# Patient Record
Sex: MALE | Race: WHITE | Employment: OTHER | URBAN - METROPOLITAN AREA
[De-identification: names, ages, dates, MRNs, and addresses within clinical notes are randomized per-mention and may not be internally consistent; named-entity substitution may affect disease eponyms.]

---

## 2018-12-12 ENCOUNTER — OFFICE VISIT (OUTPATIENT)
Dept: PRIMARY CARE CLINIC | Age: 68
End: 2018-12-12

## 2018-12-12 VITALS
RESPIRATION RATE: 18 BRPM | HEART RATE: 89 BPM | SYSTOLIC BLOOD PRESSURE: 115 MMHG | HEIGHT: 70 IN | WEIGHT: 190.6 LBS | OXYGEN SATURATION: 98 % | DIASTOLIC BLOOD PRESSURE: 71 MMHG | TEMPERATURE: 97.8 F | BODY MASS INDEX: 27.29 KG/M2

## 2018-12-12 DIAGNOSIS — J02.0 STREP PHARYNGITIS: ICD-10-CM

## 2018-12-12 DIAGNOSIS — R05.9 COUGH: ICD-10-CM

## 2018-12-12 DIAGNOSIS — J02.9 SORE THROAT: Primary | ICD-10-CM

## 2018-12-12 DIAGNOSIS — J00 ACUTE NASOPHARYNGITIS: ICD-10-CM

## 2018-12-12 LAB
S PYO AG THROAT QL: POSITIVE
VALID INTERNAL CONTROL?: YES

## 2018-12-12 RX ORDER — DICLOFENAC SODIUM 75 MG/1
TABLET, DELAYED RELEASE ORAL
COMMUNITY
Start: 2018-05-29 | End: 2019-02-01

## 2018-12-12 RX ORDER — AMLODIPINE BESYLATE 10 MG/1
10 TABLET ORAL DAILY
COMMUNITY

## 2018-12-12 RX ORDER — LISINOPRIL 20 MG/1
20 TABLET ORAL DAILY
COMMUNITY
Start: 2018-09-15 | End: 2020-01-01

## 2018-12-12 RX ORDER — HYDROCHLOROTHIAZIDE 12.5 MG/1
12.5 TABLET ORAL DAILY
COMMUNITY
End: 2019-01-23 | Stop reason: SDUPTHER

## 2018-12-12 RX ORDER — AMOXICILLIN 875 MG/1
875 TABLET, FILM COATED ORAL 2 TIMES DAILY
Qty: 20 TAB | Refills: 0 | Status: SHIPPED | OUTPATIENT
Start: 2018-12-12 | End: 2018-12-22

## 2018-12-12 RX ORDER — BENZONATATE 200 MG/1
200 CAPSULE ORAL
Qty: 21 CAP | Refills: 0 | Status: SHIPPED | OUTPATIENT
Start: 2018-12-12 | End: 2018-12-19

## 2018-12-12 RX ORDER — FINASTERIDE 5 MG/1
5 TABLET, FILM COATED ORAL DAILY
COMMUNITY
Start: 2018-12-11

## 2018-12-12 RX ORDER — TRAMADOL HYDROCHLORIDE 50 MG/1
50 TABLET ORAL
COMMUNITY
Start: 2017-10-09 | End: 2019-02-01

## 2018-12-12 NOTE — PROGRESS NOTES
Subjective:     Chief Complaint   Patient presents with    Cough     productive cough, yellow in color    Nasal Discharge    Sore Throat     started friday        He  is a 77y.o. year old male who presents today as a new patient for acute care. He walked in for acute care. He already has an established PCP. His medical hx is significant for HTN, AF, Hx of melanoma. Patient reports that for the past 5 days he has been having runny nose, sore throat, coughing up yellow colored phlegm . Reports that his wife was having the same symptoms last week and she is feeling little better now. He denies any fever, chill, soa. He reports that he tried Mucinex and zyrtec for few days but did not seem like help. He reports that his throat hurt especially at night . He reports that cough is intermittent, no chest pain,no  wheezing. A comprehensive review of systems was negative except for that written in the HPI. Objective:     Vitals:    12/12/18 1004   BP: 115/71   Pulse: 89   Resp: 18   Temp: 97.8 °F (36.6 °C)   TempSrc: Oral   SpO2: 98%   Weight: 190 lb 9.6 oz (86.5 kg)   Height: 5' 9.5\" (1.765 m)       Physical Examination: General appearance - alert, well appearing, and in no distress, oriented to person, place, and time and overweight  Mental status - alert, oriented to person, place, and time, normal mood, behavior, speech, dress, motor activity, and thought processes  Ears - bilateral TM's and external ear canals normal  Nose - normal and patent, no erythema, discharge or polyps and normal nontender sinuses  Mouth - mucous membranes moist, pharynx  inflamed  with no exudate. Neck - supple, no significant adenopathy  Chest - clear to auscultation, no wheezes, rales or rhonchi, symmetric air entry  Heart - normal rate, regular rhythm, normal S1, S2, no murmurs, rubs, clicks or gallops.     Allergies   Allergen Reactions    Demerol [Meperidine] Other (comments)     Duplicate, delete    Demerol [Meperidine] Other (comments)     \"passed out\"      Social History     Socioeconomic History    Marital status:      Spouse name: Not on file    Number of children: Not on file    Years of education: Not on file    Highest education level: Not on file   Tobacco Use    Smoking status: Former Smoker     Last attempt to quit: 1988     Years since quittin.9    Smokeless tobacco: Never Used   Substance and Sexual Activity    Alcohol use: Yes     Alcohol/week: 2.0 oz     Types: 4 Glasses of wine per week    Drug use: No      Family History   Problem Relation Age of Onset    Hypertension Father     Dementia Father       Past Surgical History:   Procedure Laterality Date    HX ABDOMINAL WALL DEFECT REPAIR      HX APPENDECTOMY      HX ORTHOPAEDIC  1966    CARTILEDGE REMOVED RIGHT KNEE    HX TONSILLECTOMY      HX UROLOGICAL      CYSTOSCOPY      Past Medical History:   Diagnosis Date    Cancer ([facility] [address]     BLADDER    Hypercholesterolemia     Hypertension     Joint replaced     Sun-damaged skin       Current Outpatient Medications   Medication Sig Dispense Refill    amLODIPine (NORVASC) 10 mg tablet Take  by mouth daily.  rivaroxaban (XARELTO) 20 mg tab tablet Take  by mouth daily.  diclofenac EC (VOLTAREN) 75 mg EC tablet TAKE 1 TABLET TWICE A DAY WITH MEALS      hydroCHLOROthiazide (HYDRODIURIL) 12.5 mg tablet Take 12.5 mg by mouth daily.  finasteride (PROSCAR) 5 mg tablet Take 5 mg by mouth daily.  lisinopril (PRINIVIL, ZESTRIL) 20 mg tablet Take 20 mg by mouth daily.  fenofibrate (TRICOR) 160 mg tablet Take 160 mg by mouth daily.  simvastatin (ZOCOR) 40 mg tablet Take  by mouth nightly.  traMADol (ULTRAM) 50 mg tablet Take 50 mg by mouth.  bupivacaine-EPINEPHrine (MARCAINE-EPINEPHRINE) 0.25 %-1:200,000 soln Mohs surgery 1.5 mL 0        Assessment/ Plan:   Diagnoses and all orders for this visit:    1. Sore throat  -     AMB POC RAPID STREP A is positive.      2. Strep pharyngitis  -    Start  amoxicillin (AMOXIL) 875 mg tablet; Take 1 Tab by mouth two (2) times a day for 10 days. 3. Cough  -    Start  benzonatate (TESSALON) 200 mg capsule; Take 1 Cap by mouth three (3) times daily as needed for Cough for up to 7 days. 4. Acute nasopharyngitis      - Humidifier, mist inhalation, Zicam lozenges. - Tylenol/advil , salt water gurgle. Medication risks/benefits/costs/interactions/alternatives discussed with patient. Advised patient to call back or return to office if symptoms worsen/change/persist. If patient cannot reach us or should anything more severe/urgent arise he/she should proceed directly to the nearest emergency department. Discussed expected course/resolution/complications of diagnosis in detail with patient. Patient given a written after visit summary which includes her diagnoses, current medications and vitals. Patient expressed understanding with the diagnosis and plan. Follow-up Disposition:  Return if symptoms worsen or fail to improve.

## 2019-01-01 ENCOUNTER — HOSPITAL ENCOUNTER (OUTPATIENT)
Dept: CT IMAGING | Age: 69
Discharge: HOME | End: 2019-12-16
Attending: NURSE PRACTITIONER
Payer: MEDICARE

## 2019-01-01 ENCOUNTER — ANESTHESIA (OUTPATIENT)
Dept: SURGERY | Age: 69
End: 2019-01-01
Payer: MEDICARE

## 2019-01-01 ENCOUNTER — TELEPHONE (OUTPATIENT)
Dept: PRIMARY CARE CLINIC | Age: 69
End: 2019-01-01

## 2019-01-01 ENCOUNTER — HOSPITAL ENCOUNTER (OUTPATIENT)
Age: 69
Setting detail: OUTPATIENT SURGERY
Discharge: HOME | End: 2019-12-06
Attending: SURGERY | Admitting: SURGERY
Payer: MEDICARE

## 2019-01-01 ENCOUNTER — TELEPHONE (OUTPATIENT)
Dept: SURGERY | Age: 69
End: 2019-01-01

## 2019-01-01 ENCOUNTER — HOSPITAL ENCOUNTER (OUTPATIENT)
Dept: LAB | Age: 69
Discharge: HOME | End: 2019-11-26

## 2019-01-01 ENCOUNTER — TELEPHONE (OUTPATIENT)
Dept: DERMATOLOGY | Facility: AMBULATORY SURGERY CENTER | Age: 69
End: 2019-01-01

## 2019-01-01 ENCOUNTER — DOCUMENTATION ONLY (OUTPATIENT)
Dept: ONCOLOGY | Age: 69
End: 2019-01-01

## 2019-01-01 ENCOUNTER — HOSPITAL ENCOUNTER (OUTPATIENT)
Dept: LAB | Age: 69
Discharge: HOME | End: 2019-12-16
Payer: MEDICARE

## 2019-01-01 ENCOUNTER — OFFICE VISIT (OUTPATIENT)
Dept: PRIMARY CARE CLINIC | Age: 69
End: 2019-01-01

## 2019-01-01 ENCOUNTER — OFFICE VISIT (OUTPATIENT)
Dept: DERMATOLOGY | Facility: AMBULATORY SURGERY CENTER | Age: 69
End: 2019-01-01

## 2019-01-01 ENCOUNTER — ANESTHESIA EVENT (OUTPATIENT)
Dept: SURGERY | Age: 69
End: 2019-01-01
Payer: MEDICARE

## 2019-01-01 ENCOUNTER — HOSPITAL ENCOUNTER (OUTPATIENT)
Dept: ULTRASOUND IMAGING | Age: 69
Discharge: HOME | End: 2019-11-22
Attending: FAMILY MEDICINE
Payer: MEDICARE

## 2019-01-01 ENCOUNTER — OFFICE VISIT (OUTPATIENT)
Dept: SURGERY | Age: 69
End: 2019-01-01

## 2019-01-01 ENCOUNTER — OFFICE VISIT (OUTPATIENT)
Dept: ONCOLOGY | Age: 69
End: 2019-01-01

## 2019-01-01 ENCOUNTER — HOSPITAL ENCOUNTER (OUTPATIENT)
Dept: MRI IMAGING | Age: 69
Discharge: HOME | End: 2019-12-16
Attending: NURSE PRACTITIONER
Payer: MEDICARE

## 2019-01-01 ENCOUNTER — HOSPITAL ENCOUNTER (OUTPATIENT)
Dept: LAB | Age: 69
Discharge: HOME | End: 2019-05-20

## 2019-01-01 VITALS
SYSTOLIC BLOOD PRESSURE: 138 MMHG | WEIGHT: 194.8 LBS | HEIGHT: 70 IN | SYSTOLIC BLOOD PRESSURE: 130 MMHG | BODY MASS INDEX: 27.2 KG/M2 | RESPIRATION RATE: 17 BRPM | HEIGHT: 70 IN | OXYGEN SATURATION: 97 % | TEMPERATURE: 98.2 F | DIASTOLIC BLOOD PRESSURE: 70 MMHG | DIASTOLIC BLOOD PRESSURE: 76 MMHG | RESPIRATION RATE: 16 BRPM | OXYGEN SATURATION: 96 % | TEMPERATURE: 97.8 F | HEART RATE: 82 BPM | BODY MASS INDEX: 27.89 KG/M2 | WEIGHT: 190 LBS | HEART RATE: 83 BPM

## 2019-01-01 VITALS
BODY MASS INDEX: 27.92 KG/M2 | OXYGEN SATURATION: 93 % | HEIGHT: 70 IN | WEIGHT: 195 LBS | RESPIRATION RATE: 16 BRPM | HEART RATE: 73 BPM | SYSTOLIC BLOOD PRESSURE: 169 MMHG | TEMPERATURE: 97.3 F | DIASTOLIC BLOOD PRESSURE: 79 MMHG

## 2019-01-01 VITALS
HEART RATE: 76 BPM | OXYGEN SATURATION: 97 % | DIASTOLIC BLOOD PRESSURE: 68 MMHG | WEIGHT: 186.4 LBS | BODY MASS INDEX: 27.61 KG/M2 | SYSTOLIC BLOOD PRESSURE: 152 MMHG | TEMPERATURE: 98 F | HEIGHT: 69 IN

## 2019-01-01 VITALS
HEIGHT: 70 IN | DIASTOLIC BLOOD PRESSURE: 76 MMHG | HEART RATE: 83 BPM | HEART RATE: 93 BPM | OXYGEN SATURATION: 98 % | BODY MASS INDEX: 27.77 KG/M2 | WEIGHT: 194 LBS | TEMPERATURE: 97.9 F | RESPIRATION RATE: 15 BRPM | OXYGEN SATURATION: 97 % | TEMPERATURE: 98.1 F | HEIGHT: 70 IN | DIASTOLIC BLOOD PRESSURE: 80 MMHG | WEIGHT: 188.6 LBS | RESPIRATION RATE: 16 BRPM | SYSTOLIC BLOOD PRESSURE: 148 MMHG | BODY MASS INDEX: 27 KG/M2 | SYSTOLIC BLOOD PRESSURE: 132 MMHG

## 2019-01-01 VITALS
HEART RATE: 116 BPM | TEMPERATURE: 97.9 F | WEIGHT: 184 LBS | OXYGEN SATURATION: 96 % | SYSTOLIC BLOOD PRESSURE: 94 MMHG | BODY MASS INDEX: 27.25 KG/M2 | DIASTOLIC BLOOD PRESSURE: 59 MMHG | HEIGHT: 69 IN | RESPIRATION RATE: 16 BRPM

## 2019-01-01 VITALS
WEIGHT: 185.7 LBS | OXYGEN SATURATION: 96 % | BODY MASS INDEX: 27.5 KG/M2 | SYSTOLIC BLOOD PRESSURE: 138 MMHG | BODY MASS INDEX: 27.25 KG/M2 | HEART RATE: 100 BPM | TEMPERATURE: 97.6 F | OXYGEN SATURATION: 95 % | WEIGHT: 187.2 LBS | SYSTOLIC BLOOD PRESSURE: 133 MMHG | TEMPERATURE: 97.9 F | DIASTOLIC BLOOD PRESSURE: 74 MMHG | HEART RATE: 77 BPM | RESPIRATION RATE: 16 BRPM | DIASTOLIC BLOOD PRESSURE: 78 MMHG | HEIGHT: 69 IN

## 2019-01-01 VITALS
SYSTOLIC BLOOD PRESSURE: 135 MMHG | TEMPERATURE: 97.7 F | BODY MASS INDEX: 26.48 KG/M2 | OXYGEN SATURATION: 98 % | RESPIRATION RATE: 16 BRPM | DIASTOLIC BLOOD PRESSURE: 79 MMHG | WEIGHT: 185 LBS | HEIGHT: 70 IN | HEART RATE: 76 BPM

## 2019-01-01 VITALS
OXYGEN SATURATION: 96 % | WEIGHT: 185 LBS | BODY MASS INDEX: 26.48 KG/M2 | SYSTOLIC BLOOD PRESSURE: 140 MMHG | TEMPERATURE: 98.1 F | DIASTOLIC BLOOD PRESSURE: 70 MMHG | HEIGHT: 70 IN | HEART RATE: 100 BPM | RESPIRATION RATE: 16 BRPM

## 2019-01-01 VITALS
BODY MASS INDEX: 26.8 KG/M2 | WEIGHT: 187.17 LBS | DIASTOLIC BLOOD PRESSURE: 71 MMHG | OXYGEN SATURATION: 92 % | HEIGHT: 70 IN | TEMPERATURE: 97.5 F | RESPIRATION RATE: 15 BRPM | SYSTOLIC BLOOD PRESSURE: 135 MMHG | HEART RATE: 76 BPM

## 2019-01-01 DIAGNOSIS — R19.01 ABDOMINAL WALL MASS OF RIGHT UPPER QUADRANT: Primary | ICD-10-CM

## 2019-01-01 DIAGNOSIS — C43.9 RECURRENT SKIN MELANOMA (HCC): ICD-10-CM

## 2019-01-01 DIAGNOSIS — D22.9 MULTIPLE BENIGN NEVI: ICD-10-CM

## 2019-01-01 DIAGNOSIS — L57.0 ACTINIC KERATOSES: ICD-10-CM

## 2019-01-01 DIAGNOSIS — I10 ESSENTIAL HYPERTENSION: Primary | ICD-10-CM

## 2019-01-01 DIAGNOSIS — R19.00 MASS OF SOFT TISSUE OF ABDOMEN: ICD-10-CM

## 2019-01-01 DIAGNOSIS — R19.7 NAUSEA VOMITING AND DIARRHEA: ICD-10-CM

## 2019-01-01 DIAGNOSIS — Z85.828 HISTORY OF NONMELANOMA SKIN CANCER: ICD-10-CM

## 2019-01-01 DIAGNOSIS — R22.2 ABDOMINAL WALL LUMP: ICD-10-CM

## 2019-01-01 DIAGNOSIS — D48.5 NEOPLASM OF UNCERTAIN BEHAVIOR OF SKIN OF BACK: Primary | ICD-10-CM

## 2019-01-01 DIAGNOSIS — I10 ESSENTIAL HYPERTENSION: ICD-10-CM

## 2019-01-01 DIAGNOSIS — R11.2 NAUSEA VOMITING AND DIARRHEA: ICD-10-CM

## 2019-01-01 DIAGNOSIS — C43.9 METASTATIC MELANOMA (HCC): ICD-10-CM

## 2019-01-01 DIAGNOSIS — E78.5 HYPERLIPIDEMIA, UNSPECIFIED HYPERLIPIDEMIA TYPE: ICD-10-CM

## 2019-01-01 DIAGNOSIS — C43.9 MALIGNANT MELANOMA, UNSPECIFIED SITE (HCC): ICD-10-CM

## 2019-01-01 DIAGNOSIS — D18.01 CHERRY ANGIOMA: ICD-10-CM

## 2019-01-01 DIAGNOSIS — Z86.79 HISTORY OF ATRIAL FIBRILLATION: ICD-10-CM

## 2019-01-01 DIAGNOSIS — R22.2 ABDOMINAL WALL LUMP: Primary | ICD-10-CM

## 2019-01-01 DIAGNOSIS — R25.1 TREMOR OF LEFT HAND: ICD-10-CM

## 2019-01-01 DIAGNOSIS — Z85.820 PERSONAL HISTORY OF MALIGNANT MELANOMA OF SKIN: ICD-10-CM

## 2019-01-01 DIAGNOSIS — K52.9 GASTROENTERITIS: Primary | ICD-10-CM

## 2019-01-01 DIAGNOSIS — E78.5 HYPERLIPIDEMIA, UNSPECIFIED HYPERLIPIDEMIA TYPE: Primary | ICD-10-CM

## 2019-01-01 DIAGNOSIS — Z85.828 HISTORY OF SKIN CANCER: Primary | ICD-10-CM

## 2019-01-01 DIAGNOSIS — L82.1 SEBORRHEIC KERATOSES: ICD-10-CM

## 2019-01-01 DIAGNOSIS — D48.5 NEOPLASM OF UNCERTAIN BEHAVIOR OF SKIN OF CHEEK: ICD-10-CM

## 2019-01-01 DIAGNOSIS — E78.2 MIXED HYPERLIPIDEMIA: ICD-10-CM

## 2019-01-01 DIAGNOSIS — C43.9 RECURRENT SKIN MELANOMA (HCC): Primary | ICD-10-CM

## 2019-01-01 DIAGNOSIS — C43.59 MALIGNANT MELANOMA OF TORSO EXCLUDING BREAST (HCC): Primary | ICD-10-CM

## 2019-01-01 DIAGNOSIS — I10 HYPERTENSION, UNSPECIFIED TYPE: ICD-10-CM

## 2019-01-01 DIAGNOSIS — L11.1 GROVER'S DISEASE: ICD-10-CM

## 2019-01-01 DIAGNOSIS — L91.0 HYPERTROPHIC SCAR: ICD-10-CM

## 2019-01-01 DIAGNOSIS — Z85.828 HISTORY OF SCC (SQUAMOUS CELL CARCINOMA) OF SKIN: ICD-10-CM

## 2019-01-01 DIAGNOSIS — D48.5 NEOPLASM OF UNCERTAIN BEHAVIOR OF SKIN OF CHEST: Primary | ICD-10-CM

## 2019-01-01 DIAGNOSIS — Z85.828 HISTORY OF BASAL CELL CARCINOMA (BCC): ICD-10-CM

## 2019-01-01 LAB
ALBUMIN SERPL-MCNC: 4.3 G/DL (ref 3.5–4.8)
ALBUMIN SERPL-MCNC: 4.8 G/DL (ref 3.5–4.8)
ALBUMIN SERPL-MCNC: 4.9 G/DL (ref 3.5–4.8)
ALBUMIN/GLOB SERPL: 2 {RATIO} (ref 1.2–2.2)
ALBUMIN/GLOB SERPL: 2.2 {RATIO} (ref 1.2–2.2)
ALBUMIN/GLOB SERPL: 2.3 {RATIO} (ref 1.2–2.2)
ALP SERPL-CCNC: 50 IU/L (ref 39–117)
ALP SERPL-CCNC: 55 IU/L (ref 39–117)
ALP SERPL-CCNC: 73 IU/L (ref 39–117)
ALT SERPL-CCNC: 21 IU/L (ref 0–44)
ALT SERPL-CCNC: 23 IU/L (ref 0–44)
ALT SERPL-CCNC: 33 IU/L (ref 0–44)
AST SERPL-CCNC: 18 IU/L (ref 0–40)
AST SERPL-CCNC: 25 IU/L (ref 0–40)
AST SERPL-CCNC: 35 IU/L (ref 0–40)
BASOPHILS # BLD AUTO: 0.1 X10E3/UL (ref 0–0.2)
BASOPHILS NFR BLD AUTO: 1 %
BILIRUB SERPL-MCNC: 0.3 MG/DL (ref 0–1.2)
BILIRUB SERPL-MCNC: 0.4 MG/DL (ref 0–1.2)
BILIRUB SERPL-MCNC: 0.5 MG/DL (ref 0–1.2)
BUN SERPL-MCNC: 13 MG/DL (ref 8–27)
BUN SERPL-MCNC: 14 MG/DL (ref 8–27)
BUN SERPL-MCNC: 17 MG/DL (ref 8–27)
BUN SERPL-MCNC: 22 MG/DL (ref 8–27)
BUN/CREAT SERPL: 11 (ref 10–24)
BUN/CREAT SERPL: 11 (ref 10–24)
BUN/CREAT SERPL: 13 (ref 10–24)
BUN/CREAT SERPL: 15 (ref 10–24)
CALCIUM SERPL-MCNC: 10.1 MG/DL (ref 8.6–10.2)
CALCIUM SERPL-MCNC: 9.4 MG/DL (ref 8.6–10.2)
CALCIUM SERPL-MCNC: 9.6 MG/DL (ref 8.6–10.2)
CALCIUM SERPL-MCNC: 9.7 MG/DL (ref 8.6–10.2)
CHLORIDE SERPL-SCNC: 104 MMOL/L (ref 96–106)
CHLORIDE SERPL-SCNC: 104 MMOL/L (ref 96–106)
CHLORIDE SERPL-SCNC: 105 MMOL/L (ref 96–106)
CHLORIDE SERPL-SCNC: 106 MMOL/L (ref 96–106)
CHOLEST SERPL-MCNC: 159 MG/DL (ref 100–199)
CHOLEST SERPL-MCNC: 162 MG/DL (ref 100–199)
CO2 SERPL-SCNC: 18 MMOL/L (ref 20–29)
CO2 SERPL-SCNC: 18 MMOL/L (ref 20–29)
CO2 SERPL-SCNC: 21 MMOL/L (ref 20–29)
CO2 SERPL-SCNC: 21 MMOL/L (ref 20–29)
CREAT SERPL-MCNC: 1.22 MG/DL (ref 0.76–1.27)
CREAT SERPL-MCNC: 1.27 MG/DL (ref 0.76–1.27)
CREAT SERPL-MCNC: 1.31 MG/DL (ref 0.76–1.27)
CREAT SERPL-MCNC: 1.5 MG/DL (ref 0.76–1.27)
EOSINOPHIL # BLD AUTO: 0.2 X10E3/UL (ref 0–0.4)
EOSINOPHIL NFR BLD AUTO: 2 %
ERYTHROCYTE [DISTWIDTH] IN BLOOD BY AUTOMATED COUNT: 12 % (ref 12.3–15.4)
GLOBULIN SER CALC-MCNC: 2 G/DL (ref 1.5–4.5)
GLOBULIN SER CALC-MCNC: 2.1 G/DL (ref 1.5–4.5)
GLOBULIN SER CALC-MCNC: 2.4 G/DL (ref 1.5–4.5)
GLUCOSE SERPL-MCNC: 113 MG/DL (ref 65–99)
GLUCOSE SERPL-MCNC: 87 MG/DL (ref 65–99)
GLUCOSE SERPL-MCNC: 88 MG/DL (ref 65–99)
GLUCOSE SERPL-MCNC: 89 MG/DL (ref 65–99)
HCT VFR BLD AUTO: 39.3 % (ref 37.5–51)
HDLC SERPL-MCNC: 46 MG/DL
HDLC SERPL-MCNC: 50 MG/DL
HGB BLD-MCNC: 13.3 G/DL (ref 13–17.7)
IMM GRANULOCYTES # BLD AUTO: 0.1 X10E3/UL (ref 0–0.1)
IMM GRANULOCYTES NFR BLD AUTO: 1 %
LDH SERPL-CCNC: 148 IU/L (ref 121–224)
LDLC SERPL CALC-MCNC: 67 MG/DL (ref 0–99)
LDLC SERPL CALC-MCNC: 74 MG/DL (ref 0–99)
LYMPHOCYTES # BLD AUTO: 1.7 X10E3/UL (ref 0.7–3.1)
LYMPHOCYTES NFR BLD AUTO: 13 %
MCH RBC QN AUTO: 31.7 PG (ref 26.6–33)
MCHC RBC AUTO-ENTMCNC: 33.8 G/DL (ref 31.5–35.7)
MCV RBC AUTO: 94 FL (ref 79–97)
MONOCYTES # BLD AUTO: 0.9 X10E3/UL (ref 0.1–0.9)
MONOCYTES NFR BLD AUTO: 7 %
NEUTROPHILS # BLD AUTO: 9.6 X10E3/UL (ref 1.4–7)
NEUTROPHILS NFR BLD AUTO: 76 %
PLATELET # BLD AUTO: 410 X10E3/UL (ref 150–450)
POTASSIUM SERPL-SCNC: 4.1 MMOL/L (ref 3.5–5.2)
POTASSIUM SERPL-SCNC: 4.2 MMOL/L (ref 3.5–5.2)
POTASSIUM SERPL-SCNC: 4.4 MMOL/L (ref 3.5–5.2)
POTASSIUM SERPL-SCNC: 4.7 MMOL/L (ref 3.5–5.2)
PROT SERPL-MCNC: 6.3 G/DL (ref 6–8.5)
PROT SERPL-MCNC: 6.9 G/DL (ref 6–8.5)
PROT SERPL-MCNC: 7.3 G/DL (ref 6–8.5)
RBC # BLD AUTO: 4.2 X10E6/UL (ref 4.14–5.8)
SODIUM SERPL-SCNC: 140 MMOL/L (ref 134–144)
SODIUM SERPL-SCNC: 141 MMOL/L (ref 134–144)
TRIGL SERPL-MCNC: 196 MG/DL (ref 0–149)
TRIGL SERPL-MCNC: 223 MG/DL (ref 0–149)
VLDLC SERPL CALC-MCNC: 39 MG/DL (ref 5–40)
VLDLC SERPL CALC-MCNC: 45 MG/DL (ref 5–40)
WBC # BLD AUTO: 12.5 X10E3/UL (ref 3.4–10.8)

## 2019-01-01 PROCEDURE — 76010000138 HC OR TIME 0.5 TO 1 HR: Performed by: SURGERY

## 2019-01-01 PROCEDURE — 70553 MRI BRAIN STEM W/O & W/DYE: CPT

## 2019-01-01 PROCEDURE — 76705 ECHO EXAM OF ABDOMEN: CPT

## 2019-01-01 PROCEDURE — 74011250637 HC RX REV CODE- 250/637: Performed by: SURGERY

## 2019-01-01 PROCEDURE — 77030002933 HC SUT MCRYL J&J -A: Performed by: SURGERY

## 2019-01-01 PROCEDURE — 74011250636 HC RX REV CODE- 250/636: Performed by: NURSE PRACTITIONER

## 2019-01-01 PROCEDURE — 83615 LACTATE (LD) (LDH) ENZYME: CPT

## 2019-01-01 PROCEDURE — 88341 IMHCHEM/IMCYTCHM EA ADD ANTB: CPT

## 2019-01-01 PROCEDURE — 36415 COLL VENOUS BLD VENIPUNCTURE: CPT

## 2019-01-01 PROCEDURE — 85025 COMPLETE CBC W/AUTO DIFF WBC: CPT

## 2019-01-01 PROCEDURE — 77030026438 HC STYL ET INTUB CARD -A: Performed by: NURSE ANESTHETIST, CERTIFIED REGISTERED

## 2019-01-01 PROCEDURE — 76210000006 HC OR PH I REC 0.5 TO 1 HR: Performed by: SURGERY

## 2019-01-01 PROCEDURE — 74011000250 HC RX REV CODE- 250: Performed by: NURSE ANESTHETIST, CERTIFIED REGISTERED

## 2019-01-01 PROCEDURE — 77030040922 HC BLNKT HYPOTHRM STRY -A

## 2019-01-01 PROCEDURE — 76060000033 HC ANESTHESIA 1 TO 1.5 HR: Performed by: SURGERY

## 2019-01-01 PROCEDURE — 80053 COMPREHEN METABOLIC PANEL: CPT

## 2019-01-01 PROCEDURE — 77030018836 HC SOL IRR NACL ICUM -A: Performed by: SURGERY

## 2019-01-01 PROCEDURE — 76210000021 HC REC RM PH II 0.5 TO 1 HR: Performed by: SURGERY

## 2019-01-01 PROCEDURE — 74011250636 HC RX REV CODE- 250/636: Performed by: NURSE ANESTHETIST, CERTIFIED REGISTERED

## 2019-01-01 PROCEDURE — A9575 INJ GADOTERATE MEGLUMI 0.1ML: HCPCS | Performed by: NURSE PRACTITIONER

## 2019-01-01 PROCEDURE — 74011636320 HC RX REV CODE- 636/320: Performed by: RADIOLOGY

## 2019-01-01 PROCEDURE — 77030011640 HC PAD GRND REM COVD -A: Performed by: SURGERY

## 2019-01-01 PROCEDURE — 74011000258 HC RX REV CODE- 258: Performed by: RADIOLOGY

## 2019-01-01 PROCEDURE — 77030002916 HC SUT ETHLN J&J -A: Performed by: SURGERY

## 2019-01-01 PROCEDURE — 74011000250 HC RX REV CODE- 250: Performed by: SURGERY

## 2019-01-01 PROCEDURE — 74177 CT ABD & PELVIS W/CONTRAST: CPT

## 2019-01-01 PROCEDURE — 74011250636 HC RX REV CODE- 250/636: Performed by: SURGERY

## 2019-01-01 PROCEDURE — 77030008684 HC TU ET CUF COVD -B: Performed by: NURSE ANESTHETIST, CERTIFIED REGISTERED

## 2019-01-01 PROCEDURE — 74011250636 HC RX REV CODE- 250/636: Performed by: ANESTHESIOLOGY

## 2019-01-01 PROCEDURE — 88342 IMHCHEM/IMCYTCHM 1ST ANTB: CPT

## 2019-01-01 PROCEDURE — 88305 TISSUE EXAM BY PATHOLOGIST: CPT

## 2019-01-01 PROCEDURE — 71260 CT THORAX DX C+: CPT

## 2019-01-01 PROCEDURE — 77030031139 HC SUT VCRL2 J&J -A: Performed by: SURGERY

## 2019-01-01 PROCEDURE — 88331 PATH CONSLTJ SURG 1 BLK 1SPC: CPT

## 2019-01-01 RX ORDER — METOPROLOL SUCCINATE 50 MG/1
50 TABLET, EXTENDED RELEASE ORAL DAILY
Qty: 90 TAB | Refills: 0 | Status: SHIPPED | OUTPATIENT
Start: 2019-01-01 | End: 2019-01-01

## 2019-01-01 RX ORDER — SODIUM CHLORIDE, SODIUM LACTATE, POTASSIUM CHLORIDE, CALCIUM CHLORIDE 600; 310; 30; 20 MG/100ML; MG/100ML; MG/100ML; MG/100ML
1000 INJECTION, SOLUTION INTRAVENOUS CONTINUOUS
Status: DISCONTINUED | OUTPATIENT
Start: 2019-01-01 | End: 2019-01-01 | Stop reason: HOSPADM

## 2019-01-01 RX ORDER — LIDOCAINE HYDROCHLORIDE 20 MG/ML
INJECTION, SOLUTION EPIDURAL; INFILTRATION; INTRACAUDAL; PERINEURAL AS NEEDED
Status: DISCONTINUED | OUTPATIENT
Start: 2019-01-01 | End: 2019-01-01 | Stop reason: HOSPADM

## 2019-01-01 RX ORDER — SODIUM CHLORIDE, SODIUM LACTATE, POTASSIUM CHLORIDE, CALCIUM CHLORIDE 600; 310; 30; 20 MG/100ML; MG/100ML; MG/100ML; MG/100ML
100 INJECTION, SOLUTION INTRAVENOUS CONTINUOUS
Status: DISCONTINUED | OUTPATIENT
Start: 2019-01-01 | End: 2019-01-01 | Stop reason: HOSPADM

## 2019-01-01 RX ORDER — MIDAZOLAM HYDROCHLORIDE 1 MG/ML
1 INJECTION, SOLUTION INTRAMUSCULAR; INTRAVENOUS AS NEEDED
Status: DISCONTINUED | OUTPATIENT
Start: 2019-01-01 | End: 2019-01-01 | Stop reason: HOSPADM

## 2019-01-01 RX ORDER — CEFAZOLIN SODIUM/WATER 2 G/20 ML
2 SYRINGE (ML) INTRAVENOUS
Status: COMPLETED | OUTPATIENT
Start: 2019-01-01 | End: 2019-01-01

## 2019-01-01 RX ORDER — BUPIVACAINE HYDROCHLORIDE 2.5 MG/ML
30 INJECTION, SOLUTION EPIDURAL; INFILTRATION; INTRACAUDAL ONCE
Status: CANCELLED | OUTPATIENT
Start: 2019-01-01 | End: 2019-01-01

## 2019-01-01 RX ORDER — SIMVASTATIN 40 MG/1
TABLET, FILM COATED ORAL
Qty: 90 TAB | Refills: 1 | Status: SHIPPED | OUTPATIENT
Start: 2019-01-01

## 2019-01-01 RX ORDER — SODIUM CHLORIDE 9 MG/ML
25 INJECTION, SOLUTION INTRAVENOUS CONTINUOUS
Status: DISCONTINUED | OUTPATIENT
Start: 2019-01-01 | End: 2019-01-01 | Stop reason: HOSPADM

## 2019-01-01 RX ORDER — OXYCODONE HYDROCHLORIDE 5 MG/1
5 TABLET ORAL AS NEEDED
Status: DISCONTINUED | OUTPATIENT
Start: 2019-01-01 | End: 2019-01-01 | Stop reason: HOSPADM

## 2019-01-01 RX ORDER — EPINEPHRINE 1 MG/ML
0.3 INJECTION, SOLUTION, CONCENTRATE INTRAVENOUS AS NEEDED
Status: CANCELLED | OUTPATIENT
Start: 2020-01-01

## 2019-01-01 RX ORDER — GLYCOPYRROLATE 0.2 MG/ML
INJECTION INTRAMUSCULAR; INTRAVENOUS AS NEEDED
Status: DISCONTINUED | OUTPATIENT
Start: 2019-01-01 | End: 2019-01-01 | Stop reason: HOSPADM

## 2019-01-01 RX ORDER — LIDOCAINE HYDROCHLORIDE 10 MG/ML
0.1 INJECTION, SOLUTION EPIDURAL; INFILTRATION; INTRACAUDAL; PERINEURAL AS NEEDED
Status: DISCONTINUED | OUTPATIENT
Start: 2019-01-01 | End: 2019-01-01 | Stop reason: HOSPADM

## 2019-01-01 RX ORDER — SODIUM CHLORIDE 9 MG/ML
25 INJECTION, SOLUTION INTRAVENOUS CONTINUOUS
Status: CANCELLED | OUTPATIENT
Start: 2020-01-01

## 2019-01-01 RX ORDER — ROPIVACAINE HYDROCHLORIDE 5 MG/ML
150 INJECTION, SOLUTION EPIDURAL; INFILTRATION; PERINEURAL AS NEEDED
Status: DISCONTINUED | OUTPATIENT
Start: 2019-01-01 | End: 2019-01-01 | Stop reason: HOSPADM

## 2019-01-01 RX ORDER — ONDANSETRON 2 MG/ML
8 INJECTION INTRAMUSCULAR; INTRAVENOUS AS NEEDED
Status: CANCELLED | OUTPATIENT
Start: 2020-01-01

## 2019-01-01 RX ORDER — ONDANSETRON 2 MG/ML
4 INJECTION INTRAMUSCULAR; INTRAVENOUS AS NEEDED
Status: DISCONTINUED | OUTPATIENT
Start: 2019-01-01 | End: 2019-01-01 | Stop reason: HOSPADM

## 2019-01-01 RX ORDER — HYDROCORTISONE SODIUM SUCCINATE 100 MG/2ML
100 INJECTION, POWDER, FOR SOLUTION INTRAMUSCULAR; INTRAVENOUS AS NEEDED
Status: CANCELLED | OUTPATIENT
Start: 2020-01-01

## 2019-01-01 RX ORDER — SODIUM CHLORIDE 9 MG/ML
10 INJECTION INTRAMUSCULAR; INTRAVENOUS; SUBCUTANEOUS AS NEEDED
Status: CANCELLED | OUTPATIENT
Start: 2020-01-01

## 2019-01-01 RX ORDER — ALBUTEROL SULFATE 0.83 MG/ML
2.5 SOLUTION RESPIRATORY (INHALATION) AS NEEDED
Status: CANCELLED
Start: 2020-01-01

## 2019-01-01 RX ORDER — METOPROLOL SUCCINATE 50 MG/1
75 TABLET, EXTENDED RELEASE ORAL DAILY
Qty: 90 TAB | Refills: 0
Start: 2019-01-01 | End: 2019-01-01 | Stop reason: SDUPTHER

## 2019-01-01 RX ORDER — ACETAMINOPHEN 325 MG/1
650 TABLET ORAL ONCE
Status: DISCONTINUED | OUTPATIENT
Start: 2019-01-01 | End: 2019-01-01 | Stop reason: HOSPADM

## 2019-01-01 RX ORDER — TADALAFIL 5 MG/1
5 TABLET ORAL AS NEEDED
COMMUNITY
Start: 2019-01-01 | End: 2020-01-01

## 2019-01-01 RX ORDER — DEXAMETHASONE SODIUM PHOSPHATE 4 MG/ML
INJECTION, SOLUTION INTRA-ARTICULAR; INTRALESIONAL; INTRAMUSCULAR; INTRAVENOUS; SOFT TISSUE AS NEEDED
Status: DISCONTINUED | OUTPATIENT
Start: 2019-01-01 | End: 2019-01-01 | Stop reason: HOSPADM

## 2019-01-01 RX ORDER — MORPHINE SULFATE 2 MG/ML
2 INJECTION, SOLUTION INTRAMUSCULAR; INTRAVENOUS
Status: DISCONTINUED | OUTPATIENT
Start: 2019-01-01 | End: 2019-01-01 | Stop reason: HOSPADM

## 2019-01-01 RX ORDER — MIDAZOLAM HYDROCHLORIDE 1 MG/ML
0.5 INJECTION, SOLUTION INTRAMUSCULAR; INTRAVENOUS
Status: DISCONTINUED | OUTPATIENT
Start: 2019-01-01 | End: 2019-01-01 | Stop reason: HOSPADM

## 2019-01-01 RX ORDER — PHENYLEPHRINE HCL IN 0.9% NACL 0.4MG/10ML
SYRINGE (ML) INTRAVENOUS AS NEEDED
Status: DISCONTINUED | OUTPATIENT
Start: 2019-01-01 | End: 2019-01-01 | Stop reason: HOSPADM

## 2019-01-01 RX ORDER — BUPIVACAINE HYDROCHLORIDE 2.5 MG/ML
INJECTION, SOLUTION EPIDURAL; INFILTRATION; INTRACAUDAL AS NEEDED
Status: DISCONTINUED | OUTPATIENT
Start: 2019-01-01 | End: 2019-01-01 | Stop reason: HOSPADM

## 2019-01-01 RX ORDER — FENTANYL CITRATE 50 UG/ML
50 INJECTION, SOLUTION INTRAMUSCULAR; INTRAVENOUS AS NEEDED
Status: DISCONTINUED | OUTPATIENT
Start: 2019-01-01 | End: 2019-01-01 | Stop reason: HOSPADM

## 2019-01-01 RX ORDER — ONDANSETRON 4 MG/1
4 TABLET, ORALLY DISINTEGRATING ORAL
Qty: 15 TAB | Refills: 0 | Status: ON HOLD | OUTPATIENT
Start: 2019-01-01 | End: 2020-01-01

## 2019-01-01 RX ORDER — TAMSULOSIN HYDROCHLORIDE 0.4 MG/1
0.4 CAPSULE ORAL DAILY
COMMUNITY
Start: 2019-01-01

## 2019-01-01 RX ORDER — SODIUM CHLORIDE 0.9 % (FLUSH) 0.9 %
10 SYRINGE (ML) INJECTION
Status: COMPLETED | OUTPATIENT
Start: 2019-01-01 | End: 2019-01-01

## 2019-01-01 RX ORDER — FENOFIBRATE 160 MG/1
160 TABLET ORAL DAILY
Qty: 90 TAB | Refills: 1 | Status: SHIPPED | OUTPATIENT
Start: 2019-01-01 | End: 2019-01-01

## 2019-01-01 RX ORDER — FENTANYL CITRATE 50 UG/ML
25 INJECTION, SOLUTION INTRAMUSCULAR; INTRAVENOUS
Status: DISCONTINUED | OUTPATIENT
Start: 2019-01-01 | End: 2019-01-01 | Stop reason: HOSPADM

## 2019-01-01 RX ORDER — HEPARIN 100 UNIT/ML
300-500 SYRINGE INTRAVENOUS AS NEEDED
Status: CANCELLED
Start: 2020-01-01

## 2019-01-01 RX ORDER — ACETAMINOPHEN 325 MG/1
650 TABLET ORAL AS NEEDED
Status: CANCELLED
Start: 2020-01-01

## 2019-01-01 RX ORDER — DIPHENHYDRAMINE HYDROCHLORIDE 50 MG/ML
50 INJECTION, SOLUTION INTRAMUSCULAR; INTRAVENOUS AS NEEDED
Status: CANCELLED
Start: 2020-01-01

## 2019-01-01 RX ORDER — SIMVASTATIN 40 MG/1
40 TABLET, FILM COATED ORAL
Qty: 90 TAB | Refills: 0 | OUTPATIENT
Start: 2019-01-01

## 2019-01-01 RX ORDER — FENOFIBRATE 160 MG/1
TABLET ORAL
Qty: 90 TAB | Refills: 1 | Status: SHIPPED | OUTPATIENT
Start: 2019-01-01 | End: 2020-01-01

## 2019-01-01 RX ORDER — BUPIVACAINE HYDROCHLORIDE 2.5 MG/ML
30 INJECTION, SOLUTION EPIDURAL; INFILTRATION; INTRACAUDAL ONCE
Status: DISCONTINUED | OUTPATIENT
Start: 2019-01-01 | End: 2019-01-01 | Stop reason: HOSPADM

## 2019-01-01 RX ORDER — PROPOFOL 10 MG/ML
INJECTION, EMULSION INTRAVENOUS AS NEEDED
Status: DISCONTINUED | OUTPATIENT
Start: 2019-01-01 | End: 2019-01-01 | Stop reason: HOSPADM

## 2019-01-01 RX ORDER — ACETAMINOPHEN 325 MG/1
1000 TABLET ORAL ONCE
Status: CANCELLED | OUTPATIENT
Start: 2019-01-01 | End: 2019-01-01

## 2019-01-01 RX ORDER — METOPROLOL SUCCINATE 50 MG/1
75 TABLET, EXTENDED RELEASE ORAL DAILY
Qty: 135 TAB | Refills: 0 | Status: SHIPPED | OUTPATIENT
Start: 2019-01-01 | End: 2020-01-01

## 2019-01-01 RX ORDER — SODIUM CHLORIDE 0.9 % (FLUSH) 0.9 %
10 SYRINGE (ML) INJECTION AS NEEDED
Status: CANCELLED
Start: 2020-01-01

## 2019-01-01 RX ORDER — MIDAZOLAM HYDROCHLORIDE 1 MG/ML
INJECTION, SOLUTION INTRAMUSCULAR; INTRAVENOUS AS NEEDED
Status: DISCONTINUED | OUTPATIENT
Start: 2019-01-01 | End: 2019-01-01 | Stop reason: HOSPADM

## 2019-01-01 RX ORDER — OXYCODONE HYDROCHLORIDE 5 MG/1
5 TABLET ORAL
Qty: 5 TAB | Refills: 0 | Status: SHIPPED | OUTPATIENT
Start: 2019-01-01 | End: 2019-01-01

## 2019-01-01 RX ORDER — FENTANYL CITRATE 50 UG/ML
INJECTION, SOLUTION INTRAMUSCULAR; INTRAVENOUS AS NEEDED
Status: DISCONTINUED | OUTPATIENT
Start: 2019-01-01 | End: 2019-01-01 | Stop reason: HOSPADM

## 2019-01-01 RX ORDER — NEOSTIGMINE METHYLSULFATE 1 MG/ML
INJECTION INTRAVENOUS AS NEEDED
Status: DISCONTINUED | OUTPATIENT
Start: 2019-01-01 | End: 2019-01-01 | Stop reason: HOSPADM

## 2019-01-01 RX ORDER — METOPROLOL SUCCINATE 50 MG/1
50 TABLET, EXTENDED RELEASE ORAL DAILY
Qty: 90 TAB | Refills: 0 | Status: SHIPPED | OUTPATIENT
Start: 2019-01-01 | End: 2019-01-01 | Stop reason: SDUPTHER

## 2019-01-01 RX ORDER — ROCURONIUM BROMIDE 10 MG/ML
INJECTION, SOLUTION INTRAVENOUS AS NEEDED
Status: DISCONTINUED | OUTPATIENT
Start: 2019-01-01 | End: 2019-01-01 | Stop reason: HOSPADM

## 2019-01-01 RX ORDER — GADOTERATE MEGLUMINE 376.9 MG/ML
17 INJECTION INTRAVENOUS
Status: COMPLETED | OUTPATIENT
Start: 2019-01-01 | End: 2019-01-01

## 2019-01-01 RX ORDER — SUCCINYLCHOLINE CHLORIDE 20 MG/ML
INJECTION INTRAMUSCULAR; INTRAVENOUS AS NEEDED
Status: DISCONTINUED | OUTPATIENT
Start: 2019-01-01 | End: 2019-01-01 | Stop reason: HOSPADM

## 2019-01-01 RX ORDER — EPHEDRINE SULFATE/0.9% NACL/PF 50 MG/5 ML
5 SYRINGE (ML) INTRAVENOUS AS NEEDED
Status: DISCONTINUED | OUTPATIENT
Start: 2019-01-01 | End: 2019-01-01 | Stop reason: HOSPADM

## 2019-01-01 RX ORDER — DIPHENHYDRAMINE HYDROCHLORIDE 50 MG/ML
12.5 INJECTION, SOLUTION INTRAMUSCULAR; INTRAVENOUS AS NEEDED
Status: DISCONTINUED | OUTPATIENT
Start: 2019-01-01 | End: 2019-01-01 | Stop reason: HOSPADM

## 2019-01-01 RX ORDER — FENOFIBRATE 160 MG/1
160 TABLET ORAL DAILY
Qty: 90 TAB | Refills: 0 | OUTPATIENT
Start: 2019-01-01

## 2019-01-01 RX ORDER — SIMVASTATIN 40 MG/1
40 TABLET, FILM COATED ORAL
Qty: 90 TAB | Refills: 1 | Status: SHIPPED | OUTPATIENT
Start: 2019-01-01 | End: 2019-01-01

## 2019-01-01 RX ORDER — HYDROMORPHONE HYDROCHLORIDE 1 MG/ML
0.2 INJECTION, SOLUTION INTRAMUSCULAR; INTRAVENOUS; SUBCUTANEOUS
Status: DISCONTINUED | OUTPATIENT
Start: 2019-01-01 | End: 2019-01-01 | Stop reason: HOSPADM

## 2019-01-01 RX ORDER — ONDANSETRON 2 MG/ML
INJECTION INTRAMUSCULAR; INTRAVENOUS AS NEEDED
Status: DISCONTINUED | OUTPATIENT
Start: 2019-01-01 | End: 2019-01-01 | Stop reason: HOSPADM

## 2019-01-01 RX ORDER — ACETAMINOPHEN 500 MG
1000 TABLET ORAL ONCE
Status: COMPLETED | OUTPATIENT
Start: 2019-01-01 | End: 2019-01-01

## 2019-01-01 RX ADMIN — Medication 2 G: at 13:52

## 2019-01-01 RX ADMIN — Medication 80 MCG: at 14:05

## 2019-01-01 RX ADMIN — Medication 80 MCG: at 13:58

## 2019-01-01 RX ADMIN — GADOTERATE MEGLUMINE 17 ML: 376.9 INJECTION INTRAVENOUS at 18:49

## 2019-01-01 RX ADMIN — Medication 80 MCG: at 13:48

## 2019-01-01 RX ADMIN — ONDANSETRON HYDROCHLORIDE 4 MG: 2 INJECTION, SOLUTION INTRAMUSCULAR; INTRAVENOUS at 13:50

## 2019-01-01 RX ADMIN — LIDOCAINE HYDROCHLORIDE 80 MG: 20 INJECTION, SOLUTION EPIDURAL; INFILTRATION; INTRACAUDAL; PERINEURAL at 13:43

## 2019-01-01 RX ADMIN — PROPOFOL 40 MG: 10 INJECTION, EMULSION INTRAVENOUS at 14:01

## 2019-01-01 RX ADMIN — ROCURONIUM BROMIDE 10 MG: 10 SOLUTION INTRAVENOUS at 13:43

## 2019-01-01 RX ADMIN — GLYCOPYRROLATE 0.4 MG: 0.2 INJECTION, SOLUTION INTRAMUSCULAR; INTRAVENOUS at 14:24

## 2019-01-01 RX ADMIN — FENTANYL CITRATE 50 MCG: 50 INJECTION, SOLUTION INTRAMUSCULAR; INTRAVENOUS at 13:40

## 2019-01-01 RX ADMIN — PHENYLEPHRINE HYDROCHLORIDE 40 MCG/MIN: 10 INJECTION INTRAVENOUS at 14:04

## 2019-01-01 RX ADMIN — NEOSTIGMINE METHYLSULFATE 2 MG: 1 INJECTION, SOLUTION INTRAMUSCULAR; INTRAVENOUS; SUBCUTANEOUS at 14:24

## 2019-01-01 RX ADMIN — ACETAMINOPHEN 1000 MG: 500 TABLET ORAL at 12:08

## 2019-01-01 RX ADMIN — DEXAMETHASONE SODIUM PHOSPHATE 4 MG: 4 INJECTION, SOLUTION INTRAMUSCULAR; INTRAVENOUS at 13:50

## 2019-01-01 RX ADMIN — ROCURONIUM BROMIDE 10 MG: 10 SOLUTION INTRAVENOUS at 14:01

## 2019-01-01 RX ADMIN — FENTANYL CITRATE 50 MCG: 50 INJECTION, SOLUTION INTRAMUSCULAR; INTRAVENOUS at 13:43

## 2019-01-01 RX ADMIN — Medication 120 MCG: at 14:19

## 2019-01-01 RX ADMIN — Medication 80 MCG: at 14:01

## 2019-01-01 RX ADMIN — Medication 80 MCG: at 13:51

## 2019-01-01 RX ADMIN — Medication 80 MCG: at 13:56

## 2019-01-01 RX ADMIN — IOPAMIDOL 100 ML: 755 INJECTION, SOLUTION INTRAVENOUS at 18:03

## 2019-01-01 RX ADMIN — SUCCINYLCHOLINE CHLORIDE 160 MG: 20 INJECTION, SOLUTION INTRAMUSCULAR; INTRAVENOUS at 13:44

## 2019-01-01 RX ADMIN — PROPOFOL 160 MG: 10 INJECTION, EMULSION INTRAVENOUS at 13:43

## 2019-01-01 RX ADMIN — SODIUM CHLORIDE 100 ML: 900 INJECTION, SOLUTION INTRAVENOUS at 18:03

## 2019-01-01 RX ADMIN — Medication 10 ML: at 18:03

## 2019-01-01 RX ADMIN — SODIUM CHLORIDE, POTASSIUM CHLORIDE, SODIUM LACTATE AND CALCIUM CHLORIDE: 600; 310; 30; 20 INJECTION, SOLUTION INTRAVENOUS at 13:30

## 2019-01-01 RX ADMIN — MIDAZOLAM 2 MG: 1 INJECTION INTRAMUSCULAR; INTRAVENOUS at 13:37

## 2019-01-23 ENCOUNTER — OFFICE VISIT (OUTPATIENT)
Dept: PRIMARY CARE CLINIC | Age: 69
End: 2019-01-23

## 2019-01-23 VITALS
DIASTOLIC BLOOD PRESSURE: 85 MMHG | SYSTOLIC BLOOD PRESSURE: 163 MMHG | HEART RATE: 83 BPM | OXYGEN SATURATION: 96 % | TEMPERATURE: 97.7 F | HEIGHT: 70 IN | RESPIRATION RATE: 16 BRPM | WEIGHT: 191.4 LBS | BODY MASS INDEX: 27.4 KG/M2

## 2019-01-23 DIAGNOSIS — I10 ELEVATED BLOOD PRESSURE READING WITH DIAGNOSIS OF HYPERTENSION: ICD-10-CM

## 2019-01-23 DIAGNOSIS — L30.9 DERMATITIS: Primary | ICD-10-CM

## 2019-01-23 RX ORDER — TRIAMCINOLONE ACETONIDE 1 MG/G
OINTMENT TOPICAL 2 TIMES DAILY
Qty: 30 G | Refills: 0 | Status: SHIPPED | OUTPATIENT
Start: 2019-01-23 | End: 2019-03-21 | Stop reason: ALTCHOICE

## 2019-01-23 RX ORDER — HYDROCHLOROTHIAZIDE 25 MG/1
25 TABLET ORAL DAILY
Qty: 30 TAB | Refills: 1 | Status: SHIPPED | OUTPATIENT
Start: 2019-01-23 | End: 2019-03-01 | Stop reason: SDUPTHER

## 2019-01-23 NOTE — PROGRESS NOTES
HPI:     Chief Complaint   Patient presents with    Rash     started 10 days ago, adiel shin, red and raised. No pus, or blisters. Spreaded from left leg to right leg, initally itched but not now, does not hurt        Patient is a 77 y.o. male who presents for acute care visit for evaluation of rash. Has established PCP elsewhere. Reports 10 day history of red rash on bilateral ankles, right greater than left, that is very pruritic. Tried neosporin for 3-4 days, which seemed to worsen rash. Then tried OTC cortisone cream, which did help alleviate itching and redness somewhat. No fevers, chills, tenderness, drainage. Patient denies any new creams, lotions, detergents, soaps. Does report socks are new, but washed before wearing the first time. Denies recent recreation outdoors. Denies family members with similar complaints. Does have dogs in the home, which are regularly treated for fleas. Has not had similar symptoms in the past.      Blood pressure is elevated in the office today. Patient has history of HTN. Compliant with HCTZ 12.5mg, amlodipine 10mg, and lisinopril 20mg daily. Took meds this morning. He feels well and denies chest pain, palpitations, dyspnea, headache, blurred visions. Reports BP at home usually in the 140s/80-90. There is no problem list on file for this patient. Current Outpatient Medications   Medication Sig Dispense Refill    triamcinolone acetonide (KENALOG) 0.1 % ointment Apply  to affected area two (2) times a day. use thin layer 30 g 0    hydroCHLOROthiazide (HYDRODIURIL) 25 mg tablet Take 1 Tab by mouth daily. 30 Tab 1    amLODIPine (NORVASC) 10 mg tablet Take  by mouth daily.  rivaroxaban (XARELTO) 20 mg tab tablet Take  by mouth daily.  diclofenac EC (VOLTAREN) 75 mg EC tablet TAKE 1 TABLET TWICE A DAY WITH MEALS      finasteride (PROSCAR) 5 mg tablet Take 5 mg by mouth daily.       lisinopril (PRINIVIL, ZESTRIL) 20 mg tablet Take 20 mg by mouth daily.      fenofibrate (TRICOR) 160 mg tablet Take 160 mg by mouth daily.  simvastatin (ZOCOR) 40 mg tablet Take  by mouth nightly.  traMADol (ULTRAM) 50 mg tablet Take 50 mg by mouth.  bupivacaine-EPINEPHrine (MARCAINE-EPINEPHRINE) 0.25 %-1:200,000 soln Mohs surgery 1.5 mL 0     Allergies   Allergen Reactions    Demerol [Meperidine] Other (comments)     Duplicate, delete    Demerol [Meperidine] Other (comments)     \"passed out\"     Past Medical History:   Diagnosis Date    Cancer ([facility] [address]     BLADDER    Hypercholesterolemia     Hypertension     Joint replaced     Sun-damaged skin           ROS:   Pertinent items are noted in HPI. Objective:     Vitals:    01/23/19 0945 01/23/19 1028   BP: 165/85 163/85   Pulse: 83    Resp: 16    Temp: 97.7 °F (36.5 °C)    TempSrc: Oral    SpO2: 96%    Weight: 191 lb 6.4 oz (86.8 kg)    Height: 5' 9.5\" (1.765 m)         Vitals and Nurse Documentation reviewed. Physical Examination:   General appearance - alert, well appearing, and in no distress  Mental status - alert, oriented to person, place, and time, normal mood, behavior, speech, dress, motor activity, and thought processes  Chest - clear to auscultation, no wheezes, rales or rhonchi, symmetric air entry  Heart - normal rate, regular rhythm, normal S1, S2, no murmurs, rubs, clicks or gallops  Neurological - alert, oriented, normal speech, no focal findings or movement disorder noted  Extremities - peripheral pulses normal, no pedal edema, no clubbing or cyanosis  Skin - erythematous, macular localized rash to ankle, lower leg in distribution of high socks, R>L. No rash noted elsewhere on body. Assessment/ Plan:   Diagnoses and all orders for this visit:    1. Dermatitis  -     Start triamcinolone acetonide (KENALOG) 0.1 % ointment; Apply  to affected area two (2) times a day. use thin layer.   Medication benefits, risks, indication, dosage, potential adverse effects, and alternate medication options were discussed with patient who expressed understanding.   -      Hx of new socks, but washed prior to wearing. Advised switching to unscented, gentle laundry detergent.   -      Recommended daily moisturizer, cetaphil or cerave. Do not use neosporin. 2. Elevated blood pressure reading with diagnosis of hypertension  -     Blood pressure is elevated today, similar on repeat. Patient reports taking meds this morning. Has been 140s/80-90s at home. Asymptomatic  -     Will increase HCTZ to 25mg daily. Start hydroCHLOROthiazide (HYDRODIURIL) 25 mg tablet; Take 1 Tab by mouth daily. Medication benefits, risks, indication, dosage, potential adverse effects, and alternate medication options were discussed with patient who expressed understanding.   -     Continue lisinopril, amlodipine as prescribed. -     METABOLIC PANEL, BASIC        -     Patient should follow-up with PCP in 2 weeks for BP check      Discussed with Dr. Tamara Khan who agrees with treatment plan. Follow-up Disposition:  Return in about 10 days (around 2/2/2019) for rash . I have discussed the diagnosis with the patient and the intended plan as seen in the above orders. Advised prompt follow-up if symptoms worsen or fail to improve and symptoms that would warrant emergent evaluation in ED. The patient has received an after-visit summary and questions were answered concerning future plans. I have discussed medication side effects and warnings with the patient as well. Patient expressed understanding and is in agreement with the diagnosis and plan.

## 2019-01-23 NOTE — PATIENT INSTRUCTIONS
Dermatitis: Care Instructions  Your Care Instructions  Dermatitis is the general name used for any rash or inflammation of the skin. Different kinds of dermatitis cause different kinds of rashes. Common causes of a rash include new medicines, plants (such as poison oak or poison ivy), heat, and stress. Certain illnesses can also cause a rash. An allergic reaction to something that touches your skin, such as latex, nickel, or poison ivy, is called contact dermatitis. Contact dermatitis may also be caused by something that irritates the skin, such as bleach, a chemical, or soap. These types of rashes cannot be spread from person to person. How long your rash will last depends on what caused it. Rashes may last a few days or months. Follow-up care is a key part of your treatment and safety. Be sure to make and go to all appointments, and call your doctor if you are having problems. It's also a good idea to know your test results and keep a list of the medicines you take. How can you care for yourself at home? · Do not scratch the rash. Cut your nails short, and file them smooth. Or wear gloves if this helps keep you from scratching. · Wash the area with water only. Pat dry. · Put cold, wet cloths on the rash to reduce itching. · Keep cool, and stay out of the sun. · Leave the rash open to the air as much as possible. · If the rash itches, use hydrocortisone cream. Follow the directions on the label. Calamine lotion may help for plant rashes. · Take an over-the-counter antihistamine, such as diphenhydramine (Benadryl) or loratadine (Claritin), to help calm the itching. Read and follow all instructions on the label. · If your doctor prescribed a cream, use it as directed. If your doctor prescribed medicine, take it exactly as directed. When should you call for help?   Call your doctor now or seek immediate medical care if:    · You have symptoms of infection, such as:  ? Increased pain, swelling, warmth, or redness. ? Red streaks leading from the area. ? Pus draining from the area. ? A fever.     · You have joint pain along with the rash.    Watch closely for changes in your health, and be sure to contact your doctor if:    · Your rash is changing or getting worse.     · You are not getting better as expected. Where can you learn more? Go to http://dheeraj-sarai.info/. Enter [id number] in the search box to learn more about \"Dermatitis: Care Instructions. \"  Current as of: April 17, 2018  Content Version: 11.9  © 9954-7704 [employer]. Care instructions adapted under license by [employer] (which disclaims liability or warranty for this information). If you have questions about a medical condition or this instruction, always ask your healthcare professional. [address] any warranty or liability for your use of this information. DASH Diet: Care Instructions  Your Care Instructions    The DASH diet is an eating plan that can help lower your blood pressure. DASH stands for Dietary Approaches to Stop Hypertension. Hypertension is high blood pressure. The DASH diet focuses on eating foods that are high in calcium, potassium, and magnesium. These nutrients can lower blood pressure. The foods that are highest in these nutrients are fruits, vegetables, low-fat dairy products, nuts, seeds, and legumes. But taking calcium, potassium, and magnesium supplements instead of eating foods that are high in those nutrients does not have the same effect. The DASH diet also includes whole grains, fish, and poultry. The DASH diet is one of several lifestyle changes your doctor may recommend to lower your high blood pressure. Your doctor may also want you to decrease the amount of sodium in your diet. Lowering sodium while following the DASH diet can lower blood pressure even further than just the DASH diet alone.   Follow-up care is a key part of your treatment and safety. Be sure to make and go to all appointments, and call your doctor if you are having problems. It's also a good idea to know your test results and keep a list of the medicines you take. How can you care for yourself at home? Following the DASH diet  · Eat 4 to 5 servings of fruit each day. A serving is 1 medium-sized piece of fruit, ½ cup chopped or canned fruit, 1/4 cup dried fruit, or 4 ounces (½ cup) of fruit juice. Choose fruit more often than fruit juice. · Eat 4 to 5 servings of vegetables each day. A serving is 1 cup of lettuce or raw leafy vegetables, ½ cup of chopped or cooked vegetables, or 4 ounces (½ cup) of vegetable juice. Choose vegetables more often than vegetable juice. · Get 2 to 3 servings of low-fat and fat-free dairy each day. A serving is 8 ounces of milk, 1 cup of yogurt, or 1 ½ ounces of cheese. · Eat 6 to 8 servings of grains each day. A serving is 1 slice of bread, 1 ounce of dry cereal, or ½ cup of cooked rice, pasta, or cooked cereal. Try to choose whole-grain products as much as possible. · Limit lean meat, poultry, and fish to 2 servings each day. A serving is 3 ounces, about the size of a deck of cards. · Eat 4 to 5 servings of nuts, seeds, and legumes (cooked dried beans, lentils, and split peas) each week. A serving is 1/3 cup of nuts, 2 tablespoons of seeds, or ½ cup of cooked beans or peas. · Limit fats and oils to 2 to 3 servings each day. A serving is 1 teaspoon of vegetable oil or 2 tablespoons of salad dressing. · Limit sweets and added sugars to 5 servings or less a week. A serving is 1 tablespoon jelly or jam, ½ cup sorbet, or 1 cup of lemonade. · Eat less than 2,300 milligrams (mg) of sodium a day. If you limit your sodium to 1,500 mg a day, you can lower your blood pressure even more. Tips for success  · Start small. Do not try to make dramatic changes to your diet all at once.  You might feel that you are missing out on your favorite foods and then be more likely to not follow the plan. Make small changes, and stick with them. Once those changes become habit, add a few more changes. · Try some of the following:  ? Make it a goal to eat a fruit or vegetable at every meal and at snacks. This will make it easy to get the recommended amount of fruits and vegetables each day. ? Try yogurt topped with fruit and nuts for a snack or healthy dessert. ? Add lettuce, tomato, cucumber, and onion to sandwiches. ? Combine a ready-made pizza crust with low-fat mozzarella cheese and lots of vegetable toppings. Try using tomatoes, squash, spinach, broccoli, carrots, cauliflower, and onions. ? Have a variety of cut-up vegetables with a low-fat dip as an appetizer instead of chips and dip. ? Sprinkle sunflower seeds or chopped almonds over salads. Or try adding chopped walnuts or almonds to cooked vegetables. ? Try some vegetarian meals using beans and peas. Add garbanzo or kidney beans to salads. Make burritos and tacos with mashed johnson beans or black beans. Where can you learn more? Go to http://dheeraj-sarai.info/. Enter R837 in the search box to learn more about \"DASH Diet: Care Instructions. \"  Current as of: July 22, 2018  Content Version: 11.9  © 9633-8229 [employer]. Care instructions adapted under license by [employer] (which disclaims liability or warranty for this information). If you have questions about a medical condition or this instruction, always ask your healthcare professional. [place] [address] any warranty or liability for your use of this information.

## 2019-01-23 NOTE — PROGRESS NOTES
Chief Complaint   Patient presents with    Rash     started 10 days ago, adiel shin, red and raised. No pus, or blisters. Spreaded from left leg to right leg, initally itched but not now, does not hurt     1. Have you been to the ER, urgent care clinic since your last visit? Hospitalized since your last visit? No    2. Have you seen or consulted any other health care providers outside of the [address] [place] [address] since your last visit? Include any pap smears or colon screening.  No

## 2019-01-24 ENCOUNTER — TELEPHONE (OUTPATIENT)
Dept: PRIMARY CARE CLINIC | Age: 69
End: 2019-01-24

## 2019-01-24 LAB
BUN SERPL-MCNC: 23 MG/DL (ref 8–27)
BUN/CREAT SERPL: 17 (ref 10–24)
CALCIUM SERPL-MCNC: 10 MG/DL (ref 8.6–10.2)
CHLORIDE SERPL-SCNC: 103 MMOL/L (ref 96–106)
CO2 SERPL-SCNC: 20 MMOL/L (ref 20–29)
CREAT SERPL-MCNC: 1.37 MG/DL (ref 0.76–1.27)
GLUCOSE SERPL-MCNC: 103 MG/DL (ref 65–99)
POTASSIUM SERPL-SCNC: 4.1 MMOL/L (ref 3.5–5.2)
SODIUM SERPL-SCNC: 140 MMOL/L (ref 134–144)

## 2019-01-24 NOTE — TELEPHONE ENCOUNTER
I have spoken with pt and discussed results. Pt states he does not have past decreased kidney function that he knows of. Pt did state on the phone that he wanted to establish care here so I have set him up for 2/1/18 @9:40am for his CPE and est care.

## 2019-01-24 NOTE — PROGRESS NOTES
Attempted to reach patient to discuss results. No answer, LVM to return call to discuss. Kidney function slightly decreased, nothing concerning at this point. Does patient have history of this? Patient should discuss with PCP if not planning to establish here.

## 2019-01-24 NOTE — TELEPHONE ENCOUNTER
----- Message from Isa Downing NP sent at 1/24/2019  8:51 AM EST -----  Attempted to reach patient to discuss results. No answer, LVM to return call to discuss. Kidney function slightly decreased, nothing concerning at this point. Does patient have history of this? Patient should discuss with PCP if not planning to establish here.

## 2019-02-01 ENCOUNTER — OFFICE VISIT (OUTPATIENT)
Dept: PRIMARY CARE CLINIC | Age: 69
End: 2019-02-01

## 2019-02-01 VITALS
RESPIRATION RATE: 16 BRPM | HEART RATE: 88 BPM | HEIGHT: 70 IN | TEMPERATURE: 97.9 F | DIASTOLIC BLOOD PRESSURE: 86 MMHG | WEIGHT: 189 LBS | SYSTOLIC BLOOD PRESSURE: 141 MMHG | OXYGEN SATURATION: 97 % | BODY MASS INDEX: 27.06 KG/M2

## 2019-02-01 DIAGNOSIS — Z11.59 ENCOUNTER FOR HEPATITIS C SCREENING TEST FOR LOW RISK PATIENT: ICD-10-CM

## 2019-02-01 DIAGNOSIS — I10 ESSENTIAL HYPERTENSION: Primary | ICD-10-CM

## 2019-02-01 DIAGNOSIS — E78.5 HYPERLIPIDEMIA, UNSPECIFIED HYPERLIPIDEMIA TYPE: ICD-10-CM

## 2019-02-01 DIAGNOSIS — Z85.828 HISTORY OF SKIN CANCER: ICD-10-CM

## 2019-02-01 DIAGNOSIS — Z12.11 SCREENING FOR COLON CANCER: ICD-10-CM

## 2019-02-01 DIAGNOSIS — I48.91 ATRIAL FIBRILLATION, UNSPECIFIED TYPE (HCC): ICD-10-CM

## 2019-02-01 DIAGNOSIS — Z23 ENCOUNTER FOR IMMUNIZATION: ICD-10-CM

## 2019-02-01 PROBLEM — Z85.51 HISTORY OF BLADDER CANCER: Status: ACTIVE | Noted: 2019-02-01

## 2019-02-01 NOTE — PATIENT INSTRUCTIONS
DASH Diet: Care Instructions Your Care Instructions The DASH diet is an eating plan that can help lower your blood pressure. DASH stands for Dietary Approaches to Stop Hypertension. Hypertension is high blood pressure. The DASH diet focuses on eating foods that are high in calcium, potassium, and magnesium. These nutrients can lower blood pressure. The foods that are highest in these nutrients are fruits, vegetables, low-fat dairy products, nuts, seeds, and legumes. But taking calcium, potassium, and magnesium supplements instead of eating foods that are high in those nutrients does not have the same effect. The DASH diet also includes whole grains, fish, and poultry. The DASH diet is one of several lifestyle changes your doctor may recommend to lower your high blood pressure. Your doctor may also want you to decrease the amount of sodium in your diet. Lowering sodium while following the DASH diet can lower blood pressure even further than just the DASH diet alone. Follow-up care is a key part of your treatment and safety. Be sure to make and go to all appointments, and call your doctor if you are having problems. It's also a good idea to know your test results and keep a list of the medicines you take. How can you care for yourself at home? Following the DASH diet · Eat 4 to 5 servings of fruit each day. A serving is 1 medium-sized piece of fruit, ½ cup chopped or canned fruit, 1/4 cup dried fruit, or 4 ounces (½ cup) of fruit juice. Choose fruit more often than fruit juice. · Eat 4 to 5 servings of vegetables each day. A serving is 1 cup of lettuce or raw leafy vegetables, ½ cup of chopped or cooked vegetables, or 4 ounces (½ cup) of vegetable juice. Choose vegetables more often than vegetable juice. · Get 2 to 3 servings of low-fat and fat-free dairy each day. A serving is 8 ounces of milk, 1 cup of yogurt, or 1 ½ ounces of cheese. · Eat 6 to 8 servings of grains each day. A serving is 1 slice of bread, 1 ounce of dry cereal, or ½ cup of cooked rice, pasta, or cooked cereal. Try to choose whole-grain products as much as possible. · Limit lean meat, poultry, and fish to 2 servings each day. A serving is 3 ounces, about the size of a deck of cards. · Eat 4 to 5 servings of nuts, seeds, and legumes (cooked dried beans, lentils, and split peas) each week. A serving is 1/3 cup of nuts, 2 tablespoons of seeds, or ½ cup of cooked beans or peas. · Limit fats and oils to 2 to 3 servings each day. A serving is 1 teaspoon of vegetable oil or 2 tablespoons of salad dressing. · Limit sweets and added sugars to 5 servings or less a week. A serving is 1 tablespoon jelly or jam, ½ cup sorbet, or 1 cup of lemonade. · Eat less than 2,300 milligrams (mg) of sodium a day. If you limit your sodium to 1,500 mg a day, you can lower your blood pressure even more. Tips for success · Start small. Do not try to make dramatic changes to your diet all at once. You might feel that you are missing out on your favorite foods and then be more likely to not follow the plan. Make small changes, and stick with them. Once those changes become habit, add a few more changes. · Try some of the following: ? Make it a goal to eat a fruit or vegetable at every meal and at snacks. This will make it easy to get the recommended amount of fruits and vegetables each day. ? Try yogurt topped with fruit and nuts for a snack or healthy dessert. ? Add lettuce, tomato, cucumber, and onion to sandwiches. ? Combine a ready-made pizza crust with low-fat mozzarella cheese and lots of vegetable toppings. Try using tomatoes, squash, spinach, broccoli, carrots, cauliflower, and onions. ? Have a variety of cut-up vegetables with a low-fat dip as an appetizer instead of chips and dip. ? Sprinkle sunflower seeds or chopped almonds over salads.  Or try adding chopped walnuts or almonds to cooked vegetables. ? Try some vegetarian meals using beans and peas. Add garbanzo or kidney beans to salads. Make burritos and tacos with mashed johnson beans or black beans. Where can you learn more? Go to http://dheeraj-sarai.info/. Enter T689 in the search box to learn more about \"DASH Diet: Care Instructions. \" Current as of: 2018 Content Version: 11.9 © 8634-7486 [employer]. Care instructions adapted under license by [employer] (which disclaims liability or warranty for this information). If you have questions about a medical condition or this instruction, always ask your healthcare professional. [address] any warranty or liability for your use of this information. Tdap (Tetanus, Diphtheria, Pertussis) Vaccine: What You Need to Know Why get vaccinated? Tetanus, diphtheria, and pertussis are very serious diseases. Tdap vaccine can protect us from these diseases. And Tdap vaccine given to pregnant women can protect  babies against pertussis. Tetanus (lockjaw) is rare in the [facility] today. It causes painful muscle tightening and stiffness, usually all over the body. · It can lead to tightening of muscles in the head and neck so you can't open your mouth, swallow, or sometimes even breathe. Tetanus kills about 1 out of 10 people who are infected even after receiving the best medical care. Diphtheria is also rare in the [place] today. It can cause a thick coating to form in the back of the throat. · It can lead to breathing problems, heart failure, paralysis, and death. Pertussis (whooping cough) causes severe coughing spells, which can cause difficulty breathing, vomiting, and disturbed sleep. · It can also lead to weight loss, incontinence, and rib fractures.  Up to 2 in 100 adolescents and 5 in 100 adults with pertussis are hospitalized or have complications, which could include pneumonia or death. These diseases are caused by bacteria. Diphtheria and pertussis are spread from person to person through secretions from coughing or sneezing. Tetanus enters the body through cuts, scratches, or wounds. Before vaccines, as many as 200,000 cases of diphtheria, 200,000 cases of pertussis, and hundreds of cases of tetanus were reported in the [place] each year. Since vaccination began, reports of cases for tetanus and diphtheria have dropped by about 99% and for pertussis by about 80%. Tdap vaccine The Tdap vaccine can protect adolescents and adults from tetanus, diphtheria, and pertussis. One dose of Tdap is routinely given at age 6 or 15. People who did not get Tdap at that age should get it as soon as possible. Tdap is especially important for health care professionals and anyone having close contact with a baby younger than 12 months. Pregnant women should get a dose of Tdap during every pregnancy, to protect the  from pertussis. Infants are most at risk for severe, life-threatening complications from pertussis. Another vaccine, called Td, protects against tetanus and diphtheria, but not pertussis. A Td booster should be given every 10 years. Tdap may be given as one of these boosters if you have never gotten Tdap before. Tdap may also be given after a severe cut or burn to prevent tetanus infection. Your doctor or the person giving you the vaccine can give you more information. Tdap may safely be given at the same time as other vaccines. Some people should not get this vaccine · A person who has ever had a life-threatening allergic reaction after a previous dose of any diphtheria-, tetanus-, or pertussis-containing vaccine, OR has a severe allergy to any part of this vaccine, should not get Tdap vaccine. Tell the person giving the vaccine about any severe allergies. · Anyone who had coma or long repeated seizures within 7 days after a childhood dose of DTP or DTaP, or a previous dose of Tdap, should not get Tdap, unless a cause other than the vaccine was found. They can still get Td. · Talk to your doctor if you: 
? Have seizures or another nervous system problem. ? Had severe pain or swelling after any vaccine containing diphtheria, tetanus, or pertussis. ? Ever had a condition called Guillain-Barré Syndrome (GBS). ? Aren't feeling well on the day the shot is scheduled. Risks With any medicine, including vaccines, there is a chance of side effects. These are usually mild and go away on their own. Serious reactions are also possible but are rare. Most people who get Tdap vaccine do not have any problems with it. Mild problems following Tdap 
(Did not interfere with activities) · Pain where the shot was given (about 3 in 4 adolescents or 2 in 3 adults) · Redness or swelling where the shot was given (about 1 person in 5) · Mild fever of at least 100.4°F (up to about 1 in 25 adolescents or 1 in 100 adults) · Headache (about 3 or 4 people in 10) · Tiredness (about 1 person in 3 or 4) · Nausea, vomiting, diarrhea, stomachache (up to 1 in 4 adolescents or 1 in 10 adults) · Chills, sore joints (about 1 person in 10) · Body aches (about 1 person in 3 or 4) · Rash, swollen glands (uncommon) Moderate problems following Tdap (Interfered with activities, but did not require medical attention) · Pain where the shot was given (up to 1 in 5 or 6) · Redness or swelling where the shot was given (up to about 1 in 16 adolescents or 1 in 12 adults) · Fever over 102°F (about 1 in 100 adolescents or 1 in 250 adults) · Headache (about 1 in 7 adolescents or 1 in 10 adults) · Nausea, vomiting, diarrhea, stomachache (up to 1 to 3 people in 100) · Swelling of the entire arm where the shot was given (up to about 1 in 500) Severe problems following Tdap (Unable to perform usual activities; required medical attention) · Swelling, severe pain, bleeding and redness in the arm where the shot was given (rare) Problems that could happen after any vaccine: · People sometimes faint after a medical procedure, including vaccination. Sitting or lying down for about 15 minutes can help prevent fainting, and injuries caused by a fall. Tell your doctor if you feel dizzy or have vision changes or ringing in the ears. · Some people get severe pain in the shoulder and have difficulty moving the arm where a shot was given. This happens very rarely. · Any medication can cause a severe allergic reaction. Such reactions from a vaccine are very rare, estimated at fewer than 1 in a million doses, and would happen within a few minutes to a few hours after the vaccination. As with any medicine, there is a very remote chance of a vaccine causing a serious injury or death. The safety of vaccines is always being monitored. For more information, visit: www.cdc.gov/vaccinesafety. What if there is a serious problem? What should I look for? · Look for anything that concerns you, such as signs of a severe allergic reaction, very high fever, or unusual behavior. Signs of a severe allergic reaction can include hives, swelling of the face and throat, difficulty breathing, a fast heartbeat, dizziness, and weakness. These would usually start a few minutes to a few hours after the vaccination. What should I do? · If you think it is a severe allergic reaction or other emergency that can't wait, call 9-1-1 or get the person to the nearest hospital. Otherwise, call your doctor. · Afterward, the reaction should be reported to the Vaccine Adverse Event Reporting System (VAERS). Your doctor might file this report, or you can do it yourself through the VAERS web site at www.vaers. [facility].gov, or by calling 1-236.660.5924. VAERS does not give medical advice. The National Vaccine Injury Compensation Program 
The National Vaccine Injury Compensation Program (VICP) is a federal program that was created to compensate people who may have been injured by certain vaccines. Persons who believe they may have been injured by a vaccine can learn about the program and about filing a claim by calling 5-362.398.6513 or visiting the [address] website at www.[facility].gov/vaccinecompensation. There is a time limit to file a claim for compensation. How can I learn more? · Ask your doctor. He or she can give you the vaccine package insert or suggest other sources of information. · Call your local or state health department. · Contact the Centers for Disease Control and Prevention (CDC): 
? Call 7-860.747.8381 (8-976-PUN-INFO) or 
? Visit CDC's website at www.cdc.gov/vaccines Vaccine Information Statement (Interim) Tdap Vaccine 
(2/24/15) 42 Doris Sams 607BC-30 [facility] and [employer] Centers for Disease Control and Prevention Many Vaccine Information Statements are available in [place]ian and other languages. See www.immunize.org/vis. Muchas hojas de información sobre vacunas están disponibles en español y en otros idiomas. Visite www.immunize.org/vis. Care instructions adapted under license by [employer] (which disclaims liability or warranty for this information). If you have questions about a medical condition or this instruction, always ask your healthcare professional. [place] [address] any warranty or liability for your use of this information.

## 2019-02-01 NOTE — PROGRESS NOTES
HPI:  
 
Chief Complaint Patient presents with [address] [place] [address] Patient is a 77 y.o. male with significant history of HTN, HLD, afib, bladder cancer, skin cancer who presents to [facility]. Rash from last visit has completely resolved. Feels well today and has no concerns or complaints. HTN - diagnosed around 10 years ago. Complaint with HCTZ 25mg, amlodipine 10mg, and lisinopril 20mg. BP was elevated during acute care visit last week. HCTZ was increased to 25mg. Tolerating meds well, no side effects. Patient reports his BP normally runs 140s/80s. Has not been 120-130 range \"for many years. \"  Patient feels well and denies chest pain, palpitations, dyspnea, peripheral edema, dizziness, headache, blurred vision. BP elevated at start of visit 154/79, 141/86 on repeat. Hx Afib - reports onset of afib in early 2018 with subsequent ablation. Has been on xarelto since then. Doing well, denies chest pain, palpitations, dizziness. Sees cardiology Dr. Prasad Mrarero of [facility] every 3-6 months. Has appointment scheduled in March. HLD - has been stable with simvastatin 40mg and fenofibrate 160mg daily for the past several years. Denies medication side effect, muscle pain, dark urine. Hx bladder cancer - in the early 1990s. Follows with urology every 3-6 months. Also with hx BPH, on proscar. Hx skin cancer - reports some \"spots on his face\" and melanoma on his abdomen. Has been following with dermatology every 3 months, but dermatologist recently retired. He would like referral.  
 
Patient reports history of right knee replacement in 2010, doing well. Has appointment for April 2nd with Dr. Rosalba Howe for left knee replacement. Has appointment with cardiologist for pre-op clearance scheduled. Patient Active Problem List  
Diagnosis Code  Essential hypertension I10  
 Hyperlipidemia E78.5  Atrial fibrillation ([address] I48.91  
  History of skin cancer M39.612  BMI 27.0-27.9,adult Z68.27  
 History of bladder cancer Z85.51 Current Outpatient Medications Medication Sig Dispense Refill  triamcinolone acetonide (KENALOG) 0.1 % ointment Apply  to affected area two (2) times a day. use thin layer 30 g 0  
 hydroCHLOROthiazide (HYDRODIURIL) 25 mg tablet Take 1 Tab by mouth daily. 30 Tab 1  
 amLODIPine (NORVASC) 10 mg tablet Take  by mouth daily.  rivaroxaban (XARELTO) 20 mg tab tablet Take  by mouth daily.  finasteride (PROSCAR) 5 mg tablet Take 5 mg by mouth daily.  lisinopril (PRINIVIL, ZESTRIL) 20 mg tablet Take 20 mg by mouth daily.  fenofibrate (TRICOR) 160 mg tablet Take 160 mg by mouth daily.  simvastatin (ZOCOR) 40 mg tablet Take  by mouth nightly. Allergies Allergen Reactions  Demerol [Meperidine] Other (comments) Duplicate, delete  Demerol [Meperidine] Other (comments) \"passed out\" Past Medical History:  
Diagnosis Date  Atrial fibrillation ([facility] [address]  BPH (benign prostatic hyperplasia)  Cancer ([facility] [address] early 36s BLADDER  
 Hypercholesterolemia  Hypertension  Joint replaced 2011  
 right knee  Skin cancer  Sun-damaged skin Past Surgical History:  
Procedure Laterality Date  HX AFIB ABLATION  2018  HX APPENDECTOMY [address]  HX KNEE REPLACEMENT  2010  
 right  HX MALIGNANT SKIN LESION EXCISION    
 HX TONSILLECTOMY  HX UROLOGICAL CYSTOSCOPY Family History Problem Relation Age of Onset  Hypertension Father  Dementia Father Social History Tobacco Use  Smoking status: Former Smoker Last attempt to quit: 1988 Years since quittin.0  Smokeless tobacco: Never Used Substance Use Topics  Alcohol use: Yes Alcohol/week: 2.0 oz Types: 4 Glasses of wine per week ROS:  
Pertinent items are noted in HPI. Objective:  
 
Vitals: 02/01/19 0949 02/01/19 1025 BP: 154/79 141/86 Pulse: 88 Resp: 16 Temp: 97.9 °F (36.6 °C) TempSrc: Oral   
SpO2: 97% Weight: 189 lb (85.7 kg) Height: 5' 9.5\" (1.765 m) Vitals and Nurse Documentation reviewed. Physical Examination:  
General appearance - alert, well appearing, and in no distress Mental status - alert, oriented to person, place, and time, normal mood, behavior, speech, dress, motor activity, and thought processes Eyes - pupils equal and reactive, sclera white, conjunctiva pink Ears - bilateral TM's and external ear canals normal 
Nose - normal and patent, no erythema, discharge or polyps Mouth - mucous membranes moist, pharynx normal without lesions Neck - supple, no significant adenopathy Chest - clear to auscultation, no wheezes, rales or rhonchi, symmetric air entry Heart - normal rate, regular rhythm, normal S1, S2, no murmurs, rubs, clicks or gallops Neurological - alert, oriented, normal speech, no focal findings or movement disorder noted Extremities - peripheral pulses normal, no pedal edema, no clubbing or cyanosis Assessment/ Plan:  
Diagnoses and all orders for this visit: 1. Essential hypertension -     BP elevated at start of visit, decreased to 141/86 on repeat. Patient is asymptomatic. 
-     Will continue to monitor BP. If persistently elevated will consider adding metoprolol as the next step considering patient's history of afib. -     Advised that patient continue to monitor BP at home and follow-up sooner if persistently >140/90s 
-     Discussed DASH diet, sodium reduction, exercise, and avoidance of caffeine -     METABOLIC PANEL, COMPREHENSIVE 2. Hyperlipidemia, unspecified hyperlipidemia type 
-     Has been on simvastatin and fenofibrate for several years. Will recheck today and notify patient if any changes needed. -     LIPID PANEL 
-     METABOLIC PANEL, COMPREHENSIVE 3. Atrial fibrillation, unspecified type ([address] -     History a fib since early 2018 s/p ablation. Compliant with xarelto. -     Management per cardiology Dr. Jagruti Coreas 4. History of skin cancer 
-     REFERRAL TO DERMATOLOGY 5. BMI 27.0-27.9,adult -     BMI discussed with patient. Reports he would like to lose 10 lbs. Has been working on diet and exercise. Discussed lifestyle changes, daily physical activity, and advised 150 minutes of exercise weekly. Discussed healthy diet choices and limiting fried, fatty foods, fast foods, processed foods, sugar-sweetened beverages/soda, and added sugars. Increase fruits, vegetables, low-fat dairy products, lean proteins, and whole grains. 6. Encounter for hepatitis C screening test for low risk patient 
-     HEPATITIS C AB 
 
7. Screening for colon cancer -     Reports colonoscopy about 10 years ago. Is not interested in colonoscopy at this time. Is willing to do stool cards. -     OCCULT BLOOD IMMUNOASSAY,DIAGNOSTIC 8. Encounter for immunization -     TETANUS, DIPHTHERIA TOXOIDS AND ACELLULAR PERTUSSIS VACCINE (TDAP), IN INDIVIDS. >=7, IM Follow-up Disposition: 
Return in about 4 weeks (around 3/1/2019) for blood pressure check . I have discussed the diagnosis with the patient and the intended plan as seen in the above orders. Advised prompt follow-up if symptoms worsen or fail to improve and symptoms that would warrant emergent evaluation in ED. The patient has received an after-visit summary and questions were answered concerning future plans. I have discussed medication side effects and warnings with the patient as well. Patient expressed understanding and is in agreement with the diagnosis and plan.

## 2019-02-01 NOTE — PROGRESS NOTES
Chief Complaint Patient presents with [facility] Establish Care 1. Have you been to the ER, urgent care clinic since your last visit? Hospitalized since your last visit? No 
 
2. Have you seen or consulted any other health care providers outside of the [address] [place] [address] since your last visit? Include any pap smears or colon screening.  No

## 2019-02-02 LAB
ALBUMIN SERPL-MCNC: 4.8 G/DL (ref 3.5–4.8)
ALBUMIN/GLOB SERPL: 2 {RATIO} (ref 1.2–2.2)
ALP SERPL-CCNC: 50 IU/L (ref 39–117)
ALT SERPL-CCNC: 37 IU/L (ref 0–44)
AST SERPL-CCNC: 29 IU/L (ref 0–40)
BILIRUB SERPL-MCNC: 0.5 MG/DL (ref 0–1.2)
BUN SERPL-MCNC: 24 MG/DL (ref 8–27)
BUN/CREAT SERPL: 15 (ref 10–24)
CALCIUM SERPL-MCNC: 9.7 MG/DL (ref 8.6–10.2)
CHLORIDE SERPL-SCNC: 103 MMOL/L (ref 96–106)
CHOLEST SERPL-MCNC: 171 MG/DL (ref 100–199)
CO2 SERPL-SCNC: 20 MMOL/L (ref 20–29)
CREAT SERPL-MCNC: 1.57 MG/DL (ref 0.76–1.27)
GLOBULIN SER CALC-MCNC: 2.4 G/DL (ref 1.5–4.5)
GLUCOSE SERPL-MCNC: 86 MG/DL (ref 65–99)
HCV AB S/CO SERPL IA: <0.1 S/CO RATIO (ref 0–0.9)
HDLC SERPL-MCNC: 46 MG/DL
LDLC SERPL CALC-MCNC: 73 MG/DL (ref 0–99)
POTASSIUM SERPL-SCNC: 4.2 MMOL/L (ref 3.5–5.2)
PROT SERPL-MCNC: 7.2 G/DL (ref 6–8.5)
SODIUM SERPL-SCNC: 141 MMOL/L (ref 134–144)
TRIGL SERPL-MCNC: 258 MG/DL (ref 0–149)
VLDLC SERPL CALC-MCNC: 52 MG/DL (ref 5–40)

## 2019-02-04 ENCOUNTER — TELEPHONE (OUTPATIENT)
Dept: PRIMARY CARE CLINIC | Age: 69
End: 2019-02-04

## 2019-02-04 ENCOUNTER — OFFICE VISIT (OUTPATIENT)
Dept: DERMATOLOGY | Facility: AMBULATORY SURGERY CENTER | Age: 69
End: 2019-02-04

## 2019-02-04 ENCOUNTER — HOSPITAL ENCOUNTER (OUTPATIENT)
Dept: LAB | Age: 69
Discharge: HOME | End: 2019-02-04

## 2019-02-04 VITALS
RESPIRATION RATE: 16 BRPM | SYSTOLIC BLOOD PRESSURE: 140 MMHG | HEIGHT: 70 IN | OXYGEN SATURATION: 96 % | TEMPERATURE: 98.4 F | BODY MASS INDEX: 27.06 KG/M2 | WEIGHT: 189 LBS | HEART RATE: 96 BPM | DIASTOLIC BLOOD PRESSURE: 78 MMHG

## 2019-02-04 DIAGNOSIS — L57.0 ACTINIC KERATOSES: ICD-10-CM

## 2019-02-04 DIAGNOSIS — Z85.820 PERSONAL HISTORY OF MALIGNANT MELANOMA OF SKIN: ICD-10-CM

## 2019-02-04 DIAGNOSIS — D18.01 CHERRY ANGIOMA: ICD-10-CM

## 2019-02-04 DIAGNOSIS — Z85.828 HISTORY OF NONMELANOMA SKIN CANCER: ICD-10-CM

## 2019-02-04 DIAGNOSIS — L21.9 SEBORRHEIC DERMATITIS: ICD-10-CM

## 2019-02-04 DIAGNOSIS — D22.9 MULTIPLE BENIGN NEVI: ICD-10-CM

## 2019-02-04 DIAGNOSIS — D48.5 NEOPLASM OF UNCERTAIN BEHAVIOR OF SKIN OF CHEST: Primary | ICD-10-CM

## 2019-02-04 DIAGNOSIS — L30.8 OTHER ECZEMA: ICD-10-CM

## 2019-02-04 DIAGNOSIS — L82.1 SEBORRHEIC KERATOSES: ICD-10-CM

## 2019-02-04 RX ORDER — FLUOROURACIL 50 MG/G
CREAM TOPICAL
Qty: 40 G | Refills: 0 | Status: ON HOLD | OUTPATIENT
Start: 2019-02-04 | End: 2020-01-01

## 2019-02-04 NOTE — TELEPHONE ENCOUNTER
----- Message from Davion Portillo NP sent at 2/4/2019  9:33 AM EST -----  Attempted to reach patient to discuss results, no answer. LVM to return call to discuss. Creatinine remains slightly elevated. Will recheck at follow-up in 4 weeks and if continues to rise may need to adjust meds. Patient should make sure to drink plenty of water and avoid OTC pain medications like ibuprofen, naproxen (advil, motrin, aleve). Total cholesterol and LDL are good. Triglyceride elevated. Continue simvastatin and fenofibrate as prescribed. Work on diet and exercise. Decrease fried, fatty foods. Bake, broil, grill, or steam foods instead of frying them. Eat fish, skinless poultry, limit high-fat meats. Try to eat two servings of fish a week (such as salmon). Increase fruits, vegetables, fiber, whole-grains. Limit alcohol intake. Try to engage in daily physical activity, 150 minutes per week of exercise is recommended. Will recheck in 3 months. Screening for hepatitis C is negative.

## 2019-02-04 NOTE — PATIENT INSTRUCTIONS
Chief Complaint   Patient presents with    Skin Exam     Areas of concern: spots on back     Self Skin Exam and Sunscreens    Early detection and treatment is essential in the treatment of all forms of skin cancer. If caught early, all forms of skin cancer are curable. In addition to your regular visits, you should perform a monthly skin examination. Over time, you become familiar with what is normally found on your skin and can identify new or suspicious spots. One of the screening tools you can use to assess your skin is to follow the ABCDEs:    A= Asymmetry (One half is unlike the other half)     B= Border (An irregular, scalloped or poorly defined edge)    C= Color (Is varied from one area to another, has shades of tan, brown/ black,       white, red or blue)    D= Diameter (Spots larger than 6mm or a pencil eraser)    E= Evolving (New spots or one that is changing in size, shape, or color)    A follow- up interval will be customized based on your history of skin cancer or level of skin damage and risk factors. In any case, if you notice a suspicious or new spot, an appointment should be arranged between regular visits. Everyone should use sunscreen and sun-safe practices, which is especially important for those with a personal or family history of skin cancer. Suggestions for this include:    1. Use daily moisturizers containing SPF 30 or higher. 2. Wear long sleeve clothing with UPF ratings and a broad-brimmed hat. 3. Apply sunscreen with SPF 30 or higher to all sun exposed areas if you are going to be in the sun. A broad spectrum UVA/ UVB sunscreen is best.  Dont forget to REAPPLY every two hours or more often if swimming or sweating! 4. Avoid outside activities during peak sun hours, especially in the summer (10am- 2pm). 5. DO NOT use tanning beds.     Using sunscreen and sun-safe practices can help reduce the likelihood of developing skin cancer or additional skin cancers in those previously diagnosed.

## 2019-02-04 NOTE — PROGRESS NOTES
Attempted to reach patient to discuss results, no answer. LVM to return call to discuss. Creatinine remains slightly elevated. Will recheck at follow-up in 4 weeks and if continues to rise may need to adjust meds. Patient should make sure to drink plenty of water and avoid OTC pain medications like ibuprofen, naproxen (advil, motrin, aleve). Total cholesterol and LDL are good. Triglyceride elevated. Continue simvastatin and fenofibrate as prescribed. Work on diet and exercise. Decrease fried, fatty foods. Bake, broil, grill, or steam foods instead of frying them. Eat fish, skinless poultry, limit high-fat meats. Try to eat two servings of fish a week (such as salmon). Increase fruits, vegetables, fiber, whole-grains. Limit alcohol intake. Try to engage in daily physical activity, 150 minutes per week of exercise is recommended. Will recheck in 3 months. Screening for hepatitis C is negative.

## 2019-02-04 NOTE — PROGRESS NOTES
Written by Margarita Strange, as dictated by [place] [employer], [address]. Name: Pj Bae       Age: 77 y.o. Date: 2/4/2019    Chief Complaint:   Chief Complaint   Patient presents with    Skin Exam     Areas of concern: spots on back       Subjective:    HPI  Mr. Pj Bae is a 77 y.o. male who presents for a full skin exam.  The patient's last skin exam was on 02/24/15 and the patient does have current complaints related to his skin. He reports a rash on his chest that has been present for 2-3 years. He denies itching. He states he had a rash on his shins and treated this with Triamcinolone with almost complete relief. He has not used the Triamcinolone on the chest. He reports having melanoma on the abdomen that presented as a red spot and biopsied by Dr. Ken House. This was surgically treated by Dr. Briana Saini, who also performed a sentinel node biopsy which the pt believes came back negative. He also reports multiple lesions treated with cryotherapy on his face and arms, as well as skin cancers removed by Dr. Ken House. His last skin exam with Dr. Ken House was about 4 months ago. He is feeling well and in her usual state of health today. He has no current illnesses, no other skin concerns. His allergies, medications, medical, and social history are reviewed by me today. He will have records of melanoma sent from Dr. Esteban Dillon office. The patient's pertinent skin history includes :   -BCC, left lateral neck, Mohs, 02/24/17  -Melanoma, abdomen, surgically removed by Dr. Briana Saini, negative SLN biopsy (will obtain records)    ROS: Constitutional: Negative.     Dermatological : positive for - skin lesion changes    Social History     Socioeconomic History    Marital status:      Spouse name: Not on file    Number of children: Not on file    Years of education: Not on file    Highest education level: Not on file   Social Needs    Financial resource strain: Not on file   [facility] Food insecurity - worry: Not on file    Food insecurity - inability: Not on file    Transportation needs - medical: Not on file   [employer] needs - non-medical: Not on file   Occupational History    Not on file   Tobacco Use    Smoking status: Former Smoker     Last attempt to quit: 1988     Years since quittin.0    Smokeless tobacco: Never Used   Substance and Sexual Activity    Alcohol use: Yes     Alcohol/week: 2.0 oz     Types: 4 Glasses of wine per week    Drug use: No    Sexual activity: Not on file   Other Topics Concern    Not on file   Social History Narrative    Not on file       Family History   Problem Relation Age of Onset    Hypertension Father     Dementia Father        Past Medical History:   Diagnosis Date    [facility] ([facility]     BPH (benign prostatic hyperplasia)     Cancer ([facility] early 36s    BLADDER    Hypercholesterolemia     Hypertension     Joint replaced     right knee    Skin cancer     Skin disorder 2018    Skin Cancer - melanoma across stomach, lymphnode involvement in Right armpit and Right groin    Sun-damaged skin        Past Surgical History:   Procedure Laterality Date    HX AFIB ABLATION  2018    HX APPENDECTOMY      HX HERNIA REPAIR      HX KNEE REPLACEMENT  2010    right     HX MALIGNANT SKIN LESION EXCISION      HX TONSILLECTOMY      HX UROLOGICAL      CYSTOSCOPY    SKIN TISSUE PROCEDURE UNLISTED  2018    Melanoma across abdomen, Right armpit and groin lymphnode involvement       Current Outpatient Medications   Medication Sig Dispense Refill    triamcinolone acetonide (KENALOG) 0.1 % ointment Apply  to affected area two (2) times a day. use thin layer 30 g 0    hydroCHLOROthiazide (HYDRODIURIL) 25 mg tablet Take 1 Tab by mouth daily. 30 Tab 1    amLODIPine (NORVASC) 10 mg tablet Take  by mouth daily.  rivaroxaban (XARELTO) 20 mg tab tablet Take  by mouth daily.       finasteride (PROSCAR) 5 mg tablet Take 5 mg by mouth daily.  lisinopril (PRINIVIL, ZESTRIL) 20 mg tablet Take 20 mg by mouth daily.  fenofibrate (TRICOR) 160 mg tablet Take 160 mg by mouth daily.  simvastatin (ZOCOR) 40 mg tablet Take  by mouth nightly. Allergies   Allergen Reactions    Demerol [Meperidine] Other (comments)     Duplicate, delete    Demerol [Meperidine] Other (comments)     \"passed out\"         Objective:    Visit Vitals  /78   Pulse 96   Temp 98.4 °F (36.9 °C)   Resp 16   Ht 5' 9.5\" (1.765 m)   Wt 189 lb (85.7 kg)   SpO2 96%   BMI 27.51 kg/m²       Lissette Portillo is a 77 y.o. male who appears well and in no distress. He is awake, alert, and oriented. There is no preauricular, submandibular, or cervical lymphadenopathy. A skin examination was performed including his scalp, face (including eyelids), ears, neck, chest, back, abdomen, upper extremities (including digits/nails), lower extremities, breasts, buttocks; genital skin was not examined. There is a well-healed scar on the left lateral neck without evidence of lesion recurrence. He has some well-healed scars on the face/ scalp as well. There is a well-healed melanoma scar on the abdomen without pigment, nodularity, or other evidence of lesion recurrence. There is a well healed SLN site at the right axilla, no associated lymphadenopathy. There is a 5 x 4 mm medium and dark brown irregularly pigmented macule on the mid chest, concerning for atypical pigmented lesion. There are diffuse thin-scaled actinic keratoses on the right cheek, forehead, eyebrows, lateral face, and tops of ears. He has scattered waxy macules and keratotic papules consistent with seborrheic keratoses. He has pink scaly patches on the left lower back, right buttocks, and lower legs consistent with eczema. He has dry, pink, and flaky skin on the mid chest consistent with seborrheic dermatitis.  He has pink intradermal nevi with tan and medium and dark brown junctional nevi, no concerning features for severe atypia. There is a 7 x 3 mm tan macule with accentuation of irregular pigment on the medial aspect on the right upper back-- photographed for monitoring. He has scattered red papules consistent with cherry angiomas. Mid chest       Mid chest        Right upper back    Right upper back    Assessment/Plan:    1. Personal history of melanoma and nonmelanoma skin cancer. I discussed sun protection, sunscreen use, the warning signs of skin cancer, the need for self-skin examinations, and the need for regular practitioner exams every 3 months. The patient should follow up sooner as needed if new, changing, or symptomatic skin lesions arise. 2. Neoplasm of Uncertain Behavior, mid chest, R/O atypical pigmented lesion. The differential diagnoses were discussed. A shave biopsy was advised to sample this lesion. The procedure was reviewed and verbal and written consent were obtained. The risks of pain, bleeding, infection, and scar were discussed. The patient is aware that this is a sample and is intended for diagnosis and not therapy of the skin lesion. I performed the procedure. The site was cleansed and anesthetized with 1% Lidocaine with Epinephrine 1:100,000. A shave biopsy was performed to sample the lesion. Drysol was used for hemostasis. The wound was bandaged and care reviewed. The specimen was sent to pathology. I will contact the patient with the results and any further treatment that may be necessary. 3. Actinic Keratoses. The diagnosis of this precancerous lesion related to sun exposure was reviewed. Differential field-therapy treatments were discussed. I prescribed Efudex to treat the diffuse actinic keratoses on the face and ears. Use and side effects were reviewed. 4. Seborrheic keratoses. The diagnosis was reviewed and the patient was reassured that no treatment is needed for these benign lesions. 5. Eczema, lower back and lower legs.  The diagnosis was reviewed. I advised to apply Triamcinolone to these areas, along with soaking and greasing these areas more frequently. 6. Seborrheic Dermatitis, chest.  The diagnosis was discussed and over the counter recommendations were made for treatment. 7. Normal nevi. The diagnosis of normal nevi was reviewed. I discussed sun protection, sunscreen use, the warning signs of skin cancer, the need for self-skin examinations, and the need for regular practitioner exams every 3 months. The patient should follow up sooner as needed if new, changing, or symptomatic skin lesions arise. 8. Cherry angiomas. The diagnosis was reviewed and the patient was reassured that no treatment is needed for these benign lesions. This plan was reviewed with the patient and patient agrees. All questions were answered. This scribe documentation was reviewed by me and accurately reflects the examination and decisions made by me. [facility]   OFFICE PROCEDURE PROGRESS NOTE   Chart reviewed for the following:   Baltazar RUELAS, have reviewed the History, Physical and updated the Allergic reactions for Ermalene Sprain. TIME OUT performed immediately prior to start of procedure:   Holly RUELAS, have performed the following reviews on Ermalene Sprain   prior to the start of the procedure:     * Patient was identified by name and date of birth   * Agreement on procedure being performed was verified   * Risks and Benefits explained to the patient   * Procedure site verified and marked as necessary   * Patient was positioned for comfort   * Consent was signed and verified     Time: 12:00 PM  Date of procedure: 2/4/2019  Procedure performed by: Cherri Delvalle.  Harry Castillo  Provider assisted by: Taryn Irwin LPN   Patient assisted by: self   How tolerated by patient: tolerated the procedure well with no complications   Comments: none

## 2019-02-10 LAB — HEMOCCULT STL QL IA: POSITIVE

## 2019-02-13 ENCOUNTER — TELEPHONE (OUTPATIENT)
Dept: PRIMARY CARE CLINIC | Age: 69
End: 2019-02-13

## 2019-02-13 DIAGNOSIS — R19.5 POSITIVE FIT (FECAL IMMUNOCHEMICAL TEST): Primary | ICD-10-CM

## 2019-02-13 DIAGNOSIS — Z12.11 COLON CANCER SCREENING: ICD-10-CM

## 2019-02-13 NOTE — PROGRESS NOTES
Results were discussed with patient who expressed his understanding. He will call and schedule colonoscopy.

## 2019-02-13 NOTE — PROGRESS NOTES
Attempted to reach patient to discuss results, no answer, LVM to return call to discuss. Stool FIT test is positive for occult blood. Patient will need colonoscopy for further evaluation. Referral for GI placed and is in chart.

## 2019-02-13 NOTE — TELEPHONE ENCOUNTER
----- Message from Antonina Milian NP sent at 2/13/2019 10:37 AM EST -----  Attempted to reach patient to discuss results, no answer, LVM to return call to discuss. Stool FIT test is positive for occult blood. Patient will need colonoscopy for further evaluation. Referral for GI placed and is in chart.

## 2019-02-24 DIAGNOSIS — R05.9 COUGH: ICD-10-CM

## 2019-02-25 RX ORDER — BENZONATATE 200 MG/1
CAPSULE ORAL
Qty: 21 CAP | Refills: 0 | OUTPATIENT
Start: 2019-02-25

## 2019-02-26 ENCOUNTER — OFFICE VISIT (OUTPATIENT)
Dept: PRIMARY CARE CLINIC | Age: 69
End: 2019-02-26

## 2019-02-26 ENCOUNTER — HOSPITAL ENCOUNTER (OUTPATIENT)
Dept: GENERAL RADIOLOGY | Age: 69
Discharge: HOME | End: 2019-02-26
Payer: MEDICARE

## 2019-02-26 VITALS
SYSTOLIC BLOOD PRESSURE: 132 MMHG | WEIGHT: 187.6 LBS | HEART RATE: 91 BPM | DIASTOLIC BLOOD PRESSURE: 80 MMHG | TEMPERATURE: 98 F | OXYGEN SATURATION: 96 % | HEIGHT: 70 IN | RESPIRATION RATE: 16 BRPM | BODY MASS INDEX: 26.86 KG/M2

## 2019-02-26 DIAGNOSIS — J21.9 ACUTE BRONCHIOLITIS DUE TO UNSPECIFIED ORGANISM: ICD-10-CM

## 2019-02-26 DIAGNOSIS — R05.9 COUGH: ICD-10-CM

## 2019-02-26 DIAGNOSIS — R06.2 WHEEZING SYMPTOM: ICD-10-CM

## 2019-02-26 DIAGNOSIS — J20.9 ACUTE BRONCHITIS, UNSPECIFIED ORGANISM: Primary | ICD-10-CM

## 2019-02-26 PROCEDURE — 71046 X-RAY EXAM CHEST 2 VIEWS: CPT

## 2019-02-26 RX ORDER — ALBUTEROL SULFATE 90 UG/1
2 AEROSOL, METERED RESPIRATORY (INHALATION)
Qty: 1 INHALER | Refills: 1 | Status: SHIPPED | OUTPATIENT
Start: 2019-02-26 | End: 2019-04-09 | Stop reason: SDUPTHER

## 2019-02-26 RX ORDER — AZITHROMYCIN 250 MG/1
250 TABLET, FILM COATED ORAL DAILY
Qty: 6 TAB | Refills: 0 | Status: SHIPPED | OUTPATIENT
Start: 2019-02-26 | End: 2019-03-21 | Stop reason: ALTCHOICE

## 2019-02-26 RX ORDER — BENZONATATE 200 MG/1
200 CAPSULE ORAL
Qty: 21 CAP | Refills: 0 | Status: SHIPPED | OUTPATIENT
Start: 2019-02-26 | End: 2019-03-05

## 2019-02-26 RX ORDER — DICLOFENAC SODIUM 75 MG/1
75 TABLET, DELAYED RELEASE ORAL
COMMUNITY
Start: 2019-02-22 | End: 2020-01-01

## 2019-02-26 NOTE — PROGRESS NOTES
CXR negative for pneumonia. Results were discussed with patient who expressed his understanding. Treatment as discussed during visit.

## 2019-02-26 NOTE — TELEPHONE ENCOUNTER
I have spoken with the pt and let him know that Marco A Abdalla is not a chronic medication therefore he needs to come in to be seen if he is having a cough so he can be evaluated. Pt verbalized his understanding. I have scheduled him to see Nicanor Pierson today at 3pm for an evaluation. Before we got off of the phone I made sure pt was aware of his latest results. Pt states he is aware of stool test being positive and has an appt with GI on March 6 for further evaluation.

## 2019-02-26 NOTE — PROGRESS NOTES
Chief Complaint   Patient presents with    Cough     started back up 4-5 days ago, productive cough, has clear to yellowish tinge sputum- continuously getting worse. Pt states he is wheezing at night. 1. Have you been to the ER, urgent care clinic since your last visit? Hospitalized since your last visit? No    2. Have you seen or consulted any other health care providers outside of the [address] [place] [address] since your last visit? Include any pap smears or colon screening.  No

## 2019-02-26 NOTE — PATIENT INSTRUCTIONS
Bronchitis: Care Instructions  Your Care Instructions    Bronchitis is inflammation of the bronchial tubes, which carry air to the lungs. The tubes swell and produce mucus, or phlegm. The mucus and inflamed bronchial tubes make you cough. You may have trouble breathing. Most cases of bronchitis are caused by viruses like those that cause colds. Antibiotics usually do not help and they may be harmful. Bronchitis usually develops rapidly and lasts about 2 to 3 weeks in otherwise healthy people. Follow-up care is a key part of your treatment and safety. Be sure to make and go to all appointments, and call your doctor if you are having problems. It's also a good idea to know your test results and keep a list of the medicines you take. How can you care for yourself at home? · Take all medicines exactly as prescribed. Call your doctor if you think you are having a problem with your medicine. · Get some extra rest.  · Take an over-the-counter pain medicine, such as acetaminophen (Tylenol), ibuprofen (Advil, Motrin), or naproxen (Aleve) to reduce fever and relieve body aches. Read and follow all instructions on the label. · Do not take two or more pain medicines at the same time unless the doctor told you to. Many pain medicines have acetaminophen, which is Tylenol. Too much acetaminophen (Tylenol) can be harmful. · Take an over-the-counter cough medicine that contains dextromethorphan to help quiet a dry, hacking cough so that you can sleep. Avoid cough medicines that have more than one active ingredient. Read and follow all instructions on the label. · Breathe moist air from a humidifier, hot shower, or sink filled with hot water. The heat and moisture will thin mucus so you can cough it out. · Do not smoke. Smoking can make bronchitis worse. If you need help quitting, talk to your doctor about stop-smoking programs and medicines. These can increase your chances of quitting for good.   When should you call for help? Call 911 anytime you think you may need emergency care. For example, call if:    · You have severe trouble breathing.    Call your doctor now or seek immediate medical care if:    · You have new or worse trouble breathing.     · You cough up dark brown or bloody mucus (sputum).     · You have a new or higher fever.     · You have a new rash.    Watch closely for changes in your health, and be sure to contact your doctor if:    · You cough more deeply or more often, especially if you notice more mucus or a change in the color of your mucus.     · You are not getting better as expected. Where can you learn more? Go to http://dheeraj-sarai.info/. Enter H333 in the search box to learn more about \"Bronchitis: Care Instructions. \"  Current as of: September 5, 2018  Content Version: 11.9  © 0706-3634 [employer]. Care instructions adapted under license by [facility] (which disclaims liability or warranty for this information). If you have questions about a medical condition or this instruction, always ask your healthcare professional. [address] any warranty or liability for your use of this information.

## 2019-02-26 NOTE — PROGRESS NOTES
HPI:     Chief Complaint   Patient presents with    Cough     started back up 4-5 days ago, productive cough, has clear to yellowish tinge sputum- continuously getting worse. Pt states he is wheezing at night. Patient is a 77 y.o. male with significant history of HTN, HLD, afib, hx bladder cancer, hx skin cancer who presents for evaluation of cough. Patient reports intermittent cough since December with worsening over the past 4-5 days. Reports cough is productive of yellow colored phlegm. Cough seems to be worse at night and has noticed wheezing sound occasionally at bedtime. He denies fever, chills, dyspnea, difficulty breathing, chest pain. Has mild nasal congestion and clear rhinorrhea. Denies headache, sore throat, abdominal pain, nausea, vomiting. Appetite is good. Reports that his wife is sick with cold symptoms. Patient has been using dayquil and cough drops, which help somewhat. Patient Active Problem List   Diagnosis Code    Essential hypertension I10    Hyperlipidemia E78.5    [facility] I48.91    History of skin cancer Z85.828    BMI 27.0-27.9,adult Z68.27    History of bladder cancer Z85.51     Current Outpatient Medications   Medication Sig Dispense Refill    diclofenac EC (VOLTAREN) 75 mg EC tablet       benzonatate (TESSALON) 200 mg capsule Take 1 Cap by mouth three (3) times daily as needed for Cough for up to 7 days. 21 Cap 0    albuterol (PROVENTIL HFA, VENTOLIN HFA, PROAIR HFA) 90 mcg/actuation inhaler Take 2 Puffs by inhalation every six (6) hours as needed for Wheezing or Shortness of Breath. 1 Inhaler 1    azithromycin (ZITHROMAX) 250 mg tablet Take 1 Tab by mouth daily. Take 2 tabs day 1, then 1 tab days 2-5 6 Tab 0    hydroCHLOROthiazide (HYDRODIURIL) 25 mg tablet Take 1 Tab by mouth daily. 30 Tab 1    amLODIPine (NORVASC) 10 mg tablet Take  by mouth daily.  rivaroxaban (XARELTO) 20 mg tab tablet Take  by mouth daily.       finasteride (PROSCAR) 5 mg tablet Take 5 mg by mouth daily.  lisinopril (PRINIVIL, ZESTRIL) 20 mg tablet Take 20 mg by mouth daily.  fenofibrate (TRICOR) 160 mg tablet Take 160 mg by mouth daily.  simvastatin (ZOCOR) 40 mg tablet Take  by mouth nightly.  fluorouracil (EFUDEX) 5 % chemo cream Apply a thin layer twice daily for total of 4 weeks. 40 g 0    triamcinolone acetonide (KENALOG) 0.1 % ointment Apply  to affected area two (2) times a day. use thin layer 30 g 0     Allergies   Allergen Reactions    Demerol [Meperidine] Other (comments)     Duplicate, delete    Demerol [Meperidine] Other (comments)     \"passed out\"     Past Medical History:   Diagnosis Date    Atrial fibrillation (HCC)     BPH (benign prostatic hyperplasia)     Cancer ([facility] [address] early 36s    BLADDER    Hypercholesterolemia     Hypertension     Joint replaced 2011    right knee    Skin cancer     Skin disorder 2018    Skin Cancer - melanoma across stomach, lymphnode involvement in Right armpit and Right groin    Sun-damaged skin           ROS:   Pertinent items are noted in HPI. Objective:     Vitals:    02/26/19 1507   BP: 132/80   Pulse: 91   Resp: 16   Temp: 98 °F (36.7 °C)   TempSrc: Oral   SpO2: 96%   Weight: 187 lb 9.6 oz (85.1 kg)   Height: 5' 9.5\" (1.765 m)        Vitals and Nurse Documentation reviewed.     Physical Examination:   General appearance - alert, well appearing, and in no distress  Mental status - alert, oriented to person, place, and time, normal mood, behavior, speech, dress, motor activity, and thought processes  Eyes - pupils equal and reactive, sclera white, conjunctiva pink   Ears - bilateral TM's and external ear canals normal  Nose - normal and patent, no erythema, discharge or polyps  Mouth - mucous membranes moist, pharynx normal without lesions  Neck - supple, no significant adenopathy  Chest - clear to auscultation, no wheezes, rales or rhonchi, symmetric air entry  Heart - normal rate, regular rhythm, normal S1, S2, no murmurs, rubs, clicks or gallops  Neurological - alert, oriented, normal speech, no focal findings or movement disorder noted  Extremities - peripheral pulses normal, no pedal edema, no clubbing or cyanosis      Assessment/ Plan:   Diagnoses and all orders for this visit:    1. Acute bronchitis, unspecified organism  -     Patient with 5 day history of worsening productive cough. Lungs clear on exam, no adventitious sounds. Afebrile, VSS.    -     CXR today is negative for pneumonia.   -     Start azithromycin (ZITHROMAX) 250 mg tablet; Take 2 tabs day 1, then 1 tab days 2-5  -     Start benzonatate (TESSALON) 200 mg capsule; Take 1 Cap by mouth three (3) times daily as needed for Cough for up to 7 days.  -     Start albuterol (PROVENTIL HFA, VENTOLIN HFA, PROAIR HFA) 90 mcg/actuation inhaler; Take 2 Puffs by inhalation every six (6) hours as needed for Wheezing or Shortness of Breath. -      Medication benefits, risks, indication, dosage, potential adverse effects, and alternate medication options were discussed with patient who expressed understanding.   -      Symptomatic treatment discussed including tea with honey, throat lozenges, salt water gargles, cool mist humidifier, saline nasal spray or neti pot, warm moist compresses, increase fluid intake      2. Cough        -     Same as #1.     3. Wheezing symptom  -     Patient reports wheezing sound past few days at bedtime. Lungs clear on exam.  He can use albuterol as needed for wheezing, dyspnea. -     albuterol (PROVENTIL HFA, VENTOLIN HFA, PROAIR HFA) 90 mcg/actuation inhaler; Take 2 Puffs by inhalation every six (6) hours as needed for Wheezing or Shortness of Breath. Follow-up Disposition:  Return if symptoms worsen or fail to improve. I have discussed the diagnosis with the patient and the intended plan as seen in the above orders.   Advised prompt follow-up if symptoms worsen or fail to improve and symptoms that would warrant emergent evaluation in ED. The patient has received an after-visit summary and questions were answered concerning future plans. I have discussed medication side effects and warnings with the patient as well. Patient expressed understanding and is in agreement with the diagnosis and plan.

## 2019-03-01 ENCOUNTER — OFFICE VISIT (OUTPATIENT)
Dept: PRIMARY CARE CLINIC | Age: 69
End: 2019-03-01

## 2019-03-01 VITALS
SYSTOLIC BLOOD PRESSURE: 137 MMHG | HEART RATE: 95 BPM | HEIGHT: 70 IN | TEMPERATURE: 98 F | BODY MASS INDEX: 26.63 KG/M2 | WEIGHT: 186 LBS | DIASTOLIC BLOOD PRESSURE: 79 MMHG | OXYGEN SATURATION: 96 % | RESPIRATION RATE: 16 BRPM

## 2019-03-01 DIAGNOSIS — Z00.00 MEDICARE ANNUAL WELLNESS VISIT, SUBSEQUENT: Primary | ICD-10-CM

## 2019-03-01 DIAGNOSIS — I10 ELEVATED BLOOD PRESSURE READING WITH DIAGNOSIS OF HYPERTENSION: ICD-10-CM

## 2019-03-01 DIAGNOSIS — I10 ESSENTIAL HYPERTENSION: ICD-10-CM

## 2019-03-01 DIAGNOSIS — Z87.891 PERSONAL HISTORY OF TOBACCO USE, PRESENTING HAZARDS TO HEALTH: ICD-10-CM

## 2019-03-01 DIAGNOSIS — Z13.31 SCREENING FOR DEPRESSION: ICD-10-CM

## 2019-03-01 DIAGNOSIS — Z13.39 SCREENING FOR ALCOHOLISM: ICD-10-CM

## 2019-03-01 RX ORDER — TRAMADOL HYDROCHLORIDE 50 MG/1
50 TABLET ORAL DAILY
Status: ON HOLD | COMMUNITY
Start: 2019-02-27 | End: 2020-01-01

## 2019-03-01 RX ORDER — HYDROCHLOROTHIAZIDE 25 MG/1
TABLET ORAL
Qty: 90 TAB | Refills: 0 | Status: SHIPPED | OUTPATIENT
Start: 2019-03-01 | End: 2019-03-04 | Stop reason: SINTOL

## 2019-03-01 NOTE — PROGRESS NOTES
Chief Complaint Patient presents with  Annual Wellness Visit  
  not fasting but had lipids done 2/1/19 1. Have you been to the ER, urgent care clinic since your last visit? Hospitalized since your last visit? No 
 
2. Have you seen or consulted any other health care providers outside of the [address] [place] [address] since your last visit? Include any pap smears or colon screening.  No

## 2019-03-01 NOTE — PATIENT INSTRUCTIONS
Medicare Wellness Visit, Male The best way to live healthy is to have a lifestyle where you eat a well-balanced diet, exercise regularly, limit alcohol use, and quit all forms of tobacco/nicotine, if applicable. Regular preventive services are another way to keep healthy. Preventive services (vaccines, screening tests, monitoring & exams) can help personalize your care plan, which helps you manage your own care. Screening tests can find health problems at the earliest stages, when they are easiest to treat. [facility] follows the current, evidence-based guidelines published by the [facility] Jonathan Tiffanie ([facility]) when recommending preventive services for our patients. Because we follow these guidelines, sometimes recommendations change over time as research supports it. (For example, a prostate screening blood test is no longer routinely recommended for men with no symptoms.) Of course, you and your doctor may decide to screen more often for some diseases, based on your risk and co-morbidities (chronic disease you are already diagnosed with). Preventive services for you include: - Medicare offers their members a free annual wellness visit, which is time for you and your primary care provider to discuss and plan for your preventive service needs. Take advantage of this benefit every year! 
-All adults over age 72 should receive the recommended pneumonia vaccines. Current USPSTF guidelines recommend a series of two vaccines for the best pneumonia protection.  
-All adults should have a flu vaccine yearly and an ECG.  All adults age 61 and older should receive a shingles vaccine once in their lifetime.   
-All adults age 38-68 who are overweight should have a diabetes screening test once every three years.  
-Other screening tests & preventive services for persons with diabetes include: an eye exam to screen for diabetic retinopathy, a kidney function test, a foot exam, and stricter control over your cholesterol.  
-Cardiovascular screening for adults with routine risk involves an electrocardiogram (ECG) at intervals determined by the provider.  
-Colorectal cancer screening should be done for adults age 54-65 with no increased risk factors for colorectal cancer. There are a number of acceptable methods of screening for this type of cancer. Each test has its own benefits and drawbacks. Discuss with your provider what is most appropriate for you during your annual wellness visit. The different tests include: colonoscopy (considered the best screening method), a fecal occult blood test, a fecal DNA test, and sigmoidoscopy. 
-All adults born between [facility] should be screened once for Hepatitis C. 
-An Abdominal Aortic Aneurysm (AAA) Screening is recommended for men age 73-68 who has ever smoked in their lifetime. Here is a list of your current Health Maintenance items (your personalized list of preventive services) with a due date: 
Health Maintenance Due Topic Date Due  Shingles Vaccine (1 of 2) 11/22/1998  Glaucoma Screening   11/22/2013 [address] [place] [address] Annual Well Visit  03/14/2018 Medicare Wellness Visit, Male The best way to live healthy is to have a lifestyle where you eat a well-balanced diet, exercise regularly, limit alcohol use, and quit all forms of tobacco/nicotine, if applicable. Regular preventive services are another way to keep healthy. Preventive services (vaccines, screening tests, monitoring & exams) can help personalize your care plan, which helps you manage your own care. Screening tests can find health problems at the earliest stages, when they are easiest to treat. [address] [facility] follows the current, evidence-based guidelines published by the [facility] Jonathan Moreno ([facility]STF) when recommending preventive services for our patients.  Because we follow these guidelines, sometimes recommendations change over time as research supports it. (For example, a prostate screening blood test is no longer routinely recommended for men with no symptoms.) Of course, you and your doctor may decide to screen more often for some diseases, based on your risk and co-morbidities (chronic disease you are already diagnosed with). Preventive services for you include: - Medicare offers their members a free annual wellness visit, which is time for you and your primary care provider to discuss and plan for your preventive service needs. Take advantage of this benefit every year! 
-All adults over age 72 should receive the recommended pneumonia vaccines. Current USPSTF guidelines recommend a series of two vaccines for the best pneumonia protection.  
-All adults should have a flu vaccine yearly and an ECG. All adults age 61 and older should receive a shingles vaccine once in their lifetime.   
-All adults age 38-68 who are overweight should have a diabetes screening test once every three years.  
-Other screening tests & preventive services for persons with diabetes include: an eye exam to screen for diabetic retinopathy, a kidney function test, a foot exam, and stricter control over your cholesterol.  
-Cardiovascular screening for adults with routine risk involves an electrocardiogram (ECG) at intervals determined by the provider.  
-Colorectal cancer screening should be done for adults age 54-65 with no increased risk factors for colorectal cancer. There are a number of acceptable methods of screening for this type of cancer. Each test has its own benefits and drawbacks. Discuss with your provider what is most appropriate for you during your annual wellness visit.  The different tests include: colonoscopy (considered the best screening method), a fecal occult blood test, a fecal DNA test, and sigmoidoscopy. 
-All adults born between [facility] should be screened once for Hepatitis C. 
-An Abdominal Aortic Aneurysm (AAA) Screening is recommended for men age 73-68 who has ever smoked in their lifetime. Here is a list of your current Health Maintenance items (your personalized list of preventive services) with a due date: 
Health Maintenance Due Topic Date Due  Shingles Vaccine (1 of 2) 11/22/1998  Glaucoma Screening   11/22/2013  AAA Screening  11/22/2013

## 2019-03-01 NOTE — PROGRESS NOTES
This is the Subsequent Medicare Annual Wellness Exam, performed 12 months or more after the Initial AWV or the last Subsequent AWV I have reviewed the patient's medical history in detail and updated the computerized patient record. Patient is a 77 y.o. male with significant history of HTN, HLD, afib, bladder cancer, skin cancer who presents for medicare AWV. Patient is feeling better from visit earlier this week, cough has improved. Albuterol is helping a lot with nighttime symptoms. Feels well today and has no concerns or complaints.  
  
HTN - complaint with HCTZ 25mg, amlodipine 10mg, and lisinopril 20mg. Denies med side effects. BP at home 130s/80s. /79 today. Patient feels well and denies chest pain, palpitations, dyspnea, peripheral edema, dizziness, headache, blurred vision. Creatinine slightly elevated last month, patient denies hx of kidney disease, will recheck today. Hx Afib - reports onset of afib in early 2018 with subsequent ablation. Has been on xarelto since then. Doing well, denies chest pain, palpitations, dizziness. Sees cardiology Dr. Narciso Cortez of [facility] every 3-6 months. Has appointment scheduled later this month for follow-up.  
  
HLD - has been stable on simvastatin 40mg and fenofibrate 160mg for the past several years. Denies medication side effect, muscle pain, dark urine. Total and LDL good when checked last month, triglycerides elevated. Lab Results Component Value Date/Time Cholesterol, total 171 02/01/2019 10:58 AM  
 HDL Cholesterol 46 02/01/2019 10:58 AM  
 LDL, calculated 73 02/01/2019 10:58 AM  
 VLDL, calculated 52 (H) 02/01/2019 10:58 AM  
 Triglyceride 258 (H) 02/01/2019 10:58 AM  
 
Hx bladder cancer - in the early 1990s. Follows with urology every 3-6 months, had follow-up last month. Also with hx BPH, on proscar. Urology is regularly checking PSA. Blood in stool - patient had positive FIT test last month and was referred to GI for evaluation/colonoscopy. He has appointment on 3/6/19. Glaucoma screening - Patient reports he regularly follows with ophthalmology and had glaucoma screening last year. He will have records sent here for review. History Past Medical History:  
Diagnosis Date  Atrial fibrillation ([facility]  BPH (benign prostatic hyperplasia)  Cancer ([facility] early 36s BLADDER  
 Hypercholesterolemia  Hypertension  Joint replaced 2011  
 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  
Procedure Laterality Date  HX AFIB ABLATION  2018  HX APPENDECTOMY [address]  HX KNEE REPLACEMENT  2010  
 right  HX MALIGNANT SKIN LESION EXCISION    
 HX TONSILLECTOMY  HX UROLOGICAL CYSTOSCOPY  SKIN TISSUE PROCEDURE UNLISTED  2018 Melanoma across abdomen, Right armpit and groin lymphnode involvement Current Outpatient Medications Medication Sig Dispense Refill  traMADol (ULTRAM) 50 mg tablet  diclofenac EC (VOLTAREN) 75 mg EC tablet  benzonatate (TESSALON) 200 mg capsule Take 1 Cap by mouth three (3) times daily as needed for Cough for up to 7 days. 21 Cap 0  
 albuterol (PROVENTIL HFA, VENTOLIN HFA, PROAIR HFA) 90 mcg/actuation inhaler Take 2 Puffs by inhalation every six (6) hours as needed for Wheezing or Shortness of Breath. 1 Inhaler 1  
 azithromycin (ZITHROMAX) 250 mg tablet Take 1 Tab by mouth daily. Take 2 tabs day 1, then 1 tab days 2-5 6 Tab 0  
 fluorouracil (EFUDEX) 5 % chemo cream Apply a thin layer twice daily for total of 4 weeks. 40 g 0  
 triamcinolone acetonide (KENALOG) 0.1 % ointment Apply  to affected area two (2) times a day.  use thin layer 30 g 0  
  hydroCHLOROthiazide (HYDRODIURIL) 25 mg tablet Take 1 Tab by mouth daily. 30 Tab 1  
 amLODIPine (NORVASC) 10 mg tablet Take  by mouth daily.  rivaroxaban (XARELTO) 20 mg tab tablet Take  by mouth daily.  finasteride (PROSCAR) 5 mg tablet Take 5 mg by mouth daily.  lisinopril (PRINIVIL, ZESTRIL) 20 mg tablet Take 20 mg by mouth daily.  fenofibrate (TRICOR) 160 mg tablet Take 160 mg by mouth daily.  simvastatin (ZOCOR) 40 mg tablet Take  by mouth nightly. Allergies Allergen Reactions  Demerol [Meperidine] Other (comments) Duplicate, delete  Demerol [Meperidine] Other (comments) \"passed out\" Family History Problem Relation Age of Onset  Hypertension Father  Dementia Father Social History Tobacco Use  Smoking status: Former Smoker Last attempt to quit: 1988 Years since quittin.1  Smokeless tobacco: Never Used Substance Use Topics  Alcohol use: Yes Alcohol/week: 2.0 oz Types: 4 Glasses of wine per week Patient Active Problem List  
Diagnosis Code  Essential hypertension I10  
 Hyperlipidemia E78.5  Atrial fibrillation (HCC) I48.91  
 History of skin cancer X40.539  BMI 27.0-27.9,adult Z68.27  
 History of bladder cancer Z85.51 Depression Risk Factor Screening:  
 
3 most recent PHQ Screens 3/1/2019 Little interest or pleasure in doing things Not at all Feeling down, depressed, irritable, or hopeless Not at all Total Score PHQ 2 0 Alcohol Risk Factor Screening: You do not drink alcohol or very rarely. Functional Ability and Level of Safety:  
Hearing Loss Hearing is good. Activities of Daily Living The home contains: no safety equipment. Patient does total self care Fall Risk Fall Risk Assessment, last 12 mths 3/1/2019 Able to walk? Yes Fall in past 12 months? No  
 
 
Abuse Screen Patient is not abused Cognitive Screening Evaluation of Cognitive Function: Has your family/caregiver stated any concerns about your memory: no 
Normal 
 
Patient Care Team  
Patient Care Team: 
Lizette Lopez MD as PCP - General (Family Practice) Amrit Doan MD (Urology) Evelyne Felty, MD (Cardiology) Magnolia Keenan MD (Orthopedic Surgery) Objective:  
 
Visit Vitals /79 (BP 1 Location: Right arm, BP Patient Position: Sitting) Pulse 95 Temp 98 °F (36.7 °C) (Oral) Resp 16 Ht 5' 9.5\" (1.765 m) Wt 186 lb (84.4 kg) SpO2 96% BMI 27.07 kg/m² Vitals and Nurse Documentation reviewed. Physical Examination:  
General appearance - alert, well appearing, and in no distress Mental status - alert, oriented to person, place, and time, normal mood, behavior, speech, dress, motor activity, and thought processes Eyes - pupils equal and reactive, extraocular eye movements intact Chest - clear to auscultation, no wheezes, rales or rhonchi, symmetric air entry Heart - normal rate, regular rhythm, normal S1, S2, no murmurs, rubs, clicks or gallops Extremities - peripheral pulses normal, no pedal edema, no clubbing or cyanosis Assessment/Plan Education and counseling provided: 
Are appropriate based on today's review and evaluation Prostate cancer screening tests (PSA, covered annually) Colorectal cancer screening tests Screening for glaucoma Diagnoses and all orders for this visit: 
 
1. Medicare annual wellness visit, subsequent 2. Essential hypertension 
-     Stable. Continue current med regimen. Will recheck creatinine today, if remains elevated may need to consider adjusting HCTZ. -     Continue to monitor BP at home and bring log to next appointment, follow-up sooner if persistently >140/90s. 
-     DASH diet, sodium reduction, exercise, and avoidance of caffeine -     METABOLIC PANEL, BASIC 
-     CBC WITH AUTOMATED DIFF 3. Personal history of tobacco use, presenting hazards to health -     Former smoker, quit in [facility] after 20 years of 2 PPD. 40 pack years. Discussed one-time AAA screening and patient would like to proceed with ultrasound screening test. 
-     US EXAM SCREENING AAA; Future 4. Screening for depression 
-     [place] [id number] 5. Screening for alcoholism -     DC ANNUAL ALCOHOL SCREEN 15 MIN Patient is in the process of finishing Shingrix vaccine series. Pharmacy has been out of vaccine. Health Maintenance Due Topic Date Due  Shingrix Vaccine Age 50> (1 of 2) 11/22/1998  GLAUCOMA SCREENING Q2Y  11/22/2013  AAA Screening 73-69 YO Male Smoking Patients  11/22/2013 Patient to return in 2 months for BP check and fasting lipids.

## 2019-03-02 LAB
BASOPHILS # BLD AUTO: 0.1 X10E3/UL (ref 0–0.2)
BASOPHILS NFR BLD AUTO: 1 %
BUN SERPL-MCNC: 22 MG/DL (ref 8–27)
BUN/CREAT SERPL: 13 (ref 10–24)
CALCIUM SERPL-MCNC: 10.1 MG/DL (ref 8.6–10.2)
CHLORIDE SERPL-SCNC: 103 MMOL/L (ref 96–106)
CO2 SERPL-SCNC: 23 MMOL/L (ref 20–29)
CREAT SERPL-MCNC: 1.72 MG/DL (ref 0.76–1.27)
EOSINOPHIL # BLD AUTO: 0.4 X10E3/UL (ref 0–0.4)
EOSINOPHIL NFR BLD AUTO: 5 %
ERYTHROCYTE [DISTWIDTH] IN BLOOD BY AUTOMATED COUNT: 13.4 % (ref 12.3–15.4)
GLUCOSE SERPL-MCNC: 92 MG/DL (ref 65–99)
HCT VFR BLD AUTO: 39.7 % (ref 37.5–51)
HGB BLD-MCNC: 13.3 G/DL (ref 13–17.7)
IMM GRANULOCYTES # BLD AUTO: 0 X10E3/UL (ref 0–0.1)
IMM GRANULOCYTES NFR BLD AUTO: 0 %
LYMPHOCYTES # BLD AUTO: 2.1 X10E3/UL (ref 0.7–3.1)
LYMPHOCYTES NFR BLD AUTO: 28 %
MCH RBC QN AUTO: 31.3 PG (ref 26.6–33)
MCHC RBC AUTO-ENTMCNC: 33.5 G/DL (ref 31.5–35.7)
MCV RBC AUTO: 93 FL (ref 79–97)
MONOCYTES # BLD AUTO: 0.8 X10E3/UL (ref 0.1–0.9)
MONOCYTES NFR BLD AUTO: 10 %
NEUTROPHILS # BLD AUTO: 4.1 X10E3/UL (ref 1.4–7)
NEUTROPHILS NFR BLD AUTO: 56 %
PLATELET # BLD AUTO: 333 X10E3/UL (ref 150–379)
POTASSIUM SERPL-SCNC: 3.9 MMOL/L (ref 3.5–5.2)
RBC # BLD AUTO: 4.25 X10E6/UL (ref 4.14–5.8)
SODIUM SERPL-SCNC: 140 MMOL/L (ref 134–144)
WBC # BLD AUTO: 7.4 X10E3/UL (ref 3.4–10.8)

## 2019-03-04 DIAGNOSIS — I10 ESSENTIAL HYPERTENSION: Primary | ICD-10-CM

## 2019-03-04 DIAGNOSIS — I10 ESSENTIAL HYPERTENSION: ICD-10-CM

## 2019-03-04 RX ORDER — METOPROLOL SUCCINATE 50 MG/1
50 TABLET, EXTENDED RELEASE ORAL DAILY
Qty: 30 TAB | Refills: 1 | Status: SHIPPED | OUTPATIENT
Start: 2019-03-04 | End: 2019-03-04 | Stop reason: SDUPTHER

## 2019-03-04 RX ORDER — METOPROLOL SUCCINATE 50 MG/1
TABLET, EXTENDED RELEASE ORAL
Qty: 90 TAB | Refills: 0 | Status: SHIPPED | OUTPATIENT
Start: 2019-03-04 | End: 2019-01-01 | Stop reason: SDUPTHER

## 2019-03-04 NOTE — PROGRESS NOTES
Creatinine is elevated, higher than last time. Stop HCTZ and add metoprolol 50mg daily. Medication benefits, risks, indication, dosage, potential adverse effects, and alternate medication options were discussed with patient who expressed understanding. Follow-up in 4 weeks. Results were discussed with patient who expressed his understanding.

## 2019-03-11 ENCOUNTER — HOSPITAL ENCOUNTER (OUTPATIENT)
Dept: ULTRASOUND IMAGING | Age: 69
Discharge: HOME | End: 2019-03-11
Attending: NURSE PRACTITIONER
Payer: MEDICARE

## 2019-03-11 DIAGNOSIS — Z87.891 PERSONAL HISTORY OF TOBACCO USE, PRESENTING HAZARDS TO HEALTH: ICD-10-CM

## 2019-03-11 PROCEDURE — 76706 US ABDL AORTA SCREEN AAA: CPT

## 2019-03-21 ENCOUNTER — OFFICE VISIT (OUTPATIENT)
Dept: PRIMARY CARE CLINIC | Age: 69
End: 2019-03-21

## 2019-03-21 VITALS
BODY MASS INDEX: 26.77 KG/M2 | DIASTOLIC BLOOD PRESSURE: 86 MMHG | HEART RATE: 81 BPM | SYSTOLIC BLOOD PRESSURE: 142 MMHG | RESPIRATION RATE: 16 BRPM | TEMPERATURE: 97.8 F | OXYGEN SATURATION: 97 % | HEIGHT: 70 IN | WEIGHT: 187 LBS

## 2019-03-21 DIAGNOSIS — Z01.818 PRE-OP EVALUATION: Primary | ICD-10-CM

## 2019-03-21 DIAGNOSIS — I10 ESSENTIAL HYPERTENSION: ICD-10-CM

## 2019-03-21 RX ORDER — SODIUM, POTASSIUM,MAG SULFATES 17.5-3.13G
SOLUTION, RECONSTITUTED, ORAL ORAL
Refills: 0 | COMMUNITY
Start: 2019-03-06 | End: 2019-01-01 | Stop reason: ALTCHOICE

## 2019-03-21 NOTE — PROGRESS NOTES
HPI:     Chief Complaint   Patient presents with    Pre-op Exam     left knee replacement 4/2/19        Patient is a 77 y.o. male with significant history of HTN, HLD, afib, bladder cancer, skin cancer who presents for pre-op evaluation. Patient is scheduled for left knee replacement on 4/2/19 with Dr. Too Lopez. Has hx of right knee replacement in 2010 that went well. Patient follows regularly with cardiology Dr. Caity Calvillo (hx afib) and was cleared for procedure earlier this month from a cardiology standpoint. Had EKG there. He understands that he is to stop Voltaren 5 days prior to surgery and Xarelto 2 days prior. Also had pre-op exam with ortho yesterday, had \"a lot of blood drawn. \"      Patient is unsure of the type of anesthesia for procedure. Has tolerated general anesthesia well in the past, never had any adverse reaction. No hx of difficult intubation. Patient feels well today and denies chest pain, palpitations, dyspnea, dizziness, syncope, headache, blurred vision. No history of diabetes, pulmonary disease, GERD, YOBANY. Patient does not smoke, drinks alcohol socially. Blood pressure is slightly elevated above goal today. Creatinine has been elevated recently and HCTZ was stopped earlier this month and metoprolol 50mg was started. Patient has tolerated med change well. Denies med side effects. Complaint with amlodipine and lisinopril as well. Reports BP at home has been around 130s/80s.          Patient Active Problem List   Diagnosis Code    Essential hypertension I10    Hyperlipidemia E78.5    [facility] I48[facility]    History of skin cancer Z85.828    BMI 27.0-27.9,adult Z68.27    History of bladder cancer Z85.51     Current Outpatient Medications   Medication Sig Dispense Refill    metoprolol succinate (TOPROL-XL) 50 mg XL tablet TAKE 1 TABLET BY MOUTH DAILY 90 Tab 0    diclofenac EC (VOLTAREN) 75 mg EC tablet       albuterol (PROVENTIL HFA, VENTOLIN HFA, PROAIR HFA) 90 mcg/actuation inhaler Take 2 Puffs by inhalation every six (6) hours as needed for Wheezing or Shortness of Breath. 1 Inhaler 1    fluorouracil (EFUDEX) 5 % chemo cream Apply a thin layer twice daily for total of 4 weeks. 40 g 0    amLODIPine (NORVASC) 10 mg tablet Take  by mouth daily.  rivaroxaban (XARELTO) 20 mg tab tablet Take  by mouth daily.  finasteride (PROSCAR) 5 mg tablet Take 5 mg by mouth daily.  lisinopril (PRINIVIL, ZESTRIL) 20 mg tablet Take 20 mg by mouth daily.  fenofibrate (TRICOR) 160 mg tablet Take 160 mg by mouth daily.  simvastatin (ZOCOR) 40 mg tablet Take  by mouth nightly.       SUPREP BOWEL PREP KIT 17.5-3.13-1.6 gram solr oral solution colonoscopy scheduled 5/6/19  0    traMADol (ULTRAM) 50 mg tablet        Allergies   Allergen Reactions    Demerol [Meperidine] Other (comments)     Duplicate, delete    Demerol [Meperidine] Other (comments)     \"passed out\"     Past Medical History:   Diagnosis Date    [facility]     BPH (benign prostatic hyperplasia)     Cancer ([facility] early 36s    BLADDER    Hypercholesterolemia     Hypertension     Joint replaced 2011    right knee    Skin cancer     Skin disorder 2018    Skin Cancer - melanoma across stomach, lymphnode involvement in Right armpit and Right groin    Sun-damaged skin      Past Surgical History:   Procedure Laterality Date    HX AFIB ABLATION  2018    HX APPENDECTOMY      HX HERNIA REPAIR  1990s    HX KNEE REPLACEMENT  2010    right     HX MALIGNANT SKIN LESION EXCISION      HX TONSILLECTOMY      HX UROLOGICAL      CYSTOSCOPY    SKIN TISSUE PROCEDURE UNLISTED  2018    Melanoma across abdomen, Right armpit and groin lymphnode involvement     Family History   Problem Relation Age of Onset    Dementia Mother     Hypertension Father     Hypertension Sister      Social History     Tobacco Use    Smoking status: Former Smoker     Packs/day: 2.00     Years: 20.00     Pack years: 40.00     Last attempt to quit: 1988     Years since quittin.2    Smokeless tobacco: Never Used   Substance Use Topics    Alcohol use: Yes     Alcohol/week: 1.2 oz     Types: 2 Glasses of wine per week     Comment: 1 glass with dinner 2 nights per week           ROS:   Pertinent items are noted in HPI. Objective:     Vitals:    19 1056 19 1143   BP: 144/74 142/86   Pulse: 81    Resp: 16    Temp: 97.8 °F (36.6 °C)    TempSrc: Oral    SpO2: 97%    Weight: 187 lb (84.8 kg)    Height: 5' 9.5\" (1.765 m)         Vitals and Nurse Documentation reviewed. Physical Examination:   General appearance - alert, well appearing, and in no distress  Mental status - alert, oriented to person, place, and time, normal mood, behavior, speech, dress, motor activity, and thought processes  Eyes - pupils equal and reactive, extraocular eye movements intact  Ears - bilateral TM's and external ear canals normal  Nose - normal and patent, no erythema, discharge or polyps  Mouth - mucous membranes moist, pharynx normal without lesions  Neck - supple, no significant adenopathy, thyroid is normal in size without nodules or tenderness  Chest - clear to auscultation, no wheezes, rales or rhonchi, symmetric air entry  Heart - normal rate, regular rhythm, normal S1, S2, no murmurs, rubs, clicks or gallops  Abdomen - soft, nontender, nondistended, no masses or organomegaly  Neurological - alert, oriented, normal speech, no focal findings or movement disorder noted  Extremities - peripheral pulses normal, no pedal edema, no clubbing or cyanosis      Assessment/ Plan:   Diagnoses and all orders for this visit:    1. Pre-op evaluation        -     Patient with left knee replacement scheduled 19. Has already received cardiac clearance from Dr. Godwin Chen. Patient otherwise appears well and optimized for procedure. Pre-op form completed and faxed. Copy handed to patient.      2. Essential hypertension  -     Systolic slightly elevated above goal.  Reports BP has been good at home. Will continue to monitor. Recheck kidney function today. Continue metoprolol, amlodipine, lisinopril as prescribed. -    METABOLIC PANEL, BASIC       Follow-up and Dispositions    · Return in about 1 month (around 4/18/2019) for blood pressure check . I have discussed the diagnosis with the patient and the intended plan as seen in the above orders. Advised prompt follow-up if symptoms worsen or fail to improve and symptoms that would warrant emergent evaluation in ED. The patient has received an after-visit summary and questions were answered concerning future plans. I have discussed medication side effects and warnings with the patient as well. Patient expressed understanding and is in agreement with the diagnosis and plan.

## 2019-03-21 NOTE — PROGRESS NOTES
Chief Complaint   Patient presents with    Pre-op Exam     left knee replacement 4/2/19     1. Have you been to the ER, urgent care clinic since your last visit? Hospitalized since your last visit? No    2. Have you seen or consulted any other health care providers outside of the [address] [place] [address] since your last visit? Include any pap smears or colon screening.  No

## 2019-03-21 NOTE — PATIENT INSTRUCTIONS
DASH Diet: Care Instructions  Your Care Instructions    The DASH diet is an eating plan that can help lower your blood pressure. DASH stands for Dietary Approaches to Stop Hypertension. Hypertension is high blood pressure. The DASH diet focuses on eating foods that are high in calcium, potassium, and magnesium. These nutrients can lower blood pressure. The foods that are highest in these nutrients are fruits, vegetables, low-fat dairy products, nuts, seeds, and legumes. But taking calcium, potassium, and magnesium supplements instead of eating foods that are high in those nutrients does not have the same effect. The DASH diet also includes whole grains, fish, and poultry. The DASH diet is one of several lifestyle changes your doctor may recommend to lower your high blood pressure. Your doctor may also want you to decrease the amount of sodium in your diet. Lowering sodium while following the DASH diet can lower blood pressure even further than just the DASH diet alone. Follow-up care is a key part of your treatment and safety. Be sure to make and go to all appointments, and call your doctor if you are having problems. It's also a good idea to know your test results and keep a list of the medicines you take. How can you care for yourself at home? Following the DASH diet  · Eat 4 to 5 servings of fruit each day. A serving is 1 medium-sized piece of fruit, ½ cup chopped or canned fruit, 1/4 cup dried fruit, or 4 ounces (½ cup) of fruit juice. Choose fruit more often than fruit juice. · Eat 4 to 5 servings of vegetables each day. A serving is 1 cup of lettuce or raw leafy vegetables, ½ cup of chopped or cooked vegetables, or 4 ounces (½ cup) of vegetable juice. Choose vegetables more often than vegetable juice. · Get 2 to 3 servings of low-fat and fat-free dairy each day. A serving is 8 ounces of milk, 1 cup of yogurt, or 1 ½ ounces of cheese. · Eat 6 to 8 servings of grains each day.  A serving is 1 slice of bread, 1 ounce of dry cereal, or ½ cup of cooked rice, pasta, or cooked cereal. Try to choose whole-grain products as much as possible. · Limit lean meat, poultry, and fish to 2 servings each day. A serving is 3 ounces, about the size of a deck of cards. · Eat 4 to 5 servings of nuts, seeds, and legumes (cooked dried beans, lentils, and split peas) each week. A serving is 1/3 cup of nuts, 2 tablespoons of seeds, or ½ cup of cooked beans or peas. · Limit fats and oils to 2 to 3 servings each day. A serving is 1 teaspoon of vegetable oil or 2 tablespoons of salad dressing. · Limit sweets and added sugars to 5 servings or less a week. A serving is 1 tablespoon jelly or jam, ½ cup sorbet, or 1 cup of lemonade. · Eat less than 2,300 milligrams (mg) of sodium a day. If you limit your sodium to 1,500 mg a day, you can lower your blood pressure even more. Tips for success  · Start small. Do not try to make dramatic changes to your diet all at once. You might feel that you are missing out on your favorite foods and then be more likely to not follow the plan. Make small changes, and stick with them. Once those changes become habit, add a few more changes. · Try some of the following:  ? Make it a goal to eat a fruit or vegetable at every meal and at snacks. This will make it easy to get the recommended amount of fruits and vegetables each day. ? Try yogurt topped with fruit and nuts for a snack or healthy dessert. ? Add lettuce, tomato, cucumber, and onion to sandwiches. ? Combine a ready-made pizza crust with low-fat mozzarella cheese and lots of vegetable toppings. Try using tomatoes, squash, spinach, broccoli, carrots, cauliflower, and onions. ? Have a variety of cut-up vegetables with a low-fat dip as an appetizer instead of chips and dip. ? Sprinkle sunflower seeds or chopped almonds over salads. Or try adding chopped walnuts or almonds to cooked vegetables.   ? Try some vegetarian meals using beans and peas. Add garbanzo or kidney beans to salads. Make burritos and tacos with mashed johnson beans or black beans. Where can you learn more? Go to http://dheeraj-sarai.info/. Enter R814 in the search box to learn more about \"DASH Diet: Care Instructions. \"  Current as of: July 22, 2018  Content Version: 11.9  © 7183-0892 [employer]. Care instructions adapted under license by [employer] (which disclaims liability or warranty for this information). If you have questions about a medical condition or this instruction, always ask your healthcare professional. [address] any warranty or liability for your use of this information.

## 2019-03-22 LAB
BUN SERPL-MCNC: 19 MG/DL (ref 8–27)
BUN/CREAT SERPL: 14 (ref 10–24)
CALCIUM SERPL-MCNC: 9.8 MG/DL (ref 8.6–10.2)
CHLORIDE SERPL-SCNC: 106 MMOL/L (ref 96–106)
CO2 SERPL-SCNC: 21 MMOL/L (ref 20–29)
CREAT SERPL-MCNC: 1.35 MG/DL (ref 0.76–1.27)
GLUCOSE SERPL-MCNC: 85 MG/DL (ref 65–99)
POTASSIUM SERPL-SCNC: 4.4 MMOL/L (ref 3.5–5.2)
SODIUM SERPL-SCNC: 140 MMOL/L (ref 134–144)

## 2019-03-22 NOTE — PROGRESS NOTES
Results sent through [address] Creatinine is much better and almost back to normal range. Will continue to monitor.

## 2019-04-09 DIAGNOSIS — J20.9 ACUTE BRONCHITIS, UNSPECIFIED ORGANISM: ICD-10-CM

## 2019-04-09 DIAGNOSIS — R06.2 WHEEZING SYMPTOM: ICD-10-CM

## 2019-04-09 RX ORDER — ALBUTEROL SULFATE 90 UG/1
AEROSOL, METERED RESPIRATORY (INHALATION)
Qty: 3 INHALER | Refills: 0 | Status: ON HOLD | OUTPATIENT
Start: 2019-04-09 | End: 2020-01-01

## 2019-04-09 RX ORDER — ALBUTEROL SULFATE 90 UG/1
AEROSOL, METERED RESPIRATORY (INHALATION)
Qty: 1 INHALER | Refills: 0 | Status: SHIPPED | OUTPATIENT
Start: 2019-04-09 | End: 2019-04-09 | Stop reason: SDUPTHER

## 2019-05-13 NOTE — PATIENT INSTRUCTIONS
DASH Diet: Care Instructions  Your Care Instructions    The DASH diet is an eating plan that can help lower your blood pressure. DASH stands for Dietary Approaches to Stop Hypertension. Hypertension is high blood pressure. The DASH diet focuses on eating foods that are high in calcium, potassium, and magnesium. These nutrients can lower blood pressure. The foods that are highest in these nutrients are fruits, vegetables, low-fat dairy products, nuts, seeds, and legumes. But taking calcium, potassium, and magnesium supplements instead of eating foods that are high in those nutrients does not have the same effect. The DASH diet also includes whole grains, fish, and poultry. The DASH diet is one of several lifestyle changes your doctor may recommend to lower your high blood pressure. Your doctor may also want you to decrease the amount of sodium in your diet. Lowering sodium while following the DASH diet can lower blood pressure even further than just the DASH diet alone. Follow-up care is a key part of your treatment and safety. Be sure to make and go to all appointments, and call your doctor if you are having problems. It's also a good idea to know your test results and keep a list of the medicines you take. How can you care for yourself at home? Following the DASH diet  · Eat 4 to 5 servings of fruit each day. A serving is 1 medium-sized piece of fruit, ½ cup chopped or canned fruit, 1/4 cup dried fruit, or 4 ounces (½ cup) of fruit juice. Choose fruit more often than fruit juice. · Eat 4 to 5 servings of vegetables each day. A serving is 1 cup of lettuce or raw leafy vegetables, ½ cup of chopped or cooked vegetables, or 4 ounces (½ cup) of vegetable juice. Choose vegetables more often than vegetable juice. · Get 2 to 3 servings of low-fat and fat-free dairy each day. A serving is 8 ounces of milk, 1 cup of yogurt, or 1 ½ ounces of cheese. · Eat 6 to 8 servings of grains each day.  A serving is 1 slice of bread, 1 ounce of dry cereal, or ½ cup of cooked rice, pasta, or cooked cereal. Try to choose whole-grain products as much as possible. · Limit lean meat, poultry, and fish to 2 servings each day. A serving is 3 ounces, about the size of a deck of cards. · Eat 4 to 5 servings of nuts, seeds, and legumes (cooked dried beans, lentils, and split peas) each week. A serving is 1/3 cup of nuts, 2 tablespoons of seeds, or ½ cup of cooked beans or peas. · Limit fats and oils to 2 to 3 servings each day. A serving is 1 teaspoon of vegetable oil or 2 tablespoons of salad dressing. · Limit sweets and added sugars to 5 servings or less a week. A serving is 1 tablespoon jelly or jam, ½ cup sorbet, or 1 cup of lemonade. · Eat less than 2,300 milligrams (mg) of sodium a day. If you limit your sodium to 1,500 mg a day, you can lower your blood pressure even more. Tips for success  · Start small. Do not try to make dramatic changes to your diet all at once. You might feel that you are missing out on your favorite foods and then be more likely to not follow the plan. Make small changes, and stick with them. Once those changes become habit, add a few more changes. · Try some of the following:  ? Make it a goal to eat a fruit or vegetable at every meal and at snacks. This will make it easy to get the recommended amount of fruits and vegetables each day. ? Try yogurt topped with fruit and nuts for a snack or healthy dessert. ? Add lettuce, tomato, cucumber, and onion to sandwiches. ? Combine a ready-made pizza crust with low-fat mozzarella cheese and lots of vegetable toppings. Try using tomatoes, squash, spinach, broccoli, carrots, cauliflower, and onions. ? Have a variety of cut-up vegetables with a low-fat dip as an appetizer instead of chips and dip. ? Sprinkle sunflower seeds or chopped almonds over salads. Or try adding chopped walnuts or almonds to cooked vegetables.   ? Try some vegetarian meals using beans and peas. Add garbanzo or kidney beans to salads. Make burritos and tacos with mashed johnson beans or black beans. Where can you learn more? Go to http://dheeraj-sarai.info/. Enter I951 in the search box to learn more about \"DASH Diet: Care Instructions. \"  Current as of: July 22, 2018  Content Version: 11.9  © 9976-2011 [email or web address]. Care instructions adapted under license by [employer] (which disclaims liability or warranty for this information). If you have questions about a medical condition or this instruction, always ask your healthcare professional. [address] any warranty or liability for your use of this information.

## 2019-05-13 NOTE — PROGRESS NOTES
Chief Complaint   Patient presents with    Cholesterol Problem     3 mos f/u, fasting     1. Have you been to the ER, urgent care clinic since your last visit? Hospitalized since your last visit? No    2. Have you seen or consulted any other health care providers outside of the [address] [place] [address] since your last visit? Include any pap smears or colon screening.  No  \

## 2019-05-13 NOTE — PROGRESS NOTES
HPI:     Chief Complaint   Patient presents with    Cholesterol Problem     3 mos f/u, fasting        Patient is a 77 y.o. male with significant history of HTN, HLD, afib, hx bladder cancer, hx skin cancer who presents for 3 month blood pressure and cholesterol follow-up. HTN - compliant with amlodipine 10mg, lisinopril 20mg, and metoprolol 50mg.  Tolerating meds well without side effects. Creatinine elevated recently, improved after stopping HCTZ in March (down to 1.35 from 1.72). /79 today, patient reports similar values at home. Patient feels well and denies chest pain, palpitations, dyspnea, peripheral edema, dizziness, headache, blurred vision. HLD - compliant with simvastatin 40mg and fenofibrate 160mg. Denies medication side effects, muscle pain, abdominal pain, nausea, dark urine. Total cholesterol and LDL at goal in February. Triglycerides slightly elevated. Patient has been working on low fat diet, has increased fish intake. Lab Results   Component Value Date/Time    Cholesterol, total 171 02/01/2019 10:58 AM    HDL Cholesterol 46 02/01/2019 10:58 AM    LDL, calculated 73 02/01/2019 10:58 AM    VLDL, calculated 52 (H) 02/01/2019 10:58 AM    Triglyceride 258 (H) 02/01/2019 10:58 AM       Patient Active Problem List   Diagnosis Code    Essential hypertension I10    Hyperlipidemia E78.5    Atrial fibrillation (HCC) I48.91    History of skin cancer Z85.828    BMI 27.0-27.9,adult Z68.27    History of bladder cancer Z85.51     Current Outpatient Medications   Medication Sig Dispense Refill    metoprolol succinate (TOPROL-XL) 50 mg XL tablet Take 1 Tab by mouth daily.  90 Tab 0    albuterol (PROVENTIL HFA, VENTOLIN HFA, PROAIR HFA) 90 mcg/actuation inhaler INHALE 2 PUFFS BY MOUTH EVERY 6 HOURS AS NEEDED FOR WHEEZING OR SHORTNESS OF BREATH 3 Inhaler 0    traMADol (ULTRAM) 50 mg tablet       diclofenac EC (VOLTAREN) 75 mg EC tablet       amLODIPine (NORVASC) 10 mg tablet Take by mouth daily.  rivaroxaban (XARELTO) 20 mg tab tablet Take  by mouth daily.  finasteride (PROSCAR) 5 mg tablet Take 5 mg by mouth daily as needed.  lisinopril (PRINIVIL, ZESTRIL) 20 mg tablet Take 20 mg by mouth daily.  fenofibrate (TRICOR) 160 mg tablet Take 160 mg by mouth daily.  simvastatin (ZOCOR) 40 mg tablet Take  by mouth nightly.  SUPREP BOWEL PREP KIT 17.5-3.13-1.6 gram solr oral solution colonoscopy scheduled 5/6/19  0    fluorouracil (EFUDEX) 5 % chemo cream Apply a thin layer twice daily for total of 4 weeks. 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 Objective:     Vitals:    05/13/19 0944   BP: 135/79   Pulse: 76   Resp: 16   Temp: 97.7 °F (36.5 °C)   TempSrc: Oral   SpO2: 98%   Weight: 185 lb (83.9 kg)   Height: 5' 9.5\" (1.765 m)        Vitals and Nurse Documentation reviewed.     Physical Examination:   General appearance - alert, well appearing, and in no distress  Mental status - alert, oriented to person, place, and time, normal mood, behavior, speech, dress, motor activity, and thought processes  Eyes - pupils equal and reactive, extraocular eye movements intact  Chest - clear to auscultation, no wheezes, rales or rhonchi, symmetric air entry  Heart - normal rate, regular rhythm, normal S1, S2, no murmurs, rubs, clicks or gallops  Neurological - alert, oriented, normal speech, no focal findings or movement disorder noted  Extremities - peripheral pulses normal, no pedal edema, no clubbing or cyanosis      Assessment/ Plan:   Diagnoses and all orders for this visit:    1. Essential hypertension  -     Stable and well controlled. Continue current med regimen, no change. -     Continue metoprolol, amlodipine, lisinopril as prescribed. -     Creatinine has improved since stopping HCTZ. Recheck today. -     Continue to monitor BP at home and follow-up if persistently >140-150/90s.   -     DASH diet, sodium reduction, exercise  -     METABOLIC PANEL, COMPREHENSIVE    2. Hyperlipidemia, unspecified hyperlipidemia type  -     Continue simvastatin and fenofibrate. Will notify if any changes needed. -     Reviewed heart healthy diet  -     METABOLIC PANEL, COMPREHENSIVE  -     LIPID PANEL       Follow-up and Dispositions    · Return in about 3 months (around 8/13/2019) for blood pressure . I have discussed the diagnosis with the patient and the intended plan as seen in the above orders. Advised prompt follow-up if symptoms worsen or fail to improve and symptoms that would warrant emergent evaluation in ED. The patient has received an after-visit summary and questions were answered concerning future plans. I have discussed medication side effects and warnings with the patient as well. Patient expressed understanding and is in agreement with the diagnosis and plan.

## 2019-05-14 NOTE — PROGRESS NOTES
Please notify patient:    Kidney and liver function are normal.      Cholesterol levels are good, triglyceride level is slightly elevated but better than last time. Keep up the good work! Continue to work on diet and exercise. Continue simvastatin and fenofibrate. Recheck in 6 months.

## 2019-05-20 NOTE — PROGRESS NOTES
Written by Gloria Amado, as dictated by Esha Adair, [address]. Name: Cleo Do       Age: 77 y.o. Date: 5/20/2019    Chief Complaint:   Chief Complaint   Patient presents with    Skin Exam     3 month skin exam.  Areas of concern: none       Subjective:    HPI  Mr. Cleo Do is a 77 y.o. male who presents for a full skin exam.  The patient's last skin exam was on 02/04/19 and the patient does not have current complaints related to his skin. He notes the biopsy scar on the mid chest is thicked and raised. This was a biopsy-proven pigmented seborrheic keratosis. He states he has used 5-FU on the face and wear without complications. He notes the areas are smoother now. He reports having knee replacement surgery on April 2nd and is slowly recovering with physical therapy. He does note some edema in the bilateral ankles. He is feeling well and in his usual state of health today. He has no current illnesses, no other skin concerns. His allergies, medications, medical, and social history are reviewed by me today. The patient's pertinent skin history includes : personal history of melanoma and NMSCs  -MM, abdomen, Breslow depth 0.65 mm, wide excision by Dr. Elisabet Bojorquez, negative right axillary and right inguinal SLN biopsies, 12/20/17  -Recurrent BCC, left lateral neck, Mohs, 02/24/15  -SCCi, right arm, ED+C, 12/2014  -BCC, left neck, ED+C, 06/07/12    ROS: Constitutional: Negative.     Dermatological : positive for - skin lesion changes    Social History     Socioeconomic History    Marital status:      Spouse name: Not on file    Number of children: Not on file    Years of education: Not on file    Highest education level: Not on file   Occupational History    Not on file   Social Needs    Financial resource strain: Not on file    Food insecurity:     Worry: Not on file     Inability: Not on file    Transportation needs:     Medical: Not on file     Non-medical: Not on file   Tobacco Use    Smoking status: Former Smoker     Packs/day: 2.00     Years: 20.00     Pack years: 40.00     Last attempt to quit: 1988     Years since quittin.3    Smokeless tobacco: Never Used   Substance and Sexual Activity    Alcohol use:  Yes     Alcohol/week: 1.2 oz     Types: 2 Glasses of wine per week     Comment: 1 glass with dinner 2 nights per week     Drug use: No    Sexual activity: Not on file   Lifestyle    Physical activity:     Days per week: Not on file     Minutes per session: Not on file    Stress: Not on file   Relationships    Social connections:     Talks on phone: Not on file     Gets together: Not on file     Attends Religion service: Not on file     Active member of club or organization: Not on file     Attends meetings of clubs or organizations: Not on file     Relationship status: Not on file    Intimate partner violence:     Fear of current or ex partner: Not on file     Emotionally abused: Not on file     Physically abused: Not on file     Forced sexual activity: Not on file   Other Topics Concern    Not on file   Social History Narrative    Not on file       Family History   Problem Relation Age of Onset    Dementia Mother     Hypertension Father     Hypertension Sister        Past Medical History:   Diagnosis Date    Atrial fibrillation ([facility] [address] [facility]     BPH (benign prostatic hyperplasia)     Cancer ([facility] [address] early 36s    BLADDER    Hypercholesterolemia     Hypertension     Joint replaced     right knee    Skin cancer     Skin disorder 2018    Skin Cancer - melanoma across stomach, lymphnode involvement in Right armpit and Right groin    Sun-damaged skin        Past Surgical History:   Procedure Laterality Date    HX AFIB ABLATION      HX APPENDECTOMY      HX HERNIA REPAIR      HX KNEE REPLACEMENT      right knee    HX KNEE REPLACEMENT  2019    left knee    HX MALIGNANT SKIN LESION EXCISION      HX TONSILLECTOMY      HX UROLOGICAL      CYSTOSCOPY    SKIN TISSUE PROCEDURE UNLISTED  2018    Melanoma across abdomen, Right armpit and groin lymphnode involvement       Current Outpatient Medications   Medication Sig Dispense Refill    metoprolol succinate (TOPROL-XL) 50 mg XL tablet Take 1 Tab by mouth daily. 90 Tab 0    albuterol (PROVENTIL HFA, VENTOLIN HFA, PROAIR HFA) 90 mcg/actuation inhaler INHALE 2 PUFFS BY MOUTH EVERY 6 HOURS AS NEEDED FOR WHEEZING OR SHORTNESS OF BREATH 3 Inhaler 0    SUPREP BOWEL PREP KIT 17.5-3.13-1.6 gram solr oral solution colonoscopy scheduled 5/6/19  0    traMADol (ULTRAM) 50 mg tablet       diclofenac EC (VOLTAREN) 75 mg EC tablet       fluorouracil (EFUDEX) 5 % chemo cream Apply a thin layer twice daily for total of 4 weeks. 40 g 0    amLODIPine (NORVASC) 10 mg tablet Take  by mouth daily.  rivaroxaban (XARELTO) 20 mg tab tablet Take  by mouth daily.  finasteride (PROSCAR) 5 mg tablet Take 5 mg by mouth daily as needed.  lisinopril (PRINIVIL, ZESTRIL) 20 mg tablet Take 20 mg by mouth daily.  fenofibrate (TRICOR) 160 mg tablet Take 160 mg by mouth daily.  simvastatin (ZOCOR) 40 mg tablet Take  by mouth nightly. Allergies   Allergen Reactions    Demerol [Meperidine] Other (comments)     Duplicate, delete    Demerol [Meperidine] Other (comments)     \"passed out\"         Objective:    Visit Vitals  /70 (BP 1 Location: Right arm, BP Patient Position: Sitting)   Pulse 100   Temp 98.1 °F (36.7 °C) (Oral)   Resp 16   Ht 5' 9.5\" (1.765 m)   Wt 185 lb (83.9 kg)   SpO2 96%   BMI 26.93 kg/m²       Kalyn Lopez is a 77 y.o. male who appears well and in no distress. He is awake, alert, and oriented. There is no preauricular, submandibular, or cervical lymphadenopathy.   A skin examination was performed including his scalp, face (including eyelids), ears, neck, chest, back, abdomen, upper extremities (including digits/nails), lower extremities, breasts, buttocks; genital skin was not examined. There are well-healed scars on the right arm and left neck without evidence of lesion recurrence. There is a well-healed melanoma scar on the abdomen without pigment, nodularity, or other evidence of lesion recurrence. There is no associated right axillary or right inguinal palpable lymphadenopathy. There is a 7 x 5 mm tan and brown irregularly pigmented macule on the right lateral back, concerning for atypical pigmented lesion. There is a thin-scaled actinic keratosis on the left sideburn, his face and ears are much improved after 5-FU treatment. There is a hypertrophic scar on the mid chest without concerning features. He has scattered waxy macules and keratotic papules consistent with seborrheic keratoses. He has pink intradermal nevi and brown junctional nevi, no concerning features for severe atypia. He has scattered red papules consistent with cherry angiomas. Right lateral back           Right lateral back- scope    Assessment/Plan:    1. Personal history of melanoma and nonmelnaoma skin cancer. I discussed sun protection, sunscreen use, the warning signs of skin cancer, the need for self-skin examinations, and the need for regular practitioner exams every 6 months. The patient should follow up sooner as needed if new, changing, or symptomatic skin lesions arise. 2. Neoplasm of Uncertain Behavior, right lateral back, R/O atypical pigmented lesion. The differential diagnoses were discussed. A shave biopsy was advised to sample this lesion. The procedure was reviewed and verbal and written consent were obtained. The risks of pain, bleeding, infection, and scar were discussed. The patient is aware that this is a sample and is intended for diagnosis and not therapy of the skin lesion. I performed the procedure. The site was cleansed and anesthetized with 1% Lidocaine with Epinephrine 1:100,000. A shave biopsy was performed to sample the lesion.   Drysol was used for hemostasis. The wound was bandaged and care reviewed. The specimen was sent to pathology. I will contact the patient with the results and any further treatment that may be necessary. 3. Actinic Keratoses. The diagnosis of this precancerous lesion related to sun exposure was reviewed. The pt will apply 5-FU on the left sideburn BID for 2 weeks. 4. Hypertrophic scar, mid chest. The diagnosis was discussed. The patient was reassured that no treatment is necessary at this time. I advised to massage the area daily. 5. Seborrheic keratoses. The diagnosis was reviewed and the patient was reassured that no treatment is needed for these benign lesions. 6. Normal nevi. The diagnosis of normal nevi was reviewed. I discussed sun protection, sunscreen use, the warning signs of skin cancer, the need for self-skin examinations, and the need for regular practitioner exams every 6 months. The patient should follow up sooner as needed if new, changing, or symptomatic skin lesions arise. 7. Cherry angiomas. The diagnosis was reviewed and the patient was reassured that no treatment is needed for these benign lesions. This plan was reviewed with the patient and patient agrees. All questions were answered. This scribe documentation was reviewed by me and accurately reflects the examination and decisions made by me. [facility]   OFFICE PROCEDURE PROGRESS NOTE   Chart reviewed for the following:   Evelia RUELAS, have reviewed the History, Physical and updated the Allergic reactions for Alberto Davis. TIME OUT performed immediately prior to start of procedure:   Holly RUELAS, have performed the following reviews on Alberto Davis   prior to the start of the procedure:     * Patient was identified by name and date of birth   * Agreement on procedure being performed was verified   * Risks and Benefits explained to the patient   * Procedure site verified and marked as necessary   * Patient was positioned for comfort   * Consent was signed and verified     Time: 12:15 AM  Date of procedure: 5/20/2019  Procedure performed by: Amy Schulz.  Ramandeep Roblero  Provider assisted by: Yoon Granger RN   Patient assisted by: self   How tolerated by patient: tolerated the procedure well with no complications   Comments: none

## 2019-05-23 NOTE — PROGRESS NOTES
I spoke with the pt an he is healing well so far. He understands the dx of mild atypical nevus - no further tx needed. F/up as scheduled.

## 2019-08-14 NOTE — PROGRESS NOTES
HPI:  
 
Chief Complaint Patient presents with  Blood Pressure Check 3 mos f/u Patient is a 77 y.o. male with significant history of HTN, HLD, afib, hx bladder cancer, hx skin cancer who presents for 3 month blood pressure follow-up.   
  
HTN - compliant with amlodipine 10mg, lisinopril 20mg, and metoprolol 50mg.  Tolerating meds well without side effects. HCTZ was stopped due to elevated creatinine, which normalized after discontinuing med. BP today 147/81, 148/80 on repeat. Reports BP at home 140-150/75-80. Patient feels well and denies chest pain, palpitations, dyspnea, peripheral edema, dizziness, headache, blurred vision, weakness or numbness of extremities. Hx afib. Patient Active Problem List  
Diagnosis Code  Essential hypertension I10  
 Hyperlipidemia E78.5  Atrial fibrillation (HCC) I48.91  
 History of skin cancer C56.719  BMI 27.0-27.9,adult Z68.27  
 History of bladder cancer Z85.51 Current Outpatient Medications Medication Sig Dispense Refill  metoprolol succinate (TOPROL-XL) 50 mg XL tablet Take 1.5 Tabs by mouth daily. 90 Tab 0  
 simvastatin (ZOCOR) 40 mg tablet Take 1 Tab by mouth nightly. 90 Tab 1  
 fenofibrate (LOFIBRA) 160 mg tablet Take 1 Tab by mouth daily. 90 Tab 1  
 albuterol (PROVENTIL HFA, VENTOLIN HFA, PROAIR HFA) 90 mcg/actuation inhaler INHALE 2 PUFFS BY MOUTH EVERY 6 HOURS AS NEEDED FOR WHEEZING OR SHORTNESS OF BREATH 3 Inhaler 0  
 SUPREP BOWEL PREP KIT 17.5-3.13-1.6 gram solr oral solution colonoscopy scheduled 5/6/19  0  
 traMADol (ULTRAM) 50 mg tablet  diclofenac EC (VOLTAREN) 75 mg EC tablet  fluorouracil (EFUDEX) 5 % chemo cream Apply a thin layer twice daily for total of 4 weeks. 40 g 0  
 amLODIPine (NORVASC) 10 mg tablet Take  by mouth daily.  rivaroxaban (XARELTO) 20 mg tab tablet Take  by mouth daily.  finasteride (PROSCAR) 5 mg tablet Take 5 mg by mouth daily as needed.  lisinopril (PRINIVIL, ZESTRIL) 20 mg tablet Take 20 mg by mouth daily. Allergies Allergen Reactions  Demerol [Meperidine] Other (comments) Duplicate, delete  Demerol [Meperidine] Other (comments) \"passed out\" Past Medical History:  
Diagnosis Date  [facility]  BPH (benign prostatic hyperplasia)  Cancer ([facility] early 36s BLADDER  
 Hypercholesterolemia  Hypertension  Joint replaced 2011  
 right knee  Skin cancer  Skin disorder 2018 Skin Cancer - melanoma across stomach, lymphnode involvement in Right armpit and Right groin  Sun-damaged skin ROS:  
Pertinent items are noted in HPI. Objective:  
 
Vitals:  
 08/14/19 9617 08/14/19 4185 BP: 147/81 148/80 Pulse: 95 93 Resp: 16 Temp: 98.1 °F (36.7 °C) TempSrc: Oral   
SpO2: 98% Weight: 188 lb 9.6 oz (85.5 kg) Height: 5' 9.5\" (1.765 m) Vitals and Nurse Documentation reviewed. Physical Examination:  
General appearance - alert, well appearing, and in no distress Mental status - alert, oriented to person, place, and time, normal mood, behavior, speech, dress, motor activity, and thought processes Eyes - pupils equal and reactive, extraocular eye movements intact Ears - bilateral TM's and external ear canals normal 
Chest - clear to auscultation, no wheezes, rales or rhonchi, symmetric air entry Heart - normal rate, regular rhythm, normal S1, S2, no murmurs, rubs, clicks or gallops Neurological - alert, oriented, normal speech, no focal findings or movement disorder noted Extremities - peripheral pulses normal, no pedal edema, no clubbing or cyanosis Assessment/ Plan:  
Diagnoses and all orders for this visit: 1. Essential hypertension -     Slightly elevated above goal with similar readings at home. Discussed with patient and will increase metoprolol to 75mg (1.5 tabs) daily. Continue amlodipine and lisinopril as prescribed. -     Continue to monitor BP at home and follow-up if persistently >140/90s. Monitor HR and notify if <60.   
-     Reviewed DASH diet, exercise, weight loss, avoidance of caffeine.  
-     METABOLIC PANEL, BASIC 2. BMI 27.0-27.9,adult -     BMI discussed with patient. Discussed lifestyle changes, daily physical activity, and advised 150 minutes of exercise weekly. Discussed healthy diet choices and limiting fried, fatty foods, fast foods, processed foods, sugar-sweetened beverages/soda, and added sugars. Increase fruits, vegetables, low-fat dairy products, lean proteins, and whole grains. Follow-up and Dispositions · Return in about 3 months (around 11/14/2019) for blood pressure and cholesterol check . I have discussed the diagnosis with the patient and the intended plan as seen in the above orders. Advised prompt follow-up if symptoms worsen or fail to improve and symptoms that would warrant emergent evaluation in ED. The patient has received an after-visit summary and questions were answered concerning future plans. I have discussed medication side effects and warnings with the patient as well. Patient expressed understanding and is in agreement with the diagnosis and plan.

## 2019-08-14 NOTE — PROGRESS NOTES
Marko Moncada is a 77 y.o. male Chief Complaint Patient presents with  Blood Pressure Check 3 mos f/u 1. Have you been to the ER, urgent care clinic since your last visit? Hospitalized since your last visit? No 
 
2. Have you seen or consulted any other health care providers outside of the [address] [place] [address] since your last visit? Include any pap smears or colon screening. No 
 
No flowsheet data found. Health Maintenance Due Topic Date Due  
 COLONOSCOPY  11/22/1966  GLAUCOMA SCREENING Q2Y  11/22/2013  Shingrix Vaccine Age 50> (2 of 2) 05/09/2019  Influenza Age 5 to Adult  08/01/2019

## 2019-08-14 NOTE — PATIENT INSTRUCTIONS
Start taking metoprolol 75mg (1.5 tablets) daily. Continue to monitor blood pressure and heart rate. Learning About High Blood Pressure What is high blood pressure? Blood pressure is a measure of how hard the blood pushes against the walls of your arteries. It's normal for blood pressure to go up and down throughout the day, but if it stays up, you have high blood pressure. Another name for high blood pressure is hypertension. Two numbers tell you your blood pressure. The first number is the systolic pressure. It shows how hard the blood pushes when your heart is pumping. The second number is the diastolic pressure. It shows how hard the blood pushes between heartbeats, when your heart is relaxed and filling with blood. Your doctor will give you a goal for your blood pressure. Your goal will be based on your health and your age. High blood pressure (hypertension) means that the top number stays high, or the bottom number stays high, or both. High blood pressure increases the risk of stroke, heart attack, and other problems. You and your doctor will talk about your risks of these problems based on your blood pressure. What happens when you have high blood pressure? · Blood flows through your arteries with too much force. Over time, this damages the walls of your arteries. But you can't feel it. High blood pressure usually doesn't cause symptoms. · Fat and calcium start to build up in your arteries. This buildup is called plaque. Plaque makes your arteries narrower and stiffer. Blood can't flow through them as easily. · This lack of good blood flow starts to damage some of the organs in your body. This can lead to problems such as coronary artery disease and heart attack, heart failure, stroke, kidney failure, and eye damage. How can you prevent high blood pressure? · Stay at a healthy weight.  
· Try to limit how much sodium you eat to less than 2,300 milligrams (mg) a day. If you limit your sodium to 1,500 mg a day, you can lower your blood pressure even more. ? Buy foods that are labeled \"unsalted,\" \"sodium-free,\" or \"low-sodium. \" Foods labeled \"reduced-sodium\" and \"light sodium\" may still have too much sodium. ? Flavor your food with garlic, lemon juice, onion, vinegar, herbs, and spices instead of salt. Do not use soy sauce, steak sauce, onion salt, garlic salt, mustard, or ketchup on your food. ? Use less salt (or none) when recipes call for it. You can often use half the salt a recipe calls for without losing flavor. · Be physically active. Get at least 30 minutes of exercise on most days of the week. Walking is a good choice. You also may want to do other activities, such as running, swimming, cycling, or playing tennis or team sports. · Limit alcohol to 2 drinks a day for men and 1 drink a day for women. · Eat plenty of fruits, vegetables, and low-fat dairy products. Eat less saturated and total fats. How is high blood pressure treated? · Your doctor will suggest making lifestyle changes to help your heart. For example, your doctor may ask you to eat healthy foods, quit smoking, lose extra weight, and be more active. · If lifestyle changes don't help enough, your doctor may recommend that you take medicine. · When blood pressure is very high, medicines are needed to lower it. Follow-up care is a key part of your treatment and safety. Be sure to make and go to all appointments, and call your doctor if you are having problems. It's also a good idea to know your test results and keep a list of the medicines you take. Where can you learn more? Go to http://dheeraj-sarai.info/. Enter P501 in the search box to learn more about \"Learning About High Blood Pressure. \" Current as of: July 22, 2018 Content Version: 12.1 © 9419-2547 [employer].  Care instructions adapted under license by [address] (which disclaims liability or warranty for this information). If you have questions about a medical condition or this instruction, always ask your healthcare professional. [address] any warranty or liability for your use of this information.

## 2019-08-15 NOTE — PROGRESS NOTES
Please notify patient: 
 
Creatinine (kidney number) is slightly elevated, but not in concerning range. Will recheck at follow-up. Be sure to drink plenty of water and avoid OTC NSAIDs like ibuprofen and naproxen.

## 2019-10-08 NOTE — TELEPHONE ENCOUNTER
Since there is not a 75mg tablet for metoprolol, we need to do the 50mg tablet (so he would take 1.5 tablets daily). It really isn't possible to get that dose with the 100mg tablet. Refill sent.

## 2019-10-08 NOTE — TELEPHONE ENCOUNTER
Patient states that he has been taking Toprol 50 mg bumped up to 1.5 tablets at 75mg. Because of increasing the dosage he will run out in 3 or 4 days,  He requesting a new script for 100 mg instead of the 75 mg. [facility] is his Pharmacy.

## 2019-11-14 NOTE — PROGRESS NOTES
Yanique León is a 77 y.o. male    Chief Complaint   Patient presents with    Blood Pressure Check     3 mos f/u    Cholesterol Problem     fasting       1. Have you been to the ER, urgent care clinic since your last visit? Hospitalized since your last visit? No    2. Have you seen or consulted any other health care providers outside of the [address] [place] [address] since your last visit? Include any pap smears or colon screening. No    No flowsheet data found.      Health Maintenance Due   Topic Date Due    GLAUCOMA SCREENING Q2Y  11/22/2013

## 2019-11-14 NOTE — PATIENT INSTRUCTIONS
DASH Diet: Care Instructions  Your Care Instructions    The DASH diet is an eating plan that can help lower your blood pressure. DASH stands for Dietary Approaches to Stop Hypertension. Hypertension is high blood pressure. The DASH diet focuses on eating foods that are high in calcium, potassium, and magnesium. These nutrients can lower blood pressure. The foods that are highest in these nutrients are fruits, vegetables, low-fat dairy products, nuts, seeds, and legumes. But taking calcium, potassium, and magnesium supplements instead of eating foods that are high in those nutrients does not have the same effect. The DASH diet also includes whole grains, fish, and poultry. The DASH diet is one of several lifestyle changes your doctor may recommend to lower your high blood pressure. Your doctor may also want you to decrease the amount of sodium in your diet. Lowering sodium while following the DASH diet can lower blood pressure even further than just the DASH diet alone. Follow-up care is a key part of your treatment and safety. Be sure to make and go to all appointments, and call your doctor if you are having problems. It's also a good idea to know your test results and keep a list of the medicines you take. How can you care for yourself at home? Following the DASH diet  · Eat 4 to 5 servings of fruit each day. A serving is 1 medium-sized piece of fruit, ½ cup chopped or canned fruit, 1/4 cup dried fruit, or 4 ounces (½ cup) of fruit juice. Choose fruit more often than fruit juice. · Eat 4 to 5 servings of vegetables each day. A serving is 1 cup of lettuce or raw leafy vegetables, ½ cup of chopped or cooked vegetables, or 4 ounces (½ cup) of vegetable juice. Choose vegetables more often than vegetable juice. · Get 2 to 3 servings of low-fat and fat-free dairy each day. A serving is 8 ounces of milk, 1 cup of yogurt, or 1 ½ ounces of cheese. · Eat 6 to 8 servings of grains each day.  A serving is 1 slice of bread, 1 ounce of dry cereal, or ½ cup of cooked rice, pasta, or cooked cereal. Try to choose whole-grain products as much as possible. · Limit lean meat, poultry, and fish to 2 servings each day. A serving is 3 ounces, about the size of a deck of cards. · Eat 4 to 5 servings of nuts, seeds, and legumes (cooked dried beans, lentils, and split peas) each week. A serving is 1/3 cup of nuts, 2 tablespoons of seeds, or ½ cup of cooked beans or peas. · Limit fats and oils to 2 to 3 servings each day. A serving is 1 teaspoon of vegetable oil or 2 tablespoons of salad dressing. · Limit sweets and added sugars to 5 servings or less a week. A serving is 1 tablespoon jelly or jam, ½ cup sorbet, or 1 cup of lemonade. · Eat less than 2,300 milligrams (mg) of sodium a day. If you limit your sodium to 1,500 mg a day, you can lower your blood pressure even more. Tips for success  · Start small. Do not try to make dramatic changes to your diet all at once. You might feel that you are missing out on your favorite foods and then be more likely to not follow the plan. Make small changes, and stick with them. Once those changes become habit, add a few more changes. · Try some of the following:  ? Make it a goal to eat a fruit or vegetable at every meal and at snacks. This will make it easy to get the recommended amount of fruits and vegetables each day. ? Try yogurt topped with fruit and nuts for a snack or healthy dessert. ? Add lettuce, tomato, cucumber, and onion to sandwiches. ? Combine a ready-made pizza crust with low-fat mozzarella cheese and lots of vegetable toppings. Try using tomatoes, squash, spinach, broccoli, carrots, cauliflower, and onions. ? Have a variety of cut-up vegetables with a low-fat dip as an appetizer instead of chips and dip. ? Sprinkle sunflower seeds or chopped almonds over salads. Or try adding chopped walnuts or almonds to cooked vegetables.   ? Try some vegetarian meals using beans and peas. Add garbanzo or kidney beans to salads. Make burritos and tacos with mashed johnson beans or black beans. Where can you learn more? Go to http://dheeraj-sarai.info/. Enter P463 in the search box to learn more about \"DASH Diet: Care Instructions. \"  Current as of: April 9, 2019  Content Version: 12.2  © 5131-0539 [employer], [employer]. Care instructions adapted under license by [employer] (which disclaims liability or warranty for this information). If you have questions about a medical condition or this instruction, always ask your healthcare professional. [address] any warranty or liability for your use of this information.

## 2019-11-14 NOTE — PROGRESS NOTES
HPI:     Chief Complaint   Patient presents with    Blood Pressure Check     3 mos f/u    Cholesterol Problem     fasting        Patient is a 68 y. o. male with significant history of HTN, HLD, afib, hx bladder cancer, hx skin cancer who presents for 3 month blood pressure follow-up.       HTN - compliant with amlodipine 10mg, lisinopril 20mg, and metoprolol 75mg (increased from 50mg 3 months ago) .   Tolerating well without side effects. Unable to tolerate HCTZ in the recent past due to elevated creatinine. BP today 155/75, 132/76 on repeat. Reports BP at home 140s/70s.  Patient feels well and denies chest pain, palpitations, dyspnea, headache, blurred vision, weakness or numbness of extremities. Hx afib and on Xarelto. Follows with cardiology. HLD - compliant with simvastatin 40mg and fenofibrate 160mg. Tolerating well without side effects. Denies muscle symptoms, dark urine, abdominal pain, nausea. Total cholesterol and LDL at goal in May. Triglycerides slightly elevated. Patient has been working on low fat diet, has increased fish intake. Lab Results   Component Value Date/Time    Cholesterol, total 159 05/13/2019 10:21 AM    HDL Cholesterol 46 05/13/2019 10:21 AM    LDL, calculated 74 05/13/2019 10:21 AM    VLDL, calculated 39 05/13/2019 10:21 AM    Triglyceride 196 (H) 05/13/2019 10:21 AM     Patient follows with urology for hx bladder cancer and BPH. Reports he was recently started on Cialis PRN (takes 1-2 times per week) and Flomax 0.4mg daily. Patient reports left hand tremor over the past 6-12 months with worsening over the past several weeks. This usually happens when he is actively using the hand. Eating soup is difficult and handwriting is poor (patient is left hand dominant). Patient was at shooting range last week and had hard time hitting the target.           Patient Active Problem List   Diagnosis Code    Essential hypertension I10    Hyperlipidemia E78.5    Atrial fibrillation (HCC) I48.91    History of skin cancer Z85.828    BMI 27.0-27.9,adult Z68.27    History of bladder cancer Z85.51     Current Outpatient Medications   Medication Sig Dispense Refill    tadalafil (CIALIS) 5 mg tablet       tamsulosin (FLOMAX) 0.4 mg capsule       metoprolol succinate (TOPROL-XL) 50 mg XL tablet Take 1.5 Tabs by mouth daily. Indications: prevention of anginal chest pain associated with coronary artery disease 135 Tab 0    simvastatin (ZOCOR) 40 mg tablet Take 1 Tab by mouth nightly. 90 Tab 1    fenofibrate (LOFIBRA) 160 mg tablet Take 1 Tab by mouth daily. 90 Tab 1    albuterol (PROVENTIL HFA, VENTOLIN HFA, PROAIR HFA) 90 mcg/actuation inhaler INHALE 2 PUFFS BY MOUTH EVERY 6 HOURS AS NEEDED FOR WHEEZING OR SHORTNESS OF BREATH 3 Inhaler 0    traMADol (ULTRAM) 50 mg tablet       diclofenac EC (VOLTAREN) 75 mg EC tablet       fluorouracil (EFUDEX) 5 % chemo cream Apply a thin layer twice daily for total of 4 weeks. 40 g 0    amLODIPine (NORVASC) 10 mg tablet Take  by mouth daily.  rivaroxaban (XARELTO) 20 mg tab tablet Take  by mouth daily.  finasteride (PROSCAR) 5 mg tablet Take 5 mg by mouth daily as needed.  lisinopril (PRINIVIL, ZESTRIL) 20 mg tablet Take 20 mg by mouth daily. Allergies   Allergen Reactions    Demerol [Meperidine] Other (comments)     Duplicate, delete    Demerol [Meperidine] Other (comments)     \"passed out\"     Past Medical History:   Diagnosis Date    Atrial fibrillation ([facility] [address]     BPH (benign prostatic hyperplasia)     Cancer ([facility] [address] early 36s    BLADDER    Hypercholesterolemia     Hypertension     Joint replaced 2011    right knee    Skin cancer     Skin disorder 2018    Skin Cancer - melanoma across stomach, lymphnode involvement in Right armpit and Right groin    Sun-damaged skin           ROS:   Pertinent items are noted in HPI.       Objective:     Vitals:    11/14/19 0853 11/14/19 0918   BP: 155/75 132/76   Pulse: 83 Resp: 15    Temp: 97.9 °F (36.6 °C)    TempSrc: Oral    SpO2: 97%    Weight: 194 lb (88 kg)    Height: 5' 9.5\" (1.765 m)         Vitals and Nurse Documentation reviewed. Physical Examination:   General appearance - alert, well appearing, and in no distress  Mental status - alert, oriented to person, place, and time, normal mood, behavior, speech, dress, motor activity, and thought processes  Chest - clear to auscultation, no wheezes, rales or rhonchi, symmetric air entry  Heart - normal rate, regular rhythm, normal S1, S2, no murmurs, rubs, clicks or gallops  Neurological - alert, oriented, normal speech, no focal findings or movement disorder noted; no appreciable tremor   Extremities - peripheral pulses normal, no pedal edema, no clubbing or cyanosis      Assessment/ Plan:   Diagnoses and all orders for this visit:    1. Essential hypertension  -     SBP initially elevated in office, but at goal on repeat. Will continue current treatment plan and continue to monitor. He will continue to check BP at home and follow-up sooner if persistently >140-150/90s. We discussed how to appropriately check BP at home (resting, feet on the floor, arm at heart level, etc.). Reviewed DASH diet. -     METABOLIC PANEL, COMPREHENSIVE    2. Mixed hyperlipidemia  -     Continue Simvastatin and Fenofibrate. LDL 74 on last check. Will notify of results and recommendation.  -     METABOLIC PANEL, COMPREHENSIVE  -     LIPID PANEL    3. Tremor of left hand  -     REFERRAL TO NEUROLOGY       Follow-up and Dispositions    · Return in about 3 months (around 2/14/2020) for blood pressure . I have discussed the diagnosis with the patient and the intended plan as seen in the above orders. Advised prompt follow-up if symptoms worsen or fail to improve and symptoms that would warrant emergent evaluation in ED. The patient has received an after-visit summary and questions were answered concerning future plans.   I have discussed medication side effects and warnings with the patient as well. Patient expressed understanding and is in agreement with the diagnosis and plan.

## 2019-11-15 NOTE — PROGRESS NOTES
Please notify patient:    Total cholesterol and LDL levels are good, triglyceride level is slightly elevated and a bit higher than last time. Continue to work on diet and exercise, decrease fried fatty foods and eat more fish. Continue simvastatin and fenofibrate. Recheck in 6 months. Creatinine (kidney number) has improved and is stable. Be sure to drink plenty of water.

## 2019-11-18 NOTE — PROGRESS NOTES
Subjective: Chief Complaint Patient presents with  Mass  
  right side of abdomen, below rib cage x 1 month. No pain, states that it has gotten bigger He  is a 77y.o. year old male history of HTN, HLD, afib, hx bladder cancer, hx skin cancer who presents today with a concern about a lump. Got a lump right below rigtt lower rib cage X 1 month. He thinks its getting bigger. Denies any pain. Denies any cough,chest, pain, weight loss, night sweats. He is concern. Pertinent items are noted in HPI. Objective:  
 
Vitals:  
 19 4206 BP: 138/76 Pulse: 83 Resp: 17 Temp: 98.2 °F (36.8 °C) TempSrc: Oral  
SpO2: 97% Weight: 194 lb 12.8 oz (88.4 kg) Height: 5' 9.5\" (1.765 m) Physical Examination: General appearance - alert, well appearing, and in no distress, oriented to person, place, and time and overweight Mental status - alert, oriented to person, place, and time, normal mood, behavior, speech, dress, motor activity, and thought processes Chest - clear to auscultation, no wheezes, rales or rhonchi, symmetric air entry Heart - normal rate, regular rhythm, normal S1, S2, no murmurs, rubs, clicks or gallops Abdomen - there is a dime sized movable. smooth bordered superficial mass. No other masses noted in his abdomen. Allergies Allergen Reactions  Demerol [Meperidine] Other (comments) Duplicate, delete  Demerol [Meperidine] Other (comments) \"passed out\" Social History Socioeconomic History  Marital status:  Spouse name: Not on file  Number of children: Not on file  Years of education: Not on file  Highest education level: Not on file Tobacco Use  Smoking status: Former Smoker Packs/day: 2.00 Years: 20.00 Pack years: 40.00 Last attempt to quit: 1988 Years since quittin.8  Smokeless tobacco: Never Used Substance and Sexual Activity  Alcohol use:  Yes  
 Alcohol/week: 2.0 standard drinks Types: 2 Glasses of wine per week Comment: 1 glass with dinner 2 nights per week  Drug use: No  
  
Family History Problem Relation Age of Onset  Dementia Mother  Hypertension Father  Hypertension Sister Past Surgical History:  
Procedure Laterality Date  HX AFIB ABLATION  2018  HX APPENDECTOMY [address]  HX KNEE REPLACEMENT  2010  
 right knee  HX KNEE REPLACEMENT  04/02/2019  
 left knee  HX MALIGNANT SKIN LESION EXCISION    
 HX TONSILLECTOMY  HX UROLOGICAL CYSTOSCOPY  SKIN TISSUE PROCEDURE UNLISTED  2018 Melanoma across abdomen, Right armpit and groin lymphnode involvement Past Medical History:  
Diagnosis Date  Atrial fibrillation ([address]  BPH (benign prostatic hyperplasia)  Cancer ([facility] [address] early 36s BLADDER  
 Hypercholesterolemia  Hypertension  Joint replaced 2011  
 right knee  Skin cancer  Skin disorder 2018 Skin Cancer - melanoma across stomach, lymphnode involvement in Right armpit and Right groin  Sun-damaged skin Current Outpatient Medications Medication Sig Dispense Refill  tadalafil (CIALIS) 5 mg tablet  tamsulosin (FLOMAX) 0.4 mg capsule  metoprolol succinate (TOPROL-XL) 50 mg XL tablet Take 1.5 Tabs by mouth daily. Indications: prevention of anginal chest pain associated with coronary artery disease 135 Tab 0  
 simvastatin (ZOCOR) 40 mg tablet Take 1 Tab by mouth nightly. 90 Tab 1  
 fenofibrate (LOFIBRA) 160 mg tablet Take 1 Tab by mouth daily. 90 Tab 1  
 traMADol (ULTRAM) 50 mg tablet  diclofenac EC (VOLTAREN) 75 mg EC tablet  fluorouracil (EFUDEX) 5 % chemo cream Apply a thin layer twice daily for total of 4 weeks. 40 g 0  
 amLODIPine (NORVASC) 10 mg tablet Take  by mouth daily.  rivaroxaban (XARELTO) 20 mg tab tablet Take  by mouth daily.  finasteride (PROSCAR) 5 mg tablet Take 5 mg by mouth daily as needed.  lisinopril (PRINIVIL, ZESTRIL) 20 mg tablet Take 20 mg by mouth daily.  albuterol (PROVENTIL HFA, VENTOLIN HFA, PROAIR HFA) 90 mcg/actuation inhaler INHALE 2 PUFFS BY MOUTH EVERY 6 HOURS AS NEEDED FOR WHEEZING OR SHORTNESS OF BREATH 3 Inhaler 0 Assessment/ Plan:  
Diagnoses and all orders for this visit: 
 
1. Abdominal wall lump -    D/D is cyst/ lipoma.  ABD LTD; Future Continue to monitor. Will notify result and recommendation. Medication risks/benefits/costs/interactions/alternatives discussed with patient. Advised patient to call back or return to office if symptoms worsen/change/persist. If patient cannot reach us or should anything more severe/urgent arise he/she should proceed directly to the nearest emergency department. Discussed expected course/resolution/complications of diagnosis in detail with patient. Patient given a written after visit summary which includes her diagnoses, current medications and vitals. Patient expressed understanding with the diagnosis and plan. Follow-up and Dispositions · Return if symptoms worsen or fail to improve.

## 2019-11-25 NOTE — PROGRESS NOTES
I spoke with patient over phone and discussed the concerning finding. Advised that Nurse will call him tomorrow to help him set up appointment with surgeon. Nurses,    Please call  [facility] surgery  to see if someone can see him  ASAP for the abdominal wall mass.

## 2019-11-26 NOTE — TELEPHONE ENCOUNTER
----- Message from Agatha Ramos MD sent at 11/25/2019  5:06 PM EST -----  I spoke with patient over phone and discussed the concerning finding. Advised that Nurse will call him tomorrow to help him set up appointment with surgeon. Nurses,    Please call  [facility] surgery  to see if someone can see him  ASAP for the abdominal wall mass.

## 2019-11-26 NOTE — PROGRESS NOTES
Written by Reymundo Sadler, as dictated by Samantha Evans Grammes, [address]. Name: Rush Mcneil       Age: 68 y.o. Date: 11/26/2019    Chief Complaint:   Chief Complaint   Patient presents with    Skin Exam       Subjective:    HPI  Mr. Rush Mcneil is a 68 y.o. male who presents for a full skin exam.  The patient's last skin exam was on 5/20/19 and the patient does have current complaints related to his skin. He reports that he noticed a lump on the right abdomen and was seen by his PCP last week. An US was ordered and per pt, he has an appointment with Dr. Beatriz Silva tomorrow for biopsy/ evaluation. He reports feeling this lump for a couple of months. He denies pain. He notes that his right upper back has been itchy and multiple tiny bumps on the bilateral flank areas. There are also dry scaly patches on the left lower leg. He has applied Triamcinolone cream to the areas with much relief. On the right lower lip, he notes a recurrently scaly lesion. He is feeling well and in his usual state of health today. He has no current illnesses, no other skin concerns. His allergies, medications, medical, and social history are reviewed by me today. Ultrasound report reviewed :FINDINGS: Inferior to the anterior right rib cage in the area of patient  concern, there is a mass in the subcutaneous tissues measuring 1.1 x 1.3 x 0.7  cm. This is heterogeneously hypoechoic with lobulation of the borders. It is  wider than it is tall. Vascularity is seen within the mass.     IMPRESSION  IMPRESSION:  Nonspecific heterogeneous mass in the subcutaneous tissues inferior to the right  anterior rib cage with vascularity. Findings are worrisome for malignancy and  biopsy versus excision is recommended.       The patient's pertinent skin history includes : personal history of melanoma and NMSCs  -mild atypical mole right lateral back 5/2019  -MM, abdomen, Breslow depth 0.65 mm, wide excision by Dr. Heather Vines, negative right axillary and right inguinal SLN biopsies, 17  -Recurrent BCC, left lateral neck, Mohs, 02/24/15  -SCCi, right arm, ED+C, 2014  -BCC, left neck, ED+C, 12    ROS: Constitutional: Negative. Dermatological : positive for - skin lesion changes    Social History     Socioeconomic History    Marital status:      Spouse name: Not on file    Number of children: Not on file    Years of education: Not on file    Highest education level: Not on file   Occupational History    Not on file   Social Needs    Financial resource strain: Not on file    Food insecurity:     Worry: Not on file     Inability: Not on file    Transportation needs:     Medical: Not on file     Non-medical: Not on file   Tobacco Use    Smoking status: Former Smoker     Packs/day: 2.00     Years: 20.00     Pack years: 40.00     Last attempt to quit: 1988     Years since quittin.8    Smokeless tobacco: Never Used   Substance and Sexual Activity    Alcohol use:  Yes     Alcohol/week: 2.0 standard drinks     Types: 2 Glasses of wine per week     Comment: 1 glass with dinner 2 nights per week     Drug use: No    Sexual activity: Not on file   Lifestyle    Physical activity:     Days per week: Not on file     Minutes per session: Not on file    Stress: Not on file   Relationships    Social connections:     Talks on phone: Not on file     Gets together: Not on file     Attends Buddhism service: Not on file     Active member of club or organization: Not on file     Attends meetings of clubs or organizations: Not on file     Relationship status: Not on file    Intimate partner violence:     Fear of current or ex partner: Not on file     Emotionally abused: Not on file     Physically abused: Not on file     Forced sexual activity: Not on file   Other Topics Concern    Not on file   Social History Narrative    Not on file       Family History   Problem Relation Age of Onset    Dementia Mother     Hypertension Father     Hypertension Sister        Past Medical History:   Diagnosis Date    [facility]rial [facility] ([facility] [address]     BPH (benign prostatic hyperplasia)     Cancer ([facility] [address] early 36s    BLADDER    Hypercholesterolemia     Hypertension     Joint replaced 2011    right knee    Skin cancer     Skin disorder 2018    Skin Cancer - melanoma across stomach, lymphnode involvement in Right armpit and Right groin    Sun-damaged skin        Past Surgical History:   Procedure Laterality Date    HX AFIB ABLATION  2018    HX APPENDECTOMY      HX HERNIA REPAIR  1990s    HX KNEE REPLACEMENT  2010    right knee    HX KNEE REPLACEMENT  04/02/2019    left knee    HX MALIGNANT SKIN LESION EXCISION      HX TONSILLECTOMY      HX UROLOGICAL      CYSTOSCOPY    SKIN TISSUE PROCEDURE UNLISTED  2018    Melanoma across abdomen, Right armpit and groin lymphnode involvement       Current Outpatient Medications   Medication Sig Dispense Refill    tadalafil (CIALIS) 5 mg tablet Take 5 mg by mouth as needed.  tamsulosin (FLOMAX) 0.4 mg capsule Take 0.4 mg by mouth daily.  metoprolol succinate (TOPROL-XL) 50 mg XL tablet Take 1.5 Tabs by mouth daily. Indications: prevention of anginal chest pain associated with coronary artery disease 135 Tab 0    simvastatin (ZOCOR) 40 mg tablet Take 1 Tab by mouth nightly. 90 Tab 1    fenofibrate (LOFIBRA) 160 mg tablet Take 1 Tab by mouth daily. 90 Tab 1    albuterol (PROVENTIL HFA, VENTOLIN HFA, PROAIR HFA) 90 mcg/actuation inhaler INHALE 2 PUFFS BY MOUTH EVERY 6 HOURS AS NEEDED FOR WHEEZING OR SHORTNESS OF BREATH 3 Inhaler 0    traMADol (ULTRAM) 50 mg tablet Take 50 mg by mouth daily.  diclofenac EC (VOLTAREN) 75 mg EC tablet Take 75 mg by mouth daily.  fluorouracil (EFUDEX) 5 % chemo cream Apply a thin layer twice daily for total of 4 weeks. 40 g 0    amLODIPine (NORVASC) 10 mg tablet Take  by mouth daily.  rivaroxaban (XARELTO) 20 mg tab tablet Take  by mouth daily.  finasteride (PROSCAR) 5 mg tablet Take 5 mg by mouth daily as needed.  lisinopril (PRINIVIL, ZESTRIL) 20 mg tablet Take 20 mg by mouth daily. Allergies   Allergen Reactions    Demerol [Meperidine] Other (comments)     Duplicate, delete    Demerol [Meperidine] Other (comments)     \"passed out\"         Objective:    Visit Vitals  /70 (BP 1 Location: Left arm, BP Patient Position: Sitting)   Pulse 82   Temp 97.8 °F (36.6 °C) (Oral)   Resp 16   Ht 5' 9.5\" (1.765 m)   Wt 190 lb (86.2 kg)   SpO2 96%   BMI 27.66 kg/m²       Ina Ugalde is a 68 y.o. male who appears well and in no distress. He is awake, alert, and oriented. A skin examination was performed including his scalp, face (including eyelids), ears, neck, chest, back, abdomen, upper extremities (including digits/nails), lower extremities, breasts, buttocks; genital skin was not examined. He has well healed scars on the left neck, abdomen, and right arm without evidence of lesion recurrence. He has a well healed melanoma scar on the abdomen - there is no pigment or nodularity in the scar or immediate surrounding tissue. However, on the right abdomen, he has a mobile subcutaneous appearing nodule which corresponds to recent US findings, worrisome for malignancy as noted in the report above--no overlying skin change. No associated palpable right axillary or right inguinal adenopathy. He has scattered waxy macules and keratotic papules consistent with seborrheic keratoses. He has pink intradermal nevi and brown junctional nevi, no concerning features for severe atypia. He has scattered red papules consistent with cherry angiomas. There are 1-2 mm pink scaly macules on the trunk consistent with Rochester's disease. He has a 7 x 4 mm pink macule on the right angle of the jaw concerning for BCC. There is a 2 mm dark brown papule on the right upper chest, R/O atypical pigmented lesion.  There are thin scaled actinic keratoses on the left helical rim and the right lower lip, including the lesion of his concern. Photos from today's visit:           Right angle of jaw Right upper chest  Right abdomen      Assessment/Plan:  1. Personal history of melanoma and nonmelanoma skin cancer. I discussed sun protection, sunscreen use, the warning signs of skin cancer, the need for self-skin examinations, and the need for regular practitioner exams. The patient should follow up sooner as needed if new, changing, or symptomatic skin lesions arise. 2. Seborrheic keratoses. The diagnosis was reviewed and the patient was reassured that no treatment is needed for these benign lesions. 3. Normal nevi. The diagnosis of normal nevi was reviewed. I discussed sun protection, sunscreen use, the warning signs of skin cancer, mole monitoring, the need for self-skin examinations, and the need for regular practitioner exams. The patient should follow up sooner as needed if new, changing, or symptomatic skin lesions arise. 4. Cherry angiomas. The diagnosis was reviewed and the patient was reassured that no treatment is needed for these benign lesions. 5. Richmond's disease. Patient should continue use of emollients and also Triamcinolone prn on the affected area. 6. Neoplasm of Uncertain Behavior, right angle of jaw, R/O BCC. The differential diagnoses were discussed. A shave biopsy was advised to sample this lesion. The procedure was reviewed and verbal and written consent were obtained. The risks of pain, bleeding, infection, and scar were discussed. The patient is aware that this is a sample and is intended for diagnosis and not therapy of the skin lesion. I performed the procedure. The site was cleansed and anesthetized with 1% Lidocaine with Epinephrine 1:100,000. A shave biopsy was performed to sample the lesion. Drysol was used for hemostasis. The wound was bandaged and care reviewed. The specimen was sent to pathology.   I will contact the patient with the results and any further treatment that may be necessary. 7. Neoplasm of Uncertain Behavior, right upper chest, R/O atypical pigmented lesion. The differential diagnoses were discussed. A shave biopsy was advised to sample this lesion. The procedure was reviewed and verbal and written consent were obtained. The risks of pain, bleeding, infection, and scar were discussed. The patient is aware that this is a sample and is intended for diagnosis and not therapy of the skin lesion. I performed the procedure. The site was cleansed and anesthetized with 1% Lidocaine with Epinephrine 1:100,000. A shave biopsy was performed to sample the lesion. Drysol was used for hemostasis. The wound was bandaged and care reviewed. The specimen was sent to pathology. I will contact the patient with the results and any further treatment that may be necessary. 8. Subcutaneous mass right abdomen. Discussed that I agree this is worrisome and needs biopsy/ excision. He has appt tomorrow with Dr. Jim Cao for this. Next skin exam:  4 months    This plan was reviewed with the patient and patient agrees. All questions were answered. This scribe documentation was reviewed by me and accurately reflects the examination and decisions made by me. [facility]   OFFICE PROCEDURE PROGRESS NOTE   Chart reviewed for the following:   Baltazar RUELAS, have reviewed the History, Physical and updated the Allergic reactions for Fernie Feliciano. TIME OUT performed immediately prior to start of procedure:   Holly RUELAS, have performed the following reviews on Fernie Feliciano   prior to the start of the procedure:     * Patient was identified by name and date of birth   * Agreement on procedure being performed was verified   * Risks and Benefits explained to the patient   * Procedure site verified and marked as necessary   * Patient was positioned for comfort * Consent was signed and verified     Time: 10:35 am  Date of procedure: 11/26/2019  Procedure performed by: Jesús Blevins.  Tasha Neavitt  Provider assisted by: Jennifer Clark LPN  Patient assisted by: self   How tolerated by patient: tolerated the procedure well with no complications   Comments: none

## 2019-11-26 NOTE — TELEPHONE ENCOUNTER
I called and spoke with Carlos Robertson from the general surgeon's office, who states he can be seen tomorrow at 3pm. Arrival 30 minutes early. I called patient while on the phone with Carlos Robertson, and he stated that will work good for him. I let him know that this is only to meet the surgeon, not the surgery. He verbalized his understanding. [address], [place], [address]    Dr. Skip Sheridan    I called patient back, and have given him all of the information regarding his appointment. Him and his wife verbalized their understanding and thanked me for the call.

## 2019-11-27 NOTE — TELEPHONE ENCOUNTER
Pt stated that Dr. Emmanuel Varner had a family emergency.  Dr Timbo Iglesias will now be doing the pt surgery and pt would like to know if Dr. Timbo Iglesias is just as good as Dr. Emmanuel Varner

## 2019-11-27 NOTE — TELEPHONE ENCOUNTER
Patient stated he didn't get to have his consultation today because Dr. Thomas Hameed had a family Emergency so he is rescheduled for Monday 12/2/19 with Dr. Saul Gatica. Patient states he was calling to see if Dr. Saul Gatica is just as good as Dr. Thomas Hameed. I let him know I do not know him personally but have heard good things about Dr. Saul Gatica. Patient verbalized his understanding and thanked me for the call.

## 2019-11-29 NOTE — TELEPHONE ENCOUNTER
I spoke w/ the pt and has surgery on Monday with Dr. Wright Kayser for excision. Good decision to move forward for biopsy.

## 2019-12-02 PROBLEM — R19.01 ABDOMINAL WALL MASS OF RIGHT UPPER QUADRANT: Status: ACTIVE | Noted: 2019-01-01

## 2019-12-02 NOTE — LETTER
12/2/2019 4:12 PM 
 
Mr. Eugenio Mandujano [address] [id number] Surgery is scheduled as follows: 
 
Surgery date: 12/06/2019    Location: [place] [address] 
 
Arrival time: 9:45 AM     Start time: 11:45 AM 
 
DO NOT EAT ANYTHING PAST MIDNIGHT THE NIGHT BEFORE SURGERY - YOU MAY HAVE CLEAR LIQUIDS UP TO ONE HOUR PRIOR TO YOUR ARRIVAL TIME. 
______________________________________________________________________ 1st Post Operative appointment:  
 
12/23/2019 at 2:40 PM with KARAN [address] Office Phone: [phone] For questions regarding this information please contact Joel George at [phone]. Sincerely, Joel George Surgical Scheduler Pre-Operative Instructions **DO NOT EAT ANYTHING AFTER MIDNIGHT THE NIGHT BEFORE YOUR SURGERY** 
**YOU MAY HAVE CLEAR LIQUIDS UP TO ONE HOUR PRIOR TO YOUR ARRIVAL TIME -  This includes water, black coffee, Gatorade, Apple juice, and tea without cream or milk. YOU MAY DRINK UP TO 12 OUNCES AT ONE TIME EVERY 4 HRS. Please report to Patient Registration/Admitting at the time given to you by your Surgeons office  Located at the [address] [place] [address]. 
If  your physical condition changes (fever, cold, flu), please contact your Surgeon as soon as possible. DO BRING your Insurance card and a photo ID, such as a Drivers License. Valuables are to be left at home. DO NOT wear make-up, lotion, powder, perfume/cologne/aftershave, or nail polish (unless clear). You may wear deodorant. DO NOT wear metal objects such as jewelry or hair pins. Remove all piercings/body jewelry. WEAR COMFORTABLE CLOTHING THAT WILL BE EASY TO CHANGE INTO AFTER SURGERY. If you are staying overnight, please pack a bag and leave it in your vehicle until after surgery. We recommend that you pack your toiletry items, a robe/pajamas, slippers or any other items that you need for your comfort. Have a family member deliver your bag to your room after your surgery  the Hospital does not have a secure location to store these personal items. You may bring a case for glasses, contact lenses, or hearing aids. Denture cups are provided by the [address] [place] [address] OR AFTER YOUR SURGERY. YOU SHOULD NOT OPERATE A VEHICLE FOR 24 HOURS AFTER RECEIVING SEDATION OR ANESTHESIA. Please arrange for a family member or friend to drive you home after your surgery: You will not be allowed to take a cab or drive yourself home. If you are discharged the day of surgery, it is recommended that you have someone stay with you until the next morning. For questions regarding the above information, please contact the [place] [address] office at [phone].

## 2019-12-02 NOTE — PROGRESS NOTES
1. Have you been to the ER, urgent care clinic since your last visit? Hospitalized since your last visit? No 
 
2. Have you seen or consulted any other health care providers outside of the [address] [place] [address] since your last visit? Include any pap smears or colon screening.  No

## 2019-12-02 NOTE — PROGRESS NOTES
HISTORY OF PRESENT ILLNESS Mitchel Enamorado is a 68 y.o. male who is referred by Florentin Ely NP for further evaluation of a subcutaneous mass in the right upper quadrant. Mr. Rhoda Armstrong tells me that he noted a subcutaneous mass in his right upper quadrant approximately 1-2 months ago. The mass has become progressively larger. No associated drainage or bleeding. He has otherwise been in his usual state of health. Abdominal ultrasound - 11/22/2019 - Inferior to the anterior right rib cage in the area of patient concern, there is a mass in the subcutaneous tissues measuring 1.1 x 1.3 x 0.7cm. This is heterogeneously hypoechoic with lobulation of the borders. It is wider than it is tall. Vascularity is seen within the mass. Of note, Mr. Rhoda Armstrong has a h/o melanoma and is s/p excision of an abdominal wall melanoma, Breslow Depth 0.65mm, with negative right axillary and right inguinal lymph node biopsies in 12/2017. Past Medical History: 
12/2/2019: Abdominal wall mass of right upper quadrant No date: Atrial fibrillation ([address] No date: BPH (benign prostatic hyperplasia) 
early 36s: Cancer ([address] Comment:  BLADDER No date: Hypercholesterolemia No date: Hypertension 2011: Joint replaced Comment:  right knee No date: Skin cancer 2018: Skin disorder Comment:  Skin Cancer - melanoma across stomach, lymphnode  
             involvement in Right armpit and Right groin No date: Sun-damaged skin Past Surgical History: 
2018: HX AFIB ABLATION No date: HX APPENDECTOMY 
1990s: HX HERNIA REPAIR 
2010: HX KNEE REPLACEMENT Comment:  right knee 04/02/2019: HX KNEE REPLACEMENT Comment:  left knee No date: HX MALIGNANT SKIN LESION EXCISION No date: HX TONSILLECTOMY No date: HX UROLOGICAL Comment:  CYSTOSCOPY 
2018: SKIN TISSUE PROCEDURE UNLISTED Comment:  Melanoma across abdomen, Right armpit and groin  
             lymphnode involvement Review of patient's family history indicates: 
Problem: Dementia Relation: Mother Age of Onset: (Not Specified) Problem: Hypertension Relation: Father Age of Onset: (Not Specified) Problem: Hypertension Relation: Sister Age of Onset: (Not Specified) Social History: Employment - Retired. Tobacco - Denies. EtOH - Wine, 2-3 glasses per week. Review of systems negative except as noted. Review of Systems Respiratory: Positive for shortness of breath. Musculoskeletal:  
     Denies discomfort at site of mass. Physical Exam 
Vitals signs reviewed. HENT:  
   Head: Normocephalic and atraumatic. Eyes:  
   General: No scleral icterus. Neck: Musculoskeletal: Neck supple. Cardiovascular:  
   Rate and Rhythm: Normal rate and regular rhythm. Pulmonary:  
   Effort: Pulmonary effort is normal.  
   Breath sounds: Normal breath sounds. Abdominal:  
   General: There is no distension. Palpations: Abdomen is soft. Tenderness: There is no tenderness. Musculoskeletal: Normal range of motion. Lymphadenopathy:  
   Cervical: No cervical adenopathy. Skin: 
 
    
   Comments: Approx. 1cm x 1cm subcutaneous mass. Non tender. Neurological:  
   General: No focal deficit present. Mental Status: He is alert. ASSESSMENT and PLAN Reviewed ultrasound. In view of the findings on H and P and ultrasound, Mr. Tobin Fair should benefit from excisional biopsy of the right upper quadrant subcutaneous mass. Discussed procedure with him including risks of bleeding, infection, need for further surgery, recurrence. He understands and wishes to proceed. I have tentatively scheduled Mr. Tobin Fair for surgery on December 6, 2019 at [facility] and will see him back in the office postoperatively. Last dose of Xarelto on December 3, 2019.  He is agreeable to this plan of action and is most certainly free to contact the office should any questions or concerns arise. CC: KARAN Garcia MD

## 2019-12-03 NOTE — PROGRESS NOTES
I spoke w/ the pt. The pigmented BCC on the chest appears cleared w/ bx. The superficial BCC on the jawline also appears cleared w/ bx but narrowly. He will use 5-Fu in the area BID for 3 weeks to ensure all cells are treated. Discussed s/e and use. Questions answered. Follow up as scheduled.

## 2019-12-04 NOTE — PATIENT INSTRUCTIONS
Gastroenteritis: Care Instructions Your Care Instructions Gastroenteritis is an illness that may cause nausea, vomiting, and diarrhea. It is sometimes called \"stomach flu. \" It can be caused by bacteria or a virus. You will probably begin to feel better in 1 to 2 days. In the meantime, get plenty of rest and make sure you do not become dehydrated. Dehydration occurs when your body loses too much fluid. Follow-up care is a key part of your treatment and safety. Be sure to make and go to all appointments, and call your doctor if you are having problems. It's also a good idea to know your test results and keep a list of the medicines you take. How can you care for yourself at home? · If your doctor prescribed antibiotics, take them as directed. Do not stop taking them just because you feel better. You need to take the full course of antibiotics. · Drink plenty of fluids to prevent dehydration, enough so that your urine is light yellow or clear like water. Choose water and other caffeine-free clear liquids until you feel better. If you have kidney, heart, or liver disease and have to limit fluids, talk with your doctor before you increase your fluid intake. · Drink fluids slowly, in frequent, small amounts, because drinking too much too fast can cause vomiting. · Begin eating mild foods, such as dry toast, yogurt, applesauce, bananas, and rice. Avoid spicy, hot, or high-fat foods, and do not drink alcohol or caffeine for a day or two. Do not drink milk or eat ice cream until you are feeling better. How to prevent gastroenteritis · Keep hot foods hot and cold foods cold. · Do not eat meats, dressings, salads, or other foods that have been kept at room temperature for more than 2 hours. · Use a thermometer to check your refrigerator. It should be between 34°F and 40°F. 
· Defrost meats in the refrigerator or microwave, not on the kitchen counter. · Keep your hands and your kitchen clean. Wash your hands, cutting boards, and countertops with hot soapy water frequently. · Cook meat until it is well done. · Do not eat raw eggs or uncooked sauces made with raw eggs. · Do not take chances. If food looks or tastes spoiled, throw it out. When should you call for help? Call 911 anytime you think you may need emergency care. For example, call if: 
  · You vomit blood or what looks like coffee grounds.  
  · You passed out (lost consciousness).  
  · You pass maroon or very bloody stools.  
 Call your doctor now or seek immediate medical care if: 
  · You have severe belly pain.  
  · You have signs of needing more fluids. You have sunken eyes, a dry mouth, and pass only a little dark urine.  
  · You feel like you are going to faint.  
  · You have increased belly pain that does not go away in 1 to 2 days.  
  · You have new or increased nausea, or you are vomiting.  
  · You have a new or higher fever.  
  · Your stools are black and tarlike or have streaks of blood.  
 Watch closely for changes in your health, and be sure to contact your doctor if: 
  · You are dizzy or lightheaded.  
  · You urinate less than usual, or your urine is dark yellow or brown.  
  · You do not feel better with each day that goes by. Where can you learn more? Go to http://dheeraj-sarai.info/. Enter N142 in the search box to learn more about \"Gastroenteritis: Care Instructions. \" Current as of: June 9, 2019 Content Version: 12.2 © 5081-0585 [employer]. Care instructions adapted under license by [employer] (which disclaims liability or warranty for this information). If you have questions about a medical condition or this instruction, always ask your healthcare professional. [address] any warranty or liability for your use of this information.

## 2019-12-04 NOTE — PROGRESS NOTES
Janine Sethi is a 68 y.o. male Chief Complaint Patient presents with  Vomiting  
  started tuesday 12/3, has thrown up ths morning.  Diarrhea  
  started monday 12/2, not so much diarrhea currently 1. Have you been to the ER, urgent care clinic since your last visit? Hospitalized since your last visit? No 
 
2. Have you seen or consulted any other health care providers outside of the [address] [place] [address] since your last visit? Include any pap smears or colon screening. No 
 
No flowsheet data found. There are no preventive care reminders to display for this patient.

## 2019-12-04 NOTE — PROGRESS NOTES
HPI:  
 
Chief Complaint Patient presents with  Vomiting  
  started tuesday 12/3, has thrown up ths morning.  Diarrhea  
  started monday 12/2, not so much diarrhea currently Patient is a 68 y.o. male with significant history of HTN, HLD, afib, hx bladder cancer, hx skin cancer who presents for evaluation of vomiting and diarrhea. Patient reports diarrhea started 2 days ago. Reports about 10 loose, watery BM per day over past 2 days, but has not had any further diarrhea yet today. Denies bloody, pussy, or dark stools. Reports few episodes of non-bilious, non-bloody vomiting yesterday and one episode this morning around 7AM.  Has not had any further vomiting yet today. Denies fever, chills. Reports mild abdominal cramping that resolves after BM. No abdominal pain at this time. Thinks he may have eaten something bad over Thanksgiving holiday. He and his wife picked up prepared thanksgiving meal from [employer]. His wife feels fine, but did not eat the pumpkin pie. He has since been eating bland diet and drinking more water. He is able to keep water and food down without vomiting. Denies any recent travel. Patient is scheduled on 12/6/19 to undergo excisional biopsy of abdominal wall mass that was found on ultrasound couple weeks ago concerning for malignancy. Patient Active Problem List  
Diagnosis Code  Essential hypertension I10  
 Hyperlipidemia E78.5  Atrial fibrillation (HCC) I48.91  
 History of skin cancer Z59.540  BMI 27.0-27.9,adult Z68.27  
 History of bladder cancer Z85.51  Abdominal wall mass of right upper quadrant R19.01  
 
Current Outpatient Medications Medication Sig Dispense Refill  ondansetron (ZOFRAN ODT) 4 mg disintegrating tablet Take 1 Tab by mouth every eight (8) hours as needed for Nausea. 15 Tab 0  
 tadalafil (CIALIS) 5 mg tablet Take 5 mg by mouth as needed.  tamsulosin (FLOMAX) 0.4 mg capsule Take 0.4 mg by mouth daily.  metoprolol succinate (TOPROL-XL) 50 mg XL tablet Take 1.5 Tabs by mouth daily. Indications: prevention of anginal chest pain associated with coronary artery disease 135 Tab 0  
 simvastatin (ZOCOR) 40 mg tablet Take 1 Tab by mouth nightly. 90 Tab 1  
 fenofibrate (LOFIBRA) 160 mg tablet Take 1 Tab by mouth daily. 90 Tab 1  
 albuterol (PROVENTIL HFA, VENTOLIN HFA, PROAIR HFA) 90 mcg/actuation inhaler INHALE 2 PUFFS BY MOUTH EVERY 6 HOURS AS NEEDED FOR WHEEZING OR SHORTNESS OF BREATH 3 Inhaler 0  
 traMADol (ULTRAM) 50 mg tablet Take 50 mg by mouth daily.  diclofenac EC (VOLTAREN) 75 mg EC tablet Take 75 mg by mouth daily.  fluorouracil (EFUDEX) 5 % chemo cream Apply a thin layer twice daily for total of 4 weeks. 40 g 0  
 amLODIPine (NORVASC) 10 mg tablet Take  by mouth daily.  rivaroxaban (XARELTO) 20 mg tab tablet Take  by mouth daily.  finasteride (PROSCAR) 5 mg tablet Take 5 mg by mouth daily as needed.  lisinopril (PRINIVIL, ZESTRIL) 20 mg tablet Take 20 mg by mouth daily. Allergies Allergen Reactions  Demerol [Meperidine] Other (comments) Duplicate, delete  Demerol [Meperidine] Other (comments) \"passed out\" Past Medical History:  
Diagnosis Date  Abdominal wall mass of right upper quadrant 12/2/2019  Atrial fibrillation ([address]  BPH (benign prostatic hyperplasia)  Cancer ([address] early 36s BLADDER  
 Hypercholesterolemia  Hypertension  Joint replaced 2011  
 right knee  Skin cancer  Skin disorder 2018 Skin Cancer - melanoma across stomach, lymphnode involvement in Right armpit and Right groin  Sun-damaged skin ROS:  
Pertinent items are noted in HPI. Objective:  
 
Vitals:  
 12/04/19 1219 BP: 133/78 Pulse: 100 Resp: 16 Temp: 97.6 °F (36.4 °C) TempSrc: Oral  
SpO2: 96% Weight: 187 lb 3.2 oz (84.9 kg) Height: (P) 5' 9.5\" (1.765 m) Vitals and Nurse Documentation reviewed. Physical Examination:  
General appearance - alert, well appearing, and in no distress Mental status - alert, oriented to person, place, and time, normal mood, behavior, speech, dress, motor activity, and thought processes Eyes - pupils equal and reactive Ears - bilateral TM's and external ear canals normal 
Nose - normal and patent, no erythema, discharge or polyps Mouth - mucous membranes moist, pharynx normal without lesions Neck - supple, no significant adenopathy Chest - clear to auscultation, no wheezes, rales or rhonchi, symmetric air entry Heart - normal rate, regular rhythm, normal S1, S2, no murmurs, rubs, clicks or gallops Abdomen - soft, nontender, nondistended, no guarding or rebound tenderness, normal bowel sounds Neurological - alert, oriented, normal speech, no focal findings or movement disorder noted Extremities - peripheral pulses normal, no pedal edema, no clubbing or cyanosis Assessment/ Plan:  
Diagnoses and all orders for this visit: 
 
1. Gastroenteritis -     Patient with 2 day history nausea, vomiting, diarrhea after thanksgiving meal.  Seems to be getting better today, has not had any diarrhea and one episode vomiting several hours ago. He is afebrile. Discussed importance of hydration and increasing water intake. May try Pedialyte. Recommended bland/BRAT diet and avoidance of fried/spicy/fatty foods, milk products, and caffeine. Advised follow-up if symptoms persist >3-5 days and will consider stool testing. We reviewed s/s that would warrant prompt follow-up. -    ondansetron (ZOFRAN ODT) 4 mg disintegrating tablet; Take 1 Tab by mouth every eight (8) hours as needed for Nausea. Medication benefits, risks, indication, dosage, potential adverse effects, and alternate medication options were discussed with patient who expressed understanding. 2. Nausea vomiting and diarrhea -     See #1 
-     ondansetron (ZOFRAN ODT) 4 mg disintegrating tablet; Take 1 Tab by mouth every eight (8) hours as needed for Nausea. Follow-up and Dispositions · Return if symptoms worsen or fail to improve. I have discussed the diagnosis with the patient and the intended plan as seen in the above orders. Advised prompt follow-up if symptoms worsen or fail to improve and symptoms that would warrant emergent evaluation in ED. The patient has received an after-visit summary and questions were answered concerning future plans. I have discussed medication side effects and warnings with the patient as well. Patient expressed understanding and is in agreement with the diagnosis and plan.

## 2019-12-06 NOTE — H&P
Date of Surgery Update:  Caty Meraz was seen and examined. History and physical has been reviewed. The patient has been examined. There have been no significant clinical changes since the completion of the originally dated History and Physical.  Patient identified by surgeon; surgical site was confirmed by patient and surgeon. Signed By: Shanna Camara MD     December 6, 2019 12:53 PM         Please note from the office and include the additional information below:    Past Medical History  Past Medical History:   Diagnosis Date    Abdominal wall mass of right upper quadrant 12/2/2019    Atrial fibrillation ([address]     BPH (benign prostatic hyperplasia)     Cancer ([address] early 1990s    BLADDER    Hypercholesterolemia     Hypertension     Joint replaced 2011    right knee    Skin cancer     Skin disorder 2018    Skin Cancer - melanoma across stomach, lymphnode involvement in Right armpit and Right groin    Sun-damaged skin         Past Surgical History  Past Surgical History:   Procedure Laterality Date    HX AFIB ABLATION  2018    HX APPENDECTOMY      HX HERNIA REPAIR  1990s    HX KNEE REPLACEMENT  2010    right knee    HX KNEE REPLACEMENT  04/02/2019    left knee    HX MALIGNANT SKIN LESION EXCISION      HX TONSILLECTOMY      HX UROLOGICAL      CYSTOSCOPY    SKIN TISSUE PROCEDURE UNLISTED  2018    Melanoma across abdomen, Right armpit and groin lymphnode involvement        Social History  The patient Caty Meraz  reports that he quit smoking about 31 years ago. He has a 40.00 pack-year smoking history. He has never used smokeless tobacco. He reports current alcohol use of about 2.0 standard drinks of alcohol per week. He reports that he does not use drugs.      Family History  Family History   Problem Relation Age of Onset    Dementia Mother     Hypertension Father     Hypertension Sister

## 2019-12-06 NOTE — DISCHARGE INSTRUCTIONS
______________________________________________________________________    Anesthesia Discharge Instructions    After general anesthesia or intervenous sedation, for 24 hours or while taking prescription Narcotics:  · Limit your activities  · Do not drive or operate hazardous machinery  · If you have not urinated within 8 hours after discharge, please contact your surgeon on call. · Do not make important personal or business decisions  · Do not drink alcoholic beverages    Report the following to your surgeon:  · Excessive pain, swelling, redness or odor of or around the surgical area  · Temperature over 100.5 degrees  · Nausea and vomiting lasting longer than 4 hours or if unable to take medication  · Any signs of decreased circulation or nerve impairment to extremity:  Change in color, persistent numbness, tingling, coldness or increased pain. · Any questions      Patient Discharge Instructions    Talia Hidalgo / [id number] : 1950    Admitted 2019 Discharged: 2019       · It is important that you take the medication exactly as they are prescribed. · Keep your medication in the bottles provided by the pharmacist and keep a list of the medication names, dosages, and times to be taken in your wallet. · Do not take other medications without consulting your doctor. What to do at Home    Recommended diet: Regular. Recommended activity: No Restrictions. No Driving While Taking Oxycodone. Tylenol 1000mg every 6 hours for 1-2 days. Ice pack to abdomen. Oxycodone as needed for severe pain. May Take Shower or [place] Roxo. Restart Xarelto on 2019. If you experience any of the following symptoms Fevers, Chills, Nausea, Vomitting, Redness or Drainage at Surgical Site(s) or Any Other Questions or Concerns Please Call -  [phone]. Follow-up with Dr. Wright Kayser in 7-10 days.         Information obtained by :  I understand that if any problems occur once I am at home I am to contact my physician. I understand and acknowledge receipt of the instructions indicated above.                                                                                                                                            Physician's or R.N.'s Signature                                                                  Date/Time                                                                                                                                              Patient or Representative Signature                                                          Date/Time

## 2019-12-06 NOTE — BRIEF OP NOTE
BRIEF OPERATIVE NOTE    Date of Procedure: 12/6/2019   Preoperative Diagnosis:  Right Upper Quadrant Abdominal Wall Mass. Postoperative Diagnosis:  Same. Procedure(s):   Excisional Biopsy Right Upper Quadrant Abdominal Wall Mass. Surgeon(s) and Role:   Frida Gu MD - Primary  Surgical Staff:  Circ-1: Diane Najera  Circ-Relief: Hallie Urena RN  Scrub Tech-1: Chel Jaden  Surg Asst-1: Thurl Olympia  Event Time In Time Out   Incision Start 1404    Incision Close       Anesthesia: General   Estimated Blood Loss: Approx. 5cc. Specimens:   ID Type Source Tests Collected by Time Destination   1 : Abdominal Wall Mass Frozen Section Abdomen  Frida Gu MD 12/6/2019 1407 Pathology      Findings: Approx. 1cm x 1cm, subcutaneous mass. Complications: None.   Implants: * No implants in log *

## 2019-12-06 NOTE — ROUTINE PROCESS
Patient: Mai Crowley MRN: [id number]  SSN: [id number]   YOB: 1950  Age: 68 y.o. Sex: male     Patient is status post Procedure(s):  EXCISIONAL BIOPSY RIGHT UPPER QUAD ABDOMINAL WALL MASS.     Surgeon(s) and Role:     Laure Najjar, MD - Primary    Local/Dose/Irrigation:  30ml local given ,see MAR                                         Dressing/Packing:  Wound Abdomen Right incision-Dressing Type: Topical skin adhesive/glue (12/06/19 6128)    Splint/Cast:  ]    Other:

## 2019-12-06 NOTE — ANESTHESIA PREPROCEDURE EVALUATION
Relevant Problems No relevant active problems Anesthetic History No history of anesthetic complications Review of Systems / Medical History Patient summary reviewed, nursing notes reviewed and pertinent labs reviewed Pulmonary Within defined limits Neuro/Psych Within defined limits Cardiovascular Hypertension Dysrhythmias : atrial fibrillation GI/Hepatic/Renal 
Within defined limits Endo/Other Cancer Other Findings Physical Exam 
 
Airway Mallampati: II 
TM Distance: > 6 cm Neck ROM: normal range of motion Mouth opening: Normal 
 
 Cardiovascular Regular rate and rhythm,  S1 and S2 normal,  no murmur, click, rub, or gallop Dental 
No notable dental hx Pulmonary Breath sounds clear to auscultation Abdominal 
GI exam deferred Other Findings Anesthetic Plan ASA: 2 Anesthesia type: general 
 
 
 
 
Induction: Intravenous Anesthetic plan and risks discussed with: Patient

## 2019-12-07 NOTE — OP NOTES
[address]  
OPERATIVE REPORT Name:  Clifton Hurtado 
MR#:  [id number] :  1950 ACCOUNT #:  [de-identified] DATE OF SERVICE:  2019 PREOPERATIVE DIAGNOSIS:  Right upper quadrant abdominal wall mass. POSTOPERATIVE DIAGNOSIS:  Right upper quadrant abdominal wall mass. PROCEDURE PERFORMED:  Excisional biopsy of right upper quadrant abdominal wall mass. SURGEON:  Aníbal Ibarra MD 
 
ASSISTANT:  Radha Francois SA 
 
ANESTHESIA:  General endotracheal. 
 
COMPLICATIONS:  None. SPECIMENS REMOVED:  Right upper quadrant abdominal wall mass to Pathology. IMPLANTS:  None. ESTIMATED BLOOD LOSS:  Approximately 5 mL. IV FLUIDS:  Crystalloids 500 mL. DRAINS:  None. INDICATIONS FOR SURGERY:  The patient is a 57-year-old man with a history of melanoma. He is status post excision of an abdominal wall melanoma in 2017. [place] lymph node biopsies at that time were negative by report. The patient recently noted a subcutaneous mass in the right upper quadrant of his abdominal wall. Ultrasound revealed a 1.1-cm x 1.3-cm x 0.7-cm nonspecific heterogeneous mass, which was worrisome for malignancy. In view of the findings on history and physical examination and the ultrasound, Mr. Tianna Nowak is brought into the operating room at this time for excisional biopsy of the abdominal wall mass. The risks of the procedure, including but not limited to, bleeding, infection and the need for further surgery were discussed in detail with the patient. Mr. Tianna Nowak understood and wished to proceed. PROCEDURE:  After consent was obtained, the patient was brought to the operating room where he was placed in the supine position on the operating room table. Following the induction of an adequate level of general anesthesia via the endotracheal tube, compression devices were placed on both lower extremities.   The abdomen was prepped with ChloraPrep and draped as a sterile field. Local anesthetic was infiltrated and an incision over the right upper quadrant abdominal wall mass was opened sharply. Subcutaneous bleeders were carefully cauterized. The incision was carried down to the mass which was readily identified, dissected free circumferentially and excised. The specimen, which measured approximately 1 cm x 1 cm, was passed off the field and submitted for frozen section evaluation. This returned findings suggestive of malignancy. Permanent sections are pending. The wound was inspected and several bleeders were cauterized. The open wound was irrigated copiously with saline, inspected and found to be hemostatic. The surgical incision was closed with 2 layers of running 3-0 Vicryl suture followed by 4-0 Monocryl subcuticular suture to the skin. Additional local anesthetic was infiltrated and the incision was dressed with Dermabond. The patient was awakened from his general anesthetic and extubated in the operating room. He was transferred to the stretcher and brought to the recovery room in stable condition having tolerated the procedure well. At the conclusion of the procedure, all sponge counts, instrument counts and needle counts were reported as correct x2. Evangelina Velazquez MD [id number] 
D:  12/06/2019 14:35 T:  12/06/2019 20:14 
JOB #:  [id number] CC:  MD Bam Gamez NP

## 2019-12-11 NOTE — TELEPHONE ENCOUNTER
Dr. Fariha Conway informed patient of pathology results. Appointment scheduled for 12/13/19 with Dr. Antoine Quevedo. Left voicemail for patient and sent [facility] message.

## 2019-12-13 NOTE — PROGRESS NOTES
Pt has been screened for all eligibility/ineligibility criteria for Strata and has been determined eligible for this protocol. Informed consent was reviewed by PharmD with patient prior to signing. All questions were answered adequately by PharmD. Patient signed consent today for study strata. A copy of ICF given to patient. No additional questions at this time.

## 2019-12-13 NOTE — PROGRESS NOTES
Edenilson Dobbins is a 68 y.o. male Chief Complaint Patient presents with  Follow-up  
  met melanoma 1. Have you been to the ER, urgent care clinic since your last visit? Hospitalized since your last visit? This is the patients first time here. 2. Have you seen or consulted any other health care providers outside of the [address] [place] [address] since your last visit? Include any pap smears or colon screening. This is the first time here in this office.

## 2019-12-16 NOTE — PROGRESS NOTES
HISTORY OF PRESENT ILLNESS Tor Moreno is a 68 y.o. male who returns for post-operative evaluation. Mr. Oskar mathews is s/p excisional biopsy of an abdominal wall mass on December 6, 2019. Discharged to home that day. Doing well since then. No complaints today. Pathology returned melanoma. Mr. Oskar city has seen Dr. Chad Ye and is scheduled for a number of imaging studies today. Review of systems negative except as noted. Review of Systems Constitutional: Negative for chills and fever. Gastrointestinal: Negative for nausea and vomiting. Musculoskeletal:  
     Denies pain at surgical site. Physical Exam 
Vitals signs reviewed. Constitutional:   
   General: He is not in acute distress. Appearance: Normal appearance. Musculoskeletal: Normal range of motion. Skin: 
   Comments: Abdominal incision is clean ane well healed. Neurological:  
   General: No focal deficit present. Mental Status: He is alert. ASSESSMENT and PLAN Reviewed operative findings and pathology again with Mr. Oskar city. Reassured him that he is doing very well thus far and that the incision has healed nicely. Imaging studies as ordered. Follow up with Dr. Chad Ye as scheduled. Follow up with [facility] as scheduled. Will see as needed. CC: KARAN Carrillo MD Dolphus Manly, MD

## 2019-12-16 NOTE — PROGRESS NOTES
1. Have you been to the ER, urgent care clinic since your last visit? Hospitalized since your last visit? No 
 
2. Have you seen or consulted any other health care providers outside of the [address] [place] [address] since your last visit? Include any pap smears or colon screening.  No

## 2019-12-18 PROBLEM — C43.59 MALIGNANT MELANOMA OF TORSO EXCLUDING BREAST (HCC): Status: ACTIVE | Noted: 2019-01-01

## 2019-12-18 NOTE — PROGRESS NOTES
[facility] [place] at [address], [id number] [place] [address] Road, [address] [place] [address] Aydee Katz [place]: [phone]  F: [phone]    Reason for Visit:   Chun Castillo is a 68 y.o. male who is seen in consultation at the request of Dr. Saul Gatica for evaluation of Melanoma. Treatment History:   · Bladder cancer dx in early 1990s  · BCC, left neck, s/p Electrodesiccation and Curettage, 06/07/12  · SCCi, right arm, s/p Electrodesiccation and Curettage, 12/2014  · Recurrent BCC, left lateral neck, Mohs, 02/24/15  · M Melanoma, mid abdomen, Breslow depth 0.65 mm, wide excision by Dr. Isra Camp, negative right axillary and right inguinal SLN biopsies, 12/20/17  · Recurrent melanoma 12/2019,  · CAT CAP and MRI head negative 12/19  · STRATA sent 12/13/19  · Opdivo 480 q 28 x 12 to start cycle 1 1/7/20    History of Present Illness: This is a 68 y.o. male with a history of BCC, SCC, and malignant melanoma who is seen in the office for evaluation of recurrent melanoma. Patient has a history of malignant melanoma of his abdomen in 2017. He sees surgical dermatology regularly due to his history. He noticed a lump on his right abdomen which grew quickly. Patient denies discoloration to the skin around the lump. He saw his PCP who ordered an [address] whichw as worrisome for malignancy. He was seen by Dr. Saul Gatica who performed an excisional biopsy of the RUQ abdominal mass. Pathology shows melanoma. Patient was referred to us for evaluation and treatment. Patient has recently lost 10 lbs. He relates this to having food poisoning over Thanksgiving. Patient is doing very well. We went over the results of the CT scan and the MRI of his head which were all negative. 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   All known melanoma has been resected at this point we will come in with adjuvant Opdivo 480 mg IV q. 28 days x 12 months. No family history of melanoma. His only family history of cancer is a brain tumor in his grandmother when she was in her 80s. Patient did have bladder cancer in the early . He does continue to get cystoscopies. He is a former smoker. His colonoscopy is up to date. Past Medical History:   Diagnosis Date    Abdominal wall mass of right upper quadrant 2019    Atrial fibrillation (HCC)     BPH (benign prostatic hyperplasia)     Cancer (HCC) early     BLADDER    Hypercholesterolemia     Hypertension     Joint replaced     right knee    Skin cancer     Skin disorder 2018    Skin Cancer - melanoma across stomach, lymphnode involvement in Right armpit and Right groin    Sun-damaged skin       Past Surgical History:   Procedure Laterality Date    HX AFIB ABLATION      HX APPENDECTOMY      HX HERNIA REPAIR      HX KNEE REPLACEMENT      right knee    HX KNEE REPLACEMENT  2019    left knee    HX MALIGNANT SKIN LESION EXCISION      HX OTHER SURGICAL  2019    Excisional biopsy of right upper quadrant abdominal wall mass.  HX TONSILLECTOMY      HX UROLOGICAL      CYSTOSCOPY    SKIN TISSUE PROCEDURE UNLISTED      Melanoma across abdomen, Right armpit and groin lymphnode involvement      Social History     Tobacco Use    Smoking status: Former Smoker     Packs/day: 2.00     Years: 20.00     Pack years: 40.00     Last attempt to quit: 1988     Years since quittin.9    Smokeless tobacco: Never Used   Substance Use Topics    Alcohol use: Yes     Alcohol/week: 2.0 standard drinks     Types: 2 Glasses of wine per week     Comment: 1 glass with dinner 2 nights per week       Family History   Problem Relation Age of Onset    Dementia Mother     Hypertension Father     Hypertension Sister      Current Outpatient Medications   Medication Sig    tamsulosin (FLOMAX) 0.4 mg capsule Take 0.4 mg by mouth daily.     metoprolol succinate (TOPROL-XL) 50 mg XL tablet Take 1.5 Tabs by mouth daily. Indications: prevention of anginal chest pain associated with coronary artery disease    simvastatin (ZOCOR) 40 mg tablet Take 1 Tab by mouth nightly.  fenofibrate (LOFIBRA) 160 mg tablet Take 1 Tab by mouth daily.  traMADol (ULTRAM) 50 mg tablet Take 50 mg by mouth daily.  diclofenac EC (VOLTAREN) 75 mg EC tablet Take 75 mg by mouth daily.  fluorouracil (EFUDEX) 5 % chemo cream Apply a thin layer twice daily for total of 4 weeks.  amLODIPine (NORVASC) 10 mg tablet Take  by mouth daily.  rivaroxaban (XARELTO) 20 mg tab tablet Take  by mouth daily.  lisinopril (PRINIVIL, ZESTRIL) 20 mg tablet Take 20 mg by mouth daily.  ondansetron (ZOFRAN ODT) 4 mg disintegrating tablet Take 1 Tab by mouth every eight (8) hours as needed for Nausea.  tadalafil (CIALIS) 5 mg tablet Take 5 mg by mouth as needed.  albuterol (PROVENTIL HFA, VENTOLIN HFA, PROAIR HFA) 90 mcg/actuation inhaler INHALE 2 PUFFS BY MOUTH EVERY 6 HOURS AS NEEDED FOR WHEEZING OR SHORTNESS OF BREATH    finasteride (PROSCAR) 5 mg tablet Take 5 mg by mouth daily as needed. No current facility-administered medications for this visit. Allergies   Allergen Reactions    Demerol [Meperidine] Other (comments)     Duplicate, delete    Demerol [Meperidine] Other (comments)     \"passed out\"      Review of Systems: A complete review of systems was obtained, negative except as described above.     Physical Exam:     Visit Vitals  /68 (BP 1 Location: Left arm, BP Patient Position: Sitting)   Pulse 76   Temp 98 °F (36.7 °C) (Oral)   Ht 5' 9\" (1.753 m)   Wt 186 lb 6.4 oz (84.6 kg)   SpO2 97%   BMI 27.53 kg/m²     ECOG PS: 0  General: No distress  Eyes: PERRLA, anicteric sclerae  HENT: Atraumatic, OP clear  Neck: Supple  Lymphatic: No cervical, supraclavicular, or axillary adenopathy  Respiratory: CTAB, normal respiratory effort  CV: Normal rate, regular rhythm, no murmurs, +1 peripheral edema  GI: Soft, nontender, nondistended, no masses  MS: Normal gait and station  Skin: Well- healed scar on left jawline. Well-healed scar approximately 2 cm above navel approximately 5 cm in size. Healing scar to right upper abdomen. Psych: Alert, oriented, appropriate affect, normal judgment/insight    Results:     Lab Results   Component Value Date/Time    WBC 12.5 (H) 12/16/2019 11:35 AM    HGB 13.3 12/16/2019 11:35 AM    HCT 39.3 12/16/2019 11:35 AM    PLATELET 595 05/10/9331 11:35 AM    MCV 94 12/16/2019 11:35 AM    ABS. NEUTROPHILS 9.6 (H) 12/16/2019 11:35 AM     Lab Results   Component Value Date/Time    Sodium 140 12/16/2019 11:35 AM    Potassium 4.1 12/16/2019 11:35 AM    Chloride 104 12/16/2019 11:35 AM    CO2 21 12/16/2019 11:35 AM    Glucose 113 (H) 12/16/2019 11:35 AM    BUN 22 12/16/2019 11:35 AM    Creatinine 1.50 (H) 12/16/2019 11:35 AM    GFR est AA 53 (L) 12/16/2019 11:35 AM    GFR est non-AA 46 (L) 12/16/2019 11:35 AM    Calcium 10.1 12/16/2019 11:35 AM     Lab Results   Component Value Date/Time    Bilirubin, total 0.3 12/16/2019 11:35 AM    ALT (SGPT) 21 12/16/2019 11:35 AM    AST (SGOT) 18 12/16/2019 11:35 AM    Alk. phosphatase 50 12/16/2019 11:35 AM    Protein, total 6.3 12/16/2019 11:35 AM    Albumin 4.3 12/16/2019 11:35 AM    Globulin 3.0 01/11/2012 04:29 PM     12/6/19 Abdominal wall mass, excisional biopsy: Melanoma,     MRI head 12/16/19    IMPRESSION:     1. No evidence of intracranial metastatic disease. 2. Mild chronic white matter disease with no acute process. CAT CAP 12/16/19    IMPRESSION:  1. No evidence of metastatic disease within the chest, abdomen, or pelvis. There  are presumed postsurgical changes in the subcutaneous fat anterior and inferior  to the right rib cage, in the region of the previously demonstrated mass by  ultrasound. 2. Incidentally noted are 2 sub-5 mm right lung pulmonary nodules.   3. Evidence of old healed granulomatous disease, with calcified granulomas in  the lung, liver, and spleen. 4. Diffuse fatty infiltration of the liver. 5. Small left renal cysts. 6. Diverticulosis of the colon. 7. Left posterior bladder diverticulum. 8. Enlarged prostate. 9. Atherosclerosis of the coronary arteries and aorta. Records reviewed and summarized above. Pathology report(s) reviewed above. Radiology report(s) reviewed above. Assessment:   1) Melanoma of the right upper abdomen  Recurrent vs. Metastasis. Suspect recurrent due to fast growth and location compared to previous melanoma  First diagnosed with melanoma of his mid abdomen 12/2017. Breslow depth 0.65 mm. S/p wide excision by Dr. Nicki Romero, negative right axillary and right inguinal SLN biopsies  BRAF testing both tumors pending    Now with melanoma or his right upper abdomen s/p excision with clear margins by Dr. Wright Kayser    Discussed briefly using a PDL1 drug for treatment. 2) History of BCC  Left neck, s/p Electrodesiccation and Curettage, 06/07/12.  Recurrent BCC, left lateral neck, Mohs, 02/24/15  Right upper chest, 11/26/19, cleared with shave biopsy  Right jawline, 11/26/19, cleared with shave biopsy but narrowly - patient to use 5-Fu BID x3 weeks per dermatology  Patient is followed closely by [facility] for skin checks    3) History of SCC  Right arm, s/p Electrodesiccation and Curettage, 12/2014  Patient is followed closely by [facility] for skin checks    4) History of bladder cancer  Diagnosed in the early 1990s  He continues to follow up with urology and has regular cystoscopies    5) History of A.fib  S/p ablation  Management per PCP/cardiology    6) HTN  BP OK today at 138/74  Management per PCP    7) Healthcare maintenance  Patient has received his influenza vaccine  He has received his pneumococcal vaccines  He is up to date with his Tdap vaccine    8) Fatty liver    9) ASCVD    10) Diverticulosis    11) granulomatous disease by CAT    12) two small sub 5 mm pulmonary nodules   Will follow with CAT's    Plan:     · Continue close follow up with Dermatology for regular skin checks  · Follow up in office on 1/7/20 to start Opdivo    I appreciate the opportunity to participate in Mr. Luong Nate carranza.

## 2019-12-18 NOTE — PROGRESS NOTES
This note will not be viewable in [address] [employer] Social Work Navigator Encounter Patient Name:  John uHi Medical History: Malignant melanoma of torso excluding breast and history of bladder cancer Advance Directives: Patient completed an advanced medical directive today. This document was scanned into [employer]. Narrative: Met with the patient and his wife during his office visit today. The patient requested to complete an AMD, which was completed and scanned into [employer]. Multiple copies were also provided to the patient and his wife. Patient will start on Opdivo on 1/7/20, and expressed no questions or concerns today. Provided this 's contact information and offered continued support as needed. Barriers to Care: No barriers identified at this time. Assessment/Action: 
Ongoing psychosocial support as desired by patient. Plan/Referral:  
Complementary therapies referral 
Nivia Garcia LCSW

## 2019-12-18 NOTE — PROGRESS NOTES
Wanda Marx is a 68 y.o. male  Chief Complaint   Patient presents with    Follow-up     Melanoma     1. Have you been to the ER, urgent care clinic since your last visit? Hospitalized since your last visit? No.  2. Have you seen or consulted any other health care providers outside of the [address] [place] [address] since your last visit? Include any pap smears or colon screening.  No.

## 2020-01-01 ENCOUNTER — ANESTHESIA EVENT (OUTPATIENT)
Dept: SURGERY | Age: 70
End: 2020-01-01
Payer: MEDICARE

## 2020-01-01 ENCOUNTER — DOCUMENTATION ONLY (OUTPATIENT)
Dept: ONCOLOGY | Age: 70
End: 2020-01-01

## 2020-01-01 ENCOUNTER — OFFICE VISIT (OUTPATIENT)
Dept: PRIMARY CARE CLINIC | Age: 70
End: 2020-01-01

## 2020-01-01 ENCOUNTER — TELEPHONE (OUTPATIENT)
Dept: SURGERY | Age: 70
End: 2020-01-01

## 2020-01-01 ENCOUNTER — HOSPITAL ENCOUNTER (OUTPATIENT)
Dept: INFUSION THERAPY | Age: 70
Discharge: HOME | End: 2020-03-06
Payer: MEDICARE

## 2020-01-01 ENCOUNTER — HOSPITAL ENCOUNTER (OUTPATIENT)
Dept: INFUSION THERAPY | Age: 70
Discharge: HOME | End: 2020-03-03
Payer: MEDICARE

## 2020-01-01 ENCOUNTER — TELEPHONE (OUTPATIENT)
Dept: PRIMARY CARE CLINIC | Age: 70
End: 2020-01-01

## 2020-01-01 ENCOUNTER — OFFICE VISIT (OUTPATIENT)
Dept: ONCOLOGY | Age: 70
End: 2020-01-01

## 2020-01-01 ENCOUNTER — HOSPITAL ENCOUNTER (OUTPATIENT)
Age: 70
Setting detail: OUTPATIENT SURGERY
Discharge: HOME | End: 2020-02-28
Attending: SURGERY | Admitting: SURGERY
Payer: MEDICARE

## 2020-01-01 ENCOUNTER — HOSPITAL ENCOUNTER (OUTPATIENT)
Dept: MRI IMAGING | Age: 70
Discharge: HOME | End: 2020-03-13
Attending: NURSE PRACTITIONER
Payer: MEDICARE

## 2020-01-01 ENCOUNTER — APPOINTMENT (OUTPATIENT)
Dept: GENERAL RADIOLOGY | Age: 70
DRG: 393 | End: 2020-01-01
Attending: FAMILY MEDICINE
Payer: MEDICARE

## 2020-01-01 ENCOUNTER — APPOINTMENT (OUTPATIENT)
Dept: GENERAL RADIOLOGY | Age: 70
DRG: 393 | End: 2020-01-01
Attending: HOSPITALIST
Payer: MEDICARE

## 2020-01-01 ENCOUNTER — TELEPHONE (OUTPATIENT)
Dept: ONCOLOGY | Age: 70
End: 2020-01-01

## 2020-01-01 ENCOUNTER — APPOINTMENT (OUTPATIENT)
Dept: GENERAL RADIOLOGY | Age: 70
DRG: 393 | End: 2020-01-01
Attending: INTERNAL MEDICINE
Payer: MEDICARE

## 2020-01-01 ENCOUNTER — HOSPITAL ENCOUNTER (OUTPATIENT)
Dept: INFUSION THERAPY | Age: 70
Discharge: HOME | End: 2020-03-27
Payer: MEDICARE

## 2020-01-01 ENCOUNTER — APPOINTMENT (OUTPATIENT)
Dept: GENERAL RADIOLOGY | Age: 70
DRG: 393 | End: 2020-01-01
Attending: NURSE PRACTITIONER
Payer: MEDICARE

## 2020-01-01 ENCOUNTER — OFFICE VISIT (OUTPATIENT)
Dept: NEUROLOGY | Age: 70
End: 2020-01-01

## 2020-01-01 ENCOUNTER — HOSPITAL ENCOUNTER (OUTPATIENT)
Dept: INFUSION THERAPY | Age: 70
Discharge: HOME | End: 2020-03-18
Payer: MEDICARE

## 2020-01-01 ENCOUNTER — OFFICE VISIT (OUTPATIENT)
Dept: SURGERY | Age: 70
End: 2020-01-01

## 2020-01-01 ENCOUNTER — HOSPITAL ENCOUNTER (EMERGENCY)
Dept: CT IMAGING | Age: 70
Discharge: HOME | DRG: 393 | End: 2020-03-30
Attending: EMERGENCY MEDICINE
Payer: MEDICARE

## 2020-01-01 ENCOUNTER — APPOINTMENT (OUTPATIENT)
Dept: GENERAL RADIOLOGY | Age: 70
DRG: 393 | End: 2020-01-01
Attending: EMERGENCY MEDICINE
Payer: MEDICARE

## 2020-01-01 ENCOUNTER — HOSPITAL ENCOUNTER (OUTPATIENT)
Dept: INFUSION THERAPY | Age: 70
Discharge: HOME | End: 2020-03-26
Payer: MEDICARE

## 2020-01-01 ENCOUNTER — HOSPITAL ENCOUNTER (OUTPATIENT)
Dept: INFUSION THERAPY | Age: 70
Discharge: HOME | End: 2020-02-04
Payer: MEDICARE

## 2020-01-01 ENCOUNTER — HOSPITAL ENCOUNTER (OUTPATIENT)
Dept: GENERAL RADIOLOGY | Age: 70
Discharge: HOME | End: 2020-03-17
Payer: MEDICARE

## 2020-01-01 ENCOUNTER — HOSPITAL ENCOUNTER (OUTPATIENT)
Dept: INFUSION THERAPY | Age: 70
Discharge: HOME | End: 2020-01-07
Payer: MEDICARE

## 2020-01-01 ENCOUNTER — HOSPICE ADMISSION (OUTPATIENT)
Dept: HOSPICE | Facility: HOSPICE | Age: 70
End: 2020-01-01

## 2020-01-01 ENCOUNTER — APPOINTMENT (OUTPATIENT)
Dept: INFUSION THERAPY | Age: 70
End: 2020-01-01
Payer: MEDICARE

## 2020-01-01 ENCOUNTER — ANESTHESIA (OUTPATIENT)
Dept: SURGERY | Age: 70
End: 2020-01-01
Payer: MEDICARE

## 2020-01-01 ENCOUNTER — HOSPITAL ENCOUNTER (INPATIENT)
Age: 70
LOS: 9 days | Discharge: HOSPICE | DRG: 393 | End: 2020-04-08
Attending: EMERGENCY MEDICINE | Admitting: FAMILY MEDICINE
Payer: MEDICARE

## 2020-01-01 VITALS
SYSTOLIC BLOOD PRESSURE: 127 MMHG | DIASTOLIC BLOOD PRESSURE: 74 MMHG | TEMPERATURE: 97.1 F | HEART RATE: 77 BPM | RESPIRATION RATE: 18 BRPM

## 2020-01-01 VITALS
HEIGHT: 69 IN | RESPIRATION RATE: 18 BRPM | OXYGEN SATURATION: 97 % | DIASTOLIC BLOOD PRESSURE: 94 MMHG | TEMPERATURE: 97.8 F | HEART RATE: 81 BPM | BODY MASS INDEX: 28.14 KG/M2 | WEIGHT: 190 LBS | SYSTOLIC BLOOD PRESSURE: 150 MMHG

## 2020-01-01 VITALS
HEART RATE: 91 BPM | RESPIRATION RATE: 18 BRPM | WEIGHT: 188 LBS | TEMPERATURE: 97.9 F | SYSTOLIC BLOOD PRESSURE: 148 MMHG | OXYGEN SATURATION: 92 % | BODY MASS INDEX: 27.85 KG/M2 | HEIGHT: 69 IN | DIASTOLIC BLOOD PRESSURE: 82 MMHG

## 2020-01-01 VITALS
WEIGHT: 186.1 LBS | BODY MASS INDEX: 27.56 KG/M2 | SYSTOLIC BLOOD PRESSURE: 139 MMHG | DIASTOLIC BLOOD PRESSURE: 81 MMHG | HEIGHT: 69 IN | HEART RATE: 77 BPM | TEMPERATURE: 97.8 F | OXYGEN SATURATION: 93 %

## 2020-01-01 VITALS
TEMPERATURE: 97.6 F | RESPIRATION RATE: 16 BRPM | DIASTOLIC BLOOD PRESSURE: 81 MMHG | SYSTOLIC BLOOD PRESSURE: 157 MMHG | HEART RATE: 69 BPM | OXYGEN SATURATION: 92 %

## 2020-01-01 VITALS
RESPIRATION RATE: 18 BRPM | HEART RATE: 62 BPM | TEMPERATURE: 97.7 F | SYSTOLIC BLOOD PRESSURE: 150 MMHG | DIASTOLIC BLOOD PRESSURE: 74 MMHG

## 2020-01-01 VITALS
DIASTOLIC BLOOD PRESSURE: 77 MMHG | BODY MASS INDEX: 27.55 KG/M2 | TEMPERATURE: 98.9 F | OXYGEN SATURATION: 97 % | WEIGHT: 186 LBS | HEIGHT: 69 IN | SYSTOLIC BLOOD PRESSURE: 128 MMHG | HEART RATE: 89 BPM | RESPIRATION RATE: 16 BRPM

## 2020-01-01 VITALS
TEMPERATURE: 98.1 F | SYSTOLIC BLOOD PRESSURE: 141 MMHG | DIASTOLIC BLOOD PRESSURE: 98 MMHG | HEART RATE: 78 BPM | OXYGEN SATURATION: 96 % | BODY MASS INDEX: 27.25 KG/M2 | HEIGHT: 69 IN | RESPIRATION RATE: 18 BRPM | WEIGHT: 184 LBS

## 2020-01-01 VITALS
OXYGEN SATURATION: 93 % | SYSTOLIC BLOOD PRESSURE: 151 MMHG | BODY MASS INDEX: 26.73 KG/M2 | TEMPERATURE: 97.3 F | RESPIRATION RATE: 20 BRPM | DIASTOLIC BLOOD PRESSURE: 92 MMHG | HEART RATE: 78 BPM | HEIGHT: 69 IN

## 2020-01-01 VITALS
OXYGEN SATURATION: 97 % | RESPIRATION RATE: 17 BRPM | TEMPERATURE: 97.9 F | SYSTOLIC BLOOD PRESSURE: 135 MMHG | WEIGHT: 185.4 LBS | DIASTOLIC BLOOD PRESSURE: 69 MMHG | HEIGHT: 69 IN | HEART RATE: 76 BPM | BODY MASS INDEX: 27.46 KG/M2

## 2020-01-01 VITALS
RESPIRATION RATE: 18 BRPM | HEART RATE: 74 BPM | SYSTOLIC BLOOD PRESSURE: 144 MMHG | DIASTOLIC BLOOD PRESSURE: 82 MMHG | TEMPERATURE: 98.2 F | OXYGEN SATURATION: 95 %

## 2020-01-01 VITALS
HEART RATE: 99 BPM | HEIGHT: 69 IN | RESPIRATION RATE: 16 BRPM | BODY MASS INDEX: 26.81 KG/M2 | TEMPERATURE: 97.8 F | DIASTOLIC BLOOD PRESSURE: 62 MMHG | SYSTOLIC BLOOD PRESSURE: 110 MMHG | WEIGHT: 181 LBS | OXYGEN SATURATION: 93 %

## 2020-01-01 VITALS
TEMPERATURE: 97.4 F | RESPIRATION RATE: 18 BRPM | DIASTOLIC BLOOD PRESSURE: 65 MMHG | HEART RATE: 94 BPM | OXYGEN SATURATION: 95 % | SYSTOLIC BLOOD PRESSURE: 120 MMHG

## 2020-01-01 VITALS
SYSTOLIC BLOOD PRESSURE: 128 MMHG | HEIGHT: 69 IN | BODY MASS INDEX: 27.39 KG/M2 | TEMPERATURE: 97.9 F | HEART RATE: 76 BPM | OXYGEN SATURATION: 94 % | WEIGHT: 184.9 LBS | DIASTOLIC BLOOD PRESSURE: 79 MMHG

## 2020-01-01 VITALS
DIASTOLIC BLOOD PRESSURE: 80 MMHG | TEMPERATURE: 97.9 F | SYSTOLIC BLOOD PRESSURE: 135 MMHG | RESPIRATION RATE: 16 BRPM | HEART RATE: 76 BPM | OXYGEN SATURATION: 99 %

## 2020-01-01 VITALS
WEIGHT: 187 LBS | OXYGEN SATURATION: 97 % | HEIGHT: 69 IN | SYSTOLIC BLOOD PRESSURE: 145 MMHG | RESPIRATION RATE: 14 BRPM | HEART RATE: 72 BPM | BODY MASS INDEX: 27.7 KG/M2 | TEMPERATURE: 97.9 F | DIASTOLIC BLOOD PRESSURE: 74 MMHG

## 2020-01-01 VITALS
WEIGHT: 185 LBS | HEART RATE: 62 BPM | RESPIRATION RATE: 16 BRPM | HEIGHT: 69 IN | OXYGEN SATURATION: 98 % | SYSTOLIC BLOOD PRESSURE: 130 MMHG | BODY MASS INDEX: 27.4 KG/M2 | DIASTOLIC BLOOD PRESSURE: 72 MMHG

## 2020-01-01 VITALS
RESPIRATION RATE: 18 BRPM | OXYGEN SATURATION: 96 % | TEMPERATURE: 97.4 F | HEART RATE: 94 BPM | SYSTOLIC BLOOD PRESSURE: 96 MMHG | DIASTOLIC BLOOD PRESSURE: 62 MMHG

## 2020-01-01 VITALS
HEIGHT: 69 IN | DIASTOLIC BLOOD PRESSURE: 75 MMHG | BODY MASS INDEX: 25.34 KG/M2 | SYSTOLIC BLOOD PRESSURE: 152 MMHG | RESPIRATION RATE: 22 BRPM | OXYGEN SATURATION: 95 % | HEART RATE: 115 BPM | WEIGHT: 171.1 LBS | TEMPERATURE: 97.6 F

## 2020-01-01 DIAGNOSIS — C43.59 MALIGNANT MELANOMA OF TORSO EXCLUDING BREAST (HCC): Primary | ICD-10-CM

## 2020-01-01 DIAGNOSIS — C43.59 MALIGNANT MELANOMA OF TORSO EXCLUDING BREAST (HCC): ICD-10-CM

## 2020-01-01 DIAGNOSIS — G24.9 TASK-SPECIFIC DYSTONIA OF HAND: ICD-10-CM

## 2020-01-01 DIAGNOSIS — I10 ESSENTIAL HYPERTENSION: ICD-10-CM

## 2020-01-01 DIAGNOSIS — M54.50 CHRONIC BILATERAL LOW BACK PAIN WITHOUT SCIATICA: ICD-10-CM

## 2020-01-01 DIAGNOSIS — M54.50 ACUTE MIDLINE LOW BACK PAIN, UNSPECIFIED WHETHER SCIATICA PRESENT: Primary | ICD-10-CM

## 2020-01-01 DIAGNOSIS — C43.9 RECURRENT SKIN MELANOMA (HCC): ICD-10-CM

## 2020-01-01 DIAGNOSIS — I10 ESSENTIAL HYPERTENSION: Primary | ICD-10-CM

## 2020-01-01 DIAGNOSIS — R21 RASH: ICD-10-CM

## 2020-01-01 DIAGNOSIS — R19.01 ABDOMINAL WALL MASS OF RIGHT UPPER QUADRANT: Primary | ICD-10-CM

## 2020-01-01 DIAGNOSIS — I48.91 ATRIAL FIBRILLATION, UNSPECIFIED TYPE (HCC): ICD-10-CM

## 2020-01-01 DIAGNOSIS — Z85.828 HISTORY OF SCC (SQUAMOUS CELL CARCINOMA) OF SKIN: ICD-10-CM

## 2020-01-01 DIAGNOSIS — E03.2 HYPOTHYROIDISM DUE TO MEDICATION: ICD-10-CM

## 2020-01-01 DIAGNOSIS — Z51.12 ENCOUNTER FOR ANTINEOPLASTIC IMMUNOTHERAPY: ICD-10-CM

## 2020-01-01 DIAGNOSIS — I10 HYPERTENSION, UNSPECIFIED TYPE: ICD-10-CM

## 2020-01-01 DIAGNOSIS — E83.52 HYPERCALCEMIA: Primary | ICD-10-CM

## 2020-01-01 DIAGNOSIS — C43.9 MALIGNANT MELANOMA, UNSPECIFIED SITE (HCC): ICD-10-CM

## 2020-01-01 DIAGNOSIS — M54.50 ACUTE MIDLINE LOW BACK PAIN, UNSPECIFIED WHETHER SCIATICA PRESENT: ICD-10-CM

## 2020-01-01 DIAGNOSIS — K92.2 GASTROINTESTINAL HEMORRHAGE, UNSPECIFIED GASTROINTESTINAL HEMORRHAGE TYPE: ICD-10-CM

## 2020-01-01 DIAGNOSIS — E03.2 HYPOTHYROIDISM DUE TO MEDICATION: Primary | ICD-10-CM

## 2020-01-01 DIAGNOSIS — R06.02 SOB (SHORTNESS OF BREATH): ICD-10-CM

## 2020-01-01 DIAGNOSIS — Z85.51 HISTORY OF BLADDER CANCER: ICD-10-CM

## 2020-01-01 DIAGNOSIS — R06.02 SHORTNESS OF BREATH: ICD-10-CM

## 2020-01-01 DIAGNOSIS — R93.89 ABNORMAL MRI: ICD-10-CM

## 2020-01-01 DIAGNOSIS — R41.89 ALTERATION IN COGNITION: ICD-10-CM

## 2020-01-01 DIAGNOSIS — L30.9 DERMATITIS: ICD-10-CM

## 2020-01-01 DIAGNOSIS — G89.29 CHRONIC BILATERAL LOW BACK PAIN WITHOUT SCIATICA: ICD-10-CM

## 2020-01-01 DIAGNOSIS — G89.3 CANCER ASSOCIATED PAIN: Primary | ICD-10-CM

## 2020-01-01 DIAGNOSIS — R25.1 TREMOR OF BOTH HANDS: Primary | ICD-10-CM

## 2020-01-01 DIAGNOSIS — Z85.828 HISTORY OF BASAL CELL CARCINOMA (BCC): ICD-10-CM

## 2020-01-01 DIAGNOSIS — J18.9 PNEUMONIA OF LEFT LOWER LOBE DUE TO INFECTIOUS ORGANISM: ICD-10-CM

## 2020-01-01 DIAGNOSIS — R42 DIZZINESS: ICD-10-CM

## 2020-01-01 DIAGNOSIS — F41.9 ANXIETY: ICD-10-CM

## 2020-01-01 DIAGNOSIS — Z71.89 GOALS OF CARE, COUNSELING/DISCUSSION: ICD-10-CM

## 2020-01-01 DIAGNOSIS — R19.7 DIARRHEA, UNSPECIFIED TYPE: ICD-10-CM

## 2020-01-01 DIAGNOSIS — E78.2 MIXED HYPERLIPIDEMIA: ICD-10-CM

## 2020-01-01 DIAGNOSIS — C43.9 METASTATIC MELANOMA (HCC): ICD-10-CM

## 2020-01-01 DIAGNOSIS — R25.1 TREMOR OF BOTH HANDS: ICD-10-CM

## 2020-01-01 LAB
ABO + RH BLD: NORMAL
ABO + RH BLD: NORMAL
ACTH PLAS-MCNC: 12 PG/ML (ref 7.2–63.3)
ALBUMIN SERPL-MCNC: 1.4 G/DL (ref 3.5–5)
ALBUMIN SERPL-MCNC: 1.4 G/DL (ref 3.5–5)
ALBUMIN SERPL-MCNC: 1.7 G/DL (ref 3.5–5)
ALBUMIN SERPL-MCNC: 2 G/DL (ref 3.5–5)
ALBUMIN SERPL-MCNC: 2.1 G/DL (ref 3.5–5)
ALBUMIN SERPL-MCNC: 3.7 G/DL (ref 3.5–5)
ALBUMIN SERPL-MCNC: 3.9 G/DL (ref 3.7–4.7)
ALBUMIN SERPL-MCNC: 4.1 G/DL (ref 3.5–5)
ALBUMIN SERPL-MCNC: 4.1 G/DL (ref 3.5–5)
ALBUMIN/GLOB SERPL: 0.4 {RATIO} (ref 1.1–2.2)
ALBUMIN/GLOB SERPL: 0.5 {RATIO} (ref 1.1–2.2)
ALBUMIN/GLOB SERPL: 0.6 {RATIO} (ref 1.1–2.2)
ALBUMIN/GLOB SERPL: 1.3 {RATIO} (ref 1.1–2.2)
ALBUMIN/GLOB SERPL: 1.4 {RATIO} (ref 1.1–2.2)
ALBUMIN/GLOB SERPL: 1.5 {RATIO} (ref 1.1–2.2)
ALBUMIN/GLOB SERPL: 1.6 {RATIO} (ref 1.2–2.2)
ALP SERPL-CCNC: 101 U/L (ref 45–117)
ALP SERPL-CCNC: 117 U/L (ref 45–117)
ALP SERPL-CCNC: 120 U/L (ref 45–117)
ALP SERPL-CCNC: 47 U/L (ref 45–117)
ALP SERPL-CCNC: 55 IU/L (ref 39–117)
ALP SERPL-CCNC: 55 U/L (ref 45–117)
ALP SERPL-CCNC: 57 U/L (ref 45–117)
ALP SERPL-CCNC: 94 U/L (ref 45–117)
ALP SERPL-CCNC: 98 U/L (ref 45–117)
ALT SERPL-CCNC: 18 IU/L (ref 0–44)
ALT SERPL-CCNC: 30 U/L (ref 12–78)
ALT SERPL-CCNC: 31 U/L (ref 12–78)
ALT SERPL-CCNC: 33 U/L (ref 12–78)
ALT SERPL-CCNC: 34 U/L (ref 12–78)
ALT SERPL-CCNC: 37 U/L (ref 12–78)
ALT SERPL-CCNC: 37 U/L (ref 12–78)
ALT SERPL-CCNC: 48 U/L (ref 12–78)
ALT SERPL-CCNC: 76 U/L (ref 12–78)
ANION GAP SERPL CALC-SCNC: 10 MMOL/L (ref 5–15)
ANION GAP SERPL CALC-SCNC: 11 MMOL/L (ref 5–15)
ANION GAP SERPL CALC-SCNC: 13 MMOL/L (ref 5–15)
ANION GAP SERPL CALC-SCNC: 6 MMOL/L (ref 5–15)
ANION GAP SERPL CALC-SCNC: 6 MMOL/L (ref 5–15)
ANION GAP SERPL CALC-SCNC: 7 MMOL/L (ref 5–15)
ANION GAP SERPL CALC-SCNC: 7 MMOL/L (ref 5–15)
ANION GAP SERPL CALC-SCNC: 8 MMOL/L (ref 5–15)
ANION GAP SERPL CALC-SCNC: 9 MMOL/L (ref 5–15)
APTT PPP: 32.5 SEC (ref 22.1–32)
ARTERIAL PATENCY WRIST A: ABNORMAL
AST SERPL-CCNC: 114 U/L (ref 15–37)
AST SERPL-CCNC: 154 U/L (ref 15–37)
AST SERPL-CCNC: 17 U/L (ref 15–37)
AST SERPL-CCNC: 22 U/L (ref 15–37)
AST SERPL-CCNC: 24 IU/L (ref 0–40)
AST SERPL-CCNC: 27 U/L (ref 15–37)
AST SERPL-CCNC: 68 U/L (ref 15–37)
AST SERPL-CCNC: 70 U/L (ref 15–37)
AST SERPL-CCNC: 79 U/L (ref 15–37)
ATRIAL RATE: 118 BPM
ATRIAL RATE: 125 BPM
ATRIAL RATE: 127 BPM
B PERT DNA SPEC QL NAA+PROBE: NOT DETECTED
BACTERIA SPEC CULT: NORMAL
BASE DEFICIT BLD-SCNC: 11 MMOL/L
BASOPHILS # BLD AUTO: 0.1 X10E3/UL (ref 0–0.2)
BASOPHILS # BLD: 0 K/UL (ref 0–0.1)
BASOPHILS # BLD: 0.1 K/UL (ref 0–0.1)
BASOPHILS NFR BLD AUTO: 1 %
BASOPHILS NFR BLD: 0 % (ref 0–1)
BASOPHILS NFR BLD: 1 % (ref 0–1)
BDY SITE: ABNORMAL
BILIRUB SERPL-MCNC: 0.4 MG/DL (ref 0–1.2)
BILIRUB SERPL-MCNC: 0.5 MG/DL (ref 0.2–1)
BILIRUB SERPL-MCNC: 0.5 MG/DL (ref 0.2–1)
BILIRUB SERPL-MCNC: 0.6 MG/DL (ref 0.2–1)
BILIRUB SERPL-MCNC: 0.7 MG/DL (ref 0.2–1)
BILIRUB SERPL-MCNC: 0.9 MG/DL (ref 0.2–1)
BILIRUB SERPL-MCNC: 1.6 MG/DL (ref 0.2–1)
BILIRUB SERPL-MCNC: 1.8 MG/DL (ref 0.2–1)
BILIRUB SERPL-MCNC: 3.1 MG/DL (ref 0.2–1)
BLD PROD TYP BPU: NORMAL
BLOOD GROUP ANTIBODIES SERPL: NORMAL
BLOOD GROUP ANTIBODIES SERPL: NORMAL
BORDETELLA PARAPERTUSSIS PCR, BORPAR: NOT DETECTED
BPU ID: NORMAL
BUN SERPL-MCNC: 15 MG/DL (ref 8–27)
BUN SERPL-MCNC: 20 MG/DL (ref 6–20)
BUN SERPL-MCNC: 21 MG/DL (ref 6–20)
BUN SERPL-MCNC: 22 MG/DL (ref 6–20)
BUN SERPL-MCNC: 24 MG/DL (ref 6–20)
BUN SERPL-MCNC: 29 MG/DL (ref 6–20)
BUN SERPL-MCNC: 31 MG/DL (ref 6–20)
BUN SERPL-MCNC: 31 MG/DL (ref 6–20)
BUN SERPL-MCNC: 32 MG/DL (ref 6–20)
BUN SERPL-MCNC: 35 MG/DL (ref 6–20)
BUN SERPL-MCNC: 37 MG/DL (ref 6–20)
BUN SERPL-MCNC: 39 MG/DL (ref 6–20)
BUN SERPL-MCNC: 40 MG/DL (ref 6–20)
BUN SERPL-MCNC: 40 MG/DL (ref 8–27)
BUN SERPL-MCNC: 41 MG/DL (ref 6–20)
BUN SERPL-MCNC: 42 MG/DL (ref 6–20)
BUN SERPL-MCNC: 55 MG/DL (ref 6–20)
BUN/CREAT SERPL: 13 (ref 10–24)
BUN/CREAT SERPL: 13 (ref 12–20)
BUN/CREAT SERPL: 14 (ref 12–20)
BUN/CREAT SERPL: 14 (ref 12–20)
BUN/CREAT SERPL: 18 (ref 10–24)
BUN/CREAT SERPL: 18 (ref 12–20)
BUN/CREAT SERPL: 20 (ref 12–20)
BUN/CREAT SERPL: 23 (ref 12–20)
BUN/CREAT SERPL: 25 (ref 12–20)
BUN/CREAT SERPL: 25 (ref 12–20)
BUN/CREAT SERPL: 26 (ref 12–20)
BUN/CREAT SERPL: 26 (ref 12–20)
BUN/CREAT SERPL: 27 (ref 12–20)
BUN/CREAT SERPL: 28 (ref 12–20)
BUN/CREAT SERPL: 30 (ref 12–20)
C DIFF GDH STL QL: NEGATIVE
C DIFF TOX A+B STL QL IA: NEGATIVE
C PNEUM DNA SPEC QL NAA+PROBE: NOT DETECTED
CA-I BLD-SCNC: 1.04 MMOL/L (ref 1.12–1.32)
CALCIUM SERPL-MCNC: 10.7 MG/DL (ref 8.5–10.1)
CALCIUM SERPL-MCNC: 14 MG/DL (ref 8.6–10.2)
CALCIUM SERPL-MCNC: 6.6 MG/DL (ref 8.5–10.1)
CALCIUM SERPL-MCNC: 6.8 MG/DL (ref 8.5–10.1)
CALCIUM SERPL-MCNC: 6.8 MG/DL (ref 8.5–10.1)
CALCIUM SERPL-MCNC: 6.9 MG/DL (ref 8.5–10.1)
CALCIUM SERPL-MCNC: 7.2 MG/DL (ref 8.5–10.1)
CALCIUM SERPL-MCNC: 7.2 MG/DL (ref 8.5–10.1)
CALCIUM SERPL-MCNC: 7.3 MG/DL (ref 8.5–10.1)
CALCIUM SERPL-MCNC: 7.3 MG/DL (ref 8.5–10.1)
CALCIUM SERPL-MCNC: 7.5 MG/DL (ref 8.5–10.1)
CALCIUM SERPL-MCNC: 7.7 MG/DL (ref 8.5–10.1)
CALCIUM SERPL-MCNC: 7.8 MG/DL (ref 8.5–10.1)
CALCIUM SERPL-MCNC: 8.8 MG/DL (ref 8.5–10.1)
CALCIUM SERPL-MCNC: 9 MG/DL (ref 8.5–10.1)
CALCIUM SERPL-MCNC: 9.6 MG/DL (ref 8.5–10.1)
CALCIUM SERPL-MCNC: 9.7 MG/DL (ref 8.6–10.2)
CALCULATED P AXIS, ECG09: 27 DEGREES
CALCULATED P AXIS, ECG09: 38 DEGREES
CALCULATED P AXIS, ECG09: 49 DEGREES
CALCULATED R AXIS, ECG10: -23 DEGREES
CALCULATED R AXIS, ECG10: -36 DEGREES
CALCULATED R AXIS, ECG10: -39 DEGREES
CALCULATED T AXIS, ECG11: 11 DEGREES
CALCULATED T AXIS, ECG11: 28 DEGREES
CALCULATED T AXIS, ECG11: 50 DEGREES
CAMPYLOBACTER SPECIES, DNA: NEGATIVE
CHLORIDE SERPL-SCNC: 103 MMOL/L (ref 96–106)
CHLORIDE SERPL-SCNC: 105 MMOL/L (ref 97–108)
CHLORIDE SERPL-SCNC: 109 MMOL/L (ref 97–108)
CHLORIDE SERPL-SCNC: 111 MMOL/L (ref 97–108)
CHLORIDE SERPL-SCNC: 112 MMOL/L (ref 97–108)
CHLORIDE SERPL-SCNC: 113 MMOL/L (ref 97–108)
CHLORIDE SERPL-SCNC: 114 MMOL/L (ref 97–108)
CHLORIDE SERPL-SCNC: 114 MMOL/L (ref 97–108)
CHLORIDE SERPL-SCNC: 117 MMOL/L (ref 97–108)
CHLORIDE SERPL-SCNC: 94 MMOL/L (ref 96–106)
CK MB CFR SERPL CALC: 2.4 % (ref 0–2.5)
CK MB SERPL-MCNC: 1.2 NG/ML (ref 5–25)
CK SERPL-CCNC: 50 U/L (ref 39–308)
CO2 SERPL-SCNC: 15 MMOL/L (ref 21–32)
CO2 SERPL-SCNC: 16 MMOL/L (ref 21–32)
CO2 SERPL-SCNC: 18 MMOL/L (ref 20–29)
CO2 SERPL-SCNC: 18 MMOL/L (ref 21–32)
CO2 SERPL-SCNC: 19 MMOL/L (ref 21–32)
CO2 SERPL-SCNC: 20 MMOL/L (ref 21–32)
CO2 SERPL-SCNC: 20 MMOL/L (ref 21–32)
CO2 SERPL-SCNC: 21 MMOL/L (ref 21–32)
CO2 SERPL-SCNC: 21 MMOL/L (ref 21–32)
CO2 SERPL-SCNC: 22 MMOL/L (ref 20–29)
CO2 SERPL-SCNC: 22 MMOL/L (ref 21–32)
CO2 SERPL-SCNC: 24 MMOL/L (ref 21–32)
CO2 SERPL-SCNC: 28 MMOL/L (ref 21–32)
COMMENT, HOLDF: NORMAL
CORTIS SERPL-MCNC: 11.5 UG/DL
CREAT SERPL-MCNC: 0.9 MG/DL (ref 0.7–1.3)
CREAT SERPL-MCNC: 1.05 MG/DL (ref 0.7–1.3)
CREAT SERPL-MCNC: 1.12 MG/DL (ref 0.7–1.3)
CREAT SERPL-MCNC: 1.19 MG/DL (ref 0.76–1.27)
CREAT SERPL-MCNC: 1.2 MG/DL (ref 0.7–1.3)
CREAT SERPL-MCNC: 1.32 MG/DL (ref 0.7–1.3)
CREAT SERPL-MCNC: 1.35 MG/DL (ref 0.7–1.3)
CREAT SERPL-MCNC: 1.42 MG/DL (ref 0.7–1.3)
CREAT SERPL-MCNC: 1.45 MG/DL (ref 0.7–1.3)
CREAT SERPL-MCNC: 1.49 MG/DL (ref 0.7–1.3)
CREAT SERPL-MCNC: 1.54 MG/DL (ref 0.7–1.3)
CREAT SERPL-MCNC: 1.55 MG/DL (ref 0.7–1.3)
CREAT SERPL-MCNC: 1.57 MG/DL (ref 0.7–1.3)
CREAT SERPL-MCNC: 1.68 MG/DL (ref 0.7–1.3)
CREAT SERPL-MCNC: 2.19 MG/DL (ref 0.76–1.27)
CREAT SERPL-MCNC: 2.39 MG/DL (ref 0.7–1.3)
CREAT SERPL-MCNC: 2.73 MG/DL (ref 0.7–1.3)
CROSSMATCH RESULT,%XM: NORMAL
DIAGNOSIS, 93000: NORMAL
DIFFERENTIAL METHOD BLD: ABNORMAL
ENTEROTOXIGEN E COLI, DNA: NEGATIVE
EOSINOPHIL # BLD AUTO: 0.2 X10E3/UL (ref 0–0.4)
EOSINOPHIL # BLD: 0 K/UL (ref 0–0.4)
EOSINOPHIL # BLD: 0.1 K/UL (ref 0–0.4)
EOSINOPHIL # BLD: 0.2 K/UL (ref 0–0.4)
EOSINOPHIL # BLD: 0.2 K/UL (ref 0–0.4)
EOSINOPHIL # BLD: 0.3 K/UL (ref 0–0.4)
EOSINOPHIL # BLD: 0.3 K/UL (ref 0–0.4)
EOSINOPHIL # BLD: 0.7 K/UL (ref 0–0.4)
EOSINOPHIL NFR BLD AUTO: 3 %
EOSINOPHIL NFR BLD: 0 % (ref 0–7)
EOSINOPHIL NFR BLD: 1 % (ref 0–7)
EOSINOPHIL NFR BLD: 3 % (ref 0–7)
EOSINOPHIL NFR BLD: 4 % (ref 0–7)
EOSINOPHIL NFR BLD: 5 % (ref 0–7)
ERYTHROCYTE [DISTWIDTH] IN BLOOD BY AUTOMATED COUNT: 12.2 % (ref 11.5–14.5)
ERYTHROCYTE [DISTWIDTH] IN BLOOD BY AUTOMATED COUNT: 12.4 % (ref 11.5–14.5)
ERYTHROCYTE [DISTWIDTH] IN BLOOD BY AUTOMATED COUNT: 12.5 % (ref 11.6–15.4)
ERYTHROCYTE [DISTWIDTH] IN BLOOD BY AUTOMATED COUNT: 12.9 % (ref 11.5–14.5)
ERYTHROCYTE [DISTWIDTH] IN BLOOD BY AUTOMATED COUNT: 13.2 % (ref 11.5–14.5)
ERYTHROCYTE [DISTWIDTH] IN BLOOD BY AUTOMATED COUNT: 13.3 % (ref 11.5–14.5)
ERYTHROCYTE [DISTWIDTH] IN BLOOD BY AUTOMATED COUNT: 13.7 % (ref 11.5–14.5)
ERYTHROCYTE [DISTWIDTH] IN BLOOD BY AUTOMATED COUNT: 14 % (ref 11.5–14.5)
ERYTHROCYTE [DISTWIDTH] IN BLOOD BY AUTOMATED COUNT: 14.1 % (ref 11.5–14.5)
ERYTHROCYTE [DISTWIDTH] IN BLOOD BY AUTOMATED COUNT: 14.1 % (ref 11.5–14.5)
ERYTHROCYTE [DISTWIDTH] IN BLOOD BY AUTOMATED COUNT: 14.2 % (ref 11.5–14.5)
ERYTHROCYTE [DISTWIDTH] IN BLOOD BY AUTOMATED COUNT: 14.4 % (ref 11.5–14.5)
ERYTHROCYTE [DISTWIDTH] IN BLOOD BY AUTOMATED COUNT: 14.5 % (ref 11.5–14.5)
ERYTHROCYTE [DISTWIDTH] IN BLOOD BY AUTOMATED COUNT: 14.6 % (ref 11.5–14.5)
EST. AVERAGE GLUCOSE BLD GHB EST-MCNC: 140 MG/DL
FLUAV AG NPH QL IA: NEGATIVE
FLUAV H1 2009 PAND RNA SPEC QL NAA+PROBE: NOT DETECTED
FLUAV H1 RNA SPEC QL NAA+PROBE: NOT DETECTED
FLUAV H3 RNA SPEC QL NAA+PROBE: NOT DETECTED
FLUAV SUBTYP SPEC NAA+PROBE: NOT DETECTED
FLUBV AG NOSE QL IA: NEGATIVE
FLUBV RNA SPEC QL NAA+PROBE: NOT DETECTED
GAS FLOW.O2 O2 DELIVERY SYS: ABNORMAL L/MIN
GAS FLOW.O2 SETTING OXYMISER: 4 L/M
GLOBULIN SER CALC-MCNC: 2.4 G/DL (ref 1.5–4.5)
GLOBULIN SER CALC-MCNC: 2.8 G/DL (ref 2–4)
GLOBULIN SER CALC-MCNC: 2.9 G/DL (ref 2–4)
GLOBULIN SER CALC-MCNC: 3 G/DL (ref 2–4)
GLOBULIN SER CALC-MCNC: 3.3 G/DL (ref 2–4)
GLOBULIN SER CALC-MCNC: 3.4 G/DL (ref 2–4)
GLOBULIN SER CALC-MCNC: 3.6 G/DL (ref 2–4)
GLOBULIN SER CALC-MCNC: 4 G/DL (ref 2–4)
GLOBULIN SER CALC-MCNC: 4 G/DL (ref 2–4)
GLUCOSE BLD STRIP.AUTO-MCNC: 101 MG/DL (ref 65–100)
GLUCOSE BLD STRIP.AUTO-MCNC: 104 MG/DL (ref 65–100)
GLUCOSE BLD STRIP.AUTO-MCNC: 106 MG/DL (ref 65–100)
GLUCOSE BLD STRIP.AUTO-MCNC: 107 MG/DL (ref 65–100)
GLUCOSE BLD STRIP.AUTO-MCNC: 108 MG/DL (ref 65–100)
GLUCOSE BLD STRIP.AUTO-MCNC: 112 MG/DL (ref 65–100)
GLUCOSE BLD STRIP.AUTO-MCNC: 112 MG/DL (ref 65–100)
GLUCOSE BLD STRIP.AUTO-MCNC: 113 MG/DL (ref 65–100)
GLUCOSE BLD STRIP.AUTO-MCNC: 114 MG/DL (ref 65–100)
GLUCOSE BLD STRIP.AUTO-MCNC: 122 MG/DL (ref 65–100)
GLUCOSE BLD STRIP.AUTO-MCNC: 123 MG/DL (ref 65–100)
GLUCOSE BLD STRIP.AUTO-MCNC: 126 MG/DL (ref 65–100)
GLUCOSE BLD STRIP.AUTO-MCNC: 130 MG/DL (ref 65–100)
GLUCOSE BLD STRIP.AUTO-MCNC: 146 MG/DL (ref 65–100)
GLUCOSE BLD STRIP.AUTO-MCNC: 147 MG/DL (ref 65–100)
GLUCOSE BLD STRIP.AUTO-MCNC: 147 MG/DL (ref 65–100)
GLUCOSE BLD STRIP.AUTO-MCNC: 154 MG/DL (ref 65–100)
GLUCOSE BLD STRIP.AUTO-MCNC: 155 MG/DL (ref 65–100)
GLUCOSE BLD STRIP.AUTO-MCNC: 160 MG/DL (ref 65–100)
GLUCOSE BLD STRIP.AUTO-MCNC: 169 MG/DL (ref 65–100)
GLUCOSE BLD STRIP.AUTO-MCNC: 76 MG/DL (ref 65–100)
GLUCOSE BLD STRIP.AUTO-MCNC: 80 MG/DL (ref 65–100)
GLUCOSE BLD STRIP.AUTO-MCNC: 80 MG/DL (ref 65–100)
GLUCOSE BLD STRIP.AUTO-MCNC: 83 MG/DL (ref 65–100)
GLUCOSE BLD STRIP.AUTO-MCNC: 86 MG/DL (ref 65–100)
GLUCOSE BLD STRIP.AUTO-MCNC: 87 MG/DL (ref 65–100)
GLUCOSE BLD STRIP.AUTO-MCNC: 89 MG/DL (ref 65–100)
GLUCOSE BLD STRIP.AUTO-MCNC: 90 MG/DL (ref 65–100)
GLUCOSE BLD STRIP.AUTO-MCNC: 91 MG/DL (ref 65–100)
GLUCOSE BLD STRIP.AUTO-MCNC: 92 MG/DL (ref 65–100)
GLUCOSE BLD STRIP.AUTO-MCNC: 92 MG/DL (ref 65–100)
GLUCOSE BLD STRIP.AUTO-MCNC: 99 MG/DL (ref 65–100)
GLUCOSE SERPL-MCNC: 101 MG/DL (ref 65–100)
GLUCOSE SERPL-MCNC: 110 MG/DL (ref 65–100)
GLUCOSE SERPL-MCNC: 111 MG/DL (ref 65–100)
GLUCOSE SERPL-MCNC: 112 MG/DL (ref 65–100)
GLUCOSE SERPL-MCNC: 119 MG/DL (ref 65–100)
GLUCOSE SERPL-MCNC: 120 MG/DL (ref 65–100)
GLUCOSE SERPL-MCNC: 124 MG/DL (ref 65–100)
GLUCOSE SERPL-MCNC: 126 MG/DL (ref 65–100)
GLUCOSE SERPL-MCNC: 128 MG/DL (ref 65–100)
GLUCOSE SERPL-MCNC: 129 MG/DL (ref 65–99)
GLUCOSE SERPL-MCNC: 152 MG/DL (ref 65–100)
GLUCOSE SERPL-MCNC: 156 MG/DL (ref 65–100)
GLUCOSE SERPL-MCNC: 163 MG/DL (ref 65–100)
GLUCOSE SERPL-MCNC: 175 MG/DL (ref 65–100)
GLUCOSE SERPL-MCNC: 184 MG/DL (ref 65–100)
GLUCOSE SERPL-MCNC: 274 MG/DL (ref 65–100)
GLUCOSE SERPL-MCNC: 96 MG/DL (ref 65–99)
HADV DNA SPEC QL NAA+PROBE: NOT DETECTED
HBA1C MFR BLD: 6.5 % (ref 4–5.6)
HCO3 BLD-SCNC: 14.6 MMOL/L (ref 22–26)
HCOV 229E RNA SPEC QL NAA+PROBE: NOT DETECTED
HCOV HKU1 RNA SPEC QL NAA+PROBE: NOT DETECTED
HCOV NL63 RNA SPEC QL NAA+PROBE: NOT DETECTED
HCOV OC43 RNA SPEC QL NAA+PROBE: NOT DETECTED
HCT VFR BLD AUTO: 20.9 % (ref 36.6–50.3)
HCT VFR BLD AUTO: 21.6 % (ref 36.6–50.3)
HCT VFR BLD AUTO: 22.2 % (ref 36.6–50.3)
HCT VFR BLD AUTO: 22.9 % (ref 36.6–50.3)
HCT VFR BLD AUTO: 23.4 % (ref 36.6–50.3)
HCT VFR BLD AUTO: 23.5 % (ref 36.6–50.3)
HCT VFR BLD AUTO: 24.1 % (ref 36.6–50.3)
HCT VFR BLD AUTO: 24.2 % (ref 36.6–50.3)
HCT VFR BLD AUTO: 24.9 % (ref 36.6–50.3)
HCT VFR BLD AUTO: 26.8 % (ref 36.6–50.3)
HCT VFR BLD AUTO: 28.2 % (ref 36.6–50.3)
HCT VFR BLD AUTO: 31.6 % (ref 36.6–50.3)
HCT VFR BLD AUTO: 37.3 % (ref 36.6–50.3)
HCT VFR BLD AUTO: 38.5 % (ref 36.6–50.3)
HCT VFR BLD AUTO: 39.8 % (ref 37.5–51)
HCT VFR BLD AUTO: 43 % (ref 36.6–50.3)
HEMOCCULT STL QL: POSITIVE
HGB BLD-MCNC: 10.7 G/DL (ref 12.1–17)
HGB BLD-MCNC: 12.4 G/DL (ref 12.1–17)
HGB BLD-MCNC: 13.1 G/DL (ref 12.1–17)
HGB BLD-MCNC: 14 G/DL (ref 13–17.7)
HGB BLD-MCNC: 14.4 G/DL (ref 12.1–17)
HGB BLD-MCNC: 6.8 G/DL (ref 12.1–17)
HGB BLD-MCNC: 7.1 G/DL (ref 12.1–17)
HGB BLD-MCNC: 7.1 G/DL (ref 12.1–17)
HGB BLD-MCNC: 7.5 G/DL (ref 12.1–17)
HGB BLD-MCNC: 7.5 G/DL (ref 12.1–17)
HGB BLD-MCNC: 7.7 G/DL (ref 12.1–17)
HGB BLD-MCNC: 7.8 G/DL (ref 12.1–17)
HGB BLD-MCNC: 7.9 G/DL (ref 12.1–17)
HGB BLD-MCNC: 8.1 G/DL (ref 12.1–17)
HGB BLD-MCNC: 8.9 G/DL (ref 12.1–17)
HGB BLD-MCNC: 9.8 G/DL (ref 12.1–17)
HMPV RNA SPEC QL NAA+PROBE: NOT DETECTED
HPIV1 RNA SPEC QL NAA+PROBE: NOT DETECTED
HPIV2 RNA SPEC QL NAA+PROBE: NOT DETECTED
HPIV3 RNA SPEC QL NAA+PROBE: NOT DETECTED
HPIV4 RNA SPEC QL NAA+PROBE: NOT DETECTED
IMM GRANULOCYTES # BLD AUTO: 0 K/UL
IMM GRANULOCYTES # BLD AUTO: 0 K/UL (ref 0–0.04)
IMM GRANULOCYTES # BLD AUTO: 0 K/UL (ref 0–0.04)
IMM GRANULOCYTES # BLD AUTO: 0.2 K/UL (ref 0–0.04)
IMM GRANULOCYTES # BLD AUTO: 0.4 X10E3/UL (ref 0–0.1)
IMM GRANULOCYTES NFR BLD AUTO: 0 %
IMM GRANULOCYTES NFR BLD AUTO: 0 % (ref 0–0.5)
IMM GRANULOCYTES NFR BLD AUTO: 0 % (ref 0–0.5)
IMM GRANULOCYTES NFR BLD AUTO: 2 % (ref 0–0.5)
IMM GRANULOCYTES NFR BLD AUTO: 4 %
INR PPP: 1.4 (ref 0.9–1.1)
INTERPRETATION: NORMAL
LACTATE SERPL-SCNC: 1.6 MMOL/L (ref 0.4–2)
LACTATE SERPL-SCNC: 1.9 MMOL/L (ref 0.4–2)
LACTATE SERPL-SCNC: 2.1 MMOL/L (ref 0.4–2)
LACTATE SERPL-SCNC: 3.2 MMOL/L (ref 0.4–2)
LACTATE SERPL-SCNC: 3.5 MMOL/L (ref 0.4–2)
LACTATE SERPL-SCNC: 3.5 MMOL/L (ref 0.4–2)
LACTATE SERPL-SCNC: 3.6 MMOL/L (ref 0.4–2)
LACTATE SERPL-SCNC: 4.1 MMOL/L (ref 0.4–2)
LYMPHOCYTES # BLD AUTO: 1.6 X10E3/UL (ref 0.7–3.1)
LYMPHOCYTES # BLD: 0.5 K/UL (ref 0.8–3.5)
LYMPHOCYTES # BLD: 0.9 K/UL (ref 0.8–3.5)
LYMPHOCYTES # BLD: 0.9 K/UL (ref 0.8–3.5)
LYMPHOCYTES # BLD: 1.1 K/UL (ref 0.8–3.5)
LYMPHOCYTES # BLD: 1.2 K/UL (ref 0.8–3.5)
LYMPHOCYTES # BLD: 1.2 K/UL (ref 0.8–3.5)
LYMPHOCYTES # BLD: 1.3 K/UL (ref 0.8–3.5)
LYMPHOCYTES # BLD: 1.4 K/UL (ref 0.8–3.5)
LYMPHOCYTES # BLD: 1.6 K/UL (ref 0.8–3.5)
LYMPHOCYTES # BLD: 1.6 K/UL (ref 0.8–3.5)
LYMPHOCYTES # BLD: 1.8 K/UL (ref 0.8–3.5)
LYMPHOCYTES # BLD: 2.2 K/UL (ref 0.8–3.5)
LYMPHOCYTES NFR BLD AUTO: 17 %
LYMPHOCYTES NFR BLD: 11 % (ref 12–49)
LYMPHOCYTES NFR BLD: 11 % (ref 12–49)
LYMPHOCYTES NFR BLD: 14 % (ref 12–49)
LYMPHOCYTES NFR BLD: 17 % (ref 12–49)
LYMPHOCYTES NFR BLD: 19 % (ref 12–49)
LYMPHOCYTES NFR BLD: 19 % (ref 12–49)
LYMPHOCYTES NFR BLD: 21 % (ref 12–49)
LYMPHOCYTES NFR BLD: 22 % (ref 12–49)
LYMPHOCYTES NFR BLD: 22 % (ref 12–49)
M PNEUMO DNA SPEC QL NAA+PROBE: NOT DETECTED
MAGNESIUM SERPL-MCNC: 2.1 MG/DL (ref 1.6–2.4)
MAGNESIUM SERPL-MCNC: 2.2 MG/DL (ref 1.6–2.4)
MCH RBC QN AUTO: 30 PG (ref 26–34)
MCH RBC QN AUTO: 30.1 PG (ref 26–34)
MCH RBC QN AUTO: 30.4 PG (ref 26–34)
MCH RBC QN AUTO: 30.4 PG (ref 26–34)
MCH RBC QN AUTO: 30.6 PG (ref 26–34)
MCH RBC QN AUTO: 30.8 PG (ref 26–34)
MCH RBC QN AUTO: 31.3 PG (ref 26.6–33)
MCH RBC QN AUTO: 31.5 PG (ref 26–34)
MCH RBC QN AUTO: 31.6 PG (ref 26–34)
MCH RBC QN AUTO: 32 PG (ref 26–34)
MCHC RBC AUTO-ENTMCNC: 31.9 G/DL (ref 30–36.5)
MCHC RBC AUTO-ENTMCNC: 32 G/DL (ref 30–36.5)
MCHC RBC AUTO-ENTMCNC: 32.5 G/DL (ref 30–36.5)
MCHC RBC AUTO-ENTMCNC: 32.6 G/DL (ref 30–36.5)
MCHC RBC AUTO-ENTMCNC: 32.8 G/DL (ref 30–36.5)
MCHC RBC AUTO-ENTMCNC: 32.9 G/DL (ref 30–36.5)
MCHC RBC AUTO-ENTMCNC: 33.2 G/DL (ref 30–36.5)
MCHC RBC AUTO-ENTMCNC: 33.2 G/DL (ref 30–36.5)
MCHC RBC AUTO-ENTMCNC: 33.3 G/DL (ref 30–36.5)
MCHC RBC AUTO-ENTMCNC: 33.5 G/DL (ref 30–36.5)
MCHC RBC AUTO-ENTMCNC: 33.9 G/DL (ref 30–36.5)
MCHC RBC AUTO-ENTMCNC: 34 G/DL (ref 30–36.5)
MCHC RBC AUTO-ENTMCNC: 34.8 G/DL (ref 30–36.5)
MCHC RBC AUTO-ENTMCNC: 35.2 G/DL (ref 31.5–35.7)
MCV RBC AUTO: 88.7 FL (ref 80–99)
MCV RBC AUTO: 89 FL (ref 79–97)
MCV RBC AUTO: 90.3 FL (ref 80–99)
MCV RBC AUTO: 91.5 FL (ref 80–99)
MCV RBC AUTO: 92.5 FL (ref 80–99)
MCV RBC AUTO: 92.7 FL (ref 80–99)
MCV RBC AUTO: 93.1 FL (ref 80–99)
MCV RBC AUTO: 93.8 FL (ref 80–99)
MCV RBC AUTO: 93.8 FL (ref 80–99)
MCV RBC AUTO: 94.1 FL (ref 80–99)
MCV RBC AUTO: 94.1 FL (ref 80–99)
MCV RBC AUTO: 94.4 FL (ref 80–99)
MCV RBC AUTO: 94.6 FL (ref 80–99)
MCV RBC AUTO: 95.2 FL (ref 80–99)
METAMYELOCYTES NFR BLD MANUAL: 2 %
METAMYELOCYTES NFR BLD MANUAL: 3 %
METAMYELOCYTES NFR BLD MANUAL: 3 %
METAMYELOCYTES NFR BLD MANUAL: 4 %
METAMYELOCYTES NFR BLD MANUAL: 4 %
METAMYELOCYTES NFR BLD MANUAL: 5 %
METAMYELOCYTES NFR BLD MANUAL: 7 %
MONOCYTES # BLD AUTO: 0.8 X10E3/UL (ref 0.1–0.9)
MONOCYTES # BLD: 0.3 K/UL (ref 0–1)
MONOCYTES # BLD: 0.3 K/UL (ref 0–1)
MONOCYTES # BLD: 0.4 K/UL (ref 0–1)
MONOCYTES # BLD: 0.5 K/UL (ref 0–1)
MONOCYTES # BLD: 0.6 K/UL (ref 0–1)
MONOCYTES # BLD: 0.6 K/UL (ref 0–1)
MONOCYTES # BLD: 0.7 K/UL (ref 0–1)
MONOCYTES # BLD: 0.8 K/UL (ref 0–1)
MONOCYTES # BLD: 0.8 K/UL (ref 0–1)
MONOCYTES # BLD: 1.3 K/UL (ref 0–1)
MONOCYTES NFR BLD AUTO: 9 %
MONOCYTES NFR BLD: 10 % (ref 5–13)
MONOCYTES NFR BLD: 11 % (ref 5–13)
MONOCYTES NFR BLD: 12 % (ref 5–13)
MONOCYTES NFR BLD: 14 % (ref 5–13)
MONOCYTES NFR BLD: 3 % (ref 5–13)
MONOCYTES NFR BLD: 5 % (ref 5–13)
MONOCYTES NFR BLD: 6 % (ref 5–13)
MONOCYTES NFR BLD: 7 % (ref 5–13)
MONOCYTES NFR BLD: 8 % (ref 5–13)
MONOCYTES NFR BLD: 9 % (ref 5–13)
MYELOCYTES NFR BLD MANUAL: 1 %
MYELOCYTES NFR BLD MANUAL: 2 %
MYELOCYTES NFR BLD MANUAL: 3 %
MYELOCYTES NFR BLD MANUAL: 4 %
MYELOCYTES NFR BLD MANUAL: 4 %
MYELOCYTES NFR BLD MANUAL: 7 %
NEUTROPHILS # BLD AUTO: 6.2 X10E3/UL (ref 1.4–7)
NEUTROPHILS NFR BLD AUTO: 66 %
NEUTS BAND NFR BLD MANUAL: 10 % (ref 0–6)
NEUTS BAND NFR BLD MANUAL: 10 % (ref 0–6)
NEUTS BAND NFR BLD MANUAL: 11 % (ref 0–6)
NEUTS BAND NFR BLD MANUAL: 12 % (ref 0–6)
NEUTS BAND NFR BLD MANUAL: 13 % (ref 0–6)
NEUTS BAND NFR BLD MANUAL: 14 % (ref 0–6)
NEUTS BAND NFR BLD MANUAL: 5 % (ref 0–6)
NEUTS BAND NFR BLD MANUAL: 6 % (ref 0–6)
NEUTS BAND NFR BLD MANUAL: 9 % (ref 0–6)
NEUTS SEG # BLD: 10.3 K/UL (ref 1.8–8)
NEUTS SEG # BLD: 2.6 K/UL (ref 1.8–8)
NEUTS SEG # BLD: 3.1 K/UL (ref 1.8–8)
NEUTS SEG # BLD: 3.1 K/UL (ref 1.8–8)
NEUTS SEG # BLD: 3.7 K/UL (ref 1.8–8)
NEUTS SEG # BLD: 4.4 K/UL (ref 1.8–8)
NEUTS SEG # BLD: 4.5 K/UL (ref 1.8–8)
NEUTS SEG # BLD: 4.5 K/UL (ref 1.8–8)
NEUTS SEG # BLD: 6.8 K/UL (ref 1.8–8)
NEUTS SEG # BLD: 7 K/UL (ref 1.8–8)
NEUTS SEG NFR BLD: 52 % (ref 32–75)
NEUTS SEG NFR BLD: 53 % (ref 32–75)
NEUTS SEG NFR BLD: 55 % (ref 32–75)
NEUTS SEG NFR BLD: 57 % (ref 32–75)
NEUTS SEG NFR BLD: 58 % (ref 32–75)
NEUTS SEG NFR BLD: 61 % (ref 32–75)
NEUTS SEG NFR BLD: 63 % (ref 32–75)
NEUTS SEG NFR BLD: 65 % (ref 32–75)
NEUTS SEG NFR BLD: 65 % (ref 32–75)
NEUTS SEG NFR BLD: 72 % (ref 32–75)
NRBC # BLD: 0 K/UL (ref 0–0.01)
NRBC # BLD: 0.02 K/UL (ref 0–0.01)
NRBC # BLD: 0.02 K/UL (ref 0–0.01)
NRBC # BLD: 0.07 K/UL (ref 0–0.01)
NRBC # BLD: 0.07 K/UL (ref 0–0.01)
NRBC # BLD: 0.08 K/UL (ref 0–0.01)
NRBC # BLD: 0.13 K/UL (ref 0–0.01)
NRBC # BLD: 0.19 K/UL (ref 0–0.01)
NRBC # BLD: 0.22 K/UL (ref 0–0.01)
NRBC # BLD: 0.24 K/UL (ref 0–0.01)
NRBC # BLD: 0.32 K/UL (ref 0–0.01)
NRBC BLD-RTO: 0 PER 100 WBC
NRBC BLD-RTO: 0.1 PER 100 WBC
NRBC BLD-RTO: 0.5 PER 100 WBC
NRBC BLD-RTO: 1.3 PER 100 WBC
NRBC BLD-RTO: 1.3 PER 100 WBC
NRBC BLD-RTO: 1.5 PER 100 WBC
NRBC BLD-RTO: 2.1 PER 100 WBC
NRBC BLD-RTO: 2.5 PER 100 WBC
NRBC BLD-RTO: 3.4 PER 100 WBC
NRBC BLD-RTO: 4.3 PER 100 WBC
NRBC BLD-RTO: 4.8 PER 100 WBC
P SHIGELLOIDES DNA STL QL NAA+PROBE: NEGATIVE
P-R INTERVAL, ECG05: 132 MS
P-R INTERVAL, ECG05: 140 MS
P-R INTERVAL, ECG05: 140 MS
PCO2 BLD: 24.8 MMHG (ref 35–45)
PH BLD: 7.38 [PH] (ref 7.35–7.45)
PHOSPHATE SERPL-MCNC: 2.6 MG/DL (ref 2.6–4.7)
PHOSPHATE SERPL-MCNC: 3.2 MG/DL (ref 2.6–4.7)
PLATELET # BLD AUTO: 107 K/UL (ref 150–400)
PLATELET # BLD AUTO: 113 K/UL (ref 150–400)
PLATELET # BLD AUTO: 113 K/UL (ref 150–400)
PLATELET # BLD AUTO: 180 X10E3/UL (ref 150–450)
PLATELET # BLD AUTO: 242 K/UL (ref 150–400)
PLATELET # BLD AUTO: 249 K/UL (ref 150–400)
PLATELET # BLD AUTO: 258 K/UL (ref 150–400)
PLATELET # BLD AUTO: 74 K/UL (ref 150–400)
PLATELET # BLD AUTO: 75 K/UL (ref 150–400)
PLATELET # BLD AUTO: 76 K/UL (ref 150–400)
PLATELET # BLD AUTO: 82 K/UL (ref 150–400)
PLATELET # BLD AUTO: 85 K/UL (ref 150–400)
PLATELET # BLD AUTO: 87 K/UL (ref 150–400)
PLATELET # BLD AUTO: 96 K/UL (ref 150–400)
PLATELET COMMENTS,PCOM: ABNORMAL
PMV BLD AUTO: 10.3 FL (ref 8.9–12.9)
PMV BLD AUTO: 10.4 FL (ref 8.9–12.9)
PMV BLD AUTO: 10.4 FL (ref 8.9–12.9)
PMV BLD AUTO: 10.6 FL (ref 8.9–12.9)
PMV BLD AUTO: 10.6 FL (ref 8.9–12.9)
PMV BLD AUTO: 10.9 FL (ref 8.9–12.9)
PMV BLD AUTO: 10.9 FL (ref 8.9–12.9)
PMV BLD AUTO: 11.2 FL (ref 8.9–12.9)
PMV BLD AUTO: 11.2 FL (ref 8.9–12.9)
PMV BLD AUTO: 11.4 FL (ref 8.9–12.9)
PMV BLD AUTO: 11.5 FL (ref 8.9–12.9)
PMV BLD AUTO: 11.6 FL (ref 8.9–12.9)
PMV BLD AUTO: 11.8 FL (ref 8.9–12.9)
PO2 BLD: 73 MMHG (ref 80–100)
POTASSIUM SERPL-SCNC: 3.5 MMOL/L (ref 3.5–5.1)
POTASSIUM SERPL-SCNC: 3.6 MMOL/L (ref 3.5–5.1)
POTASSIUM SERPL-SCNC: 3.8 MMOL/L (ref 3.5–5.1)
POTASSIUM SERPL-SCNC: 3.9 MMOL/L (ref 3.5–5.1)
POTASSIUM SERPL-SCNC: 3.9 MMOL/L (ref 3.5–5.1)
POTASSIUM SERPL-SCNC: 4 MMOL/L (ref 3.5–5.1)
POTASSIUM SERPL-SCNC: 4 MMOL/L (ref 3.5–5.2)
POTASSIUM SERPL-SCNC: 4.1 MMOL/L (ref 3.5–5.1)
POTASSIUM SERPL-SCNC: 4.2 MMOL/L (ref 3.5–5.1)
POTASSIUM SERPL-SCNC: 4.3 MMOL/L (ref 3.5–5.1)
POTASSIUM SERPL-SCNC: 4.5 MMOL/L (ref 3.5–5.2)
PROT SERPL-MCNC: 4.8 G/DL (ref 6.4–8.2)
PROT SERPL-MCNC: 5 G/DL (ref 6.4–8.2)
PROT SERPL-MCNC: 5.3 G/DL (ref 6.4–8.2)
PROT SERPL-MCNC: 5.7 G/DL (ref 6.4–8.2)
PROT SERPL-MCNC: 6.1 G/DL (ref 6.4–8.2)
PROT SERPL-MCNC: 6.3 G/DL (ref 6–8.5)
PROT SERPL-MCNC: 6.6 G/DL (ref 6.4–8.2)
PROT SERPL-MCNC: 6.9 G/DL (ref 6.4–8.2)
PROT SERPL-MCNC: 7.1 G/DL (ref 6.4–8.2)
PROTHROMBIN TIME: 14 SEC (ref 9–11.1)
Q-T INTERVAL, ECG07: 322 MS
Q-T INTERVAL, ECG07: 340 MS
Q-T INTERVAL, ECG07: 364 MS
QRS DURATION, ECG06: 108 MS
QRS DURATION, ECG06: 116 MS
QRS DURATION, ECG06: 118 MS
QTC CALCULATION (BEZET), ECG08: 467 MS
QTC CALCULATION (BEZET), ECG08: 490 MS
QTC CALCULATION (BEZET), ECG08: 510 MS
RBC # BLD AUTO: 2.21 M/UL (ref 4.1–5.7)
RBC # BLD AUTO: 2.32 M/UL (ref 4.1–5.7)
RBC # BLD AUTO: 2.47 M/UL (ref 4.1–5.7)
RBC # BLD AUTO: 2.49 M/UL (ref 4.1–5.7)
RBC # BLD AUTO: 2.53 M/UL (ref 4.1–5.7)
RBC # BLD AUTO: 2.57 M/UL (ref 4.1–5.7)
RBC # BLD AUTO: 2.58 M/UL (ref 4.1–5.7)
RBC # BLD AUTO: 2.93 M/UL (ref 4.1–5.7)
RBC # BLD AUTO: 3.18 M/UL (ref 4.1–5.7)
RBC # BLD AUTO: 3.5 M/UL (ref 4.1–5.7)
RBC # BLD AUTO: 3.92 M/UL (ref 4.1–5.7)
RBC # BLD AUTO: 4.09 M/UL (ref 4.1–5.7)
RBC # BLD AUTO: 4.48 X10E6/UL (ref 4.14–5.8)
RBC # BLD AUTO: 4.57 M/UL (ref 4.1–5.7)
RBC MORPH BLD: ABNORMAL
RSV RNA SPEC QL NAA+PROBE: NOT DETECTED
RV+EV RNA SPEC QL NAA+PROBE: NOT DETECTED
SALMONELLA SPECIES, DNA: NEGATIVE
SAMPLES BEING HELD,HOLD: NORMAL
SAO2 % BLD: 95 % (ref 92–97)
SERVICE CMNT-IMP: ABNORMAL
SERVICE CMNT-IMP: NORMAL
SHIGA TOXIN PRODUCING, DNA: NEGATIVE
SHIGELLA SP+EIEC IPAH STL QL NAA+PROBE: NEGATIVE
SODIUM SERPL-SCNC: 134 MMOL/L (ref 136–145)
SODIUM SERPL-SCNC: 135 MMOL/L (ref 136–145)
SODIUM SERPL-SCNC: 137 MMOL/L (ref 134–144)
SODIUM SERPL-SCNC: 137 MMOL/L (ref 136–145)
SODIUM SERPL-SCNC: 138 MMOL/L (ref 136–145)
SODIUM SERPL-SCNC: 139 MMOL/L (ref 136–145)
SODIUM SERPL-SCNC: 140 MMOL/L (ref 134–144)
SODIUM SERPL-SCNC: 140 MMOL/L (ref 136–145)
SODIUM SERPL-SCNC: 141 MMOL/L (ref 136–145)
SODIUM SERPL-SCNC: 142 MMOL/L (ref 136–145)
SODIUM SERPL-SCNC: 143 MMOL/L (ref 136–145)
SODIUM SERPL-SCNC: 144 MMOL/L (ref 136–145)
SODIUM SERPL-SCNC: 146 MMOL/L (ref 136–145)
SPECIMEN EXP DATE BLD: NORMAL
SPECIMEN EXP DATE BLD: NORMAL
SPECIMEN TYPE: ABNORMAL
STATUS OF UNIT,%ST: NORMAL
T4 FREE SERPL-MCNC: 1.3 NG/DL (ref 0.8–1.5)
THERAPEUTIC RANGE,PTTT: ABNORMAL SECS (ref 58–77)
TROPONIN I SERPL-MCNC: <0.05 NG/ML
TSH SERPL DL<=0.05 MIU/L-ACNC: 2.3 UIU/ML (ref 0.36–3.74)
TSH SERPL DL<=0.05 MIU/L-ACNC: 2.84 UIU/ML (ref 0.36–3.74)
TSH SERPL DL<=0.05 MIU/L-ACNC: 4.42 UIU/ML (ref 0.36–3.74)
UNIT DIVISION, %UDIV: 0
VENTRICULAR RATE, ECG03: 118 BPM
VENTRICULAR RATE, ECG03: 125 BPM
VENTRICULAR RATE, ECG03: 127 BPM
VIBRIO SPECIES, DNA: NEGATIVE
WBC # BLD AUTO: 10.3 K/UL (ref 4.1–11.1)
WBC # BLD AUTO: 14.1 K/UL (ref 4.1–11.1)
WBC # BLD AUTO: 3 K/UL (ref 4.1–11.1)
WBC # BLD AUTO: 4.1 K/UL (ref 4.1–11.1)
WBC # BLD AUTO: 4.7 K/UL (ref 4.1–11.1)
WBC # BLD AUTO: 5.4 K/UL (ref 4.1–11.1)
WBC # BLD AUTO: 5.6 K/UL (ref 4.1–11.1)
WBC # BLD AUTO: 6.2 K/UL (ref 4.1–11.1)
WBC # BLD AUTO: 6.4 K/UL (ref 4.1–11.1)
WBC # BLD AUTO: 6.7 K/UL (ref 4.1–11.1)
WBC # BLD AUTO: 6.9 K/UL (ref 4.1–11.1)
WBC # BLD AUTO: 7.1 K/UL (ref 4.1–11.1)
WBC # BLD AUTO: 9.2 X10E3/UL (ref 3.4–10.8)
WBC # BLD AUTO: 9.7 K/UL (ref 4.1–11.1)
WBC #/AREA STL HPF: >20 /HPF (ref 0–4)
WBC MORPH BLD: ABNORMAL
WBC MORPH BLD: ABNORMAL
Y. ENTEROCOLITICA, DNA: NEGATIVE

## 2020-01-01 PROCEDURE — 74011000250 HC RX REV CODE- 250: Performed by: FAMILY MEDICINE

## 2020-01-01 PROCEDURE — 94660 CPAP INITIATION&MGMT: CPT

## 2020-01-01 PROCEDURE — 36415 COLL VENOUS BLD VENIPUNCTURE: CPT

## 2020-01-01 PROCEDURE — 82272 OCCULT BLD FECES 1-3 TESTS: CPT

## 2020-01-01 PROCEDURE — 71250 CT THORAX DX C-: CPT

## 2020-01-01 PROCEDURE — 0DBN8ZX EXCISION OF SIGMOID COLON, VIA NATURAL OR ARTIFICIAL OPENING ENDOSCOPIC, DIAGNOSTIC: ICD-10-PCS | Performed by: INTERNAL MEDICINE

## 2020-01-01 PROCEDURE — 94640 AIRWAY INHALATION TREATMENT: CPT

## 2020-01-01 PROCEDURE — 74011250636 HC RX REV CODE- 250/636: Performed by: FAMILY MEDICINE

## 2020-01-01 PROCEDURE — 82962 GLUCOSE BLOOD TEST: CPT

## 2020-01-01 PROCEDURE — 84100 ASSAY OF PHOSPHORUS: CPT

## 2020-01-01 PROCEDURE — 82024 ASSAY OF ACTH: CPT

## 2020-01-01 PROCEDURE — 94664 DEMO&/EVAL PT USE INHALER: CPT

## 2020-01-01 PROCEDURE — 74011000258 HC RX REV CODE- 258: Performed by: FAMILY MEDICINE

## 2020-01-01 PROCEDURE — 74011250636 HC RX REV CODE- 250/636: Performed by: INTERNAL MEDICINE

## 2020-01-01 PROCEDURE — 77030031139 HC SUT VCRL2 J&J -A: Performed by: SURGERY

## 2020-01-01 PROCEDURE — 92610 EVALUATE SWALLOWING FUNCTION: CPT

## 2020-01-01 PROCEDURE — 77030002933 HC SUT MCRYL J&J -A: Performed by: SURGERY

## 2020-01-01 PROCEDURE — 74011250636 HC RX REV CODE- 250/636: Performed by: HOSPITALIST

## 2020-01-01 PROCEDURE — 74011250637 HC RX REV CODE- 250/637: Performed by: FAMILY MEDICINE

## 2020-01-01 PROCEDURE — 85025 COMPLETE CBC W/AUTO DIFF WBC: CPT

## 2020-01-01 PROCEDURE — 74011000258 HC RX REV CODE- 258: Performed by: INTERNAL MEDICINE

## 2020-01-01 PROCEDURE — 74011636637 HC RX REV CODE- 636/637: Performed by: FAMILY MEDICINE

## 2020-01-01 PROCEDURE — 74011250636 HC RX REV CODE- 250/636: Performed by: NURSE PRACTITIONER

## 2020-01-01 PROCEDURE — 74011250637 HC RX REV CODE- 250/637: Performed by: PHYSICAL MEDICINE & REHABILITATION

## 2020-01-01 PROCEDURE — 65660000000 HC RM CCU STEPDOWN

## 2020-01-01 PROCEDURE — 88341 IMHCHEM/IMCYTCHM EA ADD ANTB: CPT

## 2020-01-01 PROCEDURE — 86900 BLOOD TYPING SEROLOGIC ABO: CPT

## 2020-01-01 PROCEDURE — P9016 RBC LEUKOCYTES REDUCED: HCPCS

## 2020-01-01 PROCEDURE — 80053 COMPREHEN METABOLIC PANEL: CPT

## 2020-01-01 PROCEDURE — 84443 ASSAY THYROID STIM HORMONE: CPT

## 2020-01-01 PROCEDURE — 71045 X-RAY EXAM CHEST 1 VIEW: CPT

## 2020-01-01 PROCEDURE — 77010033678 HC OXYGEN DAILY

## 2020-01-01 PROCEDURE — 97530 THERAPEUTIC ACTIVITIES: CPT

## 2020-01-01 PROCEDURE — 82550 ASSAY OF CK (CPK): CPT

## 2020-01-01 PROCEDURE — 77030011640 HC PAD GRND REM COVD -A: Performed by: SURGERY

## 2020-01-01 PROCEDURE — 0100U RESPIRATORY PANEL,PCR,NASOPHARYNGEAL: CPT

## 2020-01-01 PROCEDURE — 0107U C DIFF TOX AG DETCJ IA STOOL: CPT

## 2020-01-01 PROCEDURE — 65660000001 HC RM ICU INTERMED STEPDOWN

## 2020-01-01 PROCEDURE — 96375 TX/PRO/DX INJ NEW DRUG ADDON: CPT

## 2020-01-01 PROCEDURE — 74011250636 HC RX REV CODE- 250/636: Performed by: SURGERY

## 2020-01-01 PROCEDURE — 80048 BASIC METABOLIC PNL TOTAL CA: CPT

## 2020-01-01 PROCEDURE — 94760 N-INVAS EAR/PLS OXIMETRY 1: CPT

## 2020-01-01 PROCEDURE — 77030002916 HC SUT ETHLN J&J -A: Performed by: SURGERY

## 2020-01-01 PROCEDURE — 72158 MRI LUMBAR SPINE W/O & W/DYE: CPT

## 2020-01-01 PROCEDURE — 74011250637 HC RX REV CODE- 250/637: Performed by: INTERNAL MEDICINE

## 2020-01-01 PROCEDURE — 74019 RADEX ABDOMEN 2 VIEWS: CPT

## 2020-01-01 PROCEDURE — 96360 HYDRATION IV INFUSION INIT: CPT

## 2020-01-01 PROCEDURE — 36430 TRANSFUSION BLD/BLD COMPNT: CPT

## 2020-01-01 PROCEDURE — 96374 THER/PROPH/DIAG INJ IV PUSH: CPT

## 2020-01-01 PROCEDURE — 87804 INFLUENZA ASSAY W/OPTIC: CPT

## 2020-01-01 PROCEDURE — 77030019988 HC FCPS ENDOSC DISP BSC -B: Performed by: INTERNAL MEDICINE

## 2020-01-01 PROCEDURE — 96361 HYDRATE IV INFUSION ADD-ON: CPT

## 2020-01-01 PROCEDURE — 5A09357 ASSISTANCE WITH RESPIRATORY VENTILATION, LESS THAN 24 CONSECUTIVE HOURS, CONTINUOUS POSITIVE AIRWAY PRESSURE: ICD-10-PCS | Performed by: FAMILY MEDICINE

## 2020-01-01 PROCEDURE — 30233N1 TRANSFUSION OF NONAUTOLOGOUS RED BLOOD CELLS INTO PERIPHERAL VEIN, PERCUTANEOUS APPROACH: ICD-10-PCS | Performed by: FAMILY MEDICINE

## 2020-01-01 PROCEDURE — 87506 IADNA-DNA/RNA PROBE TQ 6-11: CPT

## 2020-01-01 PROCEDURE — 74011250636 HC RX REV CODE- 250/636: Performed by: EMERGENCY MEDICINE

## 2020-01-01 PROCEDURE — 96413 CHEMO IV INFUSION 1 HR: CPT

## 2020-01-01 PROCEDURE — 83605 ASSAY OF LACTIC ACID: CPT

## 2020-01-01 PROCEDURE — 74011000250 HC RX REV CODE- 250: Performed by: SURGERY

## 2020-01-01 PROCEDURE — 85610 PROTHROMBIN TIME: CPT

## 2020-01-01 PROCEDURE — 36600 WITHDRAWAL OF ARTERIAL BLOOD: CPT

## 2020-01-01 PROCEDURE — 84439 ASSAY OF FREE THYROXINE: CPT

## 2020-01-01 PROCEDURE — 88307 TISSUE EXAM BY PATHOLOGIST: CPT

## 2020-01-01 PROCEDURE — 76210000021 HC REC RM PH II 0.5 TO 1 HR: Performed by: SURGERY

## 2020-01-01 PROCEDURE — 85730 THROMBOPLASTIN TIME PARTIAL: CPT

## 2020-01-01 PROCEDURE — 77030018836 HC SOL IRR NACL ICUM -A: Performed by: SURGERY

## 2020-01-01 PROCEDURE — 85027 COMPLETE CBC AUTOMATED: CPT

## 2020-01-01 PROCEDURE — 74011250637 HC RX REV CODE- 250/637: Performed by: NURSE PRACTITIONER

## 2020-01-01 PROCEDURE — 97116 GAIT TRAINING THERAPY: CPT

## 2020-01-01 PROCEDURE — 85018 HEMOGLOBIN: CPT

## 2020-01-01 PROCEDURE — 82533 TOTAL CORTISOL: CPT

## 2020-01-01 PROCEDURE — 0DBP8ZX EXCISION OF RECTUM, VIA NATURAL OR ARTIFICIAL OPENING ENDOSCOPIC, DIAGNOSTIC: ICD-10-PCS | Performed by: INTERNAL MEDICINE

## 2020-01-01 PROCEDURE — 84484 ASSAY OF TROPONIN QUANT: CPT

## 2020-01-01 PROCEDURE — 77030020143 HC AIRWY LARYN INTUB CGAS -A: Performed by: ANESTHESIOLOGY

## 2020-01-01 PROCEDURE — 74011250636 HC RX REV CODE- 250/636: Performed by: ANESTHESIOLOGY

## 2020-01-01 PROCEDURE — 74018 RADEX ABDOMEN 1 VIEW: CPT

## 2020-01-01 PROCEDURE — 93005 ELECTROCARDIOGRAM TRACING: CPT

## 2020-01-01 PROCEDURE — 74011000250 HC RX REV CODE- 250: Performed by: PHYSICAL MEDICINE & REHABILITATION

## 2020-01-01 PROCEDURE — 88305 TISSUE EXAM BY PATHOLOGIST: CPT

## 2020-01-01 PROCEDURE — 71046 X-RAY EXAM CHEST 2 VIEWS: CPT

## 2020-01-01 PROCEDURE — 83735 ASSAY OF MAGNESIUM: CPT

## 2020-01-01 PROCEDURE — 96417 CHEMO IV INFUS EACH ADDL SEQ: CPT

## 2020-01-01 PROCEDURE — 99285 EMERGENCY DEPT VISIT HI MDM: CPT

## 2020-01-01 PROCEDURE — 89055 LEUKOCYTE ASSESSMENT FECAL: CPT

## 2020-01-01 PROCEDURE — 76040000019: Performed by: INTERNAL MEDICINE

## 2020-01-01 PROCEDURE — 76060000032 HC ANESTHESIA 0.5 TO 1 HR: Performed by: SURGERY

## 2020-01-01 PROCEDURE — 77030040922 HC BLNKT HYPOTHRM STRY -A

## 2020-01-01 PROCEDURE — 76010000138 HC OR TIME 0.5 TO 1 HR: Performed by: SURGERY

## 2020-01-01 PROCEDURE — A9575 INJ GADOTERATE MEGLUMI 0.1ML: HCPCS | Performed by: NURSE PRACTITIONER

## 2020-01-01 PROCEDURE — 74011250636 HC RX REV CODE- 250/636: Performed by: NURSE ANESTHETIST, CERTIFIED REGISTERED

## 2020-01-01 PROCEDURE — 87040 BLOOD CULTURE FOR BACTERIA: CPT

## 2020-01-01 PROCEDURE — 74176 CT ABD & PELVIS W/O CONTRAST: CPT

## 2020-01-01 PROCEDURE — 76210000006 HC OR PH I REC 0.5 TO 1 HR: Performed by: SURGERY

## 2020-01-01 PROCEDURE — 83036 HEMOGLOBIN GLYCOSYLATED A1C: CPT

## 2020-01-01 PROCEDURE — 77030040831 HC BAG URINE DRNG MDII -A

## 2020-01-01 PROCEDURE — 96415 CHEMO IV INFUSION ADDL HR: CPT

## 2020-01-01 PROCEDURE — 82803 BLOOD GASES ANY COMBINATION: CPT

## 2020-01-01 PROCEDURE — 86920 COMPATIBILITY TEST SPIN: CPT

## 2020-01-01 PROCEDURE — 74011000250 HC RX REV CODE- 250: Performed by: NURSE ANESTHETIST, CERTIFIED REGISTERED

## 2020-01-01 PROCEDURE — 88342 IMHCHEM/IMCYTCHM 1ST ANTB: CPT

## 2020-01-01 PROCEDURE — 74011250637 HC RX REV CODE- 250/637: Performed by: SURGERY

## 2020-01-01 PROCEDURE — 97161 PT EVAL LOW COMPLEX 20 MIN: CPT

## 2020-01-01 PROCEDURE — 77030040361 HC SLV COMPR DVT MDII -B

## 2020-01-01 RX ORDER — FLUMAZENIL 0.1 MG/ML
0.2 INJECTION INTRAVENOUS
Status: DISCONTINUED | OUTPATIENT
Start: 2020-01-01 | End: 2020-01-01 | Stop reason: HOSPADM

## 2020-01-01 RX ORDER — ONDANSETRON 2 MG/ML
8 INJECTION INTRAMUSCULAR; INTRAVENOUS AS NEEDED
Status: CANCELLED | OUTPATIENT
Start: 2020-01-01

## 2020-01-01 RX ORDER — ACETAMINOPHEN 325 MG/1
1000 TABLET ORAL ONCE
Status: CANCELLED | OUTPATIENT
Start: 2020-01-01 | End: 2020-01-01

## 2020-01-01 RX ORDER — HYDROMORPHONE HYDROCHLORIDE 1 MG/ML
0.2 INJECTION, SOLUTION INTRAMUSCULAR; INTRAVENOUS; SUBCUTANEOUS
Status: DISCONTINUED | OUTPATIENT
Start: 2020-01-01 | End: 2020-01-01 | Stop reason: HOSPADM

## 2020-01-01 RX ORDER — EPINEPHRINE 0.1 MG/ML
1 INJECTION INTRACARDIAC; INTRAVENOUS
Status: DISCONTINUED | OUTPATIENT
Start: 2020-01-01 | End: 2020-01-01 | Stop reason: HOSPADM

## 2020-01-01 RX ORDER — FENTANYL CITRATE 50 UG/ML
INJECTION, SOLUTION INTRAMUSCULAR; INTRAVENOUS AS NEEDED
Status: DISCONTINUED | OUTPATIENT
Start: 2020-01-01 | End: 2020-01-01 | Stop reason: HOSPADM

## 2020-01-01 RX ORDER — SODIUM CHLORIDE 9 MG/ML
10 INJECTION INTRAMUSCULAR; INTRAVENOUS; SUBCUTANEOUS AS NEEDED
Status: CANCELLED | OUTPATIENT
Start: 2020-01-01

## 2020-01-01 RX ORDER — SODIUM CHLORIDE 450 MG/100ML
75 INJECTION, SOLUTION INTRAVENOUS CONTINUOUS
Status: DISCONTINUED | OUTPATIENT
Start: 2020-01-01 | End: 2020-01-01 | Stop reason: HOSPADM

## 2020-01-01 RX ORDER — SODIUM CHLORIDE 9 MG/ML
50 INJECTION, SOLUTION INTRAVENOUS CONTINUOUS
Status: DISPENSED | OUTPATIENT
Start: 2020-01-01 | End: 2020-01-01

## 2020-01-01 RX ORDER — ALBUTEROL SULFATE 0.83 MG/ML
2.5 SOLUTION RESPIRATORY (INHALATION) AS NEEDED
Status: CANCELLED
Start: 2020-01-01

## 2020-01-01 RX ORDER — PROPOFOL 10 MG/ML
INJECTION, EMULSION INTRAVENOUS AS NEEDED
Status: DISCONTINUED | OUTPATIENT
Start: 2020-01-01 | End: 2020-01-01 | Stop reason: HOSPADM

## 2020-01-01 RX ORDER — MIDAZOLAM HYDROCHLORIDE 1 MG/ML
INJECTION, SOLUTION INTRAMUSCULAR; INTRAVENOUS AS NEEDED
Status: DISCONTINUED | OUTPATIENT
Start: 2020-01-01 | End: 2020-01-01 | Stop reason: HOSPADM

## 2020-01-01 RX ORDER — METOPROLOL SUCCINATE 50 MG/1
75 TABLET, EXTENDED RELEASE ORAL DAILY
Status: DISCONTINUED | OUTPATIENT
Start: 2020-01-01 | End: 2020-01-01

## 2020-01-01 RX ORDER — MAGNESIUM SULFATE 100 %
4 CRYSTALS MISCELLANEOUS AS NEEDED
Status: DISCONTINUED | OUTPATIENT
Start: 2020-01-01 | End: 2020-01-01 | Stop reason: HOSPADM

## 2020-01-01 RX ORDER — EPINEPHRINE 1 MG/ML
0.3 INJECTION, SOLUTION, CONCENTRATE INTRAVENOUS AS NEEDED
Status: CANCELLED | OUTPATIENT
Start: 2020-01-01

## 2020-01-01 RX ORDER — ACETAMINOPHEN 500 MG
1000 TABLET ORAL ONCE
Status: COMPLETED | OUTPATIENT
Start: 2020-01-01 | End: 2020-01-01

## 2020-01-01 RX ORDER — DIPHENHYDRAMINE HYDROCHLORIDE 50 MG/ML
50 INJECTION, SOLUTION INTRAMUSCULAR; INTRAVENOUS AS NEEDED
Status: CANCELLED
Start: 2020-01-01

## 2020-01-01 RX ORDER — LIDOCAINE 4 G/100G
1 PATCH TOPICAL EVERY 24 HOURS
Status: DISCONTINUED | OUTPATIENT
Start: 2020-01-01 | End: 2020-01-01 | Stop reason: HOSPADM

## 2020-01-01 RX ORDER — MORPHINE SULFATE 30 MG/1
30 TABLET, FILM COATED, EXTENDED RELEASE ORAL EVERY 12 HOURS
Status: DISCONTINUED | OUTPATIENT
Start: 2020-01-01 | End: 2020-01-01 | Stop reason: HOSPADM

## 2020-01-01 RX ORDER — SODIUM CHLORIDE 0.9 % (FLUSH) 0.9 %
5-40 SYRINGE (ML) INJECTION AS NEEDED
Status: DISCONTINUED | OUTPATIENT
Start: 2020-01-01 | End: 2020-01-01 | Stop reason: HOSPADM

## 2020-01-01 RX ORDER — MORPHINE SULFATE 2 MG/ML
1 INJECTION, SOLUTION INTRAMUSCULAR; INTRAVENOUS
Status: DISCONTINUED | OUTPATIENT
Start: 2020-01-01 | End: 2020-01-01

## 2020-01-01 RX ORDER — SODIUM CHLORIDE 0.9 % (FLUSH) 0.9 %
10 SYRINGE (ML) INJECTION AS NEEDED
Status: DISPENSED | OUTPATIENT
Start: 2020-01-01 | End: 2020-01-01

## 2020-01-01 RX ORDER — SODIUM CHLORIDE 0.9 % (FLUSH) 0.9 %
10 SYRINGE (ML) INJECTION AS NEEDED
Status: CANCELLED
Start: 2020-01-01

## 2020-01-01 RX ORDER — FENTANYL CITRATE 50 UG/ML
50 INJECTION, SOLUTION INTRAMUSCULAR; INTRAVENOUS AS NEEDED
Status: DISCONTINUED | OUTPATIENT
Start: 2020-01-01 | End: 2020-01-01 | Stop reason: HOSPADM

## 2020-01-01 RX ORDER — FENTANYL CITRATE 50 UG/ML
25-200 INJECTION, SOLUTION INTRAMUSCULAR; INTRAVENOUS
Status: DISCONTINUED | OUTPATIENT
Start: 2020-01-01 | End: 2020-01-01 | Stop reason: HOSPADM

## 2020-01-01 RX ORDER — SODIUM CHLORIDE 0.9 % (FLUSH) 0.9 %
5-40 SYRINGE (ML) INJECTION EVERY 8 HOURS
Status: DISCONTINUED | OUTPATIENT
Start: 2020-01-01 | End: 2020-01-01 | Stop reason: HOSPADM

## 2020-01-01 RX ORDER — MIDAZOLAM HYDROCHLORIDE 1 MG/ML
.25-1 INJECTION, SOLUTION INTRAMUSCULAR; INTRAVENOUS
Status: DISCONTINUED | OUTPATIENT
Start: 2020-01-01 | End: 2020-01-01 | Stop reason: HOSPADM

## 2020-01-01 RX ORDER — MIDAZOLAM HYDROCHLORIDE 1 MG/ML
1 INJECTION, SOLUTION INTRAMUSCULAR; INTRAVENOUS AS NEEDED
Status: DISCONTINUED | OUTPATIENT
Start: 2020-01-01 | End: 2020-01-01 | Stop reason: HOSPADM

## 2020-01-01 RX ORDER — CHOLESTYRAMINE 4 G/9G
1 POWDER, FOR SUSPENSION ORAL
Qty: 30 PACKET | Refills: 0 | Status: ON HOLD | OUTPATIENT
Start: 2020-01-01 | End: 2020-01-01

## 2020-01-01 RX ORDER — OXYCODONE AND ACETAMINOPHEN 5; 325 MG/1; MG/1
1 TABLET ORAL
Status: DISCONTINUED | OUTPATIENT
Start: 2020-01-01 | End: 2020-01-01

## 2020-01-01 RX ORDER — CEFAZOLIN SODIUM/WATER 2 G/20 ML
2 SYRINGE (ML) INTRAVENOUS
Status: COMPLETED | OUTPATIENT
Start: 2020-01-01 | End: 2020-01-01

## 2020-01-01 RX ORDER — ACETAMINOPHEN 325 MG/1
650 TABLET ORAL AS NEEDED
Status: CANCELLED
Start: 2020-01-01

## 2020-01-01 RX ORDER — HEPARIN 100 UNIT/ML
300-500 SYRINGE INTRAVENOUS AS NEEDED
Status: CANCELLED
Start: 2020-01-01

## 2020-01-01 RX ORDER — SODIUM CHLORIDE 9 MG/ML
25 INJECTION, SOLUTION INTRAVENOUS CONTINUOUS
Status: DISPENSED | OUTPATIENT
Start: 2020-01-01 | End: 2020-01-01

## 2020-01-01 RX ORDER — IPRATROPIUM BROMIDE AND ALBUTEROL SULFATE 2.5; .5 MG/3ML; MG/3ML
3 SOLUTION RESPIRATORY (INHALATION)
Status: DISCONTINUED | OUTPATIENT
Start: 2020-01-01 | End: 2020-01-01 | Stop reason: HOSPADM

## 2020-01-01 RX ORDER — SODIUM CHLORIDE 9 MG/ML
25 INJECTION, SOLUTION INTRAVENOUS CONTINUOUS
Status: CANCELLED | OUTPATIENT
Start: 2020-01-01

## 2020-01-01 RX ORDER — SODIUM CHLORIDE 9 MG/ML
1000 INJECTION, SOLUTION INTRAVENOUS CONTINUOUS
Status: CANCELLED
Start: 2020-01-01

## 2020-01-01 RX ORDER — MORPHINE SULFATE 4 MG/ML
INJECTION INTRAVENOUS AS NEEDED
Status: DISCONTINUED | OUTPATIENT
Start: 2020-01-01 | End: 2020-01-01 | Stop reason: HOSPADM

## 2020-01-01 RX ORDER — ALBUTEROL SULFATE 0.83 MG/ML
2.5 SOLUTION RESPIRATORY (INHALATION)
Status: DISCONTINUED | OUTPATIENT
Start: 2020-01-01 | End: 2020-01-01

## 2020-01-01 RX ORDER — METOCLOPRAMIDE HYDROCHLORIDE 5 MG/ML
5 INJECTION INTRAMUSCULAR; INTRAVENOUS EVERY 6 HOURS
Status: DISCONTINUED | OUTPATIENT
Start: 2020-01-01 | End: 2020-01-01 | Stop reason: HOSPADM

## 2020-01-01 RX ORDER — DIPHENHYDRAMINE HYDROCHLORIDE 50 MG/ML
50 INJECTION, SOLUTION INTRAMUSCULAR; INTRAVENOUS ONCE
Status: DISCONTINUED | OUTPATIENT
Start: 2020-01-01 | End: 2020-01-01 | Stop reason: HOSPADM

## 2020-01-01 RX ORDER — SODIUM CHLORIDE 9 MG/ML
25 INJECTION, SOLUTION INTRAVENOUS CONTINUOUS
Status: DISCONTINUED | OUTPATIENT
Start: 2020-01-01 | End: 2020-01-01 | Stop reason: HOSPADM

## 2020-01-01 RX ORDER — TEMAZEPAM 30 MG/1
30 CAPSULE ORAL
Qty: 30 CAP | Refills: 0 | Status: SHIPPED | OUTPATIENT
Start: 2020-01-01 | End: 2020-04-26

## 2020-01-01 RX ORDER — TRIAMCINOLONE ACETONIDE 1 MG/G
OINTMENT TOPICAL
Qty: 30 G | Refills: 0 | OUTPATIENT
Start: 2020-01-01

## 2020-01-01 RX ORDER — SODIUM CHLORIDE, SODIUM LACTATE, POTASSIUM CHLORIDE, CALCIUM CHLORIDE 600; 310; 30; 20 MG/100ML; MG/100ML; MG/100ML; MG/100ML
25 INJECTION, SOLUTION INTRAVENOUS CONTINUOUS
Status: DISCONTINUED | OUTPATIENT
Start: 2020-01-01 | End: 2020-01-01 | Stop reason: HOSPADM

## 2020-01-01 RX ORDER — PREDNISONE 20 MG/1
60 TABLET ORAL
Qty: 42 TAB | Refills: 0 | Status: SHIPPED | OUTPATIENT
Start: 2020-01-01 | End: 2020-01-01 | Stop reason: CLARIF

## 2020-01-01 RX ORDER — NALOXONE HYDROCHLORIDE 0.4 MG/ML
0.4 INJECTION, SOLUTION INTRAMUSCULAR; INTRAVENOUS; SUBCUTANEOUS
Status: DISCONTINUED | OUTPATIENT
Start: 2020-01-01 | End: 2020-01-01 | Stop reason: HOSPADM

## 2020-01-01 RX ORDER — METOPROLOL SUCCINATE 50 MG/1
50 TABLET, EXTENDED RELEASE ORAL DAILY
COMMUNITY

## 2020-01-01 RX ORDER — ACETAMINOPHEN 325 MG/1
650 TABLET ORAL ONCE
Status: CANCELLED
Start: 2020-01-01

## 2020-01-01 RX ORDER — DIPHENHYDRAMINE HYDROCHLORIDE 50 MG/ML
12.5 INJECTION, SOLUTION INTRAMUSCULAR; INTRAVENOUS
Status: DISCONTINUED | OUTPATIENT
Start: 2020-01-01 | End: 2020-01-01 | Stop reason: HOSPADM

## 2020-01-01 RX ORDER — ATROPINE SULFATE 0.1 MG/ML
0.5 INJECTION INTRAVENOUS
Status: DISCONTINUED | OUTPATIENT
Start: 2020-01-01 | End: 2020-01-01 | Stop reason: HOSPADM

## 2020-01-01 RX ORDER — SODIUM BICARBONATE 1 MEQ/ML
100 SYRINGE (ML) INTRAVENOUS
Status: COMPLETED | OUTPATIENT
Start: 2020-01-01 | End: 2020-01-01

## 2020-01-01 RX ORDER — PRIMIDONE 50 MG/1
TABLET ORAL
Qty: 81 TAB | Refills: 1 | Status: ON HOLD | OUTPATIENT
Start: 2020-01-01 | End: 2020-01-01

## 2020-01-01 RX ORDER — ONDANSETRON 2 MG/ML
4 INJECTION INTRAMUSCULAR; INTRAVENOUS
Status: DISCONTINUED | OUTPATIENT
Start: 2020-01-01 | End: 2020-01-01 | Stop reason: HOSPADM

## 2020-01-01 RX ORDER — MORPHINE SULFATE 20 MG/ML
10 SOLUTION ORAL
Status: DISCONTINUED | OUTPATIENT
Start: 2020-01-01 | End: 2020-01-01 | Stop reason: HOSPADM

## 2020-01-01 RX ORDER — LIDOCAINE HYDROCHLORIDE 10 MG/ML
0.1 INJECTION, SOLUTION EPIDURAL; INFILTRATION; INTRACAUDAL; PERINEURAL AS NEEDED
Status: DISCONTINUED | OUTPATIENT
Start: 2020-01-01 | End: 2020-01-01 | Stop reason: HOSPADM

## 2020-01-01 RX ORDER — LEVOTHYROXINE SODIUM 75 UG/1
75 TABLET ORAL
Qty: 30 TAB | Refills: 1 | Status: SHIPPED | OUTPATIENT
Start: 2020-01-01 | End: 2020-01-01

## 2020-01-01 RX ORDER — HYDROCORTISONE SODIUM SUCCINATE 100 MG/2ML
100 INJECTION, POWDER, FOR SOLUTION INTRAMUSCULAR; INTRAVENOUS AS NEEDED
Status: CANCELLED | OUTPATIENT
Start: 2020-01-01

## 2020-01-01 RX ORDER — TRIAMCINOLONE ACETONIDE 1 MG/G
OINTMENT TOPICAL
COMMUNITY

## 2020-01-01 RX ORDER — PREDNISONE 20 MG/1
60 TABLET ORAL DAILY
Qty: 30 TAB | Refills: 0 | Status: SHIPPED | OUTPATIENT
Start: 2020-01-01 | End: 2020-01-01

## 2020-01-01 RX ORDER — MIDAZOLAM HYDROCHLORIDE 1 MG/ML
0.5 INJECTION, SOLUTION INTRAMUSCULAR; INTRAVENOUS
Status: DISCONTINUED | OUTPATIENT
Start: 2020-01-01 | End: 2020-01-01 | Stop reason: HOSPADM

## 2020-01-01 RX ORDER — DIPHENHYDRAMINE HYDROCHLORIDE 50 MG/ML
25 INJECTION, SOLUTION INTRAMUSCULAR; INTRAVENOUS AS NEEDED
Status: CANCELLED
Start: 2020-01-01

## 2020-01-01 RX ORDER — MORPHINE SULFATE 4 MG/ML
1 INJECTION INTRAVENOUS ONCE
Status: ACTIVE | OUTPATIENT
Start: 2020-01-01 | End: 2020-01-01

## 2020-01-01 RX ORDER — SODIUM CHLORIDE 9 MG/ML
250 INJECTION, SOLUTION INTRAVENOUS AS NEEDED
Status: DISCONTINUED | OUTPATIENT
Start: 2020-01-01 | End: 2020-01-01

## 2020-01-01 RX ORDER — SIMETHICONE 80 MG
80 TABLET,CHEWABLE ORAL
Status: DISCONTINUED | OUTPATIENT
Start: 2020-01-01 | End: 2020-01-01 | Stop reason: HOSPADM

## 2020-01-01 RX ORDER — ONDANSETRON 2 MG/ML
4 INJECTION INTRAMUSCULAR; INTRAVENOUS AS NEEDED
Status: DISCONTINUED | OUTPATIENT
Start: 2020-01-01 | End: 2020-01-01 | Stop reason: HOSPADM

## 2020-01-01 RX ORDER — SODIUM BICARBONATE 650 MG/1
650 TABLET ORAL 3 TIMES DAILY
Status: DISCONTINUED | OUTPATIENT
Start: 2020-01-01 | End: 2020-01-01

## 2020-01-01 RX ORDER — LIDOCAINE HYDROCHLORIDE 20 MG/ML
INJECTION, SOLUTION EPIDURAL; INFILTRATION; INTRACAUDAL; PERINEURAL AS NEEDED
Status: DISCONTINUED | OUTPATIENT
Start: 2020-01-01 | End: 2020-01-01 | Stop reason: HOSPADM

## 2020-01-01 RX ORDER — DEXAMETHASONE SODIUM PHOSPHATE 4 MG/ML
INJECTION, SOLUTION INTRA-ARTICULAR; INTRALESIONAL; INTRAMUSCULAR; INTRAVENOUS; SOFT TISSUE AS NEEDED
Status: DISCONTINUED | OUTPATIENT
Start: 2020-01-01 | End: 2020-01-01 | Stop reason: HOSPADM

## 2020-01-01 RX ORDER — ACETAMINOPHEN 325 MG/1
650 TABLET ORAL ONCE
Status: DISCONTINUED | OUTPATIENT
Start: 2020-01-01 | End: 2020-01-01 | Stop reason: ALTCHOICE

## 2020-01-01 RX ORDER — ALBUTEROL SULFATE 0.83 MG/ML
2.5 SOLUTION RESPIRATORY (INHALATION)
Status: DISCONTINUED | OUTPATIENT
Start: 2020-01-01 | End: 2020-01-01 | Stop reason: HOSPADM

## 2020-01-01 RX ORDER — DEXTROSE MONOHYDRATE AND SODIUM CHLORIDE 5; .9 G/100ML; G/100ML
125 INJECTION, SOLUTION INTRAVENOUS CONTINUOUS
Status: DISCONTINUED | OUTPATIENT
Start: 2020-01-01 | End: 2020-01-01

## 2020-01-01 RX ORDER — SODIUM CHLORIDE, SODIUM LACTATE, POTASSIUM CHLORIDE, CALCIUM CHLORIDE 600; 310; 30; 20 MG/100ML; MG/100ML; MG/100ML; MG/100ML
100 INJECTION, SOLUTION INTRAVENOUS CONTINUOUS
Status: DISCONTINUED | OUTPATIENT
Start: 2020-01-01 | End: 2020-01-01

## 2020-01-01 RX ORDER — SODIUM CHLORIDE, SODIUM LACTATE, POTASSIUM CHLORIDE, CALCIUM CHLORIDE 600; 310; 30; 20 MG/100ML; MG/100ML; MG/100ML; MG/100ML
125 INJECTION, SOLUTION INTRAVENOUS CONTINUOUS
Status: DISCONTINUED | OUTPATIENT
Start: 2020-01-01 | End: 2020-01-01 | Stop reason: HOSPADM

## 2020-01-01 RX ORDER — BUPIVACAINE HYDROCHLORIDE 2.5 MG/ML
INJECTION, SOLUTION EPIDURAL; INFILTRATION; INTRACAUDAL AS NEEDED
Status: DISCONTINUED | OUTPATIENT
Start: 2020-01-01 | End: 2020-01-01 | Stop reason: HOSPADM

## 2020-01-01 RX ORDER — SODIUM CHLORIDE 9 MG/ML
1000 INJECTION, SOLUTION INTRAVENOUS ONCE
Status: COMPLETED | OUTPATIENT
Start: 2020-01-01 | End: 2020-01-01

## 2020-01-01 RX ORDER — METOPROLOL SUCCINATE 50 MG/1
75 TABLET, EXTENDED RELEASE ORAL DAILY
Qty: 135 TAB | Refills: 0 | Status: ON HOLD | OUTPATIENT
Start: 2020-01-01 | End: 2020-01-01

## 2020-01-01 RX ORDER — INSULIN LISPRO 100 [IU]/ML
INJECTION, SOLUTION INTRAVENOUS; SUBCUTANEOUS
Status: DISCONTINUED | OUTPATIENT
Start: 2020-01-01 | End: 2020-01-01 | Stop reason: HOSPADM

## 2020-01-01 RX ORDER — LEVOFLOXACIN 750 MG/1
750 TABLET ORAL DAILY
Qty: 7 TAB | Refills: 0 | Status: ON HOLD | OUTPATIENT
Start: 2020-01-01 | End: 2020-01-01

## 2020-01-01 RX ORDER — ONDANSETRON 2 MG/ML
INJECTION INTRAMUSCULAR; INTRAVENOUS AS NEEDED
Status: DISCONTINUED | OUTPATIENT
Start: 2020-01-01 | End: 2020-01-01 | Stop reason: HOSPADM

## 2020-01-01 RX ORDER — TEMAZEPAM 30 MG/1
30 CAPSULE ORAL
Status: DISCONTINUED | OUTPATIENT
Start: 2020-01-01 | End: 2020-01-01 | Stop reason: HOSPADM

## 2020-01-01 RX ORDER — METOPROLOL SUCCINATE 50 MG/1
50 TABLET, EXTENDED RELEASE ORAL DAILY
Status: DISCONTINUED | OUTPATIENT
Start: 2020-01-01 | End: 2020-01-01 | Stop reason: HOSPADM

## 2020-01-01 RX ORDER — LEVOTHYROXINE SODIUM 75 UG/1
TABLET ORAL
Qty: 90 TAB | Refills: 0 | Status: SHIPPED | OUTPATIENT
Start: 2020-01-01

## 2020-01-01 RX ORDER — OXYCODONE HYDROCHLORIDE 5 MG/1
5 TABLET ORAL
Qty: 5 TAB | Refills: 0 | Status: SHIPPED | OUTPATIENT
Start: 2020-01-01 | End: 2020-01-01

## 2020-01-01 RX ORDER — MORPHINE SULFATE 2 MG/ML
2 INJECTION, SOLUTION INTRAMUSCULAR; INTRAVENOUS
Status: DISCONTINUED | OUTPATIENT
Start: 2020-01-01 | End: 2020-01-01 | Stop reason: HOSPADM

## 2020-01-01 RX ORDER — BUPIVACAINE HYDROCHLORIDE 2.5 MG/ML
30 INJECTION, SOLUTION EPIDURAL; INFILTRATION; INTRACAUDAL ONCE
Status: CANCELLED | OUTPATIENT
Start: 2020-01-01 | End: 2020-01-01

## 2020-01-01 RX ORDER — DIPHENHYDRAMINE HYDROCHLORIDE 50 MG/ML
12.5 INJECTION, SOLUTION INTRAMUSCULAR; INTRAVENOUS AS NEEDED
Status: DISCONTINUED | OUTPATIENT
Start: 2020-01-01 | End: 2020-01-01 | Stop reason: HOSPADM

## 2020-01-01 RX ORDER — GADOTERATE MEGLUMINE 376.9 MG/ML
17 INJECTION INTRAVENOUS ONCE
Status: COMPLETED | OUTPATIENT
Start: 2020-01-01 | End: 2020-01-01

## 2020-01-01 RX ORDER — MORPHINE SULFATE 10 MG/ML
2 INJECTION, SOLUTION INTRAMUSCULAR; INTRAVENOUS
Status: DISCONTINUED | OUTPATIENT
Start: 2020-01-01 | End: 2020-01-01 | Stop reason: HOSPADM

## 2020-01-01 RX ORDER — CHOLESTYRAMINE 4 G/4.8G
1 POWDER, FOR SUSPENSION ORAL
Status: DISCONTINUED | OUTPATIENT
Start: 2020-01-01 | End: 2020-01-01

## 2020-01-01 RX ORDER — OXYCODONE HYDROCHLORIDE 5 MG/1
5 TABLET ORAL AS NEEDED
Status: DISCONTINUED | OUTPATIENT
Start: 2020-01-01 | End: 2020-01-01 | Stop reason: HOSPADM

## 2020-01-01 RX ORDER — PRIMIDONE 50 MG/1
TABLET ORAL
Qty: 30 TAB | Refills: 1 | Status: SHIPPED | OUTPATIENT
Start: 2020-01-01 | End: 2020-01-01

## 2020-01-01 RX ORDER — ACETAMINOPHEN 325 MG/1
650 TABLET ORAL
Status: DISCONTINUED | OUTPATIENT
Start: 2020-01-01 | End: 2020-01-01 | Stop reason: HOSPADM

## 2020-01-01 RX ORDER — VANCOMYCIN HYDROCHLORIDE 250 MG/5ML
125 POWDER, FOR SOLUTION ORAL EVERY 6 HOURS
Status: DISCONTINUED | OUTPATIENT
Start: 2020-01-01 | End: 2020-01-01

## 2020-01-01 RX ORDER — ACETAMINOPHEN 325 MG/1
650 TABLET ORAL ONCE
Status: COMPLETED | OUTPATIENT
Start: 2020-01-01 | End: 2020-01-01

## 2020-01-01 RX ORDER — CHOLESTYRAMINE 4 G/9G
1 POWDER, FOR SUSPENSION ORAL
COMMUNITY
End: 2020-01-01

## 2020-01-01 RX ORDER — PRIMIDONE 50 MG/1
50 TABLET ORAL
COMMUNITY

## 2020-01-01 RX ORDER — LEVOTHYROXINE SODIUM 75 UG/1
75 TABLET ORAL
Status: DISCONTINUED | OUTPATIENT
Start: 2020-01-01 | End: 2020-01-01 | Stop reason: HOSPADM

## 2020-01-01 RX ORDER — ATORVASTATIN CALCIUM 20 MG/1
20 TABLET, FILM COATED ORAL
Status: DISCONTINUED | OUTPATIENT
Start: 2020-01-01 | End: 2020-01-01 | Stop reason: HOSPADM

## 2020-01-01 RX ORDER — PRIMIDONE 50 MG/1
50 TABLET ORAL
Status: DISCONTINUED | OUTPATIENT
Start: 2020-01-01 | End: 2020-01-01 | Stop reason: HOSPADM

## 2020-01-01 RX ORDER — ALBUTEROL SULFATE 90 UG/1
2 AEROSOL, METERED RESPIRATORY (INHALATION) 3 TIMES DAILY
Status: DISCONTINUED | OUTPATIENT
Start: 2020-01-01 | End: 2020-01-01 | Stop reason: CLARIF

## 2020-01-01 RX ORDER — DEXTROMETHORPHAN/PSEUDOEPHED 2.5-7.5/.8
1.2 DROPS ORAL
Status: DISCONTINUED | OUTPATIENT
Start: 2020-01-01 | End: 2020-01-01 | Stop reason: HOSPADM

## 2020-01-01 RX ORDER — TAMSULOSIN HYDROCHLORIDE 0.4 MG/1
0.4 CAPSULE ORAL DAILY
Status: DISCONTINUED | OUTPATIENT
Start: 2020-01-01 | End: 2020-01-01 | Stop reason: HOSPADM

## 2020-01-01 RX ORDER — TRIAMCINOLONE ACETONIDE 1 MG/G
OINTMENT TOPICAL 2 TIMES DAILY
Qty: 30 G | Refills: 0 | Status: ON HOLD | OUTPATIENT
Start: 2020-01-01 | End: 2020-01-01

## 2020-01-01 RX ORDER — MORPHINE SULFATE 30 MG/1
30 TABLET, FILM COATED, EXTENDED RELEASE ORAL EVERY 12 HOURS
Qty: 14 TAB | Refills: 0 | Status: SHIPPED | OUTPATIENT
Start: 2020-01-01 | End: 2020-01-01

## 2020-01-01 RX ORDER — FENTANYL CITRATE 50 UG/ML
25 INJECTION, SOLUTION INTRAMUSCULAR; INTRAVENOUS
Status: DISCONTINUED | OUTPATIENT
Start: 2020-01-01 | End: 2020-01-01 | Stop reason: HOSPADM

## 2020-01-01 RX ADMIN — INSULIN LISPRO 2 UNITS: 100 INJECTION, SOLUTION INTRAVENOUS; SUBCUTANEOUS at 11:55

## 2020-01-01 RX ADMIN — TAMSULOSIN HYDROCHLORIDE 0.4 MG: 0.4 CAPSULE ORAL at 08:53

## 2020-01-01 RX ADMIN — PRIMIDONE 50 MG: 50 TABLET ORAL at 22:31

## 2020-01-01 RX ADMIN — ALBUTEROL SULFATE 2.5 MG: 2.5 SOLUTION RESPIRATORY (INHALATION) at 14:41

## 2020-01-01 RX ADMIN — METOPROLOL SUCCINATE 50 MG: 50 TABLET, EXTENDED RELEASE ORAL at 10:52

## 2020-01-01 RX ADMIN — ALBUTEROL SULFATE 2.5 MG: 2.5 SOLUTION RESPIRATORY (INHALATION) at 08:00

## 2020-01-01 RX ADMIN — SODIUM CHLORIDE 480 MG: 9 INJECTION, SOLUTION INTRAVENOUS at 15:52

## 2020-01-01 RX ADMIN — ALBUTEROL SULFATE 2.5 MG: 2.5 SOLUTION RESPIRATORY (INHALATION) at 22:06

## 2020-01-01 RX ADMIN — PROPOFOL 100 MG: 10 INJECTION, EMULSION INTRAVENOUS at 13:43

## 2020-01-01 RX ADMIN — Medication 10 ML: at 21:14

## 2020-01-01 RX ADMIN — ALBUTEROL SULFATE 2.5 MG: 2.5 SOLUTION RESPIRATORY (INHALATION) at 20:52

## 2020-01-01 RX ADMIN — ATORVASTATIN CALCIUM 20 MG: 20 TABLET, FILM COATED ORAL at 21:45

## 2020-01-01 RX ADMIN — METOCLOPRAMIDE 5 MG: 5 INJECTION, SOLUTION INTRAMUSCULAR; INTRAVENOUS at 11:31

## 2020-01-01 RX ADMIN — METOCLOPRAMIDE 5 MG: 5 INJECTION, SOLUTION INTRAMUSCULAR; INTRAVENOUS at 06:22

## 2020-01-01 RX ADMIN — Medication 10 ML: at 06:23

## 2020-01-01 RX ADMIN — MORPHINE SULFATE 2 MG: 2 INJECTION, SOLUTION INTRAMUSCULAR; INTRAVENOUS at 01:15

## 2020-01-01 RX ADMIN — PIPERACILLIN AND TAZOBACTAM 3.38 G: 3; .375 INJECTION, POWDER, FOR SOLUTION INTRAVENOUS at 20:48

## 2020-01-01 RX ADMIN — INSULIN LISPRO 2 UNITS: 100 INJECTION, SOLUTION INTRAVENOUS; SUBCUTANEOUS at 17:29

## 2020-01-01 RX ADMIN — ACETAMINOPHEN 1000 MG: 500 TABLET ORAL at 12:39

## 2020-01-01 RX ADMIN — METOPROLOL SUCCINATE 50 MG: 50 TABLET, EXTENDED RELEASE ORAL at 09:00

## 2020-01-01 RX ADMIN — METHYLPREDNISOLONE SODIUM SUCCINATE 40 MG: 125 INJECTION, POWDER, FOR SOLUTION INTRAMUSCULAR; INTRAVENOUS at 09:06

## 2020-01-01 RX ADMIN — METOPROLOL SUCCINATE 75 MG: 50 TABLET, EXTENDED RELEASE ORAL at 17:20

## 2020-01-01 RX ADMIN — Medication 10 ML: at 22:44

## 2020-01-01 RX ADMIN — SODIUM CHLORIDE 25 ML/HR: 900 INJECTION, SOLUTION INTRAVENOUS at 11:17

## 2020-01-01 RX ADMIN — SODIUM CHLORIDE, SODIUM LACTATE, POTASSIUM CHLORIDE, AND CALCIUM CHLORIDE 100 ML/HR: 600; 310; 30; 20 INJECTION, SOLUTION INTRAVENOUS at 00:23

## 2020-01-01 RX ADMIN — METOCLOPRAMIDE 5 MG: 5 INJECTION, SOLUTION INTRAMUSCULAR; INTRAVENOUS at 12:33

## 2020-01-01 RX ADMIN — DEXTROSE MONOHYDRATE AND SODIUM CHLORIDE 75 ML/HR: 5; .9 INJECTION, SOLUTION INTRAVENOUS at 05:28

## 2020-01-01 RX ADMIN — BENZOCAINE AND MENTHOL 1 LOZENGE: 15; 3.6 LOZENGE ORAL at 06:13

## 2020-01-01 RX ADMIN — METOCLOPRAMIDE 5 MG: 5 INJECTION, SOLUTION INTRAMUSCULAR; INTRAVENOUS at 09:29

## 2020-01-01 RX ADMIN — Medication 10 ML: at 23:26

## 2020-01-01 RX ADMIN — SODIUM BICARBONATE 100 MEQ: 84 INJECTION INTRAVENOUS at 16:03

## 2020-01-01 RX ADMIN — SODIUM CHLORIDE 1000 ML: 900 INJECTION, SOLUTION INTRAVENOUS at 08:33

## 2020-01-01 RX ADMIN — MORPHINE SULFATE 2 MG: 2 INJECTION, SOLUTION INTRAMUSCULAR; INTRAVENOUS at 11:57

## 2020-01-01 RX ADMIN — METHYLPREDNISOLONE SODIUM SUCCINATE 20 MG: 125 INJECTION, POWDER, FOR SOLUTION INTRAMUSCULAR; INTRAVENOUS at 20:40

## 2020-01-01 RX ADMIN — ALBUTEROL SULFATE 2.5 MG: 2.5 SOLUTION RESPIRATORY (INHALATION) at 11:04

## 2020-01-01 RX ADMIN — SODIUM CHLORIDE 75 ML/HR: 450 INJECTION, SOLUTION INTRAVENOUS at 11:22

## 2020-01-01 RX ADMIN — ACETAMINOPHEN 650 MG: 325 TABLET ORAL at 11:17

## 2020-01-01 RX ADMIN — MORPHINE SULFATE 10 MG: 20 SOLUTION ORAL at 05:49

## 2020-01-01 RX ADMIN — MORPHINE SULFATE 2 MG: 2 INJECTION, SOLUTION INTRAMUSCULAR; INTRAVENOUS at 20:50

## 2020-01-01 RX ADMIN — IPRATROPIUM BROMIDE AND ALBUTEROL SULFATE 3 ML: .5; 3 SOLUTION RESPIRATORY (INHALATION) at 03:09

## 2020-01-01 RX ADMIN — MORPHINE SULFATE 2 MG: 2 INJECTION, SOLUTION INTRAMUSCULAR; INTRAVENOUS at 20:13

## 2020-01-01 RX ADMIN — ALBUTEROL SULFATE 2.5 MG: 2.5 SOLUTION RESPIRATORY (INHALATION) at 20:19

## 2020-01-01 RX ADMIN — ALBUTEROL SULFATE 2.5 MG: 2.5 SOLUTION RESPIRATORY (INHALATION) at 14:43

## 2020-01-01 RX ADMIN — PIPERACILLIN AND TAZOBACTAM 3.38 G: 3; .375 INJECTION, POWDER, FOR SOLUTION INTRAVENOUS at 20:04

## 2020-01-01 RX ADMIN — Medication 10 ML: at 14:00

## 2020-01-01 RX ADMIN — SODIUM CHLORIDE 75 ML/HR: 450 INJECTION, SOLUTION INTRAVENOUS at 17:40

## 2020-01-01 RX ADMIN — ATORVASTATIN CALCIUM 20 MG: 20 TABLET, FILM COATED ORAL at 22:31

## 2020-01-01 RX ADMIN — ATORVASTATIN CALCIUM 20 MG: 20 TABLET, FILM COATED ORAL at 21:07

## 2020-01-01 RX ADMIN — TAMSULOSIN HYDROCHLORIDE 0.4 MG: 0.4 CAPSULE ORAL at 09:10

## 2020-01-01 RX ADMIN — METOPROLOL SUCCINATE 50 MG: 50 TABLET, EXTENDED RELEASE ORAL at 09:10

## 2020-01-01 RX ADMIN — METOCLOPRAMIDE 5 MG: 5 INJECTION, SOLUTION INTRAMUSCULAR; INTRAVENOUS at 23:37

## 2020-01-01 RX ADMIN — ZOLEDRONIC ACID 4 MG: 0.04 INJECTION, SOLUTION INTRAVENOUS at 12:00

## 2020-01-01 RX ADMIN — Medication 10 ML: at 21:27

## 2020-01-01 RX ADMIN — ALBUTEROL SULFATE 2.5 MG: 2.5 SOLUTION RESPIRATORY (INHALATION) at 08:46

## 2020-01-01 RX ADMIN — ALBUTEROL SULFATE 2.5 MG: 2.5 SOLUTION RESPIRATORY (INHALATION) at 07:42

## 2020-01-01 RX ADMIN — METHYLPREDNISOLONE SODIUM SUCCINATE 20 MG: 125 INJECTION, POWDER, FOR SOLUTION INTRAMUSCULAR; INTRAVENOUS at 20:26

## 2020-01-01 RX ADMIN — IPRATROPIUM BROMIDE AND ALBUTEROL SULFATE 3 ML: .5; 3 SOLUTION RESPIRATORY (INHALATION) at 02:57

## 2020-01-01 RX ADMIN — METOPROLOL SUCCINATE 50 MG: 50 TABLET, EXTENDED RELEASE ORAL at 08:14

## 2020-01-01 RX ADMIN — MORPHINE SULFATE 2 MG: 2 INJECTION, SOLUTION INTRAMUSCULAR; INTRAVENOUS at 09:00

## 2020-01-01 RX ADMIN — ALBUTEROL SULFATE 2.5 MG: 2.5 SOLUTION RESPIRATORY (INHALATION) at 20:41

## 2020-01-01 RX ADMIN — Medication 10 ML: at 13:27

## 2020-01-01 RX ADMIN — METHYLPREDNISOLONE SODIUM SUCCINATE 40 MG: 125 INJECTION, POWDER, FOR SOLUTION INTRAMUSCULAR; INTRAVENOUS at 08:51

## 2020-01-01 RX ADMIN — OXYCODONE HYDROCHLORIDE AND ACETAMINOPHEN 1 TABLET: 5; 325 TABLET ORAL at 20:47

## 2020-01-01 RX ADMIN — METOCLOPRAMIDE 5 MG: 5 INJECTION, SOLUTION INTRAMUSCULAR; INTRAVENOUS at 19:09

## 2020-01-01 RX ADMIN — METHYLPREDNISOLONE SODIUM SUCCINATE 20 MG: 125 INJECTION, POWDER, FOR SOLUTION INTRAMUSCULAR; INTRAVENOUS at 08:53

## 2020-01-01 RX ADMIN — MIDAZOLAM 2 MG: 1 INJECTION INTRAMUSCULAR; INTRAVENOUS at 13:33

## 2020-01-01 RX ADMIN — Medication 10 ML: at 12:33

## 2020-01-01 RX ADMIN — ALBUTEROL SULFATE 2.5 MG: 2.5 SOLUTION RESPIRATORY (INHALATION) at 20:04

## 2020-01-01 RX ADMIN — MORPHINE SULFATE 2 MG: 2 INJECTION, SOLUTION INTRAMUSCULAR; INTRAVENOUS at 04:10

## 2020-01-01 RX ADMIN — PIPERACILLIN AND TAZOBACTAM 3.38 G: 3; .375 INJECTION, POWDER, FOR SOLUTION INTRAVENOUS at 12:02

## 2020-01-01 RX ADMIN — TAMSULOSIN HYDROCHLORIDE 0.4 MG: 0.4 CAPSULE ORAL at 09:06

## 2020-01-01 RX ADMIN — PROPOFOL 150 MG: 10 INJECTION, EMULSION INTRAVENOUS at 13:41

## 2020-01-01 RX ADMIN — MORPHINE SULFATE 2 MG: 2 INJECTION, SOLUTION INTRAMUSCULAR; INTRAVENOUS at 22:12

## 2020-01-01 RX ADMIN — MORPHINE SULFATE 2 MG: 2 INJECTION, SOLUTION INTRAMUSCULAR; INTRAVENOUS at 09:08

## 2020-01-01 RX ADMIN — CHOLESTYRAMINE 4 G: 4 POWDER, FOR SUSPENSION ORAL at 11:25

## 2020-01-01 RX ADMIN — MORPHINE SULFATE 10 MG: 20 SOLUTION ORAL at 11:31

## 2020-01-01 RX ADMIN — METOPROLOL SUCCINATE 50 MG: 50 TABLET, EXTENDED RELEASE ORAL at 08:53

## 2020-01-01 RX ADMIN — ALBUTEROL SULFATE 2.5 MG: 2.5 SOLUTION RESPIRATORY (INHALATION) at 19:39

## 2020-01-01 RX ADMIN — PIPERACILLIN AND TAZOBACTAM 3.38 G: 3; .375 INJECTION, POWDER, FOR SOLUTION INTRAVENOUS at 03:27

## 2020-01-01 RX ADMIN — LEVOTHYROXINE SODIUM 75 MCG: 0.07 TABLET ORAL at 06:22

## 2020-01-01 RX ADMIN — FENTANYL CITRATE 50 MCG: 50 INJECTION, SOLUTION INTRAMUSCULAR; INTRAVENOUS at 13:44

## 2020-01-01 RX ADMIN — DEXAMETHASONE SODIUM PHOSPHATE 4 MG: 4 INJECTION, SOLUTION INTRAMUSCULAR; INTRAVENOUS at 13:51

## 2020-01-01 RX ADMIN — METOCLOPRAMIDE 5 MG: 5 INJECTION, SOLUTION INTRAMUSCULAR; INTRAVENOUS at 12:00

## 2020-01-01 RX ADMIN — TAMSULOSIN HYDROCHLORIDE 0.4 MG: 0.4 CAPSULE ORAL at 08:14

## 2020-01-01 RX ADMIN — ATORVASTATIN CALCIUM 20 MG: 20 TABLET, FILM COATED ORAL at 21:06

## 2020-01-01 RX ADMIN — ALBUTEROL SULFATE 2.5 MG: 2.5 SOLUTION RESPIRATORY (INHALATION) at 20:16

## 2020-01-01 RX ADMIN — MORPHINE SULFATE 1 MG: 2 INJECTION, SOLUTION INTRAMUSCULAR; INTRAVENOUS at 14:13

## 2020-01-01 RX ADMIN — METOCLOPRAMIDE 5 MG: 5 INJECTION, SOLUTION INTRAMUSCULAR; INTRAVENOUS at 17:30

## 2020-01-01 RX ADMIN — METHYLPREDNISOLONE SODIUM SUCCINATE 125 MG: 125 INJECTION, POWDER, FOR SOLUTION INTRAMUSCULAR; INTRAVENOUS at 16:11

## 2020-01-01 RX ADMIN — Medication 5 ML: at 22:00

## 2020-01-01 RX ADMIN — SODIUM CHLORIDE 1000 ML: 900 INJECTION, SOLUTION INTRAVENOUS at 10:46

## 2020-01-01 RX ADMIN — MORPHINE SULFATE 30 MG: 30 TABLET, FILM COATED, EXTENDED RELEASE ORAL at 17:53

## 2020-01-01 RX ADMIN — Medication 10 ML: at 05:46

## 2020-01-01 RX ADMIN — PRIMIDONE 50 MG: 50 TABLET ORAL at 21:45

## 2020-01-01 RX ADMIN — METOCLOPRAMIDE 5 MG: 5 INJECTION, SOLUTION INTRAMUSCULAR; INTRAVENOUS at 17:00

## 2020-01-01 RX ADMIN — ALBUTEROL SULFATE 2.5 MG: 2.5 SOLUTION RESPIRATORY (INHALATION) at 14:56

## 2020-01-01 RX ADMIN — METOCLOPRAMIDE 5 MG: 5 INJECTION, SOLUTION INTRAMUSCULAR; INTRAVENOUS at 23:42

## 2020-01-01 RX ADMIN — PIPERACILLIN AND TAZOBACTAM 3.38 G: 3; .375 INJECTION, POWDER, FOR SOLUTION INTRAVENOUS at 12:04

## 2020-01-01 RX ADMIN — ALBUTEROL SULFATE 2.5 MG: 2.5 SOLUTION RESPIRATORY (INHALATION) at 08:01

## 2020-01-01 RX ADMIN — METHYLPREDNISOLONE SODIUM SUCCINATE 20 MG: 125 INJECTION, POWDER, FOR SOLUTION INTRAMUSCULAR; INTRAVENOUS at 21:19

## 2020-01-01 RX ADMIN — MORPHINE SULFATE 10 MG: 20 SOLUTION ORAL at 16:22

## 2020-01-01 RX ADMIN — METHYLPREDNISOLONE SODIUM SUCCINATE 40 MG: 125 INJECTION, POWDER, FOR SOLUTION INTRAMUSCULAR; INTRAVENOUS at 20:48

## 2020-01-01 RX ADMIN — ALBUTEROL SULFATE 2.5 MG: 2.5 SOLUTION RESPIRATORY (INHALATION) at 07:45

## 2020-01-01 RX ADMIN — VANCOMYCIN HYDROCHLORIDE 125 MG: KIT at 17:21

## 2020-01-01 RX ADMIN — METOCLOPRAMIDE 5 MG: 5 INJECTION, SOLUTION INTRAMUSCULAR; INTRAVENOUS at 05:46

## 2020-01-01 RX ADMIN — Medication 10 ML: at 23:22

## 2020-01-01 RX ADMIN — SODIUM CHLORIDE 75 ML/HR: 450 INJECTION, SOLUTION INTRAVENOUS at 00:20

## 2020-01-01 RX ADMIN — METOCLOPRAMIDE 5 MG: 5 INJECTION, SOLUTION INTRAMUSCULAR; INTRAVENOUS at 23:52

## 2020-01-01 RX ADMIN — METOPROLOL SUCCINATE 50 MG: 50 TABLET, EXTENDED RELEASE ORAL at 08:36

## 2020-01-01 RX ADMIN — GADOTERATE MEGLUMINE 17 ML: 376.9 INJECTION INTRAVENOUS at 18:00

## 2020-01-01 RX ADMIN — Medication 2 G: at 13:52

## 2020-01-01 RX ADMIN — PIPERACILLIN AND TAZOBACTAM 3.38 G: 3; .375 INJECTION, POWDER, FOR SOLUTION INTRAVENOUS at 12:00

## 2020-01-01 RX ADMIN — PIPERACILLIN AND TAZOBACTAM 3.38 G: 3; .375 INJECTION, POWDER, FOR SOLUTION INTRAVENOUS at 20:26

## 2020-01-01 RX ADMIN — PIPERACILLIN AND TAZOBACTAM 3.38 G: 3; .375 INJECTION, POWDER, FOR SOLUTION INTRAVENOUS at 03:49

## 2020-01-01 RX ADMIN — METOCLOPRAMIDE 5 MG: 5 INJECTION, SOLUTION INTRAMUSCULAR; INTRAVENOUS at 00:05

## 2020-01-01 RX ADMIN — SODIUM BICARBONATE 650 MG: 650 TABLET ORAL at 09:25

## 2020-01-01 RX ADMIN — METHYLPREDNISOLONE SODIUM SUCCINATE 40 MG: 125 INJECTION, POWDER, FOR SOLUTION INTRAMUSCULAR; INTRAVENOUS at 17:22

## 2020-01-01 RX ADMIN — Medication 10 ML: at 05:35

## 2020-01-01 RX ADMIN — Medication 10 ML: at 21:19

## 2020-01-01 RX ADMIN — MORPHINE SULFATE 2 MG: 2 INJECTION, SOLUTION INTRAMUSCULAR; INTRAVENOUS at 20:35

## 2020-01-01 RX ADMIN — Medication 10 ML: at 11:30

## 2020-01-01 RX ADMIN — Medication 10 ML: at 10:44

## 2020-01-01 RX ADMIN — Medication 10 ML: at 13:04

## 2020-01-01 RX ADMIN — PIPERACILLIN AND TAZOBACTAM 3.38 G: 3; .375 INJECTION, POWDER, FOR SOLUTION INTRAVENOUS at 11:25

## 2020-01-01 RX ADMIN — Medication 10 ML: at 07:28

## 2020-01-01 RX ADMIN — SODIUM CHLORIDE 84 MG: 900 INJECTION, SOLUTION INTRAVENOUS at 11:20

## 2020-01-01 RX ADMIN — LIDOCAINE HYDROCHLORIDE 80 MG: 20 INJECTION, SOLUTION EPIDURAL; INFILTRATION; INTRACAUDAL; PERINEURAL at 13:41

## 2020-01-01 RX ADMIN — VANCOMYCIN HYDROCHLORIDE 125 MG: KIT at 11:28

## 2020-01-01 RX ADMIN — MORPHINE SULFATE 10 MG: 20 SOLUTION ORAL at 09:22

## 2020-01-01 RX ADMIN — Medication 10 ML: at 15:21

## 2020-01-01 RX ADMIN — METHYLPREDNISOLONE SODIUM SUCCINATE 20 MG: 125 INJECTION, POWDER, FOR SOLUTION INTRAMUSCULAR; INTRAVENOUS at 20:43

## 2020-01-01 RX ADMIN — OXYCODONE HYDROCHLORIDE AND ACETAMINOPHEN 1 TABLET: 5; 325 TABLET ORAL at 08:15

## 2020-01-01 RX ADMIN — MORPHINE SULFATE 2 MG: 2 INJECTION, SOLUTION INTRAMUSCULAR; INTRAVENOUS at 20:40

## 2020-01-01 RX ADMIN — PIPERACILLIN AND TAZOBACTAM 3.38 G: 3; .375 INJECTION, POWDER, FOR SOLUTION INTRAVENOUS at 20:32

## 2020-01-01 RX ADMIN — Medication 10 ML: at 05:47

## 2020-01-01 RX ADMIN — Medication 10 ML: at 16:42

## 2020-01-01 RX ADMIN — SODIUM CHLORIDE 480 MG: 9 INJECTION, SOLUTION INTRAVENOUS at 15:08

## 2020-01-01 RX ADMIN — METOCLOPRAMIDE 5 MG: 5 INJECTION, SOLUTION INTRAMUSCULAR; INTRAVENOUS at 17:22

## 2020-01-01 RX ADMIN — METOPROLOL SUCCINATE 50 MG: 50 TABLET, EXTENDED RELEASE ORAL at 09:06

## 2020-01-01 RX ADMIN — INSULIN LISPRO 2 UNITS: 100 INJECTION, SOLUTION INTRAVENOUS; SUBCUTANEOUS at 18:09

## 2020-01-01 RX ADMIN — METOCLOPRAMIDE 5 MG: 5 INJECTION, SOLUTION INTRAMUSCULAR; INTRAVENOUS at 17:53

## 2020-01-01 RX ADMIN — SODIUM CHLORIDE, SODIUM LACTATE, POTASSIUM CHLORIDE, AND CALCIUM CHLORIDE 100 ML/HR: 600; 310; 30; 20 INJECTION, SOLUTION INTRAVENOUS at 01:00

## 2020-01-01 RX ADMIN — PRIMIDONE 50 MG: 50 TABLET ORAL at 23:20

## 2020-01-01 RX ADMIN — METOCLOPRAMIDE 5 MG: 5 INJECTION, SOLUTION INTRAMUSCULAR; INTRAVENOUS at 00:12

## 2020-01-01 RX ADMIN — Medication 10 ML: at 09:07

## 2020-01-01 RX ADMIN — LEVOTHYROXINE SODIUM 75 MCG: 0.07 TABLET ORAL at 05:35

## 2020-01-01 RX ADMIN — LEVOTHYROXINE SODIUM 75 MCG: 0.07 TABLET ORAL at 05:46

## 2020-01-01 RX ADMIN — Medication 10 ML: at 21:06

## 2020-01-01 RX ADMIN — ALBUTEROL SULFATE 2.5 MG: 2.5 SOLUTION RESPIRATORY (INHALATION) at 07:19

## 2020-01-01 RX ADMIN — PIPERACILLIN AND TAZOBACTAM 3.38 G: 3; .375 INJECTION, POWDER, FOR SOLUTION INTRAVENOUS at 13:00

## 2020-01-01 RX ADMIN — Medication 10 ML: at 14:25

## 2020-01-01 RX ADMIN — METOPROLOL SUCCINATE 50 MG: 50 TABLET, EXTENDED RELEASE ORAL at 09:03

## 2020-01-01 RX ADMIN — OXYCODONE HYDROCHLORIDE AND ACETAMINOPHEN 1 TABLET: 5; 325 TABLET ORAL at 03:31

## 2020-01-01 RX ADMIN — TAMSULOSIN HYDROCHLORIDE 0.4 MG: 0.4 CAPSULE ORAL at 09:03

## 2020-01-01 RX ADMIN — PIPERACILLIN AND TAZOBACTAM 3.38 G: 3; .375 INJECTION, POWDER, FOR SOLUTION INTRAVENOUS at 03:37

## 2020-01-01 RX ADMIN — METOCLOPRAMIDE 5 MG: 5 INJECTION, SOLUTION INTRAMUSCULAR; INTRAVENOUS at 17:27

## 2020-01-01 RX ADMIN — Medication 10 ML: at 06:22

## 2020-01-01 RX ADMIN — METHYLPREDNISOLONE SODIUM SUCCINATE 20 MG: 125 INJECTION, POWDER, FOR SOLUTION INTRAMUSCULAR; INTRAVENOUS at 09:04

## 2020-01-01 RX ADMIN — SODIUM CHLORIDE 1000 ML: 900 INJECTION, SOLUTION INTRAVENOUS at 11:54

## 2020-01-01 RX ADMIN — SODIUM CHLORIDE 25 ML/HR: 900 INJECTION, SOLUTION INTRAVENOUS at 15:52

## 2020-01-01 RX ADMIN — MORPHINE SULFATE 2 MG: 2 INJECTION, SOLUTION INTRAMUSCULAR; INTRAVENOUS at 04:17

## 2020-01-01 RX ADMIN — ATORVASTATIN CALCIUM 20 MG: 20 TABLET, FILM COATED ORAL at 23:20

## 2020-01-01 RX ADMIN — SODIUM CHLORIDE 253 MG: 9 INJECTION, SOLUTION INTRAVENOUS at 12:05

## 2020-01-01 RX ADMIN — METHYLPREDNISOLONE SODIUM SUCCINATE 20 MG: 125 INJECTION, POWDER, FOR SOLUTION INTRAMUSCULAR; INTRAVENOUS at 09:10

## 2020-01-01 RX ADMIN — ATORVASTATIN CALCIUM 20 MG: 20 TABLET, FILM COATED ORAL at 22:46

## 2020-01-01 RX ADMIN — LEVOTHYROXINE SODIUM 75 MCG: 0.07 TABLET ORAL at 06:16

## 2020-01-01 RX ADMIN — METOCLOPRAMIDE 5 MG: 5 INJECTION, SOLUTION INTRAMUSCULAR; INTRAVENOUS at 05:49

## 2020-01-01 RX ADMIN — Medication 10 ML: at 05:28

## 2020-01-01 RX ADMIN — MORPHINE SULFATE 2 MG: 2 INJECTION, SOLUTION INTRAMUSCULAR; INTRAVENOUS at 23:44

## 2020-01-01 RX ADMIN — METHYLPREDNISOLONE SODIUM SUCCINATE 40 MG: 125 INJECTION, POWDER, FOR SOLUTION INTRAMUSCULAR; INTRAVENOUS at 20:32

## 2020-01-01 RX ADMIN — METHYLPREDNISOLONE SODIUM SUCCINATE 40 MG: 125 INJECTION, POWDER, FOR SOLUTION INTRAMUSCULAR; INTRAVENOUS at 00:21

## 2020-01-01 RX ADMIN — MORPHINE SULFATE 4 MG: 4 INJECTION INTRAVENOUS at 14:01

## 2020-01-01 RX ADMIN — TAMSULOSIN HYDROCHLORIDE 0.4 MG: 0.4 CAPSULE ORAL at 08:36

## 2020-01-01 RX ADMIN — METOCLOPRAMIDE 5 MG: 5 INJECTION, SOLUTION INTRAMUSCULAR; INTRAVENOUS at 13:27

## 2020-01-01 RX ADMIN — PIPERACILLIN AND TAZOBACTAM 3.38 G: 3; .375 INJECTION, POWDER, FOR SOLUTION INTRAVENOUS at 04:30

## 2020-01-01 RX ADMIN — SODIUM CHLORIDE 25 ML/HR: 900 INJECTION, SOLUTION INTRAVENOUS at 15:08

## 2020-01-01 RX ADMIN — SODIUM CHLORIDE 75 ML/HR: 450 INJECTION, SOLUTION INTRAVENOUS at 05:49

## 2020-01-01 RX ADMIN — TAMSULOSIN HYDROCHLORIDE 0.4 MG: 0.4 CAPSULE ORAL at 09:26

## 2020-01-01 RX ADMIN — ALBUTEROL SULFATE 2.5 MG: 2.5 SOLUTION RESPIRATORY (INHALATION) at 07:27

## 2020-01-01 RX ADMIN — SODIUM BICARBONATE 650 MG: 650 TABLET ORAL at 10:53

## 2020-01-01 RX ADMIN — PIPERACILLIN AND TAZOBACTAM 3.38 G: 3; .375 INJECTION, POWDER, FOR SOLUTION INTRAVENOUS at 04:07

## 2020-01-01 RX ADMIN — TAMSULOSIN HYDROCHLORIDE 0.4 MG: 0.4 CAPSULE ORAL at 10:53

## 2020-01-01 RX ADMIN — SODIUM CHLORIDE 1000 ML: 900 INJECTION, SOLUTION INTRAVENOUS at 11:30

## 2020-01-01 RX ADMIN — ALBUTEROL SULFATE 2.5 MG: 2.5 SOLUTION RESPIRATORY (INHALATION) at 11:53

## 2020-01-01 RX ADMIN — SODIUM CHLORIDE, POTASSIUM CHLORIDE, SODIUM LACTATE AND CALCIUM CHLORIDE: 600; 310; 30; 20 INJECTION, SOLUTION INTRAVENOUS at 13:30

## 2020-01-01 RX ADMIN — MORPHINE SULFATE 30 MG: 30 TABLET, FILM COATED, EXTENDED RELEASE ORAL at 05:47

## 2020-01-01 RX ADMIN — PIPERACILLIN AND TAZOBACTAM 3.38 G: 3; .375 INJECTION, POWDER, FOR SOLUTION INTRAVENOUS at 12:31

## 2020-01-01 RX ADMIN — MORPHINE SULFATE 2 MG: 2 INJECTION, SOLUTION INTRAMUSCULAR; INTRAVENOUS at 14:23

## 2020-01-01 RX ADMIN — Medication 10 ML: at 08:16

## 2020-01-01 RX ADMIN — MORPHINE SULFATE 2 MG: 2 INJECTION, SOLUTION INTRAMUSCULAR; INTRAVENOUS at 15:37

## 2020-01-01 RX ADMIN — TAMSULOSIN HYDROCHLORIDE 0.4 MG: 0.4 CAPSULE ORAL at 08:59

## 2020-01-01 RX ADMIN — METOCLOPRAMIDE 5 MG: 5 INJECTION, SOLUTION INTRAMUSCULAR; INTRAVENOUS at 17:38

## 2020-01-01 RX ADMIN — METHYLPREDNISOLONE SODIUM SUCCINATE 60 MG: 125 INJECTION, POWDER, FOR SOLUTION INTRAMUSCULAR; INTRAVENOUS at 00:39

## 2020-01-01 RX ADMIN — DEXTROSE MONOHYDRATE AND SODIUM CHLORIDE 125 ML/HR: 5; .9 INJECTION, SOLUTION INTRAVENOUS at 16:31

## 2020-01-01 RX ADMIN — ALBUTEROL SULFATE 2.5 MG: 2.5 SOLUTION RESPIRATORY (INHALATION) at 13:42

## 2020-01-01 RX ADMIN — METHYLPREDNISOLONE SODIUM SUCCINATE 20 MG: 125 INJECTION, POWDER, FOR SOLUTION INTRAMUSCULAR; INTRAVENOUS at 08:13

## 2020-01-01 RX ADMIN — Medication 10 ML: at 21:47

## 2020-01-01 RX ADMIN — Medication 5 ML: at 06:00

## 2020-01-01 RX ADMIN — ALBUTEROL SULFATE 2.5 MG: 2.5 SOLUTION RESPIRATORY (INHALATION) at 15:40

## 2020-01-01 RX ADMIN — PIPERACILLIN AND TAZOBACTAM 3.38 G: 3; .375 INJECTION, POWDER, FOR SOLUTION INTRAVENOUS at 20:44

## 2020-01-01 RX ADMIN — ONDANSETRON HYDROCHLORIDE 4 MG: 2 INJECTION, SOLUTION INTRAMUSCULAR; INTRAVENOUS at 13:51

## 2020-01-01 RX ADMIN — SODIUM CHLORIDE, SODIUM LACTATE, POTASSIUM CHLORIDE, AND CALCIUM CHLORIDE 100 ML/HR: 600; 310; 30; 20 INJECTION, SOLUTION INTRAVENOUS at 11:29

## 2020-01-01 RX ADMIN — ATORVASTATIN CALCIUM 20 MG: 20 TABLET, FILM COATED ORAL at 21:19

## 2020-01-01 RX ADMIN — LEVOTHYROXINE SODIUM 75 MCG: 0.07 TABLET ORAL at 05:47

## 2020-01-01 RX ADMIN — METHYLPREDNISOLONE SODIUM SUCCINATE 40 MG: 125 INJECTION, POWDER, FOR SOLUTION INTRAMUSCULAR; INTRAVENOUS at 08:40

## 2020-01-01 RX ADMIN — OXYCODONE HYDROCHLORIDE AND ACETAMINOPHEN 1 TABLET: 5; 325 TABLET ORAL at 20:43

## 2020-01-01 RX ADMIN — PIPERACILLIN AND TAZOBACTAM 3.38 G: 3; .375 INJECTION, POWDER, FOR SOLUTION INTRAVENOUS at 04:08

## 2020-01-01 RX ADMIN — OXYCODONE HYDROCHLORIDE AND ACETAMINOPHEN 1 TABLET: 5; 325 TABLET ORAL at 14:24

## 2020-01-01 RX ADMIN — METOCLOPRAMIDE 5 MG: 5 INJECTION, SOLUTION INTRAMUSCULAR; INTRAVENOUS at 15:00

## 2020-01-01 RX ADMIN — ALBUTEROL SULFATE 2.5 MG: 2.5 SOLUTION RESPIRATORY (INHALATION) at 11:52

## 2020-01-01 RX ADMIN — METOCLOPRAMIDE 5 MG: 5 INJECTION, SOLUTION INTRAMUSCULAR; INTRAVENOUS at 06:13

## 2020-01-01 RX ADMIN — SODIUM CHLORIDE 1000 ML: 900 INJECTION, SOLUTION INTRAVENOUS at 08:34

## 2020-01-01 RX ADMIN — PIPERACILLIN AND TAZOBACTAM 3.38 G: 3; .375 INJECTION, POWDER, FOR SOLUTION INTRAVENOUS at 03:34

## 2020-01-01 RX ADMIN — METHYLPREDNISOLONE SODIUM SUCCINATE 40 MG: 125 INJECTION, POWDER, FOR SOLUTION INTRAMUSCULAR; INTRAVENOUS at 09:27

## 2020-01-01 RX ADMIN — SODIUM CHLORIDE, SODIUM LACTATE, POTASSIUM CHLORIDE, AND CALCIUM CHLORIDE 100 ML/HR: 600; 310; 30; 20 INJECTION, SOLUTION INTRAVENOUS at 17:19

## 2020-01-01 RX ADMIN — VANCOMYCIN HYDROCHLORIDE 125 MG: KIT at 06:16

## 2020-01-01 RX ADMIN — PIPERACILLIN AND TAZOBACTAM 3.38 G: 3; .375 INJECTION, POWDER, FOR SOLUTION INTRAVENOUS at 20:40

## 2020-01-01 RX ADMIN — PRIMIDONE 50 MG: 50 TABLET ORAL at 22:46

## 2020-01-01 RX ADMIN — ALBUTEROL SULFATE 2.5 MG: 2.5 SOLUTION RESPIRATORY (INHALATION) at 08:04

## 2020-01-01 RX ADMIN — FENTANYL CITRATE 50 MCG: 50 INJECTION, SOLUTION INTRAMUSCULAR; INTRAVENOUS at 13:41

## 2020-01-01 RX ADMIN — Medication 10 ML: at 22:58

## 2020-01-01 RX ADMIN — VANCOMYCIN HYDROCHLORIDE 125 MG: KIT at 23:40

## 2020-01-01 RX ADMIN — METOCLOPRAMIDE 5 MG: 5 INJECTION, SOLUTION INTRAMUSCULAR; INTRAVENOUS at 13:03

## 2020-01-01 RX ADMIN — MORPHINE SULFATE 1 MG: 2 INJECTION, SOLUTION INTRAMUSCULAR; INTRAVENOUS at 16:39

## 2020-01-01 RX ADMIN — MORPHINE SULFATE 10 MG: 20 SOLUTION ORAL at 14:32

## 2020-01-01 RX ADMIN — LEVOTHYROXINE SODIUM 75 MCG: 0.07 TABLET ORAL at 06:12

## 2020-01-01 RX ADMIN — PRIMIDONE 50 MG: 50 TABLET ORAL at 21:19

## 2020-01-01 RX ADMIN — MORPHINE SULFATE 2 MG: 2 INJECTION, SOLUTION INTRAMUSCULAR; INTRAVENOUS at 21:47

## 2020-01-01 RX ADMIN — METOCLOPRAMIDE 5 MG: 5 INJECTION, SOLUTION INTRAMUSCULAR; INTRAVENOUS at 05:45

## 2020-01-01 RX ADMIN — ALBUTEROL SULFATE 2.5 MG: 2.5 SOLUTION RESPIRATORY (INHALATION) at 20:10

## 2020-01-01 RX ADMIN — DEXTROSE MONOHYDRATE AND SODIUM CHLORIDE 75 ML/HR: 5; .9 INJECTION, SOLUTION INTRAVENOUS at 14:12

## 2020-01-01 RX ADMIN — SODIUM BICARBONATE 650 MG: 650 TABLET ORAL at 21:05

## 2020-01-01 RX ADMIN — PIPERACILLIN AND TAZOBACTAM 3.38 G: 3; .375 INJECTION, POWDER, FOR SOLUTION INTRAVENOUS at 20:22

## 2020-01-01 RX ADMIN — MORPHINE SULFATE 1 MG: 2 INJECTION, SOLUTION INTRAMUSCULAR; INTRAVENOUS at 16:01

## 2020-01-01 RX ADMIN — LEVOTHYROXINE SODIUM 75 MCG: 0.07 TABLET ORAL at 05:49

## 2020-01-01 RX ADMIN — TEMAZEPAM 30 MG: 30 CAPSULE ORAL at 23:20

## 2020-01-01 RX ADMIN — METOPROLOL SUCCINATE 50 MG: 50 TABLET, EXTENDED RELEASE ORAL at 09:25

## 2020-01-01 RX ADMIN — OXYCODONE HYDROCHLORIDE AND ACETAMINOPHEN 1 TABLET: 5; 325 TABLET ORAL at 04:08

## 2020-01-01 RX ADMIN — OXYCODONE HYDROCHLORIDE AND ACETAMINOPHEN 1 TABLET: 5; 325 TABLET ORAL at 09:28

## 2020-01-01 RX ADMIN — SODIUM BICARBONATE 650 MG: 650 TABLET ORAL at 11:25

## 2020-01-01 RX ADMIN — METOCLOPRAMIDE 5 MG: 5 INJECTION, SOLUTION INTRAMUSCULAR; INTRAVENOUS at 23:27

## 2020-01-01 RX ADMIN — CHOLESTYRAMINE 4 G: 4 POWDER, FOR SUSPENSION ORAL at 07:02

## 2020-01-01 RX ADMIN — PRIMIDONE 50 MG: 50 TABLET ORAL at 21:12

## 2020-01-07 NOTE — PROGRESS NOTES
Pharmacy Note- Immunotherapy Education    Jeniffer Lake is a  68 y.o.male  diagnosed with melanoma here today for Cycle 1, Day 1 chemotherapy counseling and consent. Mr. Jesus Rajput is being treated with Nivolumab. Mr. Jesus Rajput was provided information regarding the risks of immune-mediated adverse reactions secondary to Nivolumab that may require interruption/delay of treatment and possible use of corticosteroids. These reactions include, but are not limited to: colitis (diarrhea or severe abdominal pain); hepatitis (jaundice, severe nausea, or vomiting, easy bruising, and/or bleeding); hypophysitis (persistent or unusual headache, extreme weakness, dizziness/fainting, and/or vision changes); dermatitis (skin rash, mouth sores, skin blisters, and/or skin peeling); ocular toxicity (uveitis, iritis, and/or episcleritis); neuropathy (motor or sensory); hyper/hypothyroidism. Patient given ways to manage these side effects and when to contact office. [employer] Handout of medications provided to patient. Mr. Jesus Rajput verbalized understanding of the information presented and all of the patient's questions were answered.     David Jara, PharmD, BCPS, BCOP

## 2020-01-07 NOTE — PROGRESS NOTES
[facility] Progress Note    Date: 2020    Name: Talia Hidalgo    MRN: [id number]         : 1950    Mr. Leon Izquierdo Arrived 5134 and in no distress for cycle 1 D1 OPDIVO . Assessment was completed, no acute issues at this time, no new complaints voiced. PIV accessed with positive blood return. Labs drawn and sent for processing. Chemotherapy Flowsheet 2020   Cycle C1D1   Date 2020   Drug / Regimen OPDIVO   Notes given         Mr. Patricia's vitals were reviewed. Patient Vitals for the past 12 hrs:   Temp Pulse Resp BP   20 1543 -- 62 -- 150/74   20 1249 97.7 °F (36.5 °C) 72 18 134/67         Lab results were obtained and reviewed. Recent Results (from the past 12 hour(s))   CBC WITH AUTOMATED DIFF    Collection Time: 20 12:54 PM   Result Value Ref Range    WBC 7.1 4.1 - 11.1 K/uL    RBC 3.92 (L) 4.10 - 5.70 M/uL    HGB 12.4 12.1 - 17.0 g/dL    HCT 37.3 36.6 - 50.3 %    MCV 95.2 80.0 - 99.0 FL    MCH 31.6 26.0 - 34.0 PG    MCHC 33.2 30.0 - 36.5 g/dL    RDW 12.4 11.5 - 14.5 %    PLATELET 387 646 - 957 K/uL    MPV 10.6 8.9 - 12.9 FL    NRBC 0.0 0  WBC    ABSOLUTE NRBC 0.00 0.00 - 0.01 K/uL    NEUTROPHILS 63 32 - 75 %    LYMPHOCYTES 22 12 - 49 %    MONOCYTES 10 5 - 13 %    EOSINOPHILS 4 0 - 7 %    BASOPHILS 1 0 - 1 %    IMMATURE GRANULOCYTES 0 0.0 - 0.5 %    ABS. NEUTROPHILS 4.5 1.8 - 8.0 K/UL    ABS. LYMPHOCYTES 1.6 0.8 - 3.5 K/UL    ABS. MONOCYTES 0.7 0.0 - 1.0 K/UL    ABS. EOSINOPHILS 0.3 0.0 - 0.4 K/UL    ABS. BASOPHILS 0.1 0.0 - 0.1 K/UL    ABS. IMM.  GRANS. 0.0 0.00 - 0.04 K/UL    DF AUTOMATED     METABOLIC PANEL, COMPREHENSIVE    Collection Time: 20 12:54 PM   Result Value Ref Range    Sodium 140 136 - 145 mmol/L    Potassium 3.9 3.5 - 5.1 mmol/L    Chloride 111 (H) 97 - 108 mmol/L    CO2 22 21 - 32 mmol/L    Anion gap 7 5 - 15 mmol/L    Glucose 112 (H) 65 - 100 mg/dL    BUN 22 (H) 6 - 20 MG/DL    Creatinine 1.55 (H) 0.70 - 1.30 MG/DL    BUN/Creatinine ratio 14 12 - 20      GFR est AA 54 (L) >60 ml/min/1.73m2    GFR est non-AA 44 (L) >60 ml/min/1.73m2    Calcium 9.0 8.5 - 10.1 MG/DL    Bilirubin, total 0.6 0.2 - 1.0 MG/DL    ALT (SGPT) 30 12 - 78 U/L    AST (SGOT) 22 15 - 37 U/L    Alk. phosphatase 47 45 - 117 U/L    Protein, total 6.6 6.4 - 8.2 g/dL    Albumin 3.7 3.5 - 5.0 g/dL    Globulin 2.9 2.0 - 4.0 g/dL    A-G Ratio 1.3 1.1 - 2.2     TSH 3RD GENERATION    Collection Time: 01/07/20 12:54 PM   Result Value Ref Range    TSH 2.84 0.36 - 3.74 uIU/mL       Pre-medications  were administered as ordered and chemotherapy was initiated. Medications Administered     0.9% sodium chloride infusion     Admin Date  01/07/2020 Action  New Bag Dose  25 mL/hr Rate  25 mL/hr Route  IntraVENous Administered By  Afua Yung RN          nivolumab (OPDIVO) 480 mg in 0.9% sodium chloride 100 mL, overfill volume 10 mL IVPB     Admin Date  01/07/2020 Action  New Bag Dose  480 mg Rate  316 mL/hr Route  IntraVENous Administered By  Afua Yung RN                    Mr. Rosana Reyna tolerated treatment well. Piv maintained positive blood return throughout treatment. Patient was discharged from [place] [address] in stable condition at 1545.      Future Appointments   Date Time Provider Katelynn Chun   1/28/2020  9:00 AM Yasmany Matt MD [facility] JAIRO NICHOLAS   2/4/2020  1:00 PM [facility]   9/42/3987  1:01 AM Lili Guo, KARAN [address] [facility]   3/3/2020  1:00 PM Shan Brown, RN  January 7, 2020

## 2020-01-07 NOTE — PROGRESS NOTES
[facility]cer [place] [facility]  [address] Divers: [phone]  F: [phone]    Reason for Visit:   Mai Crowley is a 68 y.o. male who is seen in consultation at the request of Dr. Rusty Simon for evaluation of Melanoma. Treatment History:   · Bladder cancer dx in early 1990s  · BCC, left neck, s/p Electrodesiccation and Curettage, 06/07/12  · SCCi, right arm, s/p Electrodesiccation and Curettage, 12/2014  · Recurrent BCC, left lateral neck, Mohs, 02/24/15  · M Melanoma, mid abdomen, Breslow depth 0.65 mm, wide excision by Dr. Cara Shabazz, negative right axillary and right inguinal SLN biopsies, 12/20/17  · Recurrent melanoma 12/2019,  · CAT CAP and MRI head negative 12/19  · STRATA sent 12/13/19  · Opdivo 480 q 28 x 12 - Cycle 1 1/7/20    History of Present Illness: This is a 68 y.o. male with a history of BCC, SCC, and malignant melanoma who is seen in the office for evaluation of recurrent melanoma. Patient has a history of malignant melanoma of his abdomen in 2017. He sees surgical dermatology regularly due to his history. He noticed a lump on his right abdomen which grew quickly. Patient denies discoloration to the skin around the lump. He saw his PCP who ordered an [place] whichw as worrisome for malignancy. He was seen by Dr. Rusty Simon who performed an excisional biopsy of the RUQ abdominal mass. Pathology shows melanoma with clear margins. Patient was referred to us for evaluation and treatment. CT C/A/P and MRI head negative. D/w patient and plan for Opdivo 480 mg every 28 days x 12 Cycles. No family history of melanoma. His only family history of cancer is a brain tumor in his grandmother when she was in her 80s. Patient did have bladder cancer in the early 1990s. He does continue to get cystoscopies. He is a former smoker. His colonoscopy is up to date. Patient is here today for Cycle 1 of treatment. Patient seen in [facility] with wife at chairside.  He is feeling well today and ready to start. He is anxious to get treatment behind him. Past Medical History:   Diagnosis Date    Abdominal wall mass of right upper quadrant 2019    Atrial fibrillation (HCC)     BPH (benign prostatic hyperplasia)     Cancer (HCC) early     BLADDER    Hypercholesterolemia     Hypertension     Joint replaced     right knee    Skin cancer     Skin disorder 2018    Skin Cancer - melanoma across stomach, lymphnode involvement in Right armpit and Right groin    Sun-damaged skin       Past Surgical History:   Procedure Laterality Date    HX AFIB ABLATION      HX APPENDECTOMY      HX HERNIA REPAIR      HX KNEE REPLACEMENT      right knee    HX KNEE REPLACEMENT  2019    left knee    HX MALIGNANT SKIN LESION EXCISION      HX OTHER SURGICAL  2019    Excisional biopsy of right upper quadrant abdominal wall mass.  HX TONSILLECTOMY      HX UROLOGICAL      CYSTOSCOPY    SKIN TISSUE PROCEDURE UNLISTED      Melanoma across abdomen, Right armpit and groin lymphnode involvement      Social History     Tobacco Use    Smoking status: Former Smoker     Packs/day: 2.00     Years: 20.00     Pack years: 40.00     Last attempt to quit: 1988     Years since quittin.0    Smokeless tobacco: Never Used   Substance Use Topics    Alcohol use: Yes     Alcohol/week: 2.0 standard drinks     Types: 2 Glasses of wine per week     Comment: 1 glass with dinner 2 nights per week       Family History   Problem Relation Age of Onset    Dementia Mother     Hypertension Father     Hypertension Sister      Current Outpatient Medications   Medication Sig    metoprolol succinate (TOPROL XL) 50 mg XL tablet Take 1.5 Tabs by mouth daily.     fenofibrate (LOFIBRA) 160 mg tablet TAKE 1 TABLET DAILY    simvastatin (ZOCOR) 40 mg tablet TAKE 1 TABLET NIGHTLY    ondansetron (ZOFRAN ODT) 4 mg disintegrating tablet Take 1 Tab by mouth every eight (8) hours as needed for Nausea.  tadalafil (CIALIS) 5 mg tablet Take 5 mg by mouth as needed.  tamsulosin (FLOMAX) 0.4 mg capsule Take 0.4 mg by mouth daily.  albuterol (PROVENTIL HFA, VENTOLIN HFA, PROAIR HFA) 90 mcg/actuation inhaler INHALE 2 PUFFS BY MOUTH EVERY 6 HOURS AS NEEDED FOR WHEEZING OR SHORTNESS OF BREATH    traMADol (ULTRAM) 50 mg tablet Take 50 mg by mouth daily.  diclofenac EC (VOLTAREN) 75 mg EC tablet Take 75 mg by mouth daily.  fluorouracil (EFUDEX) 5 % chemo cream Apply a thin layer twice daily for total of 4 weeks.  amLODIPine (NORVASC) 10 mg tablet Take  by mouth daily.  rivaroxaban (XARELTO) 20 mg tab tablet Take  by mouth daily.  finasteride (PROSCAR) 5 mg tablet Take 5 mg by mouth daily as needed.  lisinopril (PRINIVIL, ZESTRIL) 20 mg tablet Take 20 mg by mouth daily. No current facility-administered medications for this encounter. Allergies   Allergen Reactions    Demerol [Meperidine] Other (comments)     Duplicate, delete    Demerol [Meperidine] Other (comments)     \"passed out\"      Review of Systems: A complete review of systems was obtained, negative except as described above. Physical Exam:     Visit Vitals  /67   Pulse 72   Temp 97.7 °F (36.5 °C)   Resp 18     ECOG PS: 0  General: No distress  Eyes: PERRL, anicteric sclerae  HENT: Atraumatic  Neck: Supple  Skin: Warm and dry  Psych: Alert, oriented, appropriate affect, normal judgment/insight    Results:     Lab Results   Component Value Date/Time    WBC 12.5 (H) 12/16/2019 11:35 AM    HGB 13.3 12/16/2019 11:35 AM    HCT 39.3 12/16/2019 11:35 AM    PLATELET 692 11/78/3701 11:35 AM    MCV 94 12/16/2019 11:35 AM    ABS.  NEUTROPHILS 9.6 (H) 12/16/2019 11:35 AM     Lab Results   Component Value Date/Time    Sodium 140 12/16/2019 11:35 AM    Potassium 4.1 12/16/2019 11:35 AM    Chloride 104 12/16/2019 11:35 AM    CO2 21 12/16/2019 11:35 AM    Glucose 113 (H) 12/16/2019 11:35 AM    BUN 22 12/16/2019 11:35 AM    Creatinine 1.50 (H) 12/16/2019 11:35 AM    GFR est AA 53 (L) 12/16/2019 11:35 AM    GFR est non-AA 46 (L) 12/16/2019 11:35 AM    Calcium 10.1 12/16/2019 11:35 AM     Lab Results   Component Value Date/Time    Bilirubin, total 0.3 12/16/2019 11:35 AM    ALT (SGPT) 21 12/16/2019 11:35 AM    AST (SGOT) 18 12/16/2019 11:35 AM    Alk. phosphatase 50 12/16/2019 11:35 AM    Protein, total 6.3 12/16/2019 11:35 AM    Albumin 4.3 12/16/2019 11:35 AM    Globulin 3.0 01/11/2012 04:29 PM     12/6/19 Abdominal wall mass, excisional biopsy: Melanoma,     MRI HEAD 12/16/19  IMPRESSION:  1. No evidence of intracranial metastatic disease. 2. Mild chronic white matter disease with no acute process. CAT C/A/P 12/16/19  IMPRESSION:  1. No evidence of metastatic disease within the chest, abdomen, or pelvis. There  are presumed postsurgical changes in the subcutaneous fat anterior and inferior  to the right rib cage, in the region of the previously demonstrated mass by  ultrasound. 2. Incidentally noted are 2 sub-5 mm right lung pulmonary nodules. 3. Evidence of old healed granulomatous disease, with calcified granulomas in  the lung, liver, and spleen. 4. Diffuse fatty infiltration of the liver. 5. Small left renal cysts. 6. Diverticulosis of the colon. 7. Left posterior bladder diverticulum. 8. Enlarged prostate. 9. Atherosclerosis of the coronary arteries and aorta. Records reviewed and summarized above. Pathology report(s) reviewed above. Radiology report(s) reviewed above. Assessment:   1) Melanoma of the right upper abdomen  First diagnosed with melanoma of his mid abdomen 12/2017. Breslow depth 0.65 mm. S/p wide excision by Dr. Paloma Finch, negative right axillary and right inguinal SLN biopsies  BRAF testing both tumors pending    Now with melanoma or his right upper abdomen s/p excision with clear margins by Dr. Yola Rodriguez 12/6/19  CT scan and the MRI of head all negative.  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 All known melanoma has been resected at this point. Treatment with adjuvant Opdivo 480 mg IV q. 28 days x 12 months  Patient here today for Cycle 1  Today's labs pending    2) History of BCC  Left neck, s/p Electrodesiccation and Curettage, 06/07/12.  Recurrent BCC, left lateral neck, Mohs, 02/24/15  Right upper chest, 11/26/19, cleared with shave biopsy  Right jawline, 11/26/19, cleared with shave biopsy but narrowly - patient to use 5-Fu BID x3 weeks per dermatology  Patient is followed closely by [facility] for skin checks    3) History of SCC  Right arm, s/p Electrodesiccation and Curettage, 12/2014  Patient is followed closely by [facility] for skin checks    4) History of bladder cancer  Diagnosed in the early 1990s  He continues to follow up with urology and has regular cystoscopies    5) History of A.fib  S/p ablation  Management per PCP/cardiology    6) HTN  BP OK today at 134/67  Management per PCP    8) Fatty liver  CT ABD demonstrated diffuse fatty infiltration of liver  AST/ALT have been normal  Labs today pending    9) ASCVD  Management per PCP    10) Diverticulosis  Management per PCP    11) Granulomatous disease  CT C/A/P demonstrated evidence of old healed granulomatous disease, with calcified granulomas in the lung, liver, and spleen    12) Two sub-5 mm right lung pulmonary nodules   Will follow with CT's    7) Healthcare maintenance  Patient has received his influenza vaccine  He has received his pneumococcal vaccines  He is up to date with his Tdap vaccine    Plan:     · Nivolumab 480 mg every 4 weeks  · CBC with diff, CMP, and TSH prior to each treatment  · Continue close follow up with Dermatology for regular skin checks  · Follow up in office with Cycle 2    I appreciate the opportunity to participate in . Mikayla Patricia's tere.

## 2020-01-20 NOTE — PROGRESS NOTES
Pt is here for a rash, he reports he has had this in the past and been prescribed a cream by his Dermatologist, it is helping some, but would like something stronger. Visit Vitals BP (!) 150/94 Pulse 81 Temp 97.8 °F (36.6 °C) Resp 18 Ht 5' 9\" (1.753 m) Wt 190 lb (86.2 kg) SpO2 97% BMI 28.06 kg/m² Pain Scale: 0 - No pain/10 Pain Location: 1. Have you been to the ER, urgent care clinic since your last visit? Hospitalized since your last visit? no 
 
2. Have you seen or consulted any other health care providers outside of the [facility] since your last visit? Include any pap smears or colon screening. No 
 
Health Maintenance reviewed

## 2020-01-20 NOTE — PROGRESS NOTES
Cancer [place] at [facility] 
[address], [address] W: [phone]  F: [phone] Reason for Visit:  
Krystle Zuniga is a 68 y.o. male who is seen in the office for follow up of Melanoma with rash since starting treatment Treatment History: · Bladder cancer dx in early 1990s · BCC, left neck, s/p Electrodesiccation and Curettage, 06/07/12 · SCCi, right arm, s/p Electrodesiccation and Curettage, 12/2014 · Recurrent BCC, left lateral neck, Mohs, 02/24/15 · M Melanoma, mid abdomen, Breslow depth 0.65 mm, wide excision by Dr. Choco Starr, negative right axillary and right inguinal SLN biopsies, 12/20/17 · Recurrent melanoma 12/2019, 
· CAT CAP and MRI head negative 12/19 · STRATA sent 12/13/19 · Opdivo 480 q 28 x 12 to start cycle 1 1/7/20 History of Present Illness: This is a 68 y.o. male with a history of BCC, SCC, and malignant melanoma who is seen in the office for evaluation of recurrent melanoma. Patient has a history of malignant melanoma of his abdomen in 2017. He sees surgical dermatology regularly due to his history. He noticed a lump on his right abdomen which grew quickly. Patient denies discoloration to the skin around the lump. He saw his PCP who ordered an [address] whichw as worrisome for malignancy. He was seen by Dr. Villa Garza who performed an excisional biopsy of the RUQ abdominal mass. Pathology shows melanoma. Patient was referred to us for evaluation and treatment. Patient has recently lost 10 lbs. He relates this to having food poisoning over Thanksgiving. We went over the results of the CT scan and the MRI of his head which were all negative. 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 All known melanoma has been resected at this point we will come in with adjuvant Opdivo 480 mg IV q. 28 days x 12 months. No family history of melanoma. His only family history of cancer is a brain tumor in his grandmother when she was in her 80s. Patient did have bladder cancer in the early . He does continue to get cystoscopies. He is a former smoker. His colonoscopy is up to date. Patient is here today c/o rash on his back and chest. He reports the rash started about a week after his Opdivo treatment. He reports he gets this rash every winter. He is prescribed Triamcinolone 1% by his dermatologist which he has been using. He is otherwise doing well and tolerated his first treatment well. Past Medical History:  
Diagnosis Date  Abdominal wall mass of right upper quadrant 2019  Atrial fibrillation ([address]  BPH (benign prostatic hyperplasia)  Cancer ([address] early 36s BLADDER  
 Hypercholesterolemia  Hypertension  Joint replaced   
 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  
Procedure Laterality Date  HX AFIB ABLATION  2018  HX APPENDECTOMY [facility]  HX KNEE REPLACEMENT  2010  
 right knee  HX KNEE REPLACEMENT  2019  
 left knee  HX MALIGNANT SKIN LESION EXCISION    
 HX OTHER SURGICAL  2019 Excisional biopsy of right upper quadrant abdominal wall mass.  HX TONSILLECTOMY  HX UROLOGICAL CYSTOSCOPY  SKIN TISSUE PROCEDURE UNLISTED  2018 Melanoma across abdomen, Right armpit and groin lymphnode involvement Social History Tobacco Use  Smoking status: Former Smoker Packs/day: 2.00 Years: 20.00 Pack years: 40.00 Last attempt to quit: 1988 Years since quittin.0  Smokeless tobacco: Never Used Substance Use Topics  Alcohol use: Yes Alcohol/week: 2.0 standard drinks Types: 2 Glasses of wine per week Comment: 1 glass with dinner 2 nights per week Family History Problem Relation Age of Onset  Dementia Mother  Hypertension Father  Hypertension Sister Current Outpatient Medications Medication Sig  
 metoprolol succinate (TOPROL XL) 50 mg XL tablet Take 1.5 Tabs by mouth daily.  fenofibrate (LOFIBRA) 160 mg tablet TAKE 1 TABLET DAILY  simvastatin (ZOCOR) 40 mg tablet TAKE 1 TABLET NIGHTLY  ondansetron (ZOFRAN ODT) 4 mg disintegrating tablet Take 1 Tab by mouth every eight (8) hours as needed for Nausea.  tadalafil (CIALIS) 5 mg tablet Take 5 mg by mouth as needed.  tamsulosin (FLOMAX) 0.4 mg capsule Take 0.4 mg by mouth daily.  albuterol (PROVENTIL HFA, VENTOLIN HFA, PROAIR HFA) 90 mcg/actuation inhaler INHALE 2 PUFFS BY MOUTH EVERY 6 HOURS AS NEEDED FOR WHEEZING OR SHORTNESS OF BREATH  
 traMADol (ULTRAM) 50 mg tablet Take 50 mg by mouth daily.  diclofenac EC (VOLTAREN) 75 mg EC tablet Take 75 mg by mouth daily.  fluorouracil (EFUDEX) 5 % chemo cream Apply a thin layer twice daily for total of 4 weeks.  amLODIPine (NORVASC) 10 mg tablet Take  by mouth daily.  rivaroxaban (XARELTO) 20 mg tab tablet Take  by mouth daily.  finasteride (PROSCAR) 5 mg tablet Take 5 mg by mouth daily as needed.  lisinopril (PRINIVIL, ZESTRIL) 20 mg tablet Take 20 mg by mouth daily. No current facility-administered medications for this visit. Allergies Allergen Reactions  Demerol [Meperidine] Other (comments) Duplicate, delete  Demerol [Meperidine] Other (comments) \"passed out\" Review of Systems: A complete review of systems was obtained, negative except as described above. Physical Exam:  
 
Visit Vitals BP (!) 150/94 Pulse 81 Temp 97.8 °F (36.6 °C) Resp 18 Ht 5' 9\" (1.753 m) Wt 190 lb (86.2 kg) SpO2 97% BMI 28.06 kg/m² ECOG PS: 0 General: No distress Eyes: PERRLA, anicteric sclerae HENT: Atraumatic, OP clear Neck: Supple Lymphatic: No cervical, supraclavicular, or axillary adenopathy Respiratory: CTAB, normal respiratory effort CV: Normal rate, regular rhythm, no murmurs, +1 peripheral edema GI: Soft, nontender, nondistended, no masses MS: Normal gait and station Skin: Well- healed scar on left jawline. Well-healed scar approximately 2 cm above navel approximately 5 cm in size. Healing scar to right upper abdomen. Fine macular rash on back and abdomen Psych: Alert, oriented, appropriate affect, normal judgment/insight Results:  
 
Lab Results Component Value Date/Time WBC 7.1 01/07/2020 12:54 PM  
 HGB 12.4 01/07/2020 12:54 PM  
 HCT 37.3 01/07/2020 12:54 PM  
 PLATELET 112 65/81/1536 12:54 PM  
 MCV 95.2 01/07/2020 12:54 PM  
 ABS. NEUTROPHILS 4.5 01/07/2020 12:54 PM  
 
Lab Results Component Value Date/Time Sodium 140 01/07/2020 12:54 PM  
 Potassium 3.9 01/07/2020 12:54 PM  
 Chloride 111 (H) 01/07/2020 12:54 PM  
 CO2 22 01/07/2020 12:54 PM  
 Glucose 112 (H) 01/07/2020 12:54 PM  
 BUN 22 (H) 01/07/2020 12:54 PM  
 Creatinine 1.55 (H) 01/07/2020 12:54 PM  
 GFR est AA 54 (L) 01/07/2020 12:54 PM  
 GFR est non-AA 44 (L) 01/07/2020 12:54 PM  
 Calcium 9.0 01/07/2020 12:54 PM  
 
Lab Results Component Value Date/Time Bilirubin, total 0.6 01/07/2020 12:54 PM  
 ALT (SGPT) 30 01/07/2020 12:54 PM  
 AST (SGOT) 22 01/07/2020 12:54 PM  
 Alk. phosphatase 47 01/07/2020 12:54 PM  
 Protein, total 6.6 01/07/2020 12:54 PM  
 Albumin 3.7 01/07/2020 12:54 PM  
 Globulin 2.9 01/07/2020 12:54 PM  
 
12/6/19 Abdominal wall mass, excisional biopsy: Melanoma, MRI head 12/16/19 IMPRESSION: 
  
1. No evidence of intracranial metastatic disease. 2. Mild chronic white matter disease with no acute process. CAT CAP 12/16/19 IMPRESSION: 
1. No evidence of metastatic disease within the chest, abdomen, or pelvis. There 
are presumed postsurgical changes in the subcutaneous fat anterior and inferior to the right rib cage, in the region of the previously demonstrated mass by 
ultrasound. 2. Incidentally noted are 2 sub-5 mm right lung pulmonary nodules. 3. Evidence of old healed granulomatous disease, with calcified granulomas in 
the lung, liver, and spleen. 4. Diffuse fatty infiltration of the liver. 5. Small left renal cysts. 6. Diverticulosis of the colon. 7. Left posterior bladder diverticulum. 8. Enlarged prostate. 9. Atherosclerosis of the coronary arteries and aorta. Records reviewed and summarized above. Pathology report(s) reviewed above. Radiology report(s) reviewed above. Assessment:  
1) Melanoma of the right upper abdomen First diagnosed with melanoma of his mid abdomen 12/2017. Breslow depth 0.65 mm. S/p wide excision by Dr. Gin Azul, negative right axillary and right inguinal SLN biopsies BRAF testing both tumors pending 
  
Now with melanoma or his right upper abdomen s/p excision with clear margins by Dr. Didier Castro 12/6/19 CT scan and the MRI of head all negative. 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 All known melanoma has been resected at this point. Treatment with adjuvant Opdivo 480 mg IV q. 28 days x 12 months started 1/7/2020 
 
2) History of BCC Left neck, s/p Electrodesiccation and Curettage, 06/07/12. Recurrent BCC, left lateral neck, Mohs, 02/24/15 Right upper chest, 11/26/19, cleared with shave biopsy Right jawline, 11/26/19, cleared with shave biopsy but narrowly - patient to use 5-Fu BID x3 weeks per dermatology Patient [facility] for skin checks 3) History of SCC Right arm, s/p Electrodesiccation and Curettage, 12/2014 [facility] 4) History of bladder cancer Diagnosed in the early 1990s He continues to follow up with urology and has regular cystoscopies 5) History of A.fib 
S/p ablation Management per PCP/cardiology 6) HTN 
BP OK today at 138/74 Management per PCP 
 
7) Fatty liver CT ABD demonstrated diffuse fatty infiltration of liver AST/ALT have been normal 
Labs today pending 
  
8) ASCVD Management per PCP 
  
9) Diverticulosis Management per PCP 
  
10) Granulomatous disease CT C/A/P demonstrated evidence of old healed granulomatous disease, with calcified granulomas in the lung, liver, and spleen 
  
11) Two sub-5 mm right lung pulmonary nodules Will follow with CT's 
  
12) Healthcare maintenance Patient has received his influenza vaccine He has received his pneumococcal vaccines He is up to date with his Tdap vaccine 13) Rash Consistent with dry skin I do not suspect this is related to Opdivo treatment He has been using Triamcinolone 1% prescribed by his dermatologist 
 
Plan:  
 
· Continue close follow up with Dermatology for regular skin checks · Recommend patient use Udder cream, Bagbomb, and/or Chanelle for dry skin/rash · Follow up in office on 2/4/20 as he starts Cycle 2 of treatment I appreciate the opportunity to participate in Mr. Angie Agudelo [facility].

## 2020-01-20 NOTE — TELEPHONE ENCOUNTER
Patient identified with two patient identifiers. Patient states he called us by mistake meant to call oncologist regarding rash he just started infusions recently. He has contacted oncology regarding possible allergic reaction.

## 2020-01-23 NOTE — TELEPHONE ENCOUNTER
Patient called in regards to cream of rrash. He states it's come back and has changed soaps. He said he received a denial on Rx and was inquiring why. He is requesting Triamcinolone 0.1.

## 2020-01-28 NOTE — PATIENT INSTRUCTIONS
PRESCRIPTION REFILL POLICY [address] [place] [address] Statement to Patients April 1, 2014 In an effort to ensure the large volume of patient prescription refills is processed in the most efficient and expeditious manner, we are asking our patients to assist us by calling your Pharmacy for all prescription refills, this will include also your  Mail Order Pharmacy. The pharmacy will contact our office electronically to continue the refill process. Please do not wait until the last minute to call your pharmacy. We need at least 48 hours (2days) to fill prescriptions. We also encourage you to call your pharmacy before going to  your prescription to make sure it is ready. With regard to controlled substance prescription refill requests (narcotic refills) that need to be picked up at our office, we ask your cooperation by providing us with at least 72 hours (3days) notice that you will need a refill. We will not refill narcotic prescription refill requests after 4:00pm on any weekday, Monday through Thursday, or after 2:00pm on Fridays, or on the weekends. We encourage everyone to explore another way of getting your prescription refill request processed using [employer], our patient web portal through our electronic medical record system. [employer] is an efficient and effective way to communicate your medication request directly to the office and  downloadable as an jaylen on your smart phone . [employer] also features a review functionality that allows you to view your medication list as well as leave messages for your physician. Are you ready to get connected? If so please review the attatched instructions or speak to any of our staff to get you set up right away! Thank you so much for your cooperation. Should you have any questions please contact our Practice Administrator. The Physicians and Staff,  [address] [place] [address]

## 2020-01-28 NOTE — PROGRESS NOTES
Mr. Corina Jara presents as a new patient for evaluation of tremor of left hand. Depression screening done on patient.

## 2020-01-28 NOTE — PROGRESS NOTES
Chief Complaint Patient presents with  Tremors HISTORY OF PRESENT ILLNESS Gerardine Riedel is a 68 y.o. male came in for neurological consultation requested by Dr. Nakita Vasques. For the past 2 years or so he has been noticing tremor in his dominant left upper extremity especially when he tries to eat or drink. He tries to stabilize his elbow by resting it on the table and it tends to shake less. It seems to be getting slowly worse and recently he has been noticing it in the right hand as well. Denies any difficulties with mobility, ambulation, falls, getting up from a seated position, going up steps etc. 
No changes to his voice or speech. He does notice difficulty when he tries to write. He has history of atrial fibrillation and has had ablation done. Still takes Xarelto Past Medical History:  
Diagnosis Date  Abdominal wall mass of right upper quadrant 12/2/2019  Atrial fibrillation ([facility]  BPH (benign prostatic hyperplasia)  Cancer ([facility] early 36s BLADDER  
 Hypercholesterolemia  Hypertension  Joint replaced 2011  
 right knee  Skin cancer  Skin disorder 2018 Skin Cancer - melanoma across stomach, lymphnode involvement in Right armpit and Right groin  Sun-damaged skin Current Outpatient Medications Medication Sig  primidone (MYSOLINE) 50 mg tablet Take one half at bedtime for 1 wk, then 1 qhs  
 metoprolol succinate (TOPROL XL) 50 mg XL tablet Take 1.5 Tabs by mouth daily.  fenofibrate (LOFIBRA) 160 mg tablet TAKE 1 TABLET DAILY  simvastatin (ZOCOR) 40 mg tablet TAKE 1 TABLET NIGHTLY  tadalafil (CIALIS) 5 mg tablet Take 5 mg by mouth as needed.  tamsulosin (FLOMAX) 0.4 mg capsule Take 0.4 mg by mouth daily.   
 albuterol (PROVENTIL HFA, VENTOLIN HFA, PROAIR HFA) 90 mcg/actuation inhaler INHALE 2 PUFFS BY MOUTH EVERY 6 HOURS AS NEEDED FOR WHEEZING OR SHORTNESS OF BREATH  
  diclofenac EC (VOLTAREN) 75 mg EC tablet Take 75 mg by mouth daily.  fluorouracil (EFUDEX) 5 % chemo cream Apply a thin layer twice daily for total of 4 weeks.  amLODIPine (NORVASC) 10 mg tablet Take  by mouth daily.  rivaroxaban (XARELTO) 20 mg tab tablet Take  by mouth daily.  finasteride (PROSCAR) 5 mg tablet Take 5 mg by mouth daily as needed.  lisinopril (PRINIVIL, ZESTRIL) 20 mg tablet Take 20 mg by mouth daily.  triamcinolone acetonide (KENALOG) 0.1 % ointment Apply  to affected area two (2) times a day. use thin layer  ondansetron (ZOFRAN ODT) 4 mg disintegrating tablet Take 1 Tab by mouth every eight (8) hours as needed for Nausea.  traMADol (ULTRAM) 50 mg tablet Take 50 mg by mouth daily. No current facility-administered medications for this visit. Allergies Allergen Reactions  Demerol [Meperidine] Other (comments) Duplicate, delete  Demerol [Meperidine] Other (comments) \"passed out\" Family History Problem Relation Age of Onset  Dementia Mother  Hypertension Father  Hypertension Sister Social History Tobacco Use  Smoking status: Former Smoker Packs/day: 2.00 Years: 20.00 Pack years: 40.00 Last attempt to quit: 1988 Years since quittin.0  Smokeless tobacco: Never Used Substance Use Topics  Alcohol use: Yes Alcohol/week: 2.0 standard drinks Types: 2 Glasses of wine per week Comment: 1 glass with dinner 2 nights per week  Drug use: No  
 
Past Surgical History:  
Procedure Laterality Date  HX AFIB ABLATION    HX APPENDECTOMY [facility]  HX KNEE REPLACEMENT  2010  
 right knee  HX KNEE REPLACEMENT  2019  
 left knee  HX MALIGNANT SKIN LESION EXCISION    
 HX OTHER SURGICAL  2019 Excisional biopsy of right upper quadrant abdominal wall mass.  HX TONSILLECTOMY  HX UROLOGICAL CYSTOSCOPY  SKIN TISSUE PROCEDURE UNLISTED  2018 Melanoma across abdomen, Right armpit and groin lymphnode involvement REVIEW OF SYSTEMS Review of Systems - History obtained from the patient Psychological ROS: negative ENT ROS: negative Hematological and Lymphatic ROS: negative Endocrine ROS: negative Respiratory ROS: no cough, shortness of breath, or wheezing Cardiovascular ROS: no chest pain or dyspnea on exertion Gastrointestinal ROS: no abdominal pain, change in bowel habits, or black or bloody stools Genito-Urinary ROS: no dysuria, trouble voiding, or hematuria Musculoskeletal ROS: negative Dermatological ROS: negative PHYSICAL EXAMINATION:   
Visit Vitals /72 Pulse 62 Resp 16 Ht 5' 9\" (1.753 m) Wt 83.9 kg (185 lb) SpO2 98% BMI 27.32 kg/m² General:  Well nourished and groomed individual in no acute distress. Neck: Supple, nontender, no bruits, no pain with resistance to active range of motion. Heart: Regular rate and rhythm. Normal S1S2. Lungs:  Equal chest expansion, no cough, no wheeze Musculoskeletal:  Extremities revealed no edema and had full range of motion of joints. Psych:  Good mood and bright affect NEUROLOGICAL EXAMINATION:    
Mental Status:   Alert and oriented to person, place, and time with recent and remote memory intact. Attention span and concentration are normal. Speech is fluent. Cranial Nerves:   
II, III, IV, VI:  Visual acuity grossly intact. Visual fields are normal.   
Pupils are equal, round, and reactive to light and accommodation. Extra-ocular movements are full and fluid. Fundoscopic exam was benign, no ptosis or nystagmus. V-XII: Hearing is grossly intact. Facial features are symmetric, with normal sensation and strength. The palate rises symmetrically and the tongue protrudes midline. Sternocleidomastoids 5/5. Motor Examination: Normal tone, bulk, and strength.  5/5 muscle strength throughout. No cogwheel rigidity or clonus present. Sensory exam:  Normal throughout to pinprick, temperature, and vibration sense. Normal proprioception. Coordination:  Finger to nose and rapid arm movement testing was normal.   No resting tremor. Fine tremor of 8 to 9 Hz frequency was noted when he was trying to mimic drinking from a cup mainly on the left side but also on the right Gait and Station:  Steady while walking on toes, heels, and with tandem walking. Normal arm swing. No Rhomberg or pronator drift. No muscle wasting or fasiculations noted. Reflexes:  DTRs 2+ throughout. Toes downgoing. Ruffus Merissa / IMAGING Lab Results Component Value Date/Time WBC 7.1 01/07/2020 12:54 PM  
 HGB 12.4 01/07/2020 12:54 PM  
 HCT 37.3 01/07/2020 12:54 PM  
 PLATELET 906 07/46/5809 12:54 PM  
 MCV 95.2 01/07/2020 12:54 PM  
 
Lab Results Component Value Date/Time Sodium 140 01/07/2020 12:54 PM  
 Potassium 3.9 01/07/2020 12:54 PM  
 Chloride 111 (H) 01/07/2020 12:54 PM  
 CO2 22 01/07/2020 12:54 PM  
 Anion gap 7 01/07/2020 12:54 PM  
 Glucose 112 (H) 01/07/2020 12:54 PM  
 BUN 22 (H) 01/07/2020 12:54 PM  
 Creatinine 1.55 (H) 01/07/2020 12:54 PM  
 BUN/Creatinine ratio 14 01/07/2020 12:54 PM  
 GFR est AA 54 (L) 01/07/2020 12:54 PM  
 GFR est non-AA 44 (L) 01/07/2020 12:54 PM  
 Calcium 9.0 01/07/2020 12:54 PM  
 Bilirubin, total 0.6 01/07/2020 12:54 PM  
 AST (SGOT) 22 01/07/2020 12:54 PM  
 Alk. phosphatase 47 01/07/2020 12:54 PM  
 Protein, total 6.6 01/07/2020 12:54 PM  
 Albumin 3.7 01/07/2020 12:54 PM  
 Globulin 2.9 01/07/2020 12:54 PM  
 A-G Ratio 1.3 01/07/2020 12:54 PM  
 ALT (SGPT) 30 01/07/2020 12:54 PM  
 
 
 
ASSESSMENT 
  ICD-10-CM ICD-9-CM 1. Tremor of both hands R25.1 781.0 primidone (MYSOLINE) 50 mg tablet 2. 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   
 
 
DISCUSSION Mr. Sue Tavares most likely has benign, task related tremor.   He was reassured that I do not see any signs of parkinsonism He is currently taking a beta-blocker and we will try him on primidone 25 mg at bedtime and titrate up to 50 mg at bedtime If it does not provide adequate relief, we may consider doing botulinum toxin injections in the left forearm Continue periodic follow-up Thank you for allowing me to participate in the care of Mr. Corina Jara. Please feel free to contact me if you have any questions. I will be happy to follow to follow him along with you. Andrzej Diaz MD 
Diplomate, American Board of Psychiatry & Neurology (Neurology) [place] Board of Psychiatry & Neurology (Clinical Neurophysiology) Diplomate, [address] [place] [address] Board of Electrodiagnostic Medicine This note will not be viewable in [address]

## 2020-02-04 NOTE — PROGRESS NOTES
[facility] Progress Note    Date: 2020    Name: Thalia Velasco    MRN: [id number]         : 1950    Mr. Maribeth Opitz Arrived ambulatory and in no distress for cycle 2 day 1 of Opdivo regimen. Assessment was completed, no acute issues at this time, no new complaints voiced. Peripheral IV established in the RIGHT AC with positive blood return. Labs sent for processing, IV capped and wrapped. Patient left [facility] for med oncology appointment. Chemotherapy Flowsheet 2020   Cycle C2D1   Date 2020   Drug / Regimen Opdivo   Dosage 480mg   Time Up 1552   Time Down 1622   Pre Hydration none   Post Hydration none   Pre Meds none   Notes given         Mr. Patricia's vitals were reviewed. Patient Vitals for the past 12 hrs:   Temp Pulse Resp BP SpO2   20 1628 98.2 °F (36.8 °C) 74 18 144/82 95 %   20 1304 97.5 °F (36.4 °C) 73 18 145/75 98 %         Lab results were obtained and reviewed. Recent Results (from the past 12 hour(s))   CBC WITH AUTOMATED DIFF    Collection Time: 20  1:10 PM   Result Value Ref Range    WBC 6.9 4.1 - 11.1 K/uL    RBC 4.09 (L) 4.10 - 5.70 M/uL    HGB 13.1 12.1 - 17.0 g/dL    HCT 38.5 36.6 - 50.3 %    MCV 94.1 80.0 - 99.0 FL    MCH 32.0 26.0 - 34.0 PG    MCHC 34.0 30.0 - 36.5 g/dL    RDW 12.2 11.5 - 14.5 %    PLATELET 150 626 - 731 K/uL    MPV 10.6 8.9 - 12.9 FL    NRBC 0.0 0  WBC    ABSOLUTE NRBC 0.00 0.00 - 0.01 K/uL    NEUTROPHILS 65 32 - 75 %    LYMPHOCYTES 19 12 - 49 %    MONOCYTES 11 5 - 13 %    EOSINOPHILS 4 0 - 7 %    BASOPHILS 1 0 - 1 %    IMMATURE GRANULOCYTES 0 0.0 - 0.5 %    ABS. NEUTROPHILS 4.5 1.8 - 8.0 K/UL    ABS. LYMPHOCYTES 1.3 0.8 - 3.5 K/UL    ABS. MONOCYTES 0.8 0.0 - 1.0 K/UL    ABS. EOSINOPHILS 0.3 0.0 - 0.4 K/UL    ABS. BASOPHILS 0.1 0.0 - 0.1 K/UL    ABS. IMM.  GRANS. 0.0 0.00 - 0.04 K/UL    DF AUTOMATED     METABOLIC PANEL, COMPREHENSIVE    Collection Time: 20  1:10 PM   Result Value Ref Range    Sodium 137 136 - 145 mmol/L Potassium 4.2 3.5 - 5.1 mmol/L    Chloride 105 97 - 108 mmol/L    CO2 24 21 - 32 mmol/L    Anion gap 8 5 - 15 mmol/L    Glucose 120 (H) 65 - 100 mg/dL    BUN 20 6 - 20 MG/DL    Creatinine 1.45 (H) 0.70 - 1.30 MG/DL    BUN/Creatinine ratio 14 12 - 20      GFR est AA 58 (L) >60 ml/min/1.73m2    GFR est non-AA 48 (L) >60 ml/min/1.73m2    Calcium 9.6 8.5 - 10.1 MG/DL    Bilirubin, total 0.5 0.2 - 1.0 MG/DL    ALT (SGPT) 31 12 - 78 U/L    AST (SGOT) 17 15 - 37 U/L    Alk. phosphatase 57 45 - 117 U/L    Protein, total 6.9 6.4 - 8.2 g/dL    Albumin 4.1 3.5 - 5.0 g/dL    Globulin 2.8 2.0 - 4.0 g/dL    A-G Ratio 1.5 1.1 - 2.2         Medications Administered     0.9% sodium chloride infusion     Admin Date  02/04/2020 Action  New Bag Dose  25 mL/hr Rate  25 mL/hr Route  IntraVENous Administered By  Alvy Fleischer          nivolumab (OPDIVO) 480 mg in 0.9% sodium chloride 100 mL, overfill volume 10 mL IVPB     Admin Date  02/04/2020 Action  New Bag Dose  480 mg Rate  316 mL/hr Route  IntraVENous Administered By  Alvy Fleischer                  Mr. Leona Abarca tolerated treatment well. Peripheral IV maintained positive blood return throughout the course of treatment and was removed at the conclusion of therapy. Patient left department at [phone] aware of his next appointment.     Future Appointments   Date Time Provider Katelynn Chun   0/18/3994  7:70 AM Kendal Guo NP [facility] JAIR[facility] SCHED   3/3/2020 11:00 AM [facility]RE[facility]INO [facility] [address] [facility]   3/3/2020 11:30 AM [facility]   3/31/2020 11:00 AM [facility]   4/28/2020 11:00 AM G[facility]   5/26/2020  9:40 AM [facility]   5/26/2020 11:00 AM [facility]  February 4, 2020

## 2020-02-04 NOTE — PROGRESS NOTES
[facility] [place] [address] [address], [address] Dena Mcdonald W: [phone]  F: [phone] Reason for Visit:  
Aria Patnio is a 68 y.o. male who is seen in the office for follow up of Melanoma with rash since starting treatment Treatment History: · Bladder cancer dx in early 1990s · BCC, left neck, s/p Electrodesiccation and Curettage, 06/07/12 · SCCi, right arm, s/p Electrodesiccation and Curettage, 12/2014 · Recurrent BCC, left lateral neck, Mohs, 02/24/15 · M Melanoma, mid abdomen, Breslow depth 0.65 mm, wide excision by Dr. David Lora, negative right axillary and right inguinal SLN biopsies, 12/20/17 · Recurrent melanoma 12/2019, 
· CAT CAP and MRI head negative 12/19 · STRATA sent 12/13/19 · Opdivo 480 q 28 x 12 to start cycle 1 1/7/20, cycle 2 2/4/20 History of Present Illness: This is a 68 y.o. male with a history of BCC, SCC, and malignant melanoma who is seen in the office for evaluation of recurrent melanoma. Patient has a history of malignant melanoma of his abdomen in 2017. He sees surgical dermatology regularly due to his history. He noticed a lump on his right abdomen which grew quickly. Patient denies discoloration to the skin around the lump. He saw his PCP who ordered an [address] whichw as worrisome for malignancy. He was seen by Dr. Michelle Mtz who performed an excisional biopsy of the RUQ abdominal mass. Pathology shows melanoma. Patient was referred to us for evaluation and treatment. Patient has recently lost 10 lbs. He relates this to having food poisoning over Thanksgiving. We went over the results of the CT scan and the MRI of his head which were all negative. 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 All known melanoma has been resected at this point. No family history of melanoma. His only family history of cancer is a brain tumor in his grandmother when she was in her 80s. Patient did have bladder cancer in the early . He does continue to get cystoscopies. He is a former smoker. His colonoscopy is up to date. Patient is here today for cycle 2 of Opdivo. Patient reports he is doing well. His rash is better. He saw his urologist last week for his cystoscopy. He had a cystoscopy and an abnormal \"spot\" was present on his bladder per patient. Patient reports he goes back for re-evaluation in 6 weeks. Past Medical History:  
Diagnosis Date  Abdominal wall mass of right upper quadrant 2019  Atrial fibrillation ([address]  BPH (benign prostatic hyperplasia)  Cancer ([address] early [address] BLADDER  
 Hypercholesterolemia  Hypertension  Joint replaced   
 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  
Procedure Laterality Date  HX AFIB ABLATION  2018  HX APPENDECTOMY [facility]  HX KNEE REPLACEMENT  2010  
 right knee  HX KNEE REPLACEMENT  2019  
 left knee  HX MALIGNANT SKIN LESION EXCISION    
 HX OTHER SURGICAL  2019 Excisional biopsy of right upper quadrant abdominal wall mass.  HX TONSILLECTOMY  HX UROLOGICAL CYSTOSCOPY  SKIN TISSUE PROCEDURE UNLISTED   Melanoma across abdomen, Right armpit and groin lymphnode involvement Social History Tobacco Use  Smoking status: Former Smoker Packs/day: 2.00 Years: 20.00 Pack years: 40.00 Last attempt to quit: 1988 Years since quittin.0  Smokeless tobacco: Never Used Substance Use Topics  Alcohol use: Yes Alcohol/week: 2.0 standard drinks Types: 2 Glasses of wine per week Comment: 1 glass with dinner 2 nights per week Family History Problem Relation Age of Onset  Dementia Mother  Hypertension Father  Hypertension Sister Current Outpatient Medications Medication Sig  primidone (MYSOLINE) 50 mg tablet TAKE 1/2 A TABLET BY MOUTH AT BEDTIME FOR 1 WEEK, THEN 1 TABLET EVERY NIGHT AT BEDTIME  triamcinolone acetonide (KENALOG) 0.1 % ointment Apply  to affected area two (2) times a day. use thin layer  metoprolol succinate (TOPROL XL) 50 mg XL tablet Take 1.5 Tabs by mouth daily.  fenofibrate (LOFIBRA) 160 mg tablet TAKE 1 TABLET DAILY  simvastatin (ZOCOR) 40 mg tablet TAKE 1 TABLET NIGHTLY  ondansetron (ZOFRAN ODT) 4 mg disintegrating tablet Take 1 Tab by mouth every eight (8) hours as needed for Nausea.  tadalafil (CIALIS) 5 mg tablet Take 5 mg by mouth as needed.  tamsulosin (FLOMAX) 0.4 mg capsule Take 0.4 mg by mouth daily.  albuterol (PROVENTIL HFA, VENTOLIN HFA, PROAIR HFA) 90 mcg/actuation inhaler INHALE 2 PUFFS BY MOUTH EVERY 6 HOURS AS NEEDED FOR WHEEZING OR SHORTNESS OF BREATH  
 traMADol (ULTRAM) 50 mg tablet Take 50 mg by mouth daily.  diclofenac EC (VOLTAREN) 75 mg EC tablet Take 75 mg by mouth daily.  fluorouracil (EFUDEX) 5 % chemo cream Apply a thin layer twice daily for total of 4 weeks.  amLODIPine (NORVASC) 10 mg tablet Take  by mouth daily.  rivaroxaban (XARELTO) 20 mg tab tablet Take  by mouth daily.  finasteride (PROSCAR) 5 mg tablet Take 5 mg by mouth daily as needed.  lisinopril (PRINIVIL, ZESTRIL) 20 mg tablet Take 20 mg by mouth daily. No current facility-administered medications for this visit. Allergies Allergen Reactions  Demerol [Meperidine] Other (comments) Duplicate, delete  Demerol [Meperidine] Other (comments) \"passed out\" Review of Systems: A complete review of systems was obtained, negative except as described above. Physical Exam:  
 
Visit Vitals BP (!) 141/98 Pulse 78 Temp 98.1 °F (36.7 °C) Resp 18 Ht 5' 9\" (1.753 m) Wt 184 lb (83.5 kg) SpO2 96% BMI 27.17 kg/m² ECOG PS: 0 General: No distress Eyes: PERRLA, anicteric sclerae HENT: Atraumatic, OP clear Neck: Supple Lymphatic: No cervical, supraclavicular, or axillary adenopathy Respiratory: CTAB, normal respiratory effort CV: Normal rate, regular rhythm, no murmurs, +1 peripheral edema GI: Soft, nontender, nondistended, no masses MS: Normal gait and station Skin: Well- healed scar on left jawline. Well-healed scar approximately 2 cm above navel approximately 5 cm in size. Well-healed scar to right upper abdomen. Fine macular rash on back and abdomen - improved since exam 1/20/20 Psych: Alert, oriented, appropriate affect, normal judgment/insight Results:  
 
Lab Results Component Value Date/Time WBC 6.9 02/04/2020 01:10 PM  
 HGB 13.1 02/04/2020 01:10 PM  
 HCT 38.5 02/04/2020 01:10 PM  
 PLATELET 525 40/58/8903 01:10 PM  
 MCV 94.1 02/04/2020 01:10 PM  
 ABS. NEUTROPHILS 4.5 02/04/2020 01:10 PM  
 
Lab Results Component Value Date/Time Sodium 140 01/07/2020 12:54 PM  
 Potassium 3.9 01/07/2020 12:54 PM  
 Chloride 111 (H) 01/07/2020 12:54 PM  
 CO2 22 01/07/2020 12:54 PM  
 Glucose 112 (H) 01/07/2020 12:54 PM  
 BUN 22 (H) 01/07/2020 12:54 PM  
 Creatinine 1.55 (H) 01/07/2020 12:54 PM  
 GFR est AA 54 (L) 01/07/2020 12:54 PM  
 GFR est non-AA 44 (L) 01/07/2020 12:54 PM  
 Calcium 9.0 01/07/2020 12:54 PM  
 
Lab Results Component Value Date/Time Bilirubin, total 0.6 01/07/2020 12:54 PM  
 ALT (SGPT) 30 01/07/2020 12:54 PM  
 AST (SGOT) 22 01/07/2020 12:54 PM  
 Alk. phosphatase 47 01/07/2020 12:54 PM  
 Protein, total 6.6 01/07/2020 12:54 PM  
 Albumin 3.7 01/07/2020 12:54 PM  
 Globulin 2.9 01/07/2020 12:54 PM  
 
12/6/19 Abdominal wall mass, excisional biopsy: Melanoma, MRI HEAD 12/16/19 IMPRESSION: 
1. No evidence of intracranial metastatic disease. 2. Mild chronic white matter disease with no acute process. CAT CAP 12/16/19 IMPRESSION: 
1. No evidence of metastatic disease within the chest, abdomen, or pelvis. There 
are presumed postsurgical changes in the subcutaneous fat anterior and inferior 
to the right rib cage, in the region of the previously demonstrated mass by 
ultrasound. 2. Incidentally noted are 2 sub-5 mm right lung pulmonary nodules. 3. Evidence of old healed granulomatous disease, with calcified granulomas in 
the lung, liver, and spleen. 4. Diffuse fatty infiltration of the liver. 5. Small left renal cysts. 6. Diverticulosis of the colon. 7. Left posterior bladder diverticulum. 8. Enlarged prostate. 9. Atherosclerosis of the coronary arteries and aorta. Records reviewed and summarized above. Pathology report(s) reviewed above. Radiology report(s) reviewed above. Assessment:  
1) Melanoma of the right upper abdomen First diagnosed with melanoma of his mid abdomen 12/2017. Breslow depth 0.65 mm. S/p wide excision by Dr. Choco Starr, negative right axillary and right inguinal SLN biopsies BRAF testing both tumors pending 
  
Now with melanoma or his right upper abdomen s/p excision with clear margins by Dr. Driver Doctor 12/6/19 CT scan and the MRI of head all negative. 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 All known melanoma has been resected at this point. Treatment with adjuvant Opdivo 480 mg IV q. 28 days x 12 months started 1/7/2020 
 
2) History of BCC Left neck, s/p Electrodesiccation and Curettage, 06/07/12. Recurrent BCC, left lateral neck, Mohs, 02/24/15 Right upper chest, 11/26/19, cleared with shave biopsy Right jawline, 11/26/19, cleared with shave biopsy but narrowly - patient to use 5-Fu BID x3 weeks per dermatology Patient is followed closely by [facility] for skin checks 3) History of SCC Right arm, s/p Electrodesiccation and Curettage, 12/2014 Patient is followed closely by [facility] for skin checks 4) History of bladder cancer Diagnosed in the early 1990s He continues to follow up with urology and has regular cystoscopies His most recent cystoscopy showed an area of concern in his bladder. Plan is for a repeat cystoscopy in 6 weeks per patient 5) History of A.fib 
S/p ablation Management per PCP/cardiology 6) HTN 
BP OK today at 141/98 Management per PCP 
 
7) Fatty liver CT ABD demonstrated diffuse fatty infiltration of liver AST/ALT have been normal 
Labs today pending 
  
8) ASCVD Management per PCP 
  
9) Diverticulosis Management per PCP 
  
10) Granulomatous disease CT C/A/P demonstrated evidence of old healed granulomatous disease, with calcified granulomas in the lung, liver, and spleen 
  
11) Two sub-5 mm right lung pulmonary nodules Will follow with CT's 
  
12) Healthcare maintenance Patient has received his influenza vaccine He has received his pneumococcal vaccines He is up to date with his Tdap vaccine 13) Rash Improved on exam today Consistent with dry skin I do not suspect this is related to Opdivo treatment He has been using Triamcinolone 1% prescribed by his dermatologist 
 
Plan:  
 
· Continue close follow up with Dermatology for regular skin checks · Recommend patient use Udder cream, Bagbomb, and/or Chanelle for dry skin/rash · Continue with Cycle 2 Opdivo today · CBC with diff and CMP today · TSH every 6 weeks · Follow up in office as he starts Cycle 3 of treatment I appreciate the opportunity to participate in Doris Berlin Lola carranza.

## 2020-02-04 NOTE — PROGRESS NOTES
Pt is here for routine follow up, he is getting cycle 2 of Opdivo today, he reports no side effects after first cycle. Pt also reports he has been following up with his urologist and may need a \"procedure\" in the near future. Will discuss with Dr Anastacio Olszewski today. Visit Vitals BP (!) 141/98 Pulse 78 Temp 98.1 °F (36.7 °C) Resp 18 Ht 5' 9\" (1.753 m) Wt 184 lb (83.5 kg) SpO2 96% BMI 27.17 kg/m² Pain Scale: 0 - No pain/10 Pain Location: 1. Have you been to the ER, urgent care clinic since your last visit? Hospitalized since your last visit? no 
 
2. Have you seen or consulted any other health care providers outside of the [address] [place] [address] since your last visit? Include any pap smears or colon screening. No 
 
Health Maintenance reviewed

## 2020-02-11 PROBLEM — M17.12 PRIMARY OSTEOARTHRITIS OF LEFT KNEE: Status: ACTIVE | Noted: 2017-08-03

## 2020-02-11 NOTE — PROGRESS NOTES
HPI:  
 
Chief Complaint Patient presents with  Blood Pressure Check 3 mos f/u Patient is a 68 y.o. male with significant history of HTN, HLD, afib s/p ablation, hx bladder cancer, malignant melanoma who presents for 3 month blood pressure follow-up.   
 
Patient recently underwent excisional biopsy of RUQ abdominal mass in December with pathology showing malignant melanoma. Margins were clear. He has previous hx of malignant melanoma of the abdomen in 2017. He has been following with oncology Dr. Bradley Campuzano. CT chest/abdomen/pelvis and MRI brain negative for metastatic disease. Started adjuvant treatment with Opdivo x12 months. He is also following with [facility] regularly for skin checks. Patient has also seen neurology Dr. Huyen Katz since our last visit for hand tremor. Suspected benign, task related tremor. He was started on Primidone, but states med caused him to be very groggy so he has not been taking. HTN - compliant with amlodipine 10mg, lisinopril 20mg, and metoprolol 75mg daily.  Tolerating well without side effects.  Unable to tolerate HCTZ in the recent past due to elevated creatinine.  /69 in office today. Patient feels well and denies chest pain, palpitations, dyspnea, headache, blurred vision, weakness or numbness of extremities.  Hx afib s/p ablation on Xarelto. Follows with cardiology.   
  
HLD - compliant with simvastatin 40mg and fenofibrate 160mg.  Tolerating well without side effects. Denies muscle symptoms, dark urine, abdominal pain, nausea. Total cholesterol and LDL at goal in November.  Triglycerides slightly elevated.  Patient has been working on low fat diet.   
 
Lab Results Component Value Date/Time  Cholesterol, total 162 11/14/2019 09:52 AM  
 HDL Cholesterol 50 11/14/2019 09:52 AM  
 LDL, calculated 67 11/14/2019 09:52 AM  
 VLDL, calculated 45 (H) 11/14/2019 09:52 AM  
 Triglyceride 223 (H) 11/14/2019 09:52 AM  
 
 
 Patient Active Problem List  
Diagnosis Code  Essential hypertension I10  
 Hyperlipidemia E78.5  Atrial fibrillation (HCC) I48.91  
 History of skin cancer I17.755  BMI 27.0-27.9,adult Z68.27  
 History of bladder cancer Z85.51  Abdominal wall mass of right upper quadrant R19.01  
 Malignant melanoma of torso excluding breast (HCC) C43.59 Current Outpatient Medications Medication Sig Dispense Refill  triamcinolone acetonide (KENALOG) 0.1 % ointment Apply  to affected area two (2) times a day. use thin layer 30 g 0  
 metoprolol succinate (TOPROL XL) 50 mg XL tablet Take 1.5 Tabs by mouth daily. 135 Tab 0  
 fenofibrate (LOFIBRA) 160 mg tablet TAKE 1 TABLET DAILY 90 Tab 1  
 simvastatin (ZOCOR) 40 mg tablet TAKE 1 TABLET NIGHTLY 90 Tab 1  
 ondansetron (ZOFRAN ODT) 4 mg disintegrating tablet Take 1 Tab by mouth every eight (8) hours as needed for Nausea. 15 Tab 0  
 tadalafil (CIALIS) 5 mg tablet Take 5 mg by mouth as needed.  tamsulosin (FLOMAX) 0.4 mg capsule Take 0.4 mg by mouth daily.  albuterol (PROVENTIL HFA, VENTOLIN HFA, PROAIR HFA) 90 mcg/actuation inhaler INHALE 2 PUFFS BY MOUTH EVERY 6 HOURS AS NEEDED FOR WHEEZING OR SHORTNESS OF BREATH 3 Inhaler 0  
 traMADol (ULTRAM) 50 mg tablet Take 50 mg by mouth daily.  diclofenac EC (VOLTAREN) 75 mg EC tablet Take 75 mg by mouth daily.  fluorouracil (EFUDEX) 5 % chemo cream Apply a thin layer twice daily for total of 4 weeks. 40 g 0  
 amLODIPine (NORVASC) 10 mg tablet Take  by mouth daily.  rivaroxaban (XARELTO) 20 mg tab tablet Take  by mouth daily.  finasteride (PROSCAR) 5 mg tablet Take 5 mg by mouth daily as needed.  lisinopril (PRINIVIL, ZESTRIL) 20 mg tablet Take 20 mg by mouth daily.  primidone (MYSOLINE) 50 mg tablet TAKE 1/2 A TABLET BY MOUTH AT BEDTIME FOR 1 WEEK, THEN 1 TABLET EVERY NIGHT AT BEDTIME 81 Tab 1 Allergies Allergen Reactions  Demerol [Meperidine] Other (comments) Duplicate, delete  Demerol [Meperidine] Other (comments) \"passed out\" Past Medical History:  
Diagnosis Date  Abdominal wall mass of right upper quadrant 12/2/2019  Atrial fibrillation ([facility] [address]  BPH (benign prostatic hyperplasia)  Cancer ([facility] [address] early 36s BLADDER  
 Hypercholesterolemia  Hypertension  Joint replaced 2011  
 right knee  Skin cancer  Skin disorder 2018 Skin Cancer - melanoma across stomach, lymphnode involvement in Right armpit and Right groin  Sun-damaged skin ROS:  
Pertinent items are noted in HPI. Objective:  
 
Vitals:  
 02/11/20 0900 BP: 135/69 Pulse: 76 Resp: 17 Temp: 97.9 °F (36.6 °C) TempSrc: Oral  
SpO2: 97% Weight: 185 lb 6.4 oz (84.1 kg) Height: 5' 9\" (1.753 m) Vitals and Nurse Documentation reviewed. Physical Examination:  
General appearance - alert, well appearing, and in no distress Mental status - alert, oriented to person, place, and time, normal mood, behavior, speech, dress, motor activity, and thought processes Chest - clear to auscultation, no wheezes, rales or rhonchi, symmetric air entry Heart - normal rate, regular rhythm, normal S1, S2, no murmurs, rubs, clicks or gallops Neurological - alert, oriented, normal speech, no focal findings or movement disorder noted Extremities - peripheral pulses normal, no pedal edema, no clubbing or cyanosis Assessment/ Plan:  
Diagnoses and all orders for this visit: 1. Essential hypertension 
-     Stable and well controlled in office today. Continue current regimen. Advised to monitor BP at home and follow-up sooner if persistently >017/98U. 
-     METABOLIC PANEL, BASIC 2. Mixed hyperlipidemia 
      -     Continue simvastatin and fenofibrate. Plan to check lipids next visit. Reviewed heart healthy diet. 3. Malignant melanoma of torso excluding breast ([facility] [address] -     S/p excision of malignant melanoma from abdomen. Following with oncology and surgical dermatology. On Opdivo. 4. Atrial fibrillation, unspecified type ([address] -     S/p ablation. Asymptomatic. On Xarelto. Following with cardiology. 5. BMI 27.0-27.9,adult -     BMI discussed with patient. Discussed lifestyle changes, daily physical activity, and advised 150 minutes of exercise weekly. Discussed healthy diet choices and limiting fried, fatty foods, fast foods, processed foods, sugar-sweetened beverages/soda, and added sugars. Increase fruits, vegetables, low-fat dairy products, lean proteins, and whole grains. Follow-up and Dispositions · Return in about 3 months (around 5/11/2020) for medicare wellness, fasting labs. I have discussed the diagnosis with the patient and the intended plan as seen in the above orders. Advised prompt follow-up if symptoms worsen or fail to improve and symptoms that would warrant emergent evaluation in ED. The patient has received an after-visit summary and questions were answered concerning future plans. I have discussed medication side effects and warnings with the patient as well. Patient expressed understanding and is in agreement with the diagnosis and plan.

## 2020-02-11 NOTE — PATIENT INSTRUCTIONS
Learning About High Blood Pressure What is high blood pressure? Blood pressure is a measure of how hard the blood pushes against the walls of your arteries. It's normal for blood pressure to go up and down throughout the day, but if it stays up, you have high blood pressure. Another name for high blood pressure is hypertension. Two numbers tell you your blood pressure. The first number is the systolic pressure. It shows how hard the blood pushes when your heart is pumping. The second number is the diastolic pressure. It shows how hard the blood pushes between heartbeats, when your heart is relaxed and filling with blood. Your doctor will give you a goal for your blood pressure. Your goal will be based on your health and your age. High blood pressure (hypertension) means that the top number stays high, or the bottom number stays high, or both. High blood pressure increases the risk of stroke, heart attack, and other problems. You and your doctor will talk about your risks of these problems based on your blood pressure. What happens when you have high blood pressure? · Blood flows through your arteries with too much force. Over time, this damages the walls of your arteries. But you can't feel it. High blood pressure usually doesn't cause symptoms. · Fat and calcium start to build up in your arteries. This buildup is called plaque. Plaque makes your arteries narrower and stiffer. Blood can't flow through them as easily. · This lack of good blood flow starts to damage some of the organs in your body. This can lead to problems such as coronary artery disease and heart attack, heart failure, stroke, kidney failure, and eye damage. How can you prevent high blood pressure? · Stay at a healthy weight. · Try to limit how much sodium you eat to less than 2,300 milligrams (mg) a day. If you limit your sodium to 1,500 mg a day, you can lower your blood pressure even more. ? Buy foods that are labeled \"unsalted,\" \"sodium-free,\" or \"low-sodium. \" Foods labeled \"reduced-sodium\" and \"light sodium\" may still have too much sodium. ? Flavor your food with garlic, lemon juice, onion, vinegar, herbs, and spices instead of salt. Do not use soy sauce, steak sauce, onion salt, garlic salt, mustard, or ketchup on your food. ? Use less salt (or none) when recipes call for it. You can often use half the salt a recipe calls for without losing flavor. · Be physically active. Get at least 30 minutes of exercise on most days of the week. Walking is a good choice. You also may want to do other activities, such as running, swimming, cycling, or playing tennis or team sports. · Limit alcohol to 2 drinks a day for men and 1 drink a day for women. · Eat plenty of fruits, vegetables, and low-fat dairy products. Eat less saturated and total fats. How is high blood pressure treated? · Your doctor will suggest making lifestyle changes to help your heart. For example, your doctor may ask you to eat healthy foods, quit smoking, lose extra weight, and be more active. · If lifestyle changes don't help enough, your doctor may recommend that you take medicine. · When blood pressure is very high, medicines are needed to lower it. Follow-up care is a key part of your treatment and safety. Be sure to make and go to all appointments, and call your doctor if you are having problems. It's also a good idea to know your test results and keep a list of the medicines you take. Where can you learn more? Go to http://dheeraj-sarai.info/. Enter P501 in the search box to learn more about \"Learning About High Blood Pressure. \" Current as of: April 9, 2019 Content Version: 12.2 © 9370-2521 [employer]. Care instructions adapted under license by [employer] (which disclaims liability or warranty for this information).  If you have questions about a medical condition or this instruction, always ask your healthcare professional. [place] [address] any warranty or liability for your use of this information.

## 2020-02-11 NOTE — PROGRESS NOTES
Familia Macias is a 68 y.o. male Chief Complaint Patient presents with  Blood Pressure Check 3 mos f/u 1. Have you been to the ER, urgent care clinic since your last visit? Hospitalized since your last visit? No 
 
2. Have you seen or consulted any other health care providers outside of the [address] [place] [address] since your last visit? Include any pap smears or colon screening. No 
 
No flowsheet data found. Health Maintenance Due Topic Date Due  Medicare Yearly Exam  03/01/2020

## 2020-02-14 NOTE — TELEPHONE ENCOUNTER
Pt called for an Rx refill for Amoxicillan, which was not prescribed by [facility], I told him he has to request the refill for it from the prescribing doctor. He seemed very confused and annoyed that he couldn't get it refilled by [facility].

## 2020-02-14 NOTE — TELEPHONE ENCOUNTER
LVM for patient to return call if needed but advised him that we did not prescribe medication and do not know what the medication is for therefore he needs to contact the prescribing physician as Jeff Valerio said. Advised patient to return call if needed.

## 2020-02-24 NOTE — PROGRESS NOTES
Pt is here as a follow up and to report new symptom of lump along his abdominal suture line. Visit Vitals /82 Pulse 91 Temp 97.9 °F (36.6 °C) Resp 18 Ht 5' 9\" (1.753 m) Wt 188 lb (85.3 kg) SpO2 92% BMI 27.76 kg/m² Pain Scale: 0 - No pain/10 Pain Location: 1. Have you been to the ER, urgent care clinic since your last visit? Hospitalized since your last visit? no 
 
2. Have you seen or consulted any other health care providers outside of the [address] [place] [address] since your last visit? Include any pap smears or colon screening. No 
 
Health Maintenance reviewed

## 2020-02-24 NOTE — PROGRESS NOTES
[facility] [place] [facility] 
[address], [address] W: [phone]  F: [phone] Reason for Visit:  
Patrikc Toribio is a 68 y.o. male who is seen in the office for follow up of Melanoma with new \"lump\" by abdominal incision Treatment History: · Bladder cancer dx in early 1990s · BCC, left neck, s/p Electrodesiccation and Curettage, 06/07/12 · SCCi, right arm, s/p Electrodesiccation and Curettage, 12/2014 · Recurrent BCC, left lateral neck, Mohs, 02/24/15 · M Melanoma, mid abdomen, Breslow depth 0.65 mm, wide excision by Dr. Dalila Concepcion, negative right axillary and right inguinal SLN biopsies, 12/20/17 · Recurrent melanoma 12/2019, 
· CAT CAP and MRI head negative 12/19 · STRATA sent 12/13/19 · Opdivo 480 q 28 x 12; cycle 1 1/7/20, cycle 2 2/4/20 History of Present Illness: This is a 68 y.o. male with a history of BCC, SCC, and malignant melanoma who is seen in the office for evaluation of recurrent melanoma. Patient has a history of malignant melanoma of his abdomen in 2017. He sees surgical dermatology regularly due to his history. He noticed a lump on his right abdomen which grew quickly. Patient denies discoloration to the skin around the lump. He saw his PCP who ordered an [address] whichw as worrisome for malignancy. He was seen by Dr. Kwame Cope who performed an excisional biopsy of the RUQ abdominal mass. Pathology shows melanoma. Patient was referred to us for evaluation and treatment. Patient has recently lost 10 lbs. He relates this to having food poisoning over Thanksgiving. We went over the results of the CT scan and the MRI of his head which were all negative. 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 All known melanoma has been resected at this point. No family history of melanoma. His only family history of cancer is a brain tumor in his grandmother when she was in her 80s. Patient did have bladder cancer in the early 1990s. He does continue to get cystoscopies. He is a former smoker. His colonoscopy is up to date. Patient is here today for evaluation of a \"lump\" by his incision line which he noticed over the weekend. Patient denies pain in this area. Patient denies any other lumps/bumps. Area of concern does not have erythema. Past Medical History:  
Diagnosis Date  Abdominal wall mass of right upper quadrant 2019  Atrial fibrillation ([address]  BPH (benign prostatic hyperplasia)  Cancer ([address] early 36s BLADDER  
 Hypercholesterolemia  Hypertension  Joint replaced   
 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  
Procedure Laterality Date  HX AFIB ABLATION  2018  HX [address]OMY [address]  HX KNEE REPLACEMENT    
 right knee  HX KNEE REPLACEMENT  2019  
 left knee  HX MALIGNANT SKIN LESION EXCISION    
 HX OTHER SURGICAL  2019 Excisional biopsy of right upper quadrant abdominal wall mass.  HX TONSILLECTOMY  HX UROLOGICAL CYSTOSCOPY  SKIN TISSUE PROCEDURE UNLISTED  2018 Melanoma across abdomen, Right armpit and groin lymphnode involvement Social History Tobacco Use  Smoking status: Former Smoker Packs/day: 2.00 Years: 20.00 Pack years: 40.00 Last attempt to quit: 1988 Years since quittin.1  Smokeless tobacco: Never Used Substance Use Topics  Alcohol use: Yes Alcohol/week: 2.0 standard drinks Types: 2 Glasses of wine per week Comment: 1 glass with dinner 2 nights per week Family History Problem Relation Age of Onset  Dementia Mother  Hypertension Father  Hypertension Sister Current Outpatient Medications Medication Sig  primidone (MYSOLINE) 50 mg tablet TAKE 1/2 A TABLET BY MOUTH AT BEDTIME FOR 1 WEEK, THEN 1 TABLET EVERY NIGHT AT BEDTIME  triamcinolone acetonide (KENALOG) 0.1 % ointment Apply  to affected area two (2) times a day. use thin layer  metoprolol succinate (TOPROL XL) 50 mg XL tablet Take 1.5 Tabs by mouth daily.  fenofibrate (LOFIBRA) 160 mg tablet TAKE 1 TABLET DAILY  simvastatin (ZOCOR) 40 mg tablet TAKE 1 TABLET NIGHTLY  ondansetron (ZOFRAN ODT) 4 mg disintegrating tablet Take 1 Tab by mouth every eight (8) hours as needed for Nausea.  tadalafil (CIALIS) 5 mg tablet Take 5 mg by mouth as needed.  tamsulosin (FLOMAX) 0.4 mg capsule Take 0.4 mg by mouth daily.  albuterol (PROVENTIL HFA, VENTOLIN HFA, PROAIR HFA) 90 mcg/actuation inhaler INHALE 2 PUFFS BY MOUTH EVERY 6 HOURS AS NEEDED FOR WHEEZING OR SHORTNESS OF BREATH  
 traMADol (ULTRAM) 50 mg tablet Take 50 mg by mouth daily.  diclofenac EC (VOLTAREN) 75 mg EC tablet Take 75 mg by mouth daily.  fluorouracil (EFUDEX) 5 % chemo cream Apply a thin layer twice daily for total of 4 weeks.  amLODIPine (NORVASC) 10 mg tablet Take  by mouth daily.  rivaroxaban (XARELTO) 20 mg tab tablet Take  by mouth daily.  finasteride (PROSCAR) 5 mg tablet Take 5 mg by mouth daily as needed.  lisinopril (PRINIVIL, ZESTRIL) 20 mg tablet Take 20 mg by mouth daily. No current facility-administered medications for this visit. Allergies Allergen Reactions  Demerol [Meperidine] Other (comments) Duplicate, delete  Demerol [Meperidine] Other (comments) \"passed out\" Review of Systems: A complete review of systems was obtained, negative except as described above. Physical Exam:  
 
Visit Vitals /82 Pulse 91 Temp 97.9 °F (36.6 °C) Resp 18 Ht 5' 9\" (1.753 m) Wt 188 lb (85.3 kg) SpO2 92% BMI 27.76 kg/m² ECOG PS: 0 General: No distress Eyes: PERRL, anicteric sclerae HENT: Atraumatic Neck: Supple MS: Normal gait and station Skin: Well- healed scar on left jawline. Well-healed scar approximately 2 cm above navel approximately 5 cm in size. Well-healed scar to right upper abdomen. Soft, mobile lump on upper left edge of abdominal scar approximately 1.5 cm in size Psych: Alert, oriented, appropriate affect, normal judgment/insight Results:  
 
Lab Results Component Value Date/Time WBC 6.9 02/04/2020 01:10 PM  
 HGB 13.1 02/04/2020 01:10 PM  
 HCT 38.5 02/04/2020 01:10 PM  
 PLATELET 736 83/39/2307 01:10 PM  
 MCV 94.1 02/04/2020 01:10 PM  
 ABS. NEUTROPHILS 4.5 02/04/2020 01:10 PM  
 
Lab Results Component Value Date/Time Sodium 140 02/12/2020 08:36 AM  
 Potassium 4.5 02/12/2020 08:36 AM  
 Chloride 103 02/12/2020 08:36 AM  
 CO2 18 (L) 02/12/2020 08:36 AM  
 Glucose 96 02/12/2020 08:36 AM  
 BUN 15 02/12/2020 08:36 AM  
 Creatinine 1.19 02/12/2020 08:36 AM  
 GFR est AA 71 02/12/2020 08:36 AM  
 GFR est non-AA 61 02/12/2020 08:36 AM  
 Calcium 9.7 02/12/2020 08:36 AM  
 
Lab Results Component Value Date/Time Bilirubin, total 0.5 02/04/2020 01:10 PM  
 ALT (SGPT) 31 02/04/2020 01:10 PM  
 AST (SGOT) 17 02/04/2020 01:10 PM  
 Alk. phosphatase 57 02/04/2020 01:10 PM  
 Protein, total 6.9 02/04/2020 01:10 PM  
 Albumin 4.1 02/04/2020 01:10 PM  
 Globulin 2.8 02/04/2020 01:10 PM  
 
12/6/19 Abdominal wall mass, excisional biopsy: Melanoma, MRI HEAD 12/16/19 IMPRESSION: 
1. No evidence of intracranial metastatic disease. 2. Mild chronic white matter disease with no acute process. CAT CAP 12/16/19 IMPRESSION: 
1. No evidence of metastatic disease within the chest, abdomen, or pelvis. There 
are presumed postsurgical changes in the subcutaneous fat anterior and inferior 
to the right rib cage, in the region of the previously demonstrated mass by 
ultrasound. 2. Incidentally noted are 2 sub-5 mm right lung pulmonary nodules. 3. Evidence of old healed granulomatous disease, with calcified granulomas in 
the lung, liver, and spleen. 4. Diffuse fatty infiltration of the liver. 5. Small left renal cysts. 6. Diverticulosis of the colon. 7. Left posterior bladder diverticulum. 8. Enlarged prostate. 9. Atherosclerosis of the coronary arteries and aorta. Records reviewed and summarized above. Pathology report(s) reviewed above. Radiology report(s) reviewed above. Assessment:  
1) Melanoma of the right upper abdomen First diagnosed with melanoma of his mid abdomen 12/2017. Breslow depth 0.65 mm. S/p wide excision by Dr. Marito Velez, negative right axillary and right inguinal SLN biopsies BRAF testing both tumors pending 
  
Now with melanoma or his right upper abdomen s/p excision with clear margins by Dr. Kristian West 12/6/19 CT scan and the MRI of head all negative. 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 All known melanoma has been resected at this point. Treatment with adjuvant Opdivo 480 mg IV q. 28 days x 12 months started 1/7/2020 
 
2) History of BCC Left neck, s/p Electrodesiccation and Curettage, 06/07/12. Recurrent BCC, left lateral neck, Mohs, 02/24/15 Right upper chest, 11/26/19, cleared with shave biopsy Right jawline, 11/26/19, cleared with shave biopsy but narrowly - patient to use 5-Fu BID x3 weeks per dermatology Patient is followed closely by [address] [place] [address] Dermatology for skin checks 3) History of SCC Right arm, s/p Electrodesiccation and Curettage, 12/2014 Patient is followed closely by [address] [place] [address] Dermatology for skin checks 4) History of bladder cancer Diagnosed in the early 1990s He continues to follow up with urology and has regular cystoscopies His most recent cystoscopy showed an area of concern in his bladder. Plan is for a repeat cystoscopy in 6 weeks per patient 5) History of A.fib 
S/p ablation Management per PCP/cardiology 6) HTN 
BP OK today at 148/82 Management per PCP 
 
7) Fatty liver CT ABD demonstrated diffuse fatty infiltration of liver AST/ALT have been normal 
Labs today pending 
  
8) ASCVD Management per PCP 
  
9) Diverticulosis Management per PCP 
  
10) Granulomatous disease CT C/A/P demonstrated evidence of old healed granulomatous disease, with calcified granulomas in the lung, liver, and spleen 
  
11) Two sub-5 mm right lung pulmonary nodules Will follow with CT's 
  
12) Healthcare maintenance Patient has received his influenza vaccine He has received his pneumococcal vaccines He is up to date with his Tdap vaccine 13) Lump by incision line May be inflammation from lymphocyte infiltration Would recommend excison and biopsy - will get patient back to see Dr. Juanita Velez Plan: · Will get patient an office visit with Dr. Juanita Velez to excise new lesion on abdominal scar site · Follow up in office as he starts Cycle 3 of treatment I appreciate the opportunity to participate in  [place] Lola carranza.

## 2020-02-25 NOTE — PROGRESS NOTES
Antonina Wright is a 68 y.o. male who is referred by Dr. Cory Lou for further evaluation of a subcutaneous mass on his abdominal wall. Mr. Azucena Powers is s/p excisional biopsy of an abdominal wall mass on December 6, 2019. Pathology returned melanoma. Saw Dr. Cory Lou. No evidence of metastatic disease on MRI of Brain, CT Scan chest/abdomen/pelvis. Started on Opdivo. Mr. Azucena Powers tells me that he recently noted a recurrent subcutaneous mass at the RUQ surgical site. The mass has grown rapidly and he believes that it is larger than the mass which was excised in 12/2019. He has otherwise been in his usual state of health. Past Medical History:  
Diagnosis Date  Abdominal wall mass of right upper quadrant 12/2/2019  Atrial fibrillation ([address]  BPH (benign prostatic hyperplasia)  Cancer ([facility] [address] early [address] [facility] BLADDER  
 Hypercholesterolemia  Hypertension  Joint replaced 2011  
 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  
Procedure Laterality Date  HX AFIB ABLATION  2018  HX APPENDECTOMY [facility]  HX KNEE REPLACEMENT  2010  
 right knee  HX KNEE REPLACEMENT  04/02/2019  
 left knee  HX MALIGNANT SKIN LESION EXCISION    
 HX OTHER SURGICAL  12/06/2019 Excisional biopsy of right upper quadrant abdominal wall mass.  HX TONSILLECTOMY  HX UROLOGICAL CYSTOSCOPY  SKIN TISSUE PROCEDURE UNLISTED  2018 Melanoma across abdomen, Right armpit and groin lymphnode involvement Family History Problem Relation Age of Onset  Dementia Mother  Hypertension Father  Hypertension Sister Social History Socioeconomic History  Marital status:  Spouse name: Not on file  Number of children: Not on file  Years of education: Not on file  Highest education level: Not on file Tobacco Use  Smoking status: Former Smoker Packs/day: 2.00 Years: 20.00 Pack years: 40.00 Last attempt to quit: 1988 Years since quittin.1  Smokeless tobacco: Never Used Substance and Sexual Activity  Alcohol use: Yes Alcohol/week: 2.0 standard drinks Types: 2 Glasses of wine per week Comment: 1 glass with dinner 2 nights per week  Drug use: No  
 
 
Review of systems negative except as noted. Review of Systems Musculoskeletal: No pain at site of subcutaneous mass. Physical Exam 
Vitals signs reviewed. Constitutional:   
   General: He is not in acute distress. Appearance: Normal appearance. HENT:  
   Head: Normocephalic and atraumatic. Eyes:  
   General: No scleral icterus. Neck: Musculoskeletal: Neck supple. Cardiovascular:  
   Rate and Rhythm: Normal rate and regular rhythm. Pulmonary:  
   Effort: Pulmonary effort is normal.  
   Breath sounds: Normal breath sounds. Abdominal:  
   General: There is no distension. Palpations: Abdomen is soft. Tenderness: There is no abdominal tenderness. There is no guarding or rebound. Musculoskeletal: Normal range of motion. Lymphadenopathy:  
   Cervical: No cervical adenopathy. Skin: 
 
    
   Comments: Well healed surgical scar. Beneath the scar, there is a well circumscribed, freely movable subcutaneous mass. Neurological:  
   General: No focal deficit present. Mental Status: He is alert. ASSESSMENT and PLAN In view of the findings on H and P, Mr. Trent Jaffe should benefit from excisional biopsy of the recurrent abdominal wall subcutaneous mass. Discussed procedure with him including risks of bleeding, infection, need for further surgery, recurrence. He understands and wishes to proceed. I have tentatively scheduled Mr. Trent Jaffe for surgery on 2020 at [address] [facility] and will see him back in the office postoperatively. Asked him to hold Xarelto. He is agreeable to this plan of action and is most certainly free to contact the office should any questions or concerns arise.     
 
 
CC: Larena Mayotte, MD Daren Fleischer, NP

## 2020-02-25 NOTE — PROGRESS NOTES
1. Have you been to the ER, urgent care clinic since your last visit? Hospitalized since your last visit? No 
 
2. Have you seen or consulted any other health care providers outside of the [address] [place] [address] since your last visit? Include any pap smears or colon screening.  No

## 2020-02-28 NOTE — ANESTHESIA PREPROCEDURE EVALUATION
Relevant Problems No relevant active problems Anesthetic History No history of anesthetic complications Review of Systems / Medical History Patient summary reviewed, nursing notes reviewed and pertinent labs reviewed Pulmonary Within defined limits Neuro/Psych Within defined limits Cardiovascular Hypertension Dysrhythmias : atrial fibrillation Exercise tolerance: >4 METS 
  
GI/Hepatic/Renal 
Within defined limits Endo/Other Within defined limits Other Findings Comments: afib s/p ablation 2 yrs ago; no recurrence since, on plavix and BB, denies gerd and tawnya, good exercise tolerance - no angina Physical Exam 
 
Airway Mallampati: I 
TM Distance: 4 - 6 cm Neck ROM: normal range of motion Mouth opening: Normal 
 
 Cardiovascular Regular rate and rhythm,  S1 and S2 normal,  no murmur, click, rub, or gallop Dental 
No notable dental hx Pulmonary Breath sounds clear to auscultation Abdominal 
Abdominal exam normal 
 
 
 Other Findings Anesthetic Plan ASA: 3 Anesthesia type: general 
 
 
 
 
Induction: Intravenous Anesthetic plan and risks discussed with: Patient

## 2020-02-28 NOTE — DISCHARGE INSTRUCTIONS
DISCHARGE SUMMARY from Nurse    The following personal items collected during your admission are returned to you:   Dental Appliance: Dental Appliances: None  Vision:    Hearing Aid:    Jewelry:    Clothing:    Other Valuables:    Valuables sent to safe: ALL CLOTHING/VALUABLES RETURNED TO PATIENT BEFORE D/C HOME    PATIENT INSTRUCTIONS:    The first meal should be something that is light; not greasy or spicy. If this is tolerated, then advance to regular diet as tolerated. Anesthesia Discharge Instructions    After general anesthesia or intervenous sedation, for 24 hours or while taking prescription Narcotics:  · Limit your activities  · Do not drive or operate hazardous machinery  · If you have not urinated within 8 hours after discharge, please contact your surgeon on call. · Do not make important personal or business decisions  · Do not drink alcoholic beverages    Report the following to your surgeon:  · Excessive pain, swelling, redness or odor of or around the surgical area  · Temperature over 100.5 degrees  · Nausea and vomiting lasting longer than 4 hours or if unable to take medication  · Any signs of decreased circulation or nerve impairment to extremity:  Change in color, persistent numbness, tingling, coldness or increased pain. · Any questions    Pain  · Take pain medication as directed by your doctor  ·  Call your doctor if pain is NOT relieved by medication  · DO NOT take aspirin or blood thinners until directed by your doctor       Follow-up Appointments   Procedures    FOLLOW UP VISIT Appointment in: One Week     Standing Status:   Standing     Number of Occurrences:   1     Order Specific Question:   Appointment in     Answer:    One Week         Follow-Up Phone Calls  · Are usually made nursing staff, the day after your surgery  · If you have any problems, call your doctor as needed    Patient Discharge Instructions    Familia Macias / [id number] : 1950    Admitted 2/28/2020 Discharged: 2/28/2020       · It is important that you take the medication exactly as they are prescribed. · Keep your medication in the bottles provided by the pharmacist and keep a list of the medication names, dosages, and times to be taken in your wallet. · Do not take other medications without consulting your doctor. What to do at Home    Recommended diet: Regular. Recommended activity: No Restrictions. No Driving While Taking Oxycodone. Tylenol 1000mg every 6 hours as needed for pain. Ice pack to abdomen. Oxycodone as needed for severe pain. May Take Shower or [place] Roxo. If you experience any of the following symptoms Fevers, Chills, Nausea, Vomitting, Redness or Drainage at Surgical Site(s) or Any Other Questions or Concerns Please Call -  [phone]. Follow-up with Dr. Karsten Jansen in 7-10 days. Information obtained by :  I understand that if any problems occur once I am at home I am to contact my physician. I understand and acknowledge receipt of the instructions indicated above.                                                                                                                                            Physician's or R.N.'s Signature                                                                  Date/Time                                                                                                                                              Patient or Representative Signature                                                          Date/Time

## 2020-02-28 NOTE — BRIEF OP NOTE
BRIEF OPERATIVE NOTE    Date of Procedure: 2/28/2020   Preoperative Diagnosis:  Right Upper Quadrant Abdominal Wall Mass. Postoperative Diagnosis:  Same. Procedure(s):   Excisional Biopsy Right Upper Quadrant Abdominal Wall Mass. Surgeon(s) and Role:   Loan Felipe MD - Primary  Surgical Staff:  Circ-1: Franck Herrera RN  Scrub RN-1: Kd Womack RN  Surg Asst-1: Yessica MORALES  Event Time In Time Out   Incision Start 1359    Incision Close 1421      Anesthesia: General   Estimated Blood Loss: Approx. 10ml. Specimens:   ID Type Source Tests Collected by Time Destination   1 : abdominal wall mass Preservative Mass  Loan Felipe MD 2/28/2020 1405 Pathology      Findings: Subcutaneous mass right upper quadrant. Approximately 3cm x 3cm. Complications: None.   Implants: * No implants in log *

## 2020-02-28 NOTE — OP NOTES
[address] 
OPERATIVE REPORT Name:  Kaylynn Trevino 
MR#:  [id number] :  1950 ACCOUNT #:  [de-identified] DATE OF SERVICE:  2020 PREOPERATIVE DIAGNOSIS:  Right upper quadrant abdominal wall mass. POSTOPERATIVE DIAGNOSIS:  Right upper quadrant abdominal wall mass. PROCEDURE PERFORMED:  Excisional biopsy of right upper quadrant abdominal wall mass. SURGEON:  Sandrita Hart MD 
 
ASSISTANT:  Petra Maguire SA 
 
ANESTHESIA:  General via laryngeal mask airway. COMPLICATIONS:  None. SPECIMENS REMOVED:  Subcutaneus mass of the right upper quadrant to Pathology. IMPLANTS:  None. ESTIMATED BLOOD LOSS:  Approximately 10 mL. FLUIDS:  Crystalloid 600 mL. DRAINS:  None. INDICATIONS FOR SURGERY:  The patient is a 47-year-old man who is status post excisional biopsy of a right upper quadrant abdominal wall mass in 2019. Pathology returned melanoma. There was no evidence of metastatic disease on MRI of the brain, CT scan of the chest, abdomen and pelvis. The patient was started on Opdivo. He was doing well until recently when he noted a recurrent subcutaneous mass at the right upper quadrant surgical site. This mass has grown rapidly and the patient believes that it is larger than the mass which was excised in 2019. In view of the findings on history and physical examination, the patient is brought to the operating room at this time for excisional biopsy of the right upper quadrant abdominal wall mass. The risks of the procedure, including but not limited to, bleeding, infection, the need for further surgery and recurrence were discussed in detail with the patient. Mr. Levi Almazan understood and wished to proceed. PROCEDURE:  After consent was obtained, the patient was brought to the operating room where he was placed in the supine position on the operating room table.   Following the induction of an adequate level of general anesthesia via the laryngeal mask airway, compression devices were placed on both lower extremities. The abdomen was prepped with ChloraPrep and draped as a sterile field. Local anesthetic was infiltrated, and the right upper quadrant abdominal wall incision was reopened sharply. The incision was extended medially several centimeters. Subcutaneous bleeders were carefully cauterized. The incision was carried down to the mass which was readily identified, dissected free circumferentially and excised. The specimen, which measured approximately 3 cm x 3 cm, was passed off the field and submitted for histopathologic evaluation. It should be noted that the mass extended down to, but not beneath the fascia. The wound was inspected and several bleeders cauterized. The wound was irrigated copiously with saline, inspected and found to be hemostatic. The surgical incision was closed with two layers of running 3-0 Vicryl suture followed by 4-0 Monocryl subcuticular suture to the skin. Additional local anesthetic was infiltrated and the incision was dressed with Dermabond. The patient was awakened from his general anesthetic and the laryngeal mask airway was removed. He was transferred to the stretcher and brought to the recovery room in stable condition, having tolerated the procedure well. At the conclusion of the procedure, all sponge counts, instrument counts and needle counts were reported as correct x2. Lisa Greenfield MD [id number] 
D:  02/28/2020 14:33 T:  02/28/2020 16:22 
JOB #:  [id number] CC:  MD Mauricio Daly MD Marykay Reese, KARAN

## 2020-03-03 NOTE — TELEPHONE ENCOUNTER
Patient's TSH elevated and will require Synthroid. Attempted to call patient to notify him but call went to . Message left to call office back when able.

## 2020-03-03 NOTE — PROGRESS NOTES
OPIC VISIT NOTE 
 
1055 Pt arrived at [facility] ambulatory and in no distress for C3 of Opdivo. Assessment completed, no new complaints at this time. PIV started in [facility]  with no difficulty. Positive blood return noted and labs drawn. Recent Results (from the past 12 hour(s)) CBC WITH AUTOMATED DIFF Collection Time: 03/03/20 11:08 AM  
Result Value Ref Range WBC 14.1 (H) 4.1 - 11.1 K/uL  
 RBC 4.57 4.10 - 5.70 M/uL  
 HGB 14.4 12.1 - 17.0 g/dL HCT 43.0 36.6 - 50.3 % MCV 94.1 80.0 - 99.0 FL  
 MCH 31.5 26.0 - 34.0 PG  
 MCHC 33.5 30.0 - 36.5 g/dL  
 RDW 12.9 11.5 - 14.5 % PLATELET 107 637 - 221 K/uL MPV 10.9 8.9 - 12.9 FL  
 NRBC 0.1 (H) 0  WBC ABSOLUTE NRBC 0.02 (H) 0.00 - 0.01 K/uL NEUTROPHILS 72 32 - 75 % LYMPHOCYTES 11 (L) 12 - 49 % MONOCYTES 9 5 - 13 % EOSINOPHILS 5 0 - 7 % BASOPHILS 1 0 - 1 % IMMATURE GRANULOCYTES 2 (H) 0.0 - 0.5 % ABS. NEUTROPHILS 10.3 (H) 1.8 - 8.0 K/UL  
 ABS. LYMPHOCYTES 1.6 0.8 - 3.5 K/UL  
 ABS. MONOCYTES 1.3 (H) 0.0 - 1.0 K/UL  
 ABS. EOSINOPHILS 0.7 (H) 0.0 - 0.4 K/UL  
 ABS. BASOPHILS 0.1 0.0 - 0.1 K/UL  
 ABS. IMM. GRANS. 0.2 (H) 0.00 - 0.04 K/UL  
 DF AUTOMATED METABOLIC PANEL, COMPREHENSIVE Collection Time: 03/03/20 11:08 AM  
Result Value Ref Range Sodium 139 136 - 145 mmol/L Potassium 4.0 3.5 - 5.1 mmol/L Chloride 105 97 - 108 mmol/L  
 CO2 28 21 - 32 mmol/L Anion gap 6 5 - 15 mmol/L Glucose 110 (H) 65 - 100 mg/dL BUN 21 (H) 6 - 20 MG/DL Creatinine 1.68 (H) 0.70 - 1.30 MG/DL  
 BUN/Creatinine ratio 13 12 - 20 GFR est AA 49 (L) >60 ml/min/1.73m2 GFR est non-AA 40 (L) >60 ml/min/1.73m2 Calcium 10.7 (H) 8.5 - 10.1 MG/DL Bilirubin, total 0.5 0.2 - 1.0 MG/DL  
 ALT (SGPT) 34 12 - 78 U/L  
 AST (SGOT) 27 15 - 37 U/L Alk. phosphatase 55 45 - 117 U/L Protein, total 7.1 6.4 - 8.2 g/dL Albumin 4.1 3.5 - 5.0 g/dL Globulin 3.0 2.0 - 4.0 g/dL A-G Ratio 1.4 1.1 - 2.2 TSH 3RD GENERATION Collection Time: 03/03/20 11:08 AM  
Result Value Ref Range TSH 4.42 (H) 0.36 - 3.74 uIU/mL Per MD office, treatment to be HELD today and pt will return Friday, March 6. 
 
Blood pressure 127/74, pulse 77, temperature 97.1 °F (36.2 °C), resp. rate 18. PIV removed per protocol. Shan Magaña D/DO'randal from [facility] ambulatory and in no distress. Next appointment is 3/6/20.

## 2020-03-03 NOTE — PROGRESS NOTES
Pharmacy Note- Immunotherapy Education    Merlyn Werner is a  68 y.o.male  diagnosed with melanoma here today for chemotherapy counseling and consent. Mr. Oskar mathews is being treated with ipilumumab and nivolumab. Reviewed schedule with patient, will receive combination every 21 days for 4 cycles, then will resume maintenance nivolumab every 4 weeks. Mr. Oskar mathews was provided information regarding the risks of immune-mediated adverse reactions secondary to ipilumumab and nivolumab that may require interruption/delay of treatment and possible use of corticosteroids. These reactions include, but are not limited to: colitis (diarrhea or severe abdominal pain); hepatitis (jaundice, severe nausea, or vomiting, easy bruising, and/or bleeding); hypophysitis (persistent or unusual headache, extreme weakness, dizziness/fainting, and/or vision changes); dermatitis (skin rash, mouth sores, skin blisters, and/or skin peeling); ocular toxicity (uveitis, iritis, and/or episcleritis); neuropathy (motor or sensory); hyper/hypothyroidism. Patient given ways to manage these side effects and when to contact office. [employer] Handout of medications provided to patient. Mr. Oskar mathews verbalized understanding of the information presented and all of the patient's questions were answered.     Steffen Robles, PharmD, BCPS, BCOP

## 2020-03-03 NOTE — TELEPHONE ENCOUNTER
Received call back from patient. Updated on lab level and need for thyroid replacement. Patient verbalized understanding and request Rx be sent to [facility].

## 2020-03-03 NOTE — PROGRESS NOTES
Reynold Mcconnell is a 68 y.o. male  Chief Complaint   Patient presents with    Follow-up     Melanoma with new \"lump\" by abdominal incision      1. Have you been to the ER, urgent care clinic since your last visit? Hospitalized since your last visit? No.    2. Have you seen or consulted any other health care providers outside of the [address] [place] [address] since your last visit? Include any pap smears or colon screening. No.    Pt states he just had surgery last week for his tumor.

## 2020-03-03 NOTE — PROGRESS NOTES
[facility] [place], [id number] [place] [address] Road, [address] [place] [address]  W: [phone]  F: [phone]    Reason for Visit:   Luciana Gauthier is a 68 y.o. male who is seen in the office for follow up of Melanoma with new recurrence     Treatment History:   · Bladder cancer dx in early 1990s  · BCC, left neck, s/p Electrodesiccation and Curettage, 06/07/12  · SCCi, right arm, s/p Electrodesiccation and Curettage, 12/2014  · Recurrent BCC, left lateral neck, Mohs, 02/24/15  · M Melanoma, mid abdomen, Breslow depth 0.65 mm, wide excision by Dr. Jamilah Mak, negative right axillary and right inguinal SLN biopsies, 12/20/17  · Recurrent melanoma 12/2019,  · CAT CAP and MRI head negative 12/19  · STRATA sent 12/13/19  · Opdivo 480 q 28 x 12; cycle 1 1/7/20, cycle 2 2/4/20  · Recurrence at surgical site 2/28/20  · Plan to add Ipilimumab -3/6/20 Ipi 3 mg/kg and opdivo 1 mg/kg q 3 weeks x 4 then opdivo    History of Present Illness: This is a 68 y.o. male with a history of BCC, SCC, and malignant melanoma who is seen in the office for evaluation of recurrent melanoma. Patient has a history of malignant melanoma of his abdomen in 2017. He sees surgical dermatology regularly due to his history. He noticed a lump on his right abdomen which grew quickly. Patient denies discoloration to the skin around the lump. He saw his PCP who ordered an [address] whichw as worrisome for malignancy. He was seen by Dr. Marcene Primrose who performed an excisional biopsy of the RUQ abdominal mass. Pathology shows melanoma. Patient was referred to us for evaluation and treatment. Patient has recently lost 10 lbs. He relates this to having food poisoning over Thanksgiving. We went over the results of the CT scan and the MRI of his head which were all negative. 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   All known melanoma has been resected at this point. No family history of melanoma. His only family history of cancer is a brain tumor in his grandmother when she was in her 80s. Patient did have bladder cancer in the early . He does continue to get cystoscopies. He is a former smoker. His colonoscopy is up to date. Patient is here today with his wife following surgery for recurrent lesion at RUQ surgical site. Patient had surgery on 20 with Dr. Cas Hensley and pathology was positive for melanoma. Discussed adding Ipilimumab to patient's regimen which patient is in agreement with. Past Medical History:   Diagnosis Date    Abdominal wall mass of right upper quadrant 2019    Atrial fibrillation (HCC)     BPH (benign prostatic hyperplasia)     Cancer (HCC) early     BLADDER    Hypercholesterolemia     Hypertension     Joint replaced     right knee    Skin cancer     Skin disorder 2018    Skin Cancer - melanoma across stomach, lymphnode involvement in Right armpit and Right groin    Sun-damaged skin       Past Surgical History:   Procedure Laterality Date    HX AFIB ABLATION      HX APPENDECTOMY      HX HERNIA REPAIR      HX KNEE REPLACEMENT      right knee    HX KNEE REPLACEMENT  2019    left knee    HX MALIGNANT SKIN LESION EXCISION      HX OTHER SURGICAL  2019    Excisional biopsy of right upper quadrant abdominal wall mass.  HX TONSILLECTOMY      HX UROLOGICAL      CYSTOSCOPY    SKIN TISSUE PROCEDURE UNLISTED      Melanoma across abdomen, Right armpit and groin lymphnode involvement      Social History     Tobacco Use    Smoking status: Former Smoker     Packs/day: 2.00     Years: 20.00     Pack years: 40.00     Last attempt to quit: 1988     Years since quittin.1    Smokeless tobacco: Never Used   Substance Use Topics    Alcohol use:  Yes     Alcohol/week: 2.0 standard drinks     Types: 2 Glasses of wine per week     Comment: 1 glass with dinner 2 nights per week       Family History   Problem Relation Age of Onset    Dementia Mother     Hypertension Father     Hypertension Sister      Current Outpatient Medications   Medication Sig    primidone (MYSOLINE) 50 mg tablet TAKE 1/2 A TABLET BY MOUTH AT BEDTIME FOR 1 WEEK, THEN 1 TABLET EVERY NIGHT AT BEDTIME    triamcinolone acetonide (KENALOG) 0.1 % ointment Apply  to affected area two (2) times a day. use thin layer    metoprolol succinate (TOPROL XL) 50 mg XL tablet Take 1.5 Tabs by mouth daily.  fenofibrate (LOFIBRA) 160 mg tablet TAKE 1 TABLET DAILY    simvastatin (ZOCOR) 40 mg tablet TAKE 1 TABLET NIGHTLY    ondansetron (ZOFRAN ODT) 4 mg disintegrating tablet Take 1 Tab by mouth every eight (8) hours as needed for Nausea.  tadalafil (CIALIS) 5 mg tablet Take 5 mg by mouth as needed.  tamsulosin (FLOMAX) 0.4 mg capsule Take 0.4 mg by mouth daily.  albuterol (PROVENTIL HFA, VENTOLIN HFA, PROAIR HFA) 90 mcg/actuation inhaler INHALE 2 PUFFS BY MOUTH EVERY 6 HOURS AS NEEDED FOR WHEEZING OR SHORTNESS OF BREATH    traMADol (ULTRAM) 50 mg tablet Take 50 mg by mouth daily.  diclofenac EC (VOLTAREN) 75 mg EC tablet Take 75 mg by mouth daily.  fluorouracil (EFUDEX) 5 % chemo cream Apply a thin layer twice daily for total of 4 weeks.  amLODIPine (NORVASC) 10 mg tablet Take  by mouth daily.  rivaroxaban (XARELTO) 20 mg tab tablet Take  by mouth daily.  finasteride (PROSCAR) 5 mg tablet Take 5 mg by mouth daily as needed.  lisinopril (PRINIVIL, ZESTRIL) 20 mg tablet Take 20 mg by mouth daily. No current facility-administered medications for this visit. Allergies   Allergen Reactions    Demerol [Meperidine] Other (comments)     Duplicate, delete    Demerol [Meperidine] Other (comments)     \"passed out\"      Review of Systems: A complete review of systems was obtained, negative except as described above.     Physical Exam:     Visit Vitals  /81 (BP 1 Location: Right arm, BP Patient Position: Sitting)   Pulse 77   Temp 97.8 °F (36.6 °C) (Oral)   Ht 5' 9\" (1.753 m)   Wt 186 lb 1.6 oz (84.4 kg)   SpO2 93%   BMI 27.48 kg/m²     ECOG PS: 0  General: No distress  Eyes: PERRL, anicteric sclerae  HENT: Atraumatic  Neck: Supple  MS: Normal gait and station  Skin: Well- healed scar on left jawline. Well-healed scar approximately 2 cm above navel approximately 5 cm in size. Incision site to right upper abdomen with surgical glue in place, c/d/i. Psych: Alert, oriented, appropriate affect, normal judgment/insight    Results:     Lab Results   Component Value Date/Time    WBC 6.9 02/04/2020 01:10 PM    HGB 13.1 02/04/2020 01:10 PM    HCT 38.5 02/04/2020 01:10 PM    PLATELET 001 40/37/5577 01:10 PM    MCV 94.1 02/04/2020 01:10 PM    ABS. NEUTROPHILS 4.5 02/04/2020 01:10 PM     Lab Results   Component Value Date/Time    Sodium 140 02/12/2020 08:36 AM    Potassium 4.5 02/12/2020 08:36 AM    Chloride 103 02/12/2020 08:36 AM    CO2 18 (L) 02/12/2020 08:36 AM    Glucose 96 02/12/2020 08:36 AM    BUN 15 02/12/2020 08:36 AM    Creatinine 1.19 02/12/2020 08:36 AM    GFR est AA 71 02/12/2020 08:36 AM    GFR est non-AA 61 02/12/2020 08:36 AM    Calcium 9.7 02/12/2020 08:36 AM     Lab Results   Component Value Date/Time    Bilirubin, total 0.5 02/04/2020 01:10 PM    ALT (SGPT) 31 02/04/2020 01:10 PM    AST (SGOT) 17 02/04/2020 01:10 PM    Alk. phosphatase 57 02/04/2020 01:10 PM    Protein, total 6.9 02/04/2020 01:10 PM    Albumin 4.1 02/04/2020 01:10 PM    Globulin 2.8 02/04/2020 01:10 PM     12/6/19 Abdominal wall mass, excisional biopsy: Melanoma,     MRI HEAD 12/16/19  IMPRESSION:  1. No evidence of intracranial metastatic disease. 2. Mild chronic white matter disease with no acute process. CAT CAP 12/16/19  IMPRESSION:  1. No evidence of metastatic disease within the chest, abdomen, or pelvis.  There  are presumed postsurgical changes in the subcutaneous fat anterior and inferior  to the right rib cage, in the region of the previously demonstrated mass by  ultrasound. 2. Incidentally noted are 2 sub-5 mm right lung pulmonary nodules. 3. Evidence of old healed granulomatous disease, with calcified granulomas in  the lung, liver, and spleen. 4. Diffuse fatty infiltration of the liver. 5. Small left renal cysts. 6. Diverticulosis of the colon. 7. Left posterior bladder diverticulum. 8. Enlarged prostate. 9. Atherosclerosis of the coronary arteries and aorta. Records reviewed and summarized above. Pathology report(s) reviewed above. Radiology report(s) reviewed above. Assessment:   1) Melanoma of the right upper abdomen  First diagnosed with melanoma of his mid abdomen 12/2017. Breslow depth 0.65 mm. S/p wide excision by Dr. Mel Loo, negative right axillary and right inguinal SLN biopsies  BRAF testing both tumors pending     Now with melanoma of his right upper abdomen s/p excision with clear margins by Dr. Rachael Michel 12/6/19  CT scan and the MRI of head all negative. 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 Treatment with adjuvant Opdivo 480 mg IV q. 28 days started 1/7/2020    Patient presented 2/24/20 with new lesion on RUQ of surgical site. Evaluated by Dr. Rachael Michel and underwent excision of lesion 2/28/20. Pathology was positive for melanoma. Margins were unable to be examined. I have asked pathology to see if there were lymphocytes infiltrating his tumor. Discussed adding Ipilimumab to patient's regimen. Discussed the increased risk for toxicity with Ipilimumab. Also discussed with patient that we should likely continue Delight Lied for a total of 24 months instead of 12 months which patient is in agreement with. 2) History of BCC  Left neck, s/p Electrodesiccation and Curettage, 06/07/12.  Recurrent BCC, left lateral neck, Mohs, 02/24/15  Right upper chest, 11/26/19, cleared with shave biopsy  Right jawline, 11/26/19, cleared with shave biopsy but narrowly - patient to use 5-Fu BID x3 weeks per dermatology  Patient is followed closely by [facility] for skin checks    3) History of SCC  Right arm, s/p Electrodesiccation and Curettage, 12/2014  Patient is followed closely by [facility] for skin checks    4) History of bladder cancer  Diagnosed in the early 1990s  He continues to follow up with urology and has regular cystoscopies  His most recent cystoscopy showed an area of concern in his bladder. Plan is for a repeat cystoscopy in 6 weeks per patient    5) History of A.fib  S/p ablation  Management per PCP/cardiology    6) HTN  BP OK today at 139/81  Management per PCP    7) Fatty liver  CT ABD demonstrated diffuse fatty infiltration of liver  AST/ALT have been normal  Labs today pending     8) ASCVD  Management per PCP     9) Diverticulosis  Management per PCP     10) Granulomatous disease  CT C/A/P demonstrated evidence of old healed granulomatous disease, with calcified granulomas in the lung, liver, and spleen     11) Two sub-5 mm right lung pulmonary nodules   Will follow with CT's     12) Healthcare maintenance  Patient has received his influenza vaccine  He has received his pneumococcal vaccines  He is up to date with his Tdap vaccine    Plan:     · Ipilimumab 3mg/kg every 3 weeks x 4 Cycles to start 3/6/20  · Opdivo 1 mg/kg every 3 weeks while on Ipilumumab then every 4 weeks for a total of 2 years  · Follow up in office in 10 days     I appreciate the opportunity to participate in Mr. Marcelina carranza.

## 2020-03-03 NOTE — ANESTHESIA POSTPROCEDURE EVALUATION
Post-Anesthesia Evaluation and Assessment Patient: Mitesh Zayas MRN: [id number]  SSN: [id number] YOB: 1950  Age: 68 y.o. Sex: male I have evaluated the patient and they are stable and ready for discharge from the PACU. Cardiovascular Function/Vital Signs Visit Vitals /74 Pulse 72 Temp 36.6 °C (97.9 °F) Resp 14 Ht 5' 9\" (1.753 m) Wt 84.8 kg (187 lb) SpO2 97% BMI 27.62 kg/m² Patient is status post General anesthesia for Procedure(s): EXCISIONAL BIOPSY ABDOMINAL WALL MASS. Nausea/Vomiting: None Postoperative hydration reviewed and adequate. Pain: 
Pain Scale 1: Numeric (0 - 10) (02/28/20 1555) Pain Intensity 1: 0 (02/28/20 1555) Managed Neurological Status:  
Neuro (WDL): Within Defined Limits (02/28/20 1500) Neuro LUE Motor Response: Purposeful (02/28/20 1500) LLE Motor Response: Purposeful (02/28/20 1500) RUE Motor Response: Purposeful (02/28/20 1500) RLE Motor Response: Purposeful (02/28/20 1500) At baseline Mental Status, Level of Consciousness: Alert and  oriented to person, place, and time Pulmonary Status:  
O2 Device: Room air (02/28/20 1555) Adequate oxygenation and airway patent Complications related to anesthesia: None Post-anesthesia assessment completed. No concerns Signed By: Roxi Gu MD   
 March 3, 2020 Procedure(s): EXCISIONAL BIOPSY ABDOMINAL WALL MASS. general 
 
<BSHSIANPOST> Vitals Value Taken Time /74 2/28/2020  3:00 PM  
Temp 36.6 °C (97.9 °F) 2/28/2020  2:28 PM  
Pulse 72 2/28/2020  3:04 PM  
Resp 13 2/28/2020  3:04 PM  
SpO2 95 % 2/28/2020  3:04 PM

## 2020-03-06 NOTE — PROGRESS NOTES
[facility]patient [facility]fusion [facility] - Chemotherapy Progress Note    1806 Pt admit to [facility] for Opdivo/Yervoy ambulatory in stable condition. Assessment completed. No new concerns voiced. PIV established with positive blood return. Only required labs drawn per Thony Talley, NP can use CBC/CMP from 3-3-20. Line flushed and connected to NS KVO. Education provided on treatment, possible side effects discussed at length with patient and patients spouse, both state understanding. Patient Vitals for the past 12 hrs:   Temp Pulse Resp BP SpO2   03/06/20 1350 97.9 °F (36.6 °C) 76 16 135/80 --   03/06/20 0953 97.4 °F (36.3 °C) 75 16 121/82 99 %       Medications:  Medications Administered     0.9% sodium chloride infusion     Admin Date  03/06/2020 Action  New Bag Dose  25 mL/hr Rate  25 mL/hr Route  IntraVENous Administered By  Jael Welch RN          acetaminophen (TYLENOL) tablet 650 mg     Admin Date  03/06/2020 Action  Given Dose  650 mg Route  Oral Administered By  Jael Welch RN          ipilimumab (YERVOY) 253 mg in 0.9% sodium chloride 100 mL, overfill volume 10 mL infusion     Admin Date  03/06/2020 Action  New Bag Dose  253 mg Rate  107.1 mL/hr Route  IntraVENous Administered By  Jael Welch RN          nivolumab (OPDIVO) 84 mg in 0.9% sodium chloride 50 mL, overfill volume 5 mL IVPB     Admin Date  03/06/2020 Action  New Bag Dose  84 mg Rate  126.8 mL/hr Route  IntraVENous Administered By  Jael Welch RN                  1350 Pt tolerated treatment well. PIV maintained positive blood return throughout treatment, flushed with positive blood return at conclusion and removed. D/c home ambulatory in no distress. Pt aware of next OPI appointment scheduled for 03/26/20 for pre chemo labs.  Treatment day is 03-27-20 at 10 am    Recent Results (from the past 12 hour(s))   T4, FREE    Collection Time: 03/06/20 10:09 AM   Result Value Ref Range    T4, Free 1.3 0.8 - 1.5 NG/DL   CORTISOL Collection Time: 03/06/20 10:09 AM   Result Value Ref Range    Cortisol, random 11.5 ug/dL     Please see other results when available in CC.

## 2020-03-09 NOTE — TELEPHONE ENCOUNTER
I called patient and spoke with him. He states his cancer has come back and he was not sure about everything that was needing to be done lab wise that wasn't already going to be done. I advised patient that Nishant Jacobson wanted to see him in May so we will move his appointment to May and hopefully by then everything will be taken care of. Patient agreed and his appointment was moved to 5/11/2020 @0900. He thanked me for the call.

## 2020-03-09 NOTE — TELEPHONE ENCOUNTER
Patient want's a call back regarding his appointment. He's having a lot of blood work taken this month because he's battling cancer and he's going to a lot of specialist so he had some questions.

## 2020-03-13 NOTE — PROGRESS NOTES
[facility] [place] at [address], [address] [facility] [address], [place]: [phone]  F: [phone]    Reason for Visit:   Monico Velez is a 68 y.o. male who is seen in the office for follow up of Melanoma with new recurrence     Treatment History:   · Bladder cancer dx in early 1990s  · BCC, left neck, s/p Electrodesiccation and Curettage, 06/07/12  · SCCi, right arm, s/p Electrodesiccation and Curettage, 12/2014  · Recurrent BCC, left lateral neck, Mohs, 02/24/15  · M Melanoma, mid abdomen, Breslow depth 0.65 mm, wide excision by Dr. Colonel Negron, negative right axillary and right inguinal SLN biopsies, 12/20/17  · Recurrent melanoma 12/2019,  · CAT CAP and MRI head negative 12/19  · STRATA sent 12/13/19  · Opdivo 480 q 28 x 12; cycle 1 1/7/20, cycle 2 2/4/20  · Recurrence at surgical site 2/28/20   · Added Ipilimumab 3 mg/kg 3/6/20 with opdivo 1 mg/kg q 3 weeks x 4 then opdivo    History of Present Illness: This is a 68 y.o. male with a history of BCC, SCC, and malignant melanoma who is seen in the office for evaluation of recurrent melanoma. Patient has a history of malignant melanoma of his abdomen in 2017. He sees surgical dermatology regularly due to his history. He noticed a lump on his right abdomen which grew quickly. Patient denies discoloration to the skin around the lump. He saw his PCP who ordered an [address] whichw as worrisome for malignancy. He was seen by Dr. Blaine Salmeron who performed an excisional biopsy of the RUQ abdominal mass. Pathology shows melanoma. Patient was referred to us for evaluation and treatment. Patient has recently lost 10 lbs. He relates this to having food poisoning over Thanksgiving. We went over the results of the CT scan and the MRI of his head which were all negative. 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   All known melanoma has been resected at this point. No family history of melanoma. His only family history of cancer is a brain tumor in his grandmother when she was in her 80s. Patient did have bladder cancer in the early 1990s. He does continue to get cystoscopies. He is a former smoker. His colonoscopy is up to date. Patient is here today for 1 week follow up after starting Yervoy. Patient reports lower back pain over the last week. His pain does not radiate to the front. He is taking 500 mg Tylenol and tramadol which does provide relief. Patient reports having trouble getting out of bed in the morning. He also has a rash on his lateral lower legs bilaterally which developed 3 days ago. He does have pruritis. He is using lotion given to him by his dermatologist. Patient also feels his voice is hoarse since starting Yervoy. He denies throat pain. Patient does report constipation. Past Medical History:   Diagnosis Date    Abdominal wall mass of right upper quadrant 12/2/2019    Atrial fibrillation (HCC)     BPH (benign prostatic hyperplasia)     Cancer (HCC) early 1990s    BLADDER    Hypercholesterolemia     Hypertension     Joint replaced 2011    right knee    Skin cancer     Skin disorder 2018    Skin Cancer - melanoma across stomach, lymphnode involvement in Right armpit and Right groin    Sun-damaged skin       Past Surgical History:   Procedure Laterality Date    HX AFIB ABLATION  2018    HX APPENDECTOMY      HX HERNIA REPAIR  1990s    HX KNEE REPLACEMENT  2010    right knee    HX KNEE REPLACEMENT  04/02/2019    left knee    HX MALIGNANT SKIN LESION EXCISION      HX OTHER SURGICAL  12/06/2019    Excisional biopsy of right upper quadrant abdominal wall mass.     HX TONSILLECTOMY      HX UROLOGICAL      CYSTOSCOPY    SKIN TISSUE PROCEDURE UNLISTED  2018    Melanoma across abdomen, Right armpit and groin lymphnode involvement      Social History     Tobacco Use    Smoking status: Former Smoker     Packs/day: 2.00     Years: 20.00     Pack years: 40.00     Last attempt to quit: 1988     Years since quittin.1    Smokeless tobacco: Never Used   Substance Use Topics    Alcohol use: Yes     Alcohol/week: 2.0 standard drinks     Types: 2 Glasses of wine per week     Comment: 1 glass with dinner 2 nights per week       Family History   Problem Relation Age of Onset    Dementia Mother     Hypertension Father     Hypertension Sister      Current Outpatient Medications   Medication Sig    levothyroxine (SYNTHROID) 75 mcg tablet TAKE 1 TABLET BY MOUTH DAILY BEFORE BREAKFAST    primidone (MYSOLINE) 50 mg tablet TAKE 1/2 A TABLET BY MOUTH AT BEDTIME FOR 1 WEEK, THEN 1 TABLET EVERY NIGHT AT BEDTIME    triamcinolone acetonide (KENALOG) 0.1 % ointment Apply  to affected area two (2) times a day. use thin layer    metoprolol succinate (TOPROL XL) 50 mg XL tablet Take 1.5 Tabs by mouth daily.  fenofibrate (LOFIBRA) 160 mg tablet TAKE 1 TABLET DAILY    simvastatin (ZOCOR) 40 mg tablet TAKE 1 TABLET NIGHTLY    tadalafil (CIALIS) 5 mg tablet Take 5 mg by mouth as needed.  tamsulosin (FLOMAX) 0.4 mg capsule Take 0.4 mg by mouth daily.  traMADol (ULTRAM) 50 mg tablet Take 50 mg by mouth daily.  diclofenac EC (VOLTAREN) 75 mg EC tablet Take 75 mg by mouth daily.  fluorouracil (EFUDEX) 5 % chemo cream Apply a thin layer twice daily for total of 4 weeks.  amLODIPine (NORVASC) 10 mg tablet Take  by mouth daily.  rivaroxaban (XARELTO) 20 mg tab tablet Take  by mouth daily.  lisinopril (PRINIVIL, ZESTRIL) 20 mg tablet Take 20 mg by mouth daily.  ondansetron (ZOFRAN ODT) 4 mg disintegrating tablet Take 1 Tab by mouth every eight (8) hours as needed for Nausea.     albuterol (PROVENTIL HFA, VENTOLIN HFA, PROAIR HFA) 90 mcg/actuation inhaler INHALE 2 PUFFS BY MOUTH EVERY 6 HOURS AS NEEDED FOR WHEEZING OR SHORTNESS OF BREATH    finasteride (PROSCAR) 5 mg tablet Take 5 mg by mouth daily as needed. No current facility-administered medications for this visit. Allergies   Allergen Reactions    Demerol [Meperidine] Other (comments)     Duplicate, delete    Demerol [Meperidine] Other (comments)     \"passed out\"      Review of Systems: A complete review of systems was obtained, negative except as described above. Physical Exam:     Visit Vitals  /79 (BP 1 Location: Left arm, BP Patient Position: Sitting)   Pulse 76   Temp 97.9 °F (36.6 °C) (Oral)   Ht 5' 9\" (1.753 m)   Wt 184 lb 14.4 oz (83.9 kg)   SpO2 94%   BMI 27.30 kg/m²     ECOG PS: 0  General: No distress  Eyes: PERRL, anicteric sclerae  HENT: Atraumatic  Neck: Supple  MS: Normal gait and station  Skin: Well- healed scar on left jawline. Well-healed scar approximately 2 cm above navel approximately 5 cm in size. Incision site to right upper abdomen with surgical glue in place, c/d/i. Red rash to bilateral lateral lower legs L > R.  Psych: Alert, oriented, appropriate affect, normal judgment/insight    Results:     Lab Results   Component Value Date/Time    WBC 14.1 (H) 03/03/2020 11:08 AM    HGB 14.4 03/03/2020 11:08 AM    HCT 43.0 03/03/2020 11:08 AM    PLATELET 312 54/78/0990 11:08 AM    MCV 94.1 03/03/2020 11:08 AM    ABS. NEUTROPHILS 10.3 (H) 03/03/2020 11:08 AM     Lab Results   Component Value Date/Time    Sodium 139 03/03/2020 11:08 AM    Potassium 4.0 03/03/2020 11:08 AM    Chloride 105 03/03/2020 11:08 AM    CO2 28 03/03/2020 11:08 AM    Glucose 110 (H) 03/03/2020 11:08 AM    BUN 21 (H) 03/03/2020 11:08 AM    Creatinine 1.68 (H) 03/03/2020 11:08 AM    GFR est AA 49 (L) 03/03/2020 11:08 AM    GFR est non-AA 40 (L) 03/03/2020 11:08 AM    Calcium 10.7 (H) 03/03/2020 11:08 AM     Lab Results   Component Value Date/Time    Bilirubin, total 0.5 03/03/2020 11:08 AM    ALT (SGPT) 34 03/03/2020 11:08 AM    AST (SGOT) 27 03/03/2020 11:08 AM    Alk.  phosphatase 55 03/03/2020 11:08 AM    Protein, total 7.1 03/03/2020 11:08 AM    Albumin 4.1 03/03/2020 11:08 AM    Globulin 3.0 03/03/2020 11:08 AM     12/6/19 Abdominal wall mass, excisional biopsy: Melanoma,     MRI HEAD 12/16/19  IMPRESSION:  1. No evidence of intracranial metastatic disease. 2. Mild chronic white matter disease with no acute process. CAT CAP 12/16/19  IMPRESSION:  1. No evidence of metastatic disease within the chest, abdomen, or pelvis. There  are presumed postsurgical changes in the subcutaneous fat anterior and inferior  to the right rib cage, in the region of the previously demonstrated mass by  ultrasound. 2. Incidentally noted are 2 sub-5 mm right lung pulmonary nodules. 3. Evidence of old healed granulomatous disease, with calcified granulomas in  the lung, liver, and spleen. 4. Diffuse fatty infiltration of the liver. 5. Small left renal cysts. 6. Diverticulosis of the colon. 7. Left posterior bladder diverticulum. 8. Enlarged prostate. 9. Atherosclerosis of the coronary arteries and aorta. Records reviewed and summarized above. Pathology report(s) reviewed above. Radiology report(s) reviewed above. Assessment:   1) Melanoma of the right upper abdomen  First diagnosed with melanoma of his mid abdomen 12/2017. Breslow depth 0.65 mm. S/p wide excision by Dr. Jamilah Mak, negative right axillary and right inguinal SLN biopsies  BRAF testing both tumors pending     Now with melanoma of his right upper abdomen s/p excision with clear margins by Dr. Marcene Primrose 12/6/19  CT scan and the MRI of head all negative. 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 Treatment with adjuvant Opdivo 480 mg IV q. 28 days started 1/7/2020    Patient presented 2/24/20 with new lesion on RUQ of surgical site. Evaluated by Dr. Marcene Primrose and underwent excision of lesion 2/28/20. Pathology was positive for melanoma. Margins were unable to be examined.  I have asked pathology to see if there were lymphocytes infiltrating his tumor. Discussed adding Ipilimumab to patient's regimen. Discussed the increased risk for toxicity with Ipilimumab. Also discussed with patient that we should likely continue [address] [place] [address] [facility] for a total of 24 months instead of 12 months which patient is in agreement with. 2) History of BCC  Left neck, s/p Electrodesiccation and Curettage, 06/07/12. Recurrent BCC, left lateral neck, Mohs, 02/24/15  Right upper chest, 11/26/19, cleared with shave biopsy  Right jawline, 11/26/19, cleared with shave biopsy but narrowly - patient to use 5-Fu BID x3 weeks per dermatology  Patient is followed closely by [facility] for skin checks    3) History of SCC  Right arm, s/p Electrodesiccation and Curettage, 12/2014  Patient is followed closely by [facility] for skin checks    4) History of bladder cancer  Diagnosed in the early 1990s  He continues to follow up with urology and has regular cystoscopies  His most recent cystoscopy showed an area of concern in his bladder.  Plan is for a repeat cystoscopy in 6 weeks per patient    5) History of A.fib  S/p ablation  Management per PCP/cardiology    6) HTN  BP OK today at 128/79  Management per PCP    7) Fatty liver  CT ABD demonstrated diffuse fatty infiltration of liver  AST/ALT have been normal     8) ASCVD  Management per PCP     9) Diverticulosis  Management per PCP     10) Granulomatous disease  CT C/A/P demonstrated evidence of old healed granulomatous disease, with calcified granulomas in the lung, liver, and spleen     11) Two sub-5 mm right lung pulmonary nodules   Will follow with CT's     12) Hypothyroidism  Likely secondary to Opdivo  Started on Synthroid 75 mg daily    13) Low back pain  New onset  Partially relieved with Tylenol and tramadol  Not tender to palpation  Will obtain MRI lumbar spine    14) Rash lateral lower legs  Likely dermatitis from Miladis  Advised patient continue lotion  Will re-evaluate on Tuesday and if not better, will start Prednisone 60 mg PO    15) Healthcare maintenance  Patient has received his influenza vaccine  He has received his pneumococcal vaccines  He is up to date with his Tdap vaccine    Plan:     · Ipilimumab 3mg/kg every 3 weeks x 4 Cycles to start 3/6/20  · Opdivo 1 mg/kg every 3 weeks while on Ipilumumab then every 4 weeks for a total of 2 years  · CBC with diff, CMP, and TSH prior to each treatment  · Synthroid 75 mg daily  · MRI lumbar spine today  · Follow up in office on 3/17/20 to reevaluate leg rash - if not better, will start Prednisone 60 mg daily    I appreciate the opportunity to participate in Mr. Dez carranza.

## 2020-03-13 NOTE — PROGRESS NOTES
Gerry Mcdonald is a 68 y.o. male  Chief Complaint   Patient presents with    Follow-up     Melanoma with new recurrence      1. Have you been to the ER, urgent care clinic since your last visit? Hospitalized since your last visit? No.    2. Have you seen or consulted any other health care providers outside of the [address] [place] [address] since your last visit? Include any pap smears or colon screening. Yes, pt went to see Jack Garcia      Pt states he has a lot of pain in his lower back when he moves around or bends.

## 2020-03-16 NOTE — TELEPHONE ENCOUNTER
Patient has been feeling dizzy and weak. Advised fluids and for patient to call Dr. Rosy Ramirez office. Patient wants to know if Herszunilda Chávez wants him to cut back on the metoprolol or continue taking the 75mg since patient states both are managing BP meds. Please advise.

## 2020-03-16 NOTE — TELEPHONE ENCOUNTER
Called patient to confirm appointment, complete COVID-19 screening, and inform patient that the waiting room is closed and to contact the office once they have arrived. Patient verbalized understanding and denied any further questions/concerns.

## 2020-03-16 NOTE — PROGRESS NOTES
1. Have you been to the ER, urgent care clinic since your last visit? Hospitalized since your last visit? No 
 
2. Have you seen or consulted any other health care providers outside of the [address] [place] [address] since your last visit? Include any pap smears or colon screening.  No

## 2020-03-16 NOTE — TELEPHONE ENCOUNTER
He should continue med for now and monitor BP and pulse at home. Check daily and write down. Call with BP readings over the next week. I also strongly encourage that he follow-up with his cardiologist as I am unsure when he last saw him.

## 2020-03-16 NOTE — TELEPHONE ENCOUNTER
Tried to reach patient, he told me he would have to call me back in a few minutes. Per Dr. Cammie Recinos note Mr. Chivo Skinner needs to call his cardiologist- Dr. Bettie Smith in regards to his low BP since he is the one managing that.

## 2020-03-16 NOTE — PROGRESS NOTES
Kota Marley is a 68 y.o. male who returns for post-operative evaluation. Mr. Trent Jaffe is s/p excisional biopsy of a right upper quadrant abdominal wall mass on 2/28/2020. Discharged to home that day. No redness or drainage at surgical site. However, he has noted a mass at the lateral aspect of the incision. No fevers or chills. No nausea or vomitting. Mr. Trent Jaffe has also been experiencing lower back pain. Furthermore, Mr. Trent Jaffe has developed a rash on his lower extremities. MRI Lumbar Spine - 3/13/2020 - Diffuse, enhancing marrow infiltration, concerning for metastases or myeloma. Bilateral neural foraminal stenosis L5-S1. Past Medical History:  
Diagnosis Date  Abdominal wall mass of right upper quadrant 12/2/2019  Atrial fibrillation ([address]  BPH (benign prostatic hyperplasia)  Cancer ([facility] early 36s BLADDER  
 Hypercholesterolemia  Hypertension  Joint replaced 2011  
 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  
Procedure Laterality Date  HX AFIB ABLATION  2018  HX [address]EN[address]CTOMY [address]  HX KNEE REPLACEMENT  2010  
 right knee  HX KNEE REPLACEMENT  04/02/2019  
 left knee  HX MALIGNANT SKIN LESION EXCISION    
 HX OTHER SURGICAL  12/06/2019 Excisional biopsy of right upper quadrant abdominal wall mass.  HX TONSILLECTOMY  HX UROLOGICAL CYSTOSCOPY  SKIN TISSUE PROCEDURE UNLISTED  2018 Melanoma across abdomen, Right armpit and groin lymphnode involvement Family History Problem Relation Age of Onset  Dementia Mother  Hypertension Father  Hypertension Sister Social History Socioeconomic History  Marital status:  Spouse name: Not on file  Number of children: Not on file  Years of education: Not on file  Highest education level: Not on file Tobacco Use  Smoking status: Former Smoker Packs/day: 2.00 Years: 20.00 Pack years: 40.00 Last attempt to quit: 1988 Years since quittin.2  Smokeless tobacco: Never Used Substance and Sexual Activity  Alcohol use: Yes Alcohol/week: 2.0 standard drinks Types: 2 Glasses of wine per week Comment: 1 glass with dinner 2 nights per week  Drug use: No  
 
Review of systems negative except as noted. Review of Systems Constitutional: Positive for malaise/fatigue. Gastrointestinal: Negative for abdominal pain, nausea and vomiting. Musculoskeletal: Positive for back pain. Denies pain at surgical site. Physical Exam 
Vitals signs reviewed. Constitutional:   
   General: He is not in acute distress. Appearance: Normal appearance. Abdominal:  
   General: A surgical scar is present. There is no distension. Palpations: Abdomen is soft. Tenderness: There is no abdominal tenderness. There is no guarding or rebound. Musculoskeletal: Normal range of motion. Skin: 
 
    
   Comments: Well healed surgical incision. At the lateral aspect of the incision, there appears to be a small hematoma. Neurological:  
   General: No focal deficit present. Mental Status: He is alert. ASSESSMENT and PLAN 
I reviewed the operative findings and pathology with Mr. Sanam Vargas today and reassured him that he is doing well thus far. Also explained to him that he appears to have a small hematoma at the lateral aspect of the incision which will resolve. Follow up with Dr. Peter Dowling as scheduled. In terms of his low BP, asked Mr. Sanam Vargas to follow up with his cardiologist - Dr. Natasha Smith. Will see in two more weeks or earlier if need be.  
 
 
CC: KARAN Reed MD

## 2020-03-16 NOTE — TELEPHONE ENCOUNTER
Patient notified  that he was experiencing SOB. Patient called to further inquire. Patient reports he has had SOB for 6 months to 1 year. Reports SOB has increased over the last few months when walking up stairs. Patient denies fever/chills. Confirmed appt for tomorrow.

## 2020-03-17 PROBLEM — E03.2 HYPOTHYROIDISM DUE TO MEDICATION: Status: ACTIVE | Noted: 2020-01-01

## 2020-03-17 NOTE — PROGRESS NOTES
Aria Patino is a 68 y.o. male Chief Complaint Patient presents with  Follow-up Melanoma 1. Have you been to the ER, urgent care clinic since your last visit? Hospitalized since your last visit? No 
 
2. Have you seen or consulted any other health care providers outside of the [address] [place] [address] since your last visit? Include any pap smears or colon screening.  No

## 2020-03-17 NOTE — LETTER
3/19/20 Patient: Henrik Humphrey YOB: 1950 Date of Visit: 3/17/2020 Janine Mckeon NP 
[address] [place] [id number] VIA In Basket Dear Janine Mckeon NP, Thank you for referring Mr. Sindi Aguilera to [address] [place] [address] for evaluation. My notes for this consultation are attached. If you have questions, please do not hesitate to call me. I look forward to following your patient along with you. Sincerely, Rose Gill NP

## 2020-03-17 NOTE — PROGRESS NOTES
[facility] at [facility] 
[address], [address], Dena Bajwa W: [phone]  F: [phone] Reason for Visit:  
Luciana Gauthier is a 68 y.o. male who is seen in the office for follow up of lower extremity skin rash and back pain. Treatment History: · Bladder cancer dx in early 1990s · BCC, left neck, s/p Electrodesiccation and Curettage, 06/07/12 · SCCi, right arm, s/p Electrodesiccation and Curettage, 12/2014 · Recurrent BCC, left lateral neck, Mohs, 02/24/15 · M Melanoma, mid abdomen, Breslow depth 0.65 mm, wide excision by Dr. Jamilah Mak, negative right axillary and right inguinal SLN biopsies, 12/20/17 · Recurrent melanoma 12/2019, 
· CAT CAP and MRI head negative 12/19 · STRATA sent 12/13/19 · Opdivo 480 q 28 x 12; cycle 1 1/7/20, cycle 2 2/4/20 · Recurrence at surgical site 2/28/20 · Added Ipilimumab 3 mg/kg 3/6/20 with opdivo 1 mg/kg q 3 weeks x 4 then opdivo History of Present Illness: This is a 68 y.o. male with a history of BCC, SCC, and malignant melanoma who is seen in the office for evaluation of recurrent melanoma. Patient has a history of malignant melanoma of his abdomen in 2017. He sees surgical dermatology regularly due to his history. He noticed a lump on his right abdomen which grew quickly. Patient denies discoloration to the skin around the lump. He saw his PCP who ordered an [address] whichw as worrisome for malignancy. He was seen by Dr. Marcene Primrose who performed an excisional biopsy of the RUQ abdominal mass. Pathology shows melanoma. Patient was referred to us for evaluation and treatment. Patient has recently lost 10 lbs. He relates this to having food poisoning over Thanksgiving. We went over the results of the CT scan and the MRI of his head which were all negative.   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 All known melanoma has been resected at this point. Patient is here today for 1 week follow up for lower extremity skin rash and back pain. Rash noted to have improved significantly but dry, slightly red skin still present. Denies itching. Patient has been using Aveeno lotion, and his wife picked up cortisone cream today. Patient's back pain has improved some. He is taking tramadol; he is unsure how much. He reports decreased cognition; he feels he has to think hard to get his thoughts out. Patient also reports dizziness and SOB. His dizziness started 2 days ago. His SOB has been present for 6 months to a year, but has increased since starting treatment in January. He denies fever/chills. Denies cough. Reports he is not drinking enough fluids. He has been monitoring his BP and HR at home. His last OV with cardiology was last week. Patient has picked up his Synthroid Rx and is taking as prescribed. Patient is here today with his supportive wife Past Medical History:  
Diagnosis Date  Abdominal wall mass of right upper quadrant 12/2/2019  Atrial fibrillation ([address]  BPH (benign prostatic hyperplasia)  Cancer ([address] early 36s BLADDER  
 Hypercholesterolemia  Hypertension  Joint replaced 2011  
 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  
Procedure Laterality Date  HX AFIB ABLATION  2018  HX APPENDECTOMY [address]  HX KNEE REPLACEMENT  2010  
 right knee  HX KNEE REPLACEMENT  04/02/2019  
 left knee  HX MALIGNANT SKIN LESION EXCISION    
 HX OTHER SURGICAL  12/06/2019 Excisional biopsy of right upper quadrant abdominal wall mass.  HX TONSILLECTOMY  HX UROLOGICAL CYSTOSCOPY  SKIN TISSUE PROCEDURE UNLISTED  2018 Melanoma across abdomen, Right armpit and groin lymphnode involvement Social History Tobacco Use  Smoking status: Former Smoker Packs/day: 2.00 Years: 20.00 Pack years: 40.00 Last attempt to quit: 1988 Years since quittin.2  Smokeless tobacco: Never Used Substance Use Topics  Alcohol use: Yes Alcohol/week: 2.0 standard drinks Types: 2 Glasses of wine per week Comment: 1 glass with dinner 2 nights per week Family History Problem Relation Age of Onset  Dementia Mother  Hypertension Father  Hypertension Sister Current Outpatient Medications Medication Sig  levothyroxine (SYNTHROID) 75 mcg tablet TAKE 1 TABLET BY MOUTH DAILY BEFORE BREAKFAST  primidone (MYSOLINE) 50 mg tablet TAKE 1/2 A TABLET BY MOUTH AT BEDTIME FOR 1 WEEK, THEN 1 TABLET EVERY NIGHT AT BEDTIME  triamcinolone acetonide (KENALOG) 0.1 % ointment Apply  to affected area two (2) times a day. use thin layer  metoprolol succinate (TOPROL XL) 50 mg XL tablet Take 1.5 Tabs by mouth daily.  fenofibrate (LOFIBRA) 160 mg tablet TAKE 1 TABLET DAILY  simvastatin (ZOCOR) 40 mg tablet TAKE 1 TABLET NIGHTLY  ondansetron (ZOFRAN ODT) 4 mg disintegrating tablet Take 1 Tab by mouth every eight (8) hours as needed for Nausea.  tadalafil (CIALIS) 5 mg tablet Take 5 mg by mouth as needed.  tamsulosin (FLOMAX) 0.4 mg capsule Take 0.4 mg by mouth daily.  albuterol (PROVENTIL HFA, VENTOLIN HFA, PROAIR HFA) 90 mcg/actuation inhaler INHALE 2 PUFFS BY MOUTH EVERY 6 HOURS AS NEEDED FOR WHEEZING OR SHORTNESS OF BREATH  
 traMADol (ULTRAM) 50 mg tablet Take 50 mg by mouth daily.  diclofenac EC (VOLTAREN) 75 mg EC tablet Take 75 mg by mouth daily.  fluorouracil (EFUDEX) 5 % chemo cream Apply a thin layer twice daily for total of 4 weeks.  amLODIPine (NORVASC) 10 mg tablet Take  by mouth daily.  rivaroxaban (XARELTO) 20 mg tab tablet Take  by mouth daily.  finasteride (PROSCAR) 5 mg tablet Take 5 mg by mouth daily as needed.  lisinopril (PRINIVIL, ZESTRIL) 20 mg tablet Take 20 mg by mouth daily. No current facility-administered medications for this visit. Allergies Allergen Reactions  Demerol [Meperidine] Other (comments) Duplicate, delete  Demerol [Meperidine] Other (comments) \"passed out\" Review of Systems: A complete review of systems was obtained, negative except as described above. Physical Exam:  
 
Visit Vitals BP (!) 151/92 Pulse 78 Temp 97.3 °F (36.3 °C) (Oral) Resp 20 Ht 5' 9\" (1.753 m) SpO2 93% BMI 26.73 kg/m² ECOG PS: 0 General: No distress Eyes: PERRL, anicteric sclerae HENT: Atraumatic Neck: Supple MS: Normal gait and station Resp: Diminished breath sounds Cardiovascular: Normal rate and rhythm. No peripheral edema Skin: Well- healed scar on left jawline. Well-healed scar approximately 2 cm above navel approximately 5 cm in size. Incision site to right upper abdomen with surgical glue in place, c/d/i. Red rash to bilateral lateral lower legs L > R improved from last weeks OV, dry skin to legs Psych: Alert, oriented, appropriate affect, normal judgment/insight Results:  
 
Lab Results Component Value Date/Time WBC 14.1 (H) 03/03/2020 11:08 AM  
 HGB 14.4 03/03/2020 11:08 AM  
 HCT 43.0 03/03/2020 11:08 AM  
 PLATELET 049 63/83/7552 11:08 AM  
 MCV 94.1 03/03/2020 11:08 AM  
 ABS. NEUTROPHILS 10.3 (H) 03/03/2020 11:08 AM  
 
Lab Results Component Value Date/Time  Sodium 139 03/03/2020 11:08 AM  
 Potassium 4.0 03/03/2020 11:08 AM  
 Chloride 105 03/03/2020 11:08 AM  
 CO2 28 03/03/2020 11:08 AM  
 Glucose 110 (H) 03/03/2020 11:08 AM  
 BUN 21 (H) 03/03/2020 11:08 AM  
 Creatinine 1.68 (H) 03/03/2020 11:08 AM  
 GFR est AA 49 (L) 03/03/2020 11:08 AM  
 GFR est non-AA 40 (L) 03/03/2020 11:08 AM  
 Calcium 10.7 (H) 03/03/2020 11:08 AM  
 
Lab Results Component Value Date/Time Bilirubin, total 0.5 03/03/2020 11:08 AM  
 ALT (SGPT) 34 03/03/2020 11:08 AM  
 AST (SGOT) 27 03/03/2020 11:08 AM  
 Alk. phosphatase 55 03/03/2020 11:08 AM  
 Protein, total 7.1 03/03/2020 11:08 AM  
 Albumin 4.1 03/03/2020 11:08 AM  
 Globulin 3.0 03/03/2020 11:08 AM  
 
12/6/19 Abdominal wall mass, excisional biopsy: Melanoma, MRI HEAD 12/16/19 IMPRESSION: 
1. No evidence of intracranial metastatic disease. 2. Mild chronic white matter disease with no acute process. CAT CAP 12/16/19 IMPRESSION: 
1. No evidence of metastatic disease within the chest, abdomen, or pelvis. There 
are presumed postsurgical changes in the subcutaneous fat anterior and inferior 
to the right rib cage, in the region of the previously demonstrated mass by 
ultrasound. 2. Incidentally noted are 2 sub-5 mm right lung pulmonary nodules. 3. Evidence of old healed granulomatous disease, with calcified granulomas in 
the lung, liver, and spleen. 4. Diffuse fatty infiltration of the liver. 5. Small left renal cysts. 6. Diverticulosis of the colon. 7. Left posterior bladder diverticulum. 8. Enlarged prostate. 9. Atherosclerosis of the coronary arteries and aorta. MRI LUMB SPINE 3/13/20 IMPRESSION:  
1. Diffuse, enhancing marrow infiltration, concerning for metastases or myeloma. 2. Bilateral neural foraminal stenosis L5-S1. More mild stenoses as described Above. Records reviewed and summarized above. Pathology report(s) reviewed above. Radiology report(s) reviewed above. 
  
Assessment:  
1) Melanoma of the right upper abdomen First diagnosed with melanoma of his mid abdomen 12/2017. Breslow depth 0.65 mm. S/p wide excision by Dr. Gaye Limon, negative right axillary and right inguinal SLN biopsies BRAF testing both tumors pending 
  
Now with melanoma of his right upper abdomen s/p excision with clear margins by Dr. Delmar Petty CT scan and the MRI of head all negative. 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 
  
Originally treated with single agent Opdivo. However, patient presented 2/24/20 with new lesion on RUQ of surgical site. Evaluated by Dr. Kristian West and underwent excision of lesion 2/28/20. Pathology was positive for melanoma. Margins were unable to be examined. I have asked pathology to see if there were lymphocytes infiltrating his tumor. Miladis added to treatment plan 3/3/20. Treatment with:  
Ipilimumab 3mg/kg every 3 weeks x 4 Cycles Opdivo 1 mg/kg every 3 weeks while on Ipilumumab then every 4 weeks for a total of 2 years   
2) History of BCC Left neck, s/p Electrodesiccation and Curettage, 06/07/12. Recurrent BCC, left lateral neck, Mohs, 02/24/15 Right upper chest, 11/26/19, cleared with shave biopsy Right jawline, 11/26/19, cleared with shave biopsy but narrowly - patient to use 5-Fu BID x3 weeks per dermatology Patient is followed closely by [facility] for skin checks   
3) History of SCC Right arm, s/p Electrodesiccation and Curettage, 12/2014 Patient is followed closely by [facility] for skin checks   
4) History of bladder cancer Diagnosed in the early 1990s He continues to follow up with urology and has regular cystoscopies His most recent cystoscopy showed an area of concern in his bladder. Plan is for a repeat cystoscopy in 6 weeks per patient   
5) History of A.fib 
S/p ablation Patient was seen by cardiology 3/2020 Management per PCP/cardiology   
6) HTN 
BP OK today at 151/92 Patient has been monitoring his BP and HR at home due to dizziness Management per PCP 
  
7) Fatty liver CT ABD demonstrated diffuse fatty infiltration of liver AST/ALT have been normal 
  
8) ASCVD Management per PCP 
  
9) Diverticulosis Management per PCP 
  
10) Granulomatous disease CT C/A/P demonstrated evidence of old healed granulomatous disease, with calcified granulomas in the lung, liver, and spleen 
  
11) Two sub-5 mm right lung pulmonary nodules Will follow with CT's 
  
12) Hypothyroidism Likely secondary to [facility] On Synthroid 75 mg daily 
  
13) Low back pain New onset, improving Relieved with Tylenol and tramadol Not tender to palpation MRI lumbar spine demonstrates diffuse, enhancing marrow infiltration, concerning for metastases or myeloma. Will obtain gammopathy evaluation to rule out myeloma; if abnormal, may consider BMBx. Recommend patient take Tylenol 1 g TID PRN for pain. If pain still present 1 hour after taking Tylenol, advised patient take 1/2 tablet tramadol. Patient's wife will assist him with his medication regimen. 
  
14) Rash lateral lower legs Improving Likely dermatitis from [facility] Advised patient continue Aveeno lotion. OK to use OTC cortisone cream if itching occurs. 
  
15) Altered cognition May be secondary to tramadol use. Recommend patient take Tylenol 1 g TID PRN for pain. If pain still present 1 hour after taking Tylenol, advised patient take 1/2 tablet tramadol. Advised wife help patient administer tramadol, so he does not over-use by mistake. Will check CMP today to rule out metabolic causes Calcium high on last labs 16) SOB Without cough or fever Patient's walking pulse ox obtained in office. O2 saturations 93-95% while walking Breath sounds are diminished on exam.  
CXR today demonstrates area of patchy opacification in the lateral left lower lobe which may be related to atelectasis or airspace disease. Will give Levaquin 750 mg daily x 7 days Will check CBC today 17) Dizziness May be secondary to # 16 and dehydration. Recommend patient increase his fluid intake 
  
Pt seen today in conjunction with [facility] Rabago Covering for Dr Trisha Leblanc who is on vacation [facility]  
I appreciate the opportunity to participate in Mr. Kristine carranza.

## 2020-03-18 PROBLEM — E83.52 HYPERCALCEMIA: Status: ACTIVE | Noted: 2020-01-01

## 2020-03-18 NOTE — TELEPHONE ENCOUNTER
Called patient (verified ID x2) to discuss critical lab result and need for IV fluid and Zometa infusion. Advised patient of appt in [facility] at 11 am, no visitors allowed back. Patient verbalized understanding but requests caller also discuss with his wife who is at the grocery store. Attempted to call patient's wife but wife left cell phone at home. Patient believes she will be back around 10 am, will call back at that time.

## 2020-03-18 NOTE — TELEPHONE ENCOUNTER
Call placed to patient's wife. Advised wife of elevated calcium and need for IVF and zometa infusion. Updated wife of appt at 11 am at [facility] which is currently not allowing visitors. Wife verbalized understanding.

## 2020-03-18 NOTE — PROGRESS NOTES
[address] [facility] @ [facility] VISIT NOTE     7334 Patient arrives for Hydration therapy & Zometa Infusion without acute problems. Please see connect care for complete assessment and education provided. Vital signs stable throughout and prior to discharge, Pt. Tolerated treatment well and discharged without incident. Patient/spouse is aware of next [facility] appointment on 03/26/2020. Patient will have next labs drawn @ next [facility] appointment per [place] NP. Appointment card given to patient/spouse. Medications Verified by Kerri Barlow RN & Sarah Lao RN via [employer]:  1. NS 1,000 ml IV Bolus  2.  Zometa 4mg IVP     VITAL SIGNS  Patient Vitals for the past 12 hrs:   Temp Pulse Resp BP SpO2   03/18/20 1253 97.6 °F (36.4 °C) 69 16 157/81 92 %   03/18/20 1216 97.8 °F (36.6 °C) 70 16 143/77 --   03/18/20 1121 97.3 °F (36.3 °C) 79 16 154/76 94 %

## 2020-03-24 NOTE — TELEPHONE ENCOUNTER
Received return call from patient. Patient reports his confusion since receiving Zometa and IVF last week is somewhat better. He does report continued low back pain. He is taking Tylenol during the day and Tylenol PM at night time which helps decrease the pain. He is also using a heating pad. He is taking frequent naps throughout the day and reports he is not sleeping well at night. Advised he try to decrease naps throughout the day in order to sleep better at night. Patient agrees. Reports dry mouth - encouraged fluids. Confirmed appt with Dr. Haim Gordon on Friday and encouraged patient to call office if he needs to be seen sooner. Patient verbalized understanding.

## 2020-03-26 NOTE — PROGRESS NOTES
[facility] Lab Visit: 
 
 
 
2728  Pt arrived ambulatory to [facility] in stable condition, for lab draw. Labs drawn peripherally from Right AC arm and sent for processing. Tolerated well. Visit Vitals BP 96/62 (BP 1 Location: Right arm) Pulse 94 Temp 97.4 °F (36.3 °C) Resp 18 SpO2 96% 1130 No new concerns voiced. D/cd home ambulatory in no distress. Pt aware of next [facility] appointment scheduled. Labs available in CC once resulted.

## 2020-03-27 NOTE — PROGRESS NOTES
[facility] Progress Note    Date: 2020    Name: Wanda Marx    MRN: [id number]         : 1950    Mr. Jared Maldonado Arrived via wheelchair and in no distress for cycle 2 Opdivo/Yervoy. Assessment was completed. Pt weak, SOB, having diarrhea w/blood in stool occasionally. Upon assessment, pt BP very low. Contacted Dr. Ben Lizarraga who will see pt at bedside. Peripheral IV established with positive blood return. Labs were drawn on yesterday and reviewed but praful addt'l labs due to pt symptoms and sent for processing. Line flushed and capped. Chemotherapy Flowsheet 3/27/2020   Cycle C2   Date 3/27/2020   Drug / Regimen Opdivo/Yervoy   Dosage -   Time Up -   Time Down -   Pre Hydration -   Post Hydration -   Pre Meds -   Notes HELD     Mr. Patricia's vitals were reviewed. Patient Vitals for the past 12 hrs:   Temp Pulse Resp BP SpO2   20 1408 -- 94 18 120/65 --   20 0956 97.4 °F (36.3 °C) (!) 110 22 (!) 75/52 95 %     Lab results were obtained and reviewed. Recent Results (from the past 12 hour(s))   TYPE & SCREEN    Collection Time: 20 10:32 AM   Result Value Ref Range    Crossmatch Expiration 2020     ABO/Rh(D) Priscilla Hare POSITIVE     Antibody screen NEG    CBC WITH AUTOMATED DIFF    Collection Time: 20 10:32 AM   Result Value Ref Range    WBC 4.1 4.1 - 11.1 K/uL    RBC 3.18 (L) 4.10 - 5.70 M/uL    HGB 9.8 (L) 12.1 - 17.0 g/dL    HCT 28.2 (L) 36.6 - 50.3 %    MCV 88.7 80.0 - 99.0 FL    MCH 30.8 26.0 - 34.0 PG    MCHC 34.8 30.0 - 36.5 g/dL    RDW 13.2 11.5 - 14.5 %    PLATELET 062 (L) 671 - 400 K/uL    MPV 10.4 8.9 - 12.9 FL    NRBC 0.5 (H) 0  WBC    ABSOLUTE NRBC 0.02 (H) 0.00 - 0.01 K/uL    NEUTROPHILS 52 32 - 75 %    BAND NEUTROPHILS 12 (H) 0 - 6 %    LYMPHOCYTES 21 12 - 49 %    MONOCYTES 14 (H) 5 - 13 %    EOSINOPHILS 1 0 - 7 %    BASOPHILS 0 0 - 1 %    IMMATURE GRANULOCYTES 0 %    ABS. NEUTROPHILS 2.6 1.8 - 8.0 K/UL    ABS. LYMPHOCYTES 0.9 0.8 - 3.5 K/UL    ABS.  MONOCYTES 0.6 0.0 - 1.0 K/UL    ABS. EOSINOPHILS 0.0 0.0 - 0.4 K/UL    ABS. BASOPHILS 0.0 0.0 - 0.1 K/UL    ABS. IMM. GRANS. 0.0 K/UL    DF MANUAL      RBC COMMENTS NORMOCYTIC, NORMOCHROMIC     METABOLIC PANEL, COMPREHENSIVE    Collection Time: 03/27/20 10:32 AM   Result Value Ref Range    Sodium 134 (L) 136 - 145 mmol/L    Potassium 3.6 3.5 - 5.1 mmol/L    Chloride 105 97 - 108 mmol/L    CO2 16 (L) 21 - 32 mmol/L    Anion gap 13 5 - 15 mmol/L    Glucose 175 (H) 65 - 100 mg/dL    BUN 55 (H) 6 - 20 MG/DL    Creatinine 2.73 (H) 0.70 - 1.30 MG/DL    BUN/Creatinine ratio 20 12 - 20      GFR est AA 28 (L) >60 ml/min/1.73m2    GFR est non-AA 23 (L) >60 ml/min/1.73m2    Calcium 7.7 (L) 8.5 - 10.1 MG/DL    Bilirubin, total 0.9 0.2 - 1.0 MG/DL    ALT (SGPT) 33 12 - 78 U/L    AST (SGOT) 70 (H) 15 - 37 U/L    Alk. phosphatase 101 45 - 117 U/L    Protein, total 5.3 (L) 6.4 - 8.2 g/dL    Albumin 2.0 (L) 3.5 - 5.0 g/dL    Globulin 3.3 2.0 - 4.0 g/dL    A-G Ratio 0.6 (L) 1.1 - 2.2         Per verbal order from Dr. Holly Gandhi, pt to receive 2L of NS and CHEMOTHERAPY HELD. Medications Administered     saline peripheral flush soln 10 mL     Admin Date  03/27/2020 Action  Given Dose  10 mL Route  InterCATHeter Administered By  Timothy Dubon, RN          sodium chloride 0.9 % bolus infusion 1,000 mL     Admin Date  03/27/2020 Action  New Bag Dose  1000 mL Rate  1,000 mL/hr Route  IntraVENous Administered By  Timothy Dubon, RN           Admin Date  03/27/2020 Action  New Bag Dose  1000 mL Rate  500 mL/hr Route  IntraVENous Administered By  Timothy Dubon, RN              Mr. Kirstin Villegas tolerated treatment well. Verbalized he felt better after infusions. Line flushed and removed per protocol. Patient was discharged from [place] [address] in stable condition at 1420.      Future Appointments   Date Time Provider Katelynn Chun   3/30/2020  9:40 AM MD Niles Garza   3/30/2020  2:00 PM [address] [place] [address] 4/1/2020  2:30 PM KARAN Paiz   4/17/2020 11:00 AM G2 [facility]ERO FASTRACK [facility]   5/8/2020 11:00 AM G2 [facility]ERO [facility]STRACK [facility]   6/90/5214  0:08 AM Noble Guo NP [address] [place] [address]   5/26/2020  9:40 AM Nancy Le MD [facility]   5/29/2020 11:00 AM G2 [facility]ERO [facility]   6/19/2020 11:00 AM G2 WERO FASTRACK [facility] [address] [facility], RN  March 27, 2020

## 2020-03-27 NOTE — PROGRESS NOTES
[facility] [place] [address], [address]  W: [phone]  F: [phone]    Reason for Visit:   Nia Butler is a 68 y.o. male who is seen in the office for follow up of Melanoma with new recurrence     Treatment History:   · Bladder cancer dx in early 1990s  · BCC, left neck, s/p Electrodesiccation and Curettage, 06/07/12  · SCCi, right arm, s/p Electrodesiccation and Curettage, 12/2014  · Recurrent BCC, left lateral neck, Mohs, 02/24/15  · M Melanoma, mid abdomen, Breslow depth 0.65 mm, wide excision by Dr. Paloma Finch, negative right axillary and right inguinal SLN biopsies, 12/20/17  · Recurrent melanoma 12/2019,  · CAT CAP and MRI head negative 12/19  · STRATA sent 12/13/19  · Opdivo 480 q 28 x 12; cycle 1 1/7/20, cycle 2 2/4/20  · Recurrence at surgical site 2/28/20  · Plan to add Ipilimumab -3/6/20 Ipi 3 mg/kg and opdivo 1 mg/kg q 3 weeks x 4 then opdivo    History of Present Illness: This is a 68 y.o. male with a history of BCC, SCC, and malignant melanoma who is seen in the office for evaluation of recurrent melanoma. Patient has a history of malignant melanoma of his abdomen in 2017. He sees surgical dermatology regularly due to his history. He noticed a lump on his right abdomen which grew quickly. Patient denies discoloration to the skin around the lump. He saw his PCP who ordered an [address]w as worrisome for malignancy. He was seen by Dr. Yola Rodriguez who performed an excisional biopsy of the RUQ abdominal mass. Pathology shows melanoma. Patient was referred to us for evaluation and treatment. Patient has recently lost 10 lbs. He relates this to having food poisoning over Thanksgiving. We went over the results of the CT scan and the MRI of his head which were all negative. 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   All known melanoma has been resected at this point. No family history of melanoma. His only family history of cancer is a brain tumor in his grandmother when she was in her 80s. Patient did have bladder cancer in the early 1990s. He does continue to get cystoscopies. He is a former smoker. His colonoscopy is up to date. Patient is here today with his wife the patient has been having diarrhea now for about a week and a half. His stool has been very loose. It is like water. His urine is a little bit darker. His creatinine is creeping up. We will go ahead with fluids. He has been on antibiotics. His stool has a very foul odor to it. We will check a stool for C. difficile. We will also get him on some Questran to help. We will with fluids he will hold his blood pressure pills. He was seen in [facility] today. Past Medical History:   Diagnosis Date    Abdominal wall mass of right upper quadrant 12/2/2019    Atrial fibrillation (HCC)     BPH (benign prostatic hyperplasia)     Cancer (HCC) early 1990s    BLADDER    Hypercholesterolemia     Hypertension     Joint replaced 2011    right knee    Skin cancer     Skin disorder 2018    Skin Cancer - melanoma across stomach, lymphnode involvement in Right armpit and Right groin    Sun-damaged skin       Past Surgical History:   Procedure Laterality Date    HX AFIB ABLATION  2018    HX APPENDECTOMY      HX HERNIA REPAIR  1990s    HX KNEE REPLACEMENT  2010    right knee    HX KNEE REPLACEMENT  04/02/2019    left knee    HX MALIGNANT SKIN LESION EXCISION      HX OTHER SURGICAL  12/06/2019    Excisional biopsy of right upper quadrant abdominal wall mass.     HX TONSILLECTOMY      HX UROLOGICAL      CYSTOSCOPY    SKIN TISSUE PROCEDURE UNLISTED  2018    Melanoma across abdomen, Right armpit and groin lymphnode involvement      Social History     Tobacco Use    Smoking status: Former Smoker     Packs/day: 2.00     Years: 20.00     Pack years: 40.00 Last attempt to quit: 1988     Years since quittin.2    Smokeless tobacco: Never Used   Substance Use Topics    Alcohol use: Yes     Alcohol/week: 2.0 standard drinks     Types: 2 Glasses of wine per week     Comment: 1 glass with dinner 2 nights per week       Family History   Problem Relation Age of Onset    Dementia Mother     Hypertension Father     Hypertension Sister      Current Outpatient Medications   Medication Sig    levoFLOXacin (LEVAQUIN) 750 mg tablet Take 1 Tab by mouth daily.  levothyroxine (SYNTHROID) 75 mcg tablet TAKE 1 TABLET BY MOUTH DAILY BEFORE BREAKFAST    primidone (MYSOLINE) 50 mg tablet TAKE 1/2 A TABLET BY MOUTH AT BEDTIME FOR 1 WEEK, THEN 1 TABLET EVERY NIGHT AT BEDTIME    triamcinolone acetonide (KENALOG) 0.1 % ointment Apply  to affected area two (2) times a day. use thin layer    metoprolol succinate (TOPROL XL) 50 mg XL tablet Take 1.5 Tabs by mouth daily.  fenofibrate (LOFIBRA) 160 mg tablet TAKE 1 TABLET DAILY    simvastatin (ZOCOR) 40 mg tablet TAKE 1 TABLET NIGHTLY    ondansetron (ZOFRAN ODT) 4 mg disintegrating tablet Take 1 Tab by mouth every eight (8) hours as needed for Nausea.  tadalafil (CIALIS) 5 mg tablet Take 5 mg by mouth as needed.  tamsulosin (FLOMAX) 0.4 mg capsule Take 0.4 mg by mouth daily.  albuterol (PROVENTIL HFA, VENTOLIN HFA, PROAIR HFA) 90 mcg/actuation inhaler INHALE 2 PUFFS BY MOUTH EVERY 6 HOURS AS NEEDED FOR WHEEZING OR SHORTNESS OF BREATH    traMADol (ULTRAM) 50 mg tablet Take 50 mg by mouth daily.  diclofenac EC (VOLTAREN) 75 mg EC tablet Take 75 mg by mouth daily.  fluorouracil (EFUDEX) 5 % chemo cream Apply a thin layer twice daily for total of 4 weeks.  amLODIPine (NORVASC) 10 mg tablet Take  by mouth daily.  rivaroxaban (XARELTO) 20 mg tab tablet Take  by mouth daily.  finasteride (PROSCAR) 5 mg tablet Take 5 mg by mouth daily as needed.     lisinopril (PRINIVIL, ZESTRIL) 20 mg tablet Take 20 mg by mouth daily. Current Facility-Administered Medications   Medication Dose Route Frequency    sodium chloride 0.9 % bolus infusion 1,000 mL  1,000 mL IntraVENous ONCE    saline peripheral flush soln 10 mL  10 mL InterCATHeter PRN      Allergies   Allergen Reactions    Demerol [Meperidine] Other (comments)     Duplicate, delete    Demerol [Meperidine] Other (comments)     \"passed out\"      Review of Systems: A complete review of systems was obtained, negative except as described above. Physical Exam:     Visit Vitals  Temp 97.4 °F (36.3 °C)     ECOG PS: 0  General: No distress  Eyes: PERRL, anicteric sclerae  HENT: Atraumatic  Skin: Well- healed scar on left is hyperpigmented consistent with recurrent disease. Well-healed scar approximately 2 cm above navel approximately 5 cm in size. Incision site to right upper abdomen with surgical glue in place, c/d/i. Psych: Alert, oriented, appropriate affect, normal judgment/insight    Results:     Lab Results   Component Value Date/Time    WBC 4.1 03/27/2020 10:32 AM    HGB 9.8 (L) 03/27/2020 10:32 AM    HCT 28.2 (L) 03/27/2020 10:32 AM    PLATELET 362 (L) 03/13/3861 10:32 AM    MCV 88.7 03/27/2020 10:32 AM    ABS. NEUTROPHILS PENDING 03/27/2020 10:32 AM     Lab Results   Component Value Date/Time    Sodium 135 (L) 03/26/2020 11:23 AM    Potassium 3.5 03/26/2020 11:23 AM    Chloride 105 03/26/2020 11:23 AM    CO2 19 (L) 03/26/2020 11:23 AM    Glucose 163 (H) 03/26/2020 11:23 AM    BUN 42 (H) 03/26/2020 11:23 AM    Creatinine 2.39 (H) 03/26/2020 11:23 AM    GFR est AA 33 (L) 03/26/2020 11:23 AM    GFR est non-AA 27 (L) 03/26/2020 11:23 AM    Calcium 8.8 03/26/2020 11:23 AM     Lab Results   Component Value Date/Time    Bilirubin, total 0.7 03/26/2020 11:23 AM    ALT (SGPT) 37 03/26/2020 11:23 AM    AST (SGOT) 68 (H) 03/26/2020 11:23 AM    Alk.  phosphatase 94 03/26/2020 11:23 AM    Protein, total 6.1 (L) 03/26/2020 11:23 AM    Albumin 2.1 (L) 03/26/2020 11:23 AM Globulin 4.0 03/26/2020 11:23 AM     12/6/19 Abdominal wall mass, excisional biopsy: Melanoma,     MRI HEAD 12/16/19  IMPRESSION:  1. No evidence of intracranial metastatic disease. 2. Mild chronic white matter disease with no acute process. CAT CAP 12/16/19  IMPRESSION:  1. No evidence of metastatic disease within the chest, abdomen, or pelvis. There  are presumed postsurgical changes in the subcutaneous fat anterior and inferior  to the right rib cage, in the region of the previously demonstrated mass by  ultrasound. 2. Incidentally noted are 2 sub-5 mm right lung pulmonary nodules. 3. Evidence of old healed granulomatous disease, with calcified granulomas in  the lung, liver, and spleen. 4. Diffuse fatty infiltration of the liver. 5. Small left renal cysts. 6. Diverticulosis of the colon. 7. Left posterior bladder diverticulum. 8. Enlarged prostate. 9. Atherosclerosis of the coronary arteries and aorta. Records reviewed and summarized above. Pathology report(s) reviewed above. Radiology report(s) reviewed above. Assessment:   1) Melanoma of the right upper abdomen  First diagnosed with melanoma of his mid abdomen 12/2017. Breslow depth 0.65 mm. S/p wide excision by Dr. Alpa Amor, negative right axillary and right inguinal SLN biopsies  BRAF testing both tumors pending     Now with melanoma of his right upper abdomen s/p excision with clear margins by Dr. Jose Raymundo 12/6/19  CT scan and the MRI of head all negative. 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 Treatment with adjuvant Opdivo 480 mg IV q. 28 days started 1/7/2020    Patient presented 2/24/20 with new lesion on RUQ of surgical site. Evaluated by Dr. Jose Raymundo and underwent excision of lesion 2/28/20. Pathology was positive for melanoma. Margins were unable to be examined.  I have asked pathology to see if there were lymphocytes infiltrating his tumor. Discussed adding Ipilimumab to patient's regimen. Discussed the increased risk for toxicity with Ipilimumab. Also discussed with patient that we should likely continue Early Grosser for a total of 24 months instead of 12 months which patient is in agreement with. We will add prednisone assuming that the diarrhea is related to [facility]. We will first see differential and put him on Questran assuming that there may be C. difficile. He will get fluids today for his hypotension and we will see him back on Monday. If he continues to do poorly he will return to the emergency room for admission. 2) History of BCC  Left neck, s/p Electrodesiccation and Curettage, 06/07/12. Recurrent BCC, left lateral neck, Mohs, 02/24/15  Right upper chest, 11/26/19, cleared with shave biopsy  Right jawline, 11/26/19, cleared with shave biopsy but narrowly - patient to use 5-Fu BID x3 weeks per dermatology  Patient is followed closely by [facility] for skin checks    3) History of SCC  Right arm, s/p Electrodesiccation and Curettage, 12/2014  Patient is followed closely by [facility] for skin checks    4) History of bladder cancer  Diagnosed in the early 1990s  He continues to follow up with urology and has regular cystoscopies  His most recent cystoscopy showed an area of concern in his bladder. Plan is for a repeat cystoscopy in 6 weeks per patient    5) History of A.fib  S/p ablation  Management per PCP/cardiology    6) HTN  BP OK today at 72 systolic we will hold his blood pressure pills for the weekend.   Management per PCP    7) Fatty liver  CT ABD demonstrated diffuse fatty infiltration of liver  AST/ALT have been normal  Labs today pending     8) ASCVD  Management per PCP     9) Diverticulosis  Management per PCP     10) Granulomatous disease  CT C/A/P demonstrated evidence of old healed granulomatous disease, with calcified granulomas in the lung, liver, and spleen     11) Two sub-5 mm right lung pulmonary nodules   Will follow with CT's     12) Healthcare maintenance  Patient has received his influenza vaccine  He has received his pneumococcal vaccines  He is up to date with his Tdap vaccine    13) diarrhea  Patient with new onset of watery diarrhea for the last week and a half. We will put him on prednisone 60 mg a day assuming that it may be related to [facility]. We will go ahead with fluid hydration today as low blood pressure. We will also check a stool for C. difficile for completeness. And we will put him on Questran in the off chance that it may be secondary to C. difficile. Plan:     · The patient knows that if he does not improve significantly he is to go to the emergency room as he will probably need to be getting IV fluids for hydration and admitted. I appreciate the opportunity to participate in Doris Candido Trejo tere.

## 2020-03-30 PROBLEM — R19.7 DIARRHEA: Status: ACTIVE | Noted: 2020-01-01

## 2020-03-30 PROBLEM — E86.0 DEHYDRATION: Status: ACTIVE | Noted: 2020-01-01

## 2020-03-30 PROBLEM — D64.9 ANEMIA: Status: ACTIVE | Noted: 2020-01-01

## 2020-03-30 PROBLEM — R06.02 SHORTNESS OF BREATH: Status: ACTIVE | Noted: 2020-01-01

## 2020-03-30 PROBLEM — C43.9 MELANOMA (HCC): Status: ACTIVE | Noted: 2020-01-01

## 2020-03-30 NOTE — ED NOTES
Attempted to call report to 2N- refusing to take report until Dr. Umm Torre clarifies whether or not he wants COVID19 testing despite okay from ID. Charge RN notified.

## 2020-03-30 NOTE — PROGRESS NOTES
TRANSFER - IN REPORT: 
 
Verbal report received from [address] [place] [address],[address] (name) on [facility]  being received from ED (unit) for routine progression of care Report consisted of patients Situation, Background, Assessment and  
Recommendations(SBAR). Information from the following report(s) SBAR, Kardex, ED Summary, [facility] and Recent Results was reviewed with the receiving nurse. Opportunity for questions and clarification was provided. Assessment completed upon patients arrival to unit and care assumed.

## 2020-03-30 NOTE — PROGRESS NOTES
Transition of Care Plan: · RUR 21%, GLOS:  Not Assigned  LOS:  0 
· Dx:  Diarrhea, Shortness of Breath · Consulted Hospitalist, Gastroenterology Consult, Oncology Consult · Oncology - Melanoma of the right upper abdomen · Care Management will assist with discharge planning/transition of care · Anticipate return to home when medically stable Isolation:  Enteric Reason for Admission:   Recent diagnosis of Pneumonia, started on antibiotics, c/o shortness of breath and blood in stool RUR Score:     21% PCP: First and Last name:   Lashawn Muller NP Name of Practice: [facility] Are you a current patient: Yes/No: Yes Approximate date of last visit:   2/11/20 Do you (patient/family) have any concerns for transition/discharge? To be determined, Prior Level of Function:  Lived with spouse in the home Plan for utilizing home health:   TBD Current Advanced Directive/Advance Care Plan:   
Advance Care Plan on file, Joselin Mo [phone] primary agent Care Management Interventions PCP Verified by CM: Yes(PCP:   Lashawn Muller NP/[facility]) Last Visit to PCP: 02/11/20 Palliative Care Criteria Met (RRAT>21 & CHF Dx)?: No 
Transition of Care Consult (CM Consult): Discharge Planning(ACO  RUR  21%   Dx:  Diarrhea, Shortness of Breath) MyChart Signup: No 
Discharge Durable Medical Equipment: No 
Health Maintenance Reviewed: Yes Physical Therapy Consult: No 
Occupational Therapy Consult: No 
Speech Therapy Consult: No 
Current Support Network: Lives with Spouse(Lives with spouse, ACO, per chart review good support network.) NOK:  Spouse Joselinsue Mo [phone] Jhoan Saldaña is a 68year old male to [facility] with shortness of breath and blood in stool. Recent diagnosis of pneumonia and started on antibiotics. Chemo on hold, IVF Bolus at [facility].   ED workup labs, EKG, CXR - no significant change, slight left hemidiaphragm with slight left basilar atelectasis. Occult Blood - positive. Hospitalist consulted for admission. Luigi Curtis RN, BSN, [place] 
ED Care Management [phone]

## 2020-03-30 NOTE — PROGRESS NOTES
Bedside shift change report given to Leah Blanco (oncoming nurse) by Mariely Mantilla (offgoing nurse). Report included the following information SBAR, Kardex, MAR and Recent Results.

## 2020-03-30 NOTE — PROGRESS NOTES
IAdmission Medication Reconciliation: 
 
Information obtained from: patient (by phone), electronic records RxQuery data available¹:  YES Summary: Patient is a limited historian but was able to provide some pertinent clarifications. At the end of our conversation he said, \"I hope I gave you the right information. \"  I also reviewed office visit notes from his [facility] providers: Acquanetta Spatz (oncology), Shanae Watts (dermatology), and [facility] (primary care). Medications added: none Medications deleted: fluorouracil (Effudex), levofloxacin, ondansetron, tramadol Dose changes: metoprolol succinate 50 mg daily (from 75 mg daily) >> dose decreased due to hypotension Inpatient orders were reviewed and updated per Dr. Gricelda Lui. ¹RxQuery pharmacy benefit data reflects medications processed through the patient's insurance, however this data does NOT capture whether the medication is currently being taken by the patient. Allergies:  Demerol [meperidine] Significant PMH/Disease States:  
Past Medical History:  
Diagnosis Date Abdominal wall mass of right upper quadrant 12/2/2019 Atrial fibrillation ([facility] [address] BPH (benign prostatic hyperplasia) Cancer [facility]) early 36s BLADDER Hypercholesterolemia Hypertension Joint replaced 2011  
 right knee Skin cancer Skin disorder 2018 Skin Cancer - melanoma across stomach, lymphnode involvement in Right armpit and Right groin Sun-damaged skin Chief Complaint for this Admission: Chief Complaint Patient presents with Melena Shortness of Breath Prior to Admission Medications:  
Prior to Admission Medications Prescriptions Last Dose Informant Patient Reported? Taking? amLODIPine (NORVASC) 10 mg tablet   Yes Yes Sig: Take 10 mg by mouth daily. cholestyramine (Questran) 4 gram packet   Yes Yes Sig: Take 1 Packet by mouth three (3) times daily (with meals). diclofenac EC (VOLTAREN) 75 mg EC tablet   Yes Yes Sig: Take 75 mg by mouth two (2) times daily as needed. fenofibrate (LOFIBRA) 160 mg tablet   No Yes Sig: TAKE 1 TABLET DAILY  
finasteride (PROSCAR) 5 mg tablet   Yes Yes Sig: Take 5 mg by mouth daily. levothyroxine (SYNTHROID) 75 mcg tablet   No Yes Sig: TAKE 1 TABLET BY MOUTH DAILY BEFORE BREAKFAST  
lisinopril (PRINIVIL, ZESTRIL) 20 mg tablet   Yes Yes Sig: Take 20 mg by mouth daily. metoprolol succinate (TOPROL-XL) 50 mg XL tablet   Yes Yes Sig: Take 50 mg by mouth daily. predniSONE (DELTASONE) 20 mg tablet   No Yes Sig: Take 60 mg by mouth daily for 10 days. primidone (MYSOLINE) 50 mg tablet   Yes Yes Sig: Take 50 mg by mouth nightly. rivaroxaban (XARELTO) 20 mg tab tablet   Yes Yes Sig: Take 20 mg by mouth daily (with dinner). simvastatin (ZOCOR) 40 mg tablet   No Yes Sig: TAKE 1 TABLET NIGHTLY  
tadalafil (CIALIS) 5 mg tablet   Yes Yes Sig: Take 5 mg by mouth as needed. tamsulosin (FLOMAX) 0.4 mg capsule   Yes Yes Sig: Take 0.4 mg by mouth daily. temazepam (RESTORIL) 30 mg capsule   No Yes Sig: Take 1 Cap by mouth nightly as needed for Sleep for up to 30 days. Max Daily Amount: 30 mg.  
triamcinolone acetonide (KENALOG) 0.1 % ointment   Yes Yes Sig: Apply  to affected area two (2) times daily as needed (rash). use thin layer Facility-Administered Medications: None Thank you for allowing me to participate in the care of this patient. Please contact the pharmacy () or the medication reconciliation pharmacist () with any questions. Lenard Meléndez, Pharm. D., BCPS, BCPPS

## 2020-03-30 NOTE — ED NOTES
Provided pt with cool washcloth for forehead per request. Attempted to call report to 2N, the RN answering the phone said they were waiting on ID to evaluate the chart before taking him.

## 2020-03-30 NOTE — ED NOTES
Provided pt with cool washcloth. Currently resting comfortably. Monitor x3. Call bell within reach. Denies needs at this time

## 2020-03-30 NOTE — ED PROVIDER NOTES
Mr. Renetta De La Rosa is a 23-year-old male who presents to the ER with complaints of shortness of breath and blood in his stool. He states that he has had a cough and shortness of breath for 1 week. He was recently diagnosed with pneumonia and started on antibiotics. He frequently has 5-6 bowel movements a day. His bowel movements have returned bloody in the last 2 to 3 days. He reports having 5-6 bloody bowel movements a day for the last 2 days. He states that they are [place]. \"  He describes them as a dark red. He says that he has some abdominal pain when he bends over and chest pain when he coughs. States that he has been coughing. His cough is been mildly productive. He says that he has a hard time getting up anything with his cough. He denies any other complaints. Past Medical History:  
Diagnosis Date  Abdominal wall mass of right upper quadrant 12/2/2019  Atrial fibrillation ([address]  BPH (benign prostatic hyperplasia)  Cancer ([address] early 36s BLADDER  
 Hypercholesterolemia  Hypertension  Joint replaced 2011  
 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  
Procedure Laterality Date  HX AFIB ABLATION  2018  HX [address]EN[address]CTOMY [address]  HX KNEE REPLACEMENT  2010  
 right knee  HX KNEE REPLACEMENT  04/02/2019  
 left knee  HX MALIGNANT SKIN LESION EXCISION    
 HX OTHER SURGICAL  12/06/2019 Excisional biopsy of right upper quadrant abdominal wall mass.  HX TONSILLECTOMY  HX UROLOGICAL CYSTOSCOPY  SKIN TISSUE PROCEDURE UNLISTED  2018 Melanoma across abdomen, Right armpit and groin lymphnode involvement Family History:  
Problem Relation Age of Onset  Dementia Mother  Hypertension Father  Hypertension Sister Social History Socioeconomic History  Marital status:  Spouse name: Not on file  Number of children: Not on file  Years of education: Not on file  Highest education level: Not on file Occupational History  Not on file Social Needs  Financial resource strain: Not on file  Food insecurity Worry: Not on file Inability: Not on file  Transportation needs Medical: Not on file Non-medical: Not on file Tobacco Use  Smoking status: Former Smoker Packs/day: 2.00 Years: 20.00 Pack years: 40.00 Last attempt to quit: 1988 Years since quittin.2  Smokeless tobacco: Never Used Substance and Sexual Activity  Alcohol use: Yes Alcohol/week: 2.0 standard drinks Types: 2 Glasses of wine per week Comment: 1 glass with dinner 2 nights per week  Drug use: No  
 Sexual activity: Not on file Lifestyle  Physical activity Days per week: Not on file Minutes per session: Not on file  Stress: Not on file Relationships  Social connections Talks on phone: Not on file Gets together: Not on file Attends Yarsani service: Not on file Active member of club or organization: Not on file Attends meetings of clubs or organizations: Not on file Relationship status: Not on file  Intimate partner violence Fear of current or ex partner: Not on file Emotionally abused: Not on file Physically abused: Not on file Forced sexual activity: Not on file Other Topics Concern  Not on file Social History Narrative  Not on file ALLERGIES: Demerol [meperidine] and Demerol [meperidine] Review of Systems Constitutional: Negative for chills and fever. HENT: Negative for rhinorrhea and sore throat. Respiratory: Positive for cough and shortness of breath. Cardiovascular: Negative for chest pain. Gastrointestinal: Positive for blood in stool.  Negative for abdominal pain, diarrhea, nausea and vomiting. Genitourinary: Negative for dysuria and hematuria. Musculoskeletal: Negative for arthralgias and myalgias. Skin: Negative for pallor and rash. Neurological: Positive for light-headedness. Negative for dizziness and weakness. All other systems reviewed and are negative. Vitals:  
 03/30/20 7245 BP: 125/85 Pulse: (!) 126 Resp: 25 Temp: 98.3 °F (36.8 °C) SpO2: 97% Physical Exam  
 
Vital signs reviewed. Nursing notes reviewed. Const:  No acute distress, well developed, well nourished Head:  Atraumatic, normocephalic Eyes:  PERRL, conjunctiva normal, no scleral icterus Neck:  Supple, trachea midline Cardiovascular:  tachycardic, no murmurs, no gallops, no rubs Resp:  No resp distress, no increased work of breathing, no wheezes, no rhonchi, no rales, coughing during the exam 
Abd:  Soft, non-distended, no rebound, no guarding, no CVA tenderness MSK:  No pedal edema, normal ROM Neuro:  Alert and oriented x3, no cranial nerve defect Skin:  Warm, dry, intact Psych: normal mood and affect, behavior is normal, judgement and thought content is normal 
 
 
 
 
MDM Number of Diagnoses or Management Options Gastrointestinal hemorrhage, unspecified gastrointestinal hemorrhage type: Metastatic melanoma ([facility]):  
SOB (shortness of breath):  
  
Amount and/or Complexity of Data Reviewed Clinical lab tests: reviewed and ordered Tests in the radiology section of CPT®: ordered and reviewed Review and summarize past medical records: yes Critical Care Total time providing critical care: 30-74 minutes (35 min.) Patient Progress Patient progress: stable Mr. Caren Chavarria is a 64-year-old male who presents to the ER with GI bleed and shortness of breath. His hemoglobin has dropped considerably in the last few days. He was also found to have new metastatic lesions in his lungs. He remains persistently tachycardic despite 2 L of IV fluid. CT of his abdomen shows colitis. He is to be evaluated for admission by the hospitalist. 
 
 
Procedures Hospitalist Perfect Serve for Admission 10:07 AM 
 
ED Room Number: FA96/86 Patient Name and age: Case Ram 68 y.o.  male Working Diagnosis:  
1. Gastrointestinal hemorrhage, unspecified gastrointestinal hemorrhage type 2. Metastatic melanoma ([facility] [facility] 3. SOB (shortness of breath) COVID-19 Suspicion:  no 
Readmission: no 
Isolation Requirements:  no 
Recommended Level of Care:  med/surg Code Status:  Do Not Resuscitate Department:[facility] Adult ED - [phone]

## 2020-03-30 NOTE — ROUTINE PROCESS
TRANSFER - OUT REPORT: 
 
Verbal report given to [facility], RN (name) on Abhijit Robb  being transferred to (unit) for routine progression of care Report consisted of patients Situation, Background, Assessment and  
Recommendations(SBAR). Information from the following report(s) SBAR, ED Summary, MAR, Recent Results and Cardiac Rhythm Sinus Tach was reviewed with the receiving nurse. Lines:  
Peripheral IV 03/30/20 Left Antecubital (Active) Peripheral IV 03/30/20 Right Antecubital (Active) Site Assessment Clean, dry, & intact 3/30/2020  8:18 AM  
Phlebitis Assessment 0 3/30/2020  8:18 AM  
Infiltration Assessment 0 3/30/2020  8:18 AM  
Dressing Status Clean, dry, & intact 3/30/2020  8:18 AM  
Dressing Type Transparent 3/30/2020  8:18 AM  
Hub Color/Line Status Patent;Pink;Flushed 3/30/2020  8:18 AM  
Action Taken Blood drawn 3/30/2020  8:18 AM  
  
 
Opportunity for questions and clarification was provided. Patient transported with: 
 O2 @ 3 liters Belongings

## 2020-03-30 NOTE — ED NOTES
Hospitalist at bedside for initial evaluation.   Pt was incontinent of stool while using urinal.

## 2020-03-30 NOTE — ED NOTES
Provided pt with mouth swabs per request. Pt currently resting comfortably. Call bell within reach.  Monitor x3

## 2020-03-30 NOTE — ED TRIAGE NOTES
Arrives via EMS from home where resides with wife for c/o dark colored stools x 48 hrs with associated ROCHA. 90% on RA, 's for EMS. Pt on Xarelto for a.fib.  +diarrhea x days Currently on treatment for Melanoma to abdomen. Last infusion on 3/27/20.

## 2020-03-30 NOTE — H&P
[facility] Adult  Hospitalist Group History and Physical 
 
Primary Care Provider: Clarisse Andrade NP Date of Service:  3/30/2020 Subjective:  
  
Mr. Janae Salvador is a 57-year-old male who presents to the ER with complaints of shortness of breath and blood in his stool. He states that he has had a cough and shortness of breath for 1 week. He was recently diagnosed with pneumonia and started on antibiotics. He frequently has 5-6 bowel movements a day. His bowel movements have returned bloody in the last 2 to 3 days. He reports having 5-6 bloody bowel movements a day for the last 2 days. He states that they are [place]. \"  He describes them as a dark red. He says that he has some abdominal pain when he bends over and chest pain when he coughs. States that he has been coughing. His cough is been mildly productive. He says that he has a hard time getting up anything with his cough. He denies any other complaints. Pt was seen in oncology office on 3/27  for cycle 2 Opdivo/Yervoy. Assessment was completed. Pt weak, SOB, having diarrhea w/blood in stool occasionally. Upon assessment, pt BP very low. 03/27/20 0956 97.4 °F (36.3 °C) (!) 110 22 (!) 75/52 95 % Labs were drawn on yesterday and reviewed but praful addt'l labs due to pt symptoms - Pt given 2L of IVFluids and chemotherapy held Review of Systems: A comprehensive review of systems was negative except for that written in the History of Present Illness. Past Medical History:  
Diagnosis Date  Abdominal wall mass of right upper quadrant 12/2/2019  Atrial fibrillation ([address]  BPH (benign prostatic hyperplasia)  Cancer ([address] early 36s BLADDER  
 Hypercholesterolemia  Hypertension  Joint replaced 2011  
 right knee  Skin cancer  Skin disorder 2018 Skin Cancer - melanoma across stomach, lymphnode involvement in Right armpit and Right groin  Sun-damaged skin Past Surgical History: Procedure Laterality Date  HX AFIB ABLATION  2018  HX APPENDECTOMY [facility]  HX KNEE REPLACEMENT  2010  
 right knee  HX KNEE REPLACEMENT  04/02/2019  
 left knee  HX MALIGNANT SKIN LESION EXCISION    
 HX OTHER SURGICAL  12/06/2019 Excisional biopsy of right upper quadrant abdominal wall mass.  HX TONSILLECTOMY  HX UROLOGICAL CYSTOSCOPY  SKIN TISSUE PROCEDURE UNLISTED  2018 Melanoma across abdomen, Right armpit and groin lymphnode involvement Prior to Admission medications Medication Sig Start Date End Date Taking? Authorizing Provider  
predniSONE (DELTASONE) 20 mg tablet Take 60 mg by mouth daily for 10 days. 3/27/20 4/6/20  Biju Heck MD  
cholestyramine (Questran) 4 gram packet Take 1 Packet by mouth three (3) times daily (with meals) for 10 days. 3/27/20 4/6/20  Biju Heck MD  
temazepam (RESTORIL) 30 mg capsule Take 1 Cap by mouth nightly as needed for Sleep for up to 30 days. Max Daily Amount: 30 mg. 3/27/20 4/26/20  Biju Heck MD  
levoFLOXacin (LEVAQUIN) 750 mg tablet Take 1 Tab by mouth daily. 3/17/20   Mann Rabgao NP  
levothyroxine (SYNTHROID) 75 mcg tablet TAKE 1 TABLET BY MOUTH DAILY BEFORE BREAKFAST 3/4/20   Mann Rabago NP  
primidone (MYSOLINE) 50 mg tablet TAKE 1/2 A TABLET BY MOUTH AT BEDTIME FOR 1 WEEK, THEN 1 TABLET EVERY NIGHT AT BEDTIME 1/28/20   Sangha, Lazarus Bally, MD  
triamcinolone acetonide (KENALOG) 0.1 % ointment Apply  to affected area two (2) times a day. use thin layer 2/50/77   Tracy Guo NP  
metoprolol succinate (TOPROL XL) 50 mg XL tablet Take 1.5 Tabs by mouth daily.  9/8/01   Jez Guo NP  
fenofibrate (LOFIBRA) 160 mg tablet TAKE 1 TABLET DAILY 90/46/46   Jez Guo NP  
simvastatin (ZOCOR) 40 mg tablet TAKE 1 TABLET NIGHTLY 38/52/19   Tracy Guo NP  
ondansetron (ZOFRAN ODT) 4 mg disintegrating tablet Take 1 Tab by mouth every eight (8) hours as needed for Nausea. 24/2/29   Farhan Guo NP  
tadalafil (CIALIS) 5 mg tablet Take 5 mg by mouth as needed. 10/19/19   Provider, Historical  
tamsulosin (FLOMAX) 0.4 mg capsule Take 0.4 mg by mouth daily. 10/19/19   Provider, Historical  
albuterol (PROVENTIL HFA, VENTOLIN HFA, PROAIR HFA) 90 mcg/actuation inhaler INHALE 2 PUFFS BY MOUTH EVERY 6 HOURS AS NEEDED FOR WHEEZING OR SHORTNESS OF BREATH 0/5/06   Farhan Guo NP  
traMADol (ULTRAM) 50 mg tablet Take 50 mg by mouth daily. 2/27/19   Provider, Historical  
diclofenac EC (VOLTAREN) 75 mg EC tablet Take 75 mg by mouth daily. 2/22/19   Provider, Historical  
fluorouracil (EFUDEX) 5 % chemo cream Apply a thin layer twice daily for total of 4 weeks. 2/4/19   Brian Harrison, KARAN  
amLODIPine (NORVASC) 10 mg tablet Take  by mouth daily. Provider, Historical  
rivaroxaban (XARELTO) 20 mg tab tablet Take  by mouth daily. Provider, Historical  
finasteride (PROSCAR) 5 mg tablet Take 5 mg by mouth daily as needed. 12/11/18   Provider, Historical  
lisinopril (PRINIVIL, ZESTRIL) 20 mg tablet Take 20 mg by mouth daily. 9/15/18   Provider, Historical  
 
Allergies Allergen Reactions  Demerol [Meperidine] Other (comments) Duplicate, delete  Demerol [Meperidine] Other (comments) \"passed out\" Family History Problem Relation Age of Onset  Dementia Mother  Hypertension Father  Hypertension Sister SOCIAL HISTORY: 
Patient resides at Home with his wife. Patient ambulates independently Smoking history: none Alcohol history: none Objective:  
 
Patient Vitals for the past 24 hrs: 
 Temp Pulse Resp BP SpO2  
03/30/20 1102 98.5 °F (36.9 °C) (!) 120 27 129/75 94 % 03/30/20 1030  (!) 118 23 120/75 95 % 03/30/20 1000  (!) 121 21 138/78 97 % 03/30/20 0930  (!) 127 (!) 34 151/71 92 % 03/30/20 0904    144/71   
03/30/20 0841  (!) 118 25 136/74 98 % 03/30/20 0800  (!) 128 27 128/77 97 % 03/30/20 0751 98.3 °F (36.8 °C) (!) 126 25 125/85 97 % Physical Exam:  
Visit Vitals /75 (BP 1 Location: Right arm, BP Patient Position: Supine) Pulse (!) 120 Temp 98.5 °F (36.9 °C) Resp 27 Ht 5' 9\" (1.753 m) Wt 81.1 kg (178 lb 12.7 oz) SpO2 94% BMI 26.40 kg/m² General:  Alert, cooperative, no distress, appears stated age. Head:  Normocephalic, without obvious abnormality, atraumatic. Eyes:  Conjunctivae/corneas clear. PERRL, EOMs intact. Fundi benign Throat: Lips, mucosa, and tongue normal. Teeth and gums normal.  
Neck: Supple, symmetrical, trachea midline, no adenopathy, thyroid: no enlargement/tenderness/nodules, no carotid bruit and no JVD. Lungs:   Decreased breath sounds  bilaterally. Chest wall:  No tenderness or deformity. Heart:  tachycardic, no murmur, click, rub or gallop. Abdomen: Moderate distension, left sided tenderness, incision healing well on RUQ Extremities: Extremities normal, atraumatic, no cyanosis mild 1+ edema. Pulses: 2+ and symmetric all extremities. Skin: Skin color, texture, turgor normal. No rashes or lesions Lymph nodes: Cervical, supraclavicular, and axillary nodes normal.  
Neurologic: CNII-XII intact. Normal strength, sensation and reflexes throughout. Cap refill: Brisk, less than 3 seconds Pulses: 2+, symmetric in all extremities Data Review: All diagnostic labs and studies have been reviewed. Recent Results (from the past 24 hour(s)) EKG, 12 LEAD, INITIAL Collection Time: 03/30/20  7:46 AM  
Result Value Ref Range Ventricular Rate 127 BPM  
 Atrial Rate 127 BPM  
 P-R Interval 140 ms QRS Duration 116 ms  
 Q-T Interval 322 ms QTC Calculation (Bezet) 467 ms Calculated P Axis 38 degrees Calculated R Axis -39 degrees Calculated T Axis 28 degrees Diagnosis Sinus tachycardia with occasional premature ventricular complexes Left anterior fascicular block Right bundle branch block When compared with ECG of 11-JAN-2012 16:03, 
premature ventricular complexes are now present Vent. rate has increased BY  48 BPM 
Right bundle branch block is now present Inferior infarct is now present Confirmed by Keo Howe M.D., Cheko Wilkins ([id number]) on 3/30/2020 8:34:48 AM 
  
CBC WITH AUTOMATED DIFF Collection Time: 03/30/20  7:52 AM  
Result Value Ref Range WBC 4.7 4.1 - 11.1 K/uL  
 RBC 2.93 (L) 4.10 - 5.70 M/uL HGB 8.9 (L) 12.1 - 17.0 g/dL HCT 26.8 (L) 36.6 - 50.3 % MCV 91.5 80.0 - 99.0 FL  
 MCH 30.4 26.0 - 34.0 PG  
 MCHC 33.2 30.0 - 36.5 g/dL  
 RDW 13.7 11.5 - 14.5 % PLATELET 599 (L) 780 - 400 K/uL MPV 10.3 8.9 - 12.9 FL  
 NRBC 1.5 (H) 0  WBC ABSOLUTE NRBC 0.07 (H) 0.00 - 0.01 K/uL NEUTROPHILS 55 32 - 75 % BAND NEUTROPHILS 10 (H) 0 - 6 % LYMPHOCYTES 11 (L) 12 - 49 % MONOCYTES 8 5 - 13 % EOSINOPHILS 4 0 - 7 % BASOPHILS 0 0 - 1 % METAMYELOCYTES 5 (H) 0 % MYELOCYTES 7 (H) 0 % IMMATURE GRANULOCYTES 0 %  
 ABS. NEUTROPHILS 3.1 1.8 - 8.0 K/UL  
 ABS. LYMPHOCYTES 0.5 (L) 0.8 - 3.5 K/UL  
 ABS. MONOCYTES 0.4 0.0 - 1.0 K/UL  
 ABS. EOSINOPHILS 0.2 0.0 - 0.4 K/UL  
 ABS. BASOPHILS 0.0 0.0 - 0.1 K/UL  
 ABS. IMM. GRANS. 0.0 K/UL  
 DF MANUAL    
 RBC COMMENTS NORMOCYTIC, NORMOCHROMIC    
 WBC COMMENTS DOHLE BODIES    
METABOLIC PANEL, COMPREHENSIVE Collection Time: 03/30/20  7:52 AM  
Result Value Ref Range Sodium 138 136 - 145 mmol/L Potassium 3.8 3.5 - 5.1 mmol/L Chloride 109 (H) 97 - 108 mmol/L  
 CO2 18 (L) 21 - 32 mmol/L Anion gap 11 5 - 15 mmol/L Glucose 111 (H) 65 - 100 mg/dL BUN 37 (H) 6 - 20 MG/DL Creatinine 1.49 (H) 0.70 - 1.30 MG/DL  
 BUN/Creatinine ratio 25 (H) 12 - 20 GFR est AA 56 (L) >60 ml/min/1.73m2 GFR est non-AA 46 (L) >60 ml/min/1.73m2 Calcium 7.8 (L) 8.5 - 10.1 MG/DL  Bilirubin, total 1.8 (H) 0.2 - 1.0 MG/DL  
 ALT (SGPT) 48 12 - 78 U/L  
 AST (SGOT) 114 (H) 15 - 37 U/L Alk. phosphatase 120 (H) 45 - 117 U/L Protein, total 5.7 (L) 6.4 - 8.2 g/dL Albumin 1.7 (L) 3.5 - 5.0 g/dL Globulin 4.0 2.0 - 4.0 g/dL A-G Ratio 0.4 (L) 1.1 - 2.2    
TROPONIN I Collection Time: 03/30/20  7:52 AM  
Result Value Ref Range Troponin-I, Qt. <0.05 <0.05 ng/mL PTT Collection Time: 03/30/20  7:52 AM  
Result Value Ref Range aPTT 32.5 (H) 22.1 - 32.0 sec  
 aPTT, therapeutic range     58.0 - 77.0 SECS  
PROTHROMBIN TIME + INR Collection Time: 03/30/20  7:52 AM  
Result Value Ref Range INR 1.4 (H) 0.9 - 1.1 Prothrombin time 14.0 (H) 9.0 - 11.1 sec SAMPLES BEING HELD Collection Time: 03/30/20  7:55 AM  
Result Value Ref Range SAMPLES BEING HELD 1RED COMMENT Add-on orders for these samples will be processed based on acceptable specimen integrity and analyte stability, which may vary by analyte. RESPIRATORY PANEL,PCR,NASOPHARYNGEAL Collection Time: 03/30/20  8:08 AM  
Result Value Ref Range Adenovirus Not detected NOTD Coronavirus 229E Not detected NOTD Coronavirus HKU1 Not detected NOTD Coronavirus CVNL63 Not detected NOTD Coronavirus OC43 Not detected NOTD Metapneumovirus Not detected NOTD Rhinovirus and Enterovirus Not detected NOTD Influenza A Not detected NOTD Influenza A, subtype H1 Not detected NOTD Influenza A, subtype H3 Not detected NOTD INFLUENZA A H1N1 PCR Not detected NOTD Influenza B Not detected NOTD Parainfluenza 1 Not detected NOTD Parainfluenza 2 Not detected NOTD Parainfluenza 3 Not detected NOTD Parainfluenza virus 4 Not detected NOTD    
 RSV by PCR Not detected NOTD B. parapertussis, PCR Not detected NOTD Bordetella pertussis - PCR Not detected NOTD Chlamydophila pneumoniae DNA, QL, PCR Not detected NOTD Mycoplasma pneumoniae DNA, QL, PCR Not detected NOTD INFLUENZA A+B VIRAL AGS Collection Time: 03/30/20  8:08 AM  
Result Value Ref Range Influenza A Antigen NEGATIVE  NEG Influenza B Antigen NEGATIVE  NEG    
LACTIC ACID Collection Time: 03/30/20  8:08 AM  
Result Value Ref Range Lactic acid 1.9 0.4 - 2.0 MMOL/L  
 
CT Results  (Last 48 hours) 03/30/20 0900  CT CHEST WO CONT Final result Impression:  IMPRESSION:  
   
1. CT of the chest demonstrates multiple bilateral pulmonary nodules which are  
new suspicious for metastatic disease. There is hepatic steatosis. There are 2 small low densities in the liver could  
represent small metastatic lesions. There is a 2.4 cm mesenteric nodule could represent metastatic disease. There is suspicion of extensive bony metastatic disease as described above. There is mild compression of superior endplate of L2.  
   
   
2. There is mild dilatation of the right, transverse, left and proximal sigmoid  
colon with mild wall thickening of the proximal sigmoid and distal left colon. There is slight pericolonic haziness distal left colon and proximal sigmoid  
colon consistent with nonspecific colitis. No free air or drainable fluid  
collection is identified. Results called to the ER. Narrative:  EXAM: CT CHEST WO CONT, CT ABD PELV WO CONT INDICATION: cough, sob COMPARISON: 12/16/2019 CONTRAST:  None. TECHNIQUE:   
Thin axial images were obtained through the chest, abdomen and pelvis. Coronal  
and sagittal reconstructions were generated. Oral contrast was not administered. CT dose reduction was achieved through use of a standardized protocol tailored  
for this examination and automatic exposure control for dose modulation. The absence of intravenous contrast material reduces the sensitivity for  
evaluation of the mediastinum and solid parenchymal organs of the abdomen. FINDINGS:   
THYROID: There is a 7 mm right thyroid nodule. MEDIASTINUM: There are a few tiny mediastinal lymph nodes which are stable to  
minimally larger in size. There is an anterior mediastinal lymph node measuring 6 mm which previously measured 5 mm. ADAMA: No mass or lymphadenopathy. THORACIC AORTA: No aneurysm. MAIN PULMONARY ARTERY: Normal in caliber. TRACHEA/BRONCHI: Patent. ESOPHAGUS: No wall thickening or dilatation. HEART: Borderline-enlarged. There is a small pericardial effusion measuring 7 mm PLEURA:. No pleural effusion identified. LUNGS: There has been interval development of multiple bilateral pulmonary  
nodules. There is a 1.4 x 0.8 cm subpleural nodule right upper lobe series 3 image 21. There is a 3 x 1.3 cm pleural-based nodule right lower lobe series 3 image 28. There are multiple nodules in the left lung. There is a 2.2 cm nodule left CP  
angle medially. There is a 6 mm nodule in the left upper lobe. There is a 11 mm subpleural nodule in the left upper lobe There is slight left basilar atelectasis. LIVER: There is diffuse hepatic steatosis. There is a 1 cm low-density in the  
liver just beneath the dome of the diaphragm. There is a 1 cm low-density 6 of  
the liver. GALLBLADDER: Unremarkable. SPLEEN: No mass. PANCREAS: No mass or ductal dilatation. ADRENALS: Unremarkable. KIDNEYS/URETERS: No mass, calculus, or hydronephrosis. STOMACH: Unremarkable. SMALL BOWEL: No dilatation or wall thickening. COLON: There is dilatation of the colon measuring up to 7 cm. There is slight  
wall thickening in the left colon. The mid to distal sigmoid colon and rectum  
are not distended. There is slight pericolonic haziness around the distal left  
colon and proximal sigmoid colon consistent with colitis. APPENDIX: Not identified PERITONEUM: There is no free air. There is a 2.4 cm mesenteric mass image 84 RETROPERITONEUM: No lymphadenopathy or aortic aneurysm. REPRODUCTIVE ORGANS: Slightly enlarged URINARY BLADDER: No mass or calculus. There is a diverticulum on the right BONES: There is a lytic area within T2 vertebral body with slight cortical  
disruption. There is suspicion of lytic lesions in T5, T6 and T8, T10, T12. There is mild compression superior endplate of L2 which is new. There is question of a small lytic lesion in L3,   
there is slight compression of the superior and inferior endplates of L4. There is a lytic lesion in L5. There is increased density in the bone marrow in the proximal femurs  
bilaterally. At T12, there is question of a tiny amount of epidural extension posteriorly ADDITIONAL COMMENTS: N/A  
   
  
 03/30/20 0900  CT ABD PELV WO CONT Final result Impression:  IMPRESSION:  
   
1. CT of the chest demonstrates multiple bilateral pulmonary nodules which are  
new suspicious for metastatic disease. There is hepatic steatosis. There are 2 small low densities in the liver could  
represent small metastatic lesions. There is a 2.4 cm mesenteric nodule could represent metastatic disease. There is suspicion of extensive bony metastatic disease as described above. There is mild compression of superior endplate of L2.  
   
   
2. There is mild dilatation of the right, transverse, left and proximal sigmoid  
colon with mild wall thickening of the proximal sigmoid and distal left colon. There is slight pericolonic haziness distal left colon and proximal sigmoid  
colon consistent with nonspecific colitis. No free air or drainable fluid  
collection is identified. Results called to the ER. Narrative:  EXAM: CT CHEST WO CONT, CT ABD PELV WO CONT INDICATION: cough, sob COMPARISON: 12/16/2019 CONTRAST:  None. TECHNIQUE:   
Thin axial images were obtained through the chest, abdomen and pelvis. Coronal  
and sagittal reconstructions were generated.  Oral contrast was not administered. CT dose reduction was achieved through use of a standardized protocol tailored  
for this examination and automatic exposure control for dose modulation. The absence of intravenous contrast material reduces the sensitivity for  
evaluation of the mediastinum and solid parenchymal organs of the abdomen. FINDINGS:   
THYROID: There is a 7 mm right thyroid nodule. MEDIASTINUM: There are a few tiny mediastinal lymph nodes which are stable to  
minimally larger in size. There is an anterior mediastinal lymph node measuring 6 mm which previously measured 5 mm. ADAMA: No mass or lymphadenopathy. THORACIC AORTA: No aneurysm. MAIN PULMONARY ARTERY: Normal in caliber. TRACHEA/BRONCHI: Patent. ESOPHAGUS: No wall thickening or dilatation. HEART: Borderline-enlarged. There is a small pericardial effusion measuring 7 mm PLEURA:. No pleural effusion identified. LUNGS: There has been interval development of multiple bilateral pulmonary  
nodules. There is a 1.4 x 0.8 cm subpleural nodule right upper lobe series 3 image 21. There is a 3 x 1.3 cm pleural-based nodule right lower lobe series 3 image 28. There are multiple nodules in the left lung. There is a 2.2 cm nodule left CP  
angle medially. There is a 6 mm nodule in the left upper lobe. There is a 11 mm subpleural nodule in the left upper lobe There is slight left basilar atelectasis. LIVER: There is diffuse hepatic steatosis. There is a 1 cm low-density in the  
liver just beneath the dome of the diaphragm. There is a 1 cm low-density 6 of  
the liver. GALLBLADDER: Unremarkable. SPLEEN: No mass. PANCREAS: No mass or ductal dilatation. ADRENALS: Unremarkable. KIDNEYS/URETERS: No mass, calculus, or hydronephrosis. STOMACH: Unremarkable. SMALL BOWEL: No dilatation or wall thickening. COLON: There is dilatation of the colon measuring up to 7 cm. There is slight wall thickening in the left colon. The mid to distal sigmoid colon and rectum  
are not distended. There is slight pericolonic haziness around the distal left  
colon and proximal sigmoid colon consistent with colitis. APPENDIX: Not identified PERITONEUM: There is no free air. There is a 2.4 cm mesenteric mass image 84 RETROPERITONEUM: No lymphadenopathy or aortic aneurysm. REPRODUCTIVE ORGANS: Slightly enlarged URINARY BLADDER: No mass or calculus. There is a diverticulum on the right BONES: There is a lytic area within T2 vertebral body with slight cortical  
disruption. There is suspicion of lytic lesions in T5, T6 and T8, T10, T12. There is mild compression superior endplate of L2 which is new. There is question of a small lytic lesion in L3,   
there is slight compression of the superior and inferior endplates of L4. There is a lytic lesion in L5. There is increased density in the bone marrow in the proximal femurs  
bilaterally. At T12, there is question of a tiny amount of epidural extension posteriorly ADDITIONAL COMMENTS: N/A Assessment:  
 
Active Problems: * No active hospital problems. * 
 
 
Plan:  
 
1) diarrhea with acute GI bleeding- hx of diverticulosis- CT of abd pelvis on admission showing \" COLON: There is dilatation of the colon measuring up to 7 cm. There is slight  
wall thickening in the left colon. The mid to distal sigmoid colon and rectum  
are not distended. There is slight pericolonic haziness around the distal left  
colon and proximal sigmoid colon consistent with colitis. Patient has been on levaquin at home 
watery diarrhea for the last week and a half- . Recently put on prednisone 60 mg for this with suspected cause adverse effect to chemo 
check a stool for C. Difficile- start Pooja, GI consult 
  
2) oncology- Melanoma- with diffuse mets including to bone per CT today On tx per Dr Kelly Perez- currently on hold due to hypotension and diarrhea Hx of skin cancer and bladder cancer- recent cystoscopy showed an area of concern in his bladder. Plan is for a repeat cystoscopy in 6 weeks per patient   
3) CV- HTN and Hx of A.fib 
S/p ablation- resume home meds as tolerated   
4) Shortness of breath/dyspnea- 
negative viral resp panel and neg for flu CT chest on admission showing no evidence of pneumonia \" There has been interval development of multiple bilateral pulmonary nodules 5) anemia- due to chronic dz and acute GI blood loss 6) metabolic acidosis- likely due to colitis/infection/cancer-  
D5 IVFluids with insulin Code status- DNR Signed By: Eladio Lopez MD   
 March 30, 2020

## 2020-03-31 NOTE — PROGRESS NOTES
1930: Bedside shift report received from [facility] 
 
3 episodes of incontinence of liquid brownish red stool. 0730: Bedside and Verbal shift change report given to [address] (oncoming nurse) by Yanet Pereyra RN (offgoing nurse). Report included the following information SBAR, Kardex, Intake/Output, MAR, Accordion, Recent Results and Med Rec Status.

## 2020-03-31 NOTE — ROUTINE PROCESS
TRANSFER - OUT REPORT: 
 
Verbal report given to Piper ABBASI(name) on Kate Rabagoable  being transferred to [facility]) for urgent transfer Report consisted of patients Situation, Background, Assessment and  
Recommendations(SBAR). Information from the following report(s) SBAR and MAR was reviewed with the receiving nurse. Lines:  
Peripheral IV 03/30/20 Left Antecubital (Active) Site Assessment Clean, dry, & intact 3/31/2020  9:00 AM  
Phlebitis Assessment 0 3/31/2020  9:00 AM  
Infiltration Assessment 0 3/31/2020  9:00 AM  
Dressing Status Clean, dry, & intact 3/31/2020  9:00 AM  
Dressing Type Transparent 3/31/2020  9:00 AM  
Hub Color/Line Status Pink; Infusing 3/31/2020  9:00 AM  
Action Taken Open ports on tubing capped 3/31/2020  1:00 AM  
Alcohol Cap Used Yes 3/31/2020  9:00 AM  
   
Peripheral IV 03/30/20 Right Antecubital (Active) Site Assessment Clean, dry, & intact 3/31/2020  9:00 AM  
Phlebitis Assessment 0 3/31/2020  9:00 AM  
Infiltration Assessment 0 3/31/2020  9:00 AM  
Dressing Status Clean 3/31/2020  9:00 AM  
Dressing Type Transparent 3/31/2020  9:00 AM  
Hub Color/Line Status Pink;Flushed;Capped 3/31/2020  9:00 AM  
Action Taken Open ports on tubing capped 3/31/2020  1:00 AM  
Alcohol Cap Used Yes 3/31/2020  9:00 AM  
  
 
Opportunity for questions and clarification was provided. Patient transported with: 
 Monitor

## 2020-03-31 NOTE — PROGRESS NOTES
[facility] Hospitalist Progress Note Sonya Yadav MD 
Answering service: [phone] OR [phone] from in house phone Date of Service:  3/31/2020 NAME:  Figueroa Prater :  1950 MRN:  [id number] Admission Summary:  
Mr. Merlin Tamez is a 61-year-old male who presents to the ER with complaints of shortness of breath and blood in his stool. [facility] states that he has had a cough and shortness of breath for 1 week. [facility] was recently diagnosed with pneumonia and started on antibiotics. [facility] frequently has 5-6 bowel movements a day.  His bowel movements have returned bloody in the last 2 to 3 days. [facility] reports having 5-6 bloody bowel movements a day for the last 2 days. [facility] states that they are \"dark. \" [facility] describes them as a dark red. [facility] says that he has some abdominal pain when he bends over and chest pain when he coughs.  States that he has been coughing.  His cough is been mildly productive.  He says that he has a hard time getting up anything with his cough.  He denies any other complaints. Pt was seen in oncology office on 3/27  for cycle 2 Opdivo/Yervoy. Assessment was completed. Pt weak, SOB, having diarrhea w/blood in stool occasionally. Upon assessment, pt BP very low. 20 0956 97.4 °F (36.3 °C) (!) 110 22 (!) 75/52 95 % Labs were drawn on yesterday and reviewed but praful addt'l labs due to pt symptoms - Pt given 2L of IVFluids and chemotherapy held Interval history / Subjective:  
Pt very short of breath, some wheezing 
abd distension, tachycardia-  
Transferring patient to tele- overflow to the ICU Assessment & Plan:  
 
 
1) diarrhea with acute GI bleeding- hx of diverticulosis- CT of abd pelvis on admission showing \" COLON: There is dilatation of the colon measuring up to 7 cm. There is slight wall thickening in the left colon. The mid to distal sigmoid colon and rectum  
are not distended. There is slight pericolonic haziness around the distal left  
colon and proximal sigmoid colon consistent with colitis. Patient has been on levaquin at home 
watery diarrhea for the last week and a half- . Recently put on prednisone 60 mg for this with suspected cause adverse effect to chemo Stool was neg  for C. Difficile- cont Questran, GI consulted, stopped PO vanc Starting zosyn for bacterial colitis 
xarelto was not restarted due to bleeding   
2) oncology- Melanoma- with diffuse mets including to bone per admit CT On tx per Dr Yanet Darby- currently on hold due to hypotension and diarrhea Hx of skin cancer and bladder cancer- recent cystoscopy showed an area of concern in his bladder. Plan is for a repeat cystoscopy in 6 weeks per patient   
3) CV- HTN and Hx of A.fib 
S/p ablation- resume home meds as tolerated Tachycardia due to acidosis   
4) Shortness of breath/dyspnea- 
negative viral resp panel and neg for flu CT chest on admission showing no evidence of pneumonia \" There has been interval development of multiple bilateral pulmonary nodules  
   
 patient very dyspnec today- struggling to breath- starting nebs, BiPAP and steroids 5) anemia- due to chronic dz and acute GI blood loss Hb low- some slow continued lower GI bleeding 6) metabolic acidosis- likely due to colitis/infection/cancer-  
Bicarb given D5 IVFluids 7) hyperglycemia-suspected DM based on HbA1c 
- accuchecks and ss insulin 8) coagulopathy- elevated INR due to xarelto use Patient ill- consult critical care MD for assistance in his care 
  
 
Code status: DNR 
DVT prophylaxis: SCDs Care Plan discussed with: Patient/Family Anticipated Disposition: Home w/Family Anticipated Discharge: Greater than 48 hours Hospital Problems  Date Reviewed: 3/19/2020 Codes Class Noted POA Shortness of breath ICD-10-CM: R06.02 
ICD-9-CM: 786.05  3/30/2020 Unknown Diarrhea ICD-10-CM: R19.7 ICD-9-CM: 787.91  3/30/2020 Unknown Melanoma ([facility] [address] ICD-10-CM: C43.9 ICD-9-CM: 172.9  3/30/2020 Unknown Anemia ICD-10-CM: D64.9 ICD-9-CM: 285.9  3/30/2020 Unknown Review of Systems: A comprehensive review of systems was negative except for that written in the HPI. Vital Signs:  
 Last 24hrs VS reviewed since prior progress note. Most recent are: 
Visit Vitals /72 (BP 1 Location: Right arm, BP Patient Position: At rest) Pulse (!) 110 Temp 97.8 °F (36.6 °C) Resp 20 Ht 5' 9\" (1.753 m) Wt 80 kg (176 lb 5.9 oz) SpO2 94% BMI 26.05 kg/m² Intake/Output Summary (Last 24 hours) at 3/31/2020 1622 Last data filed at 3/30/2020 2012 Gross per 24 hour Intake  Output 100 ml Net -100 ml Physical Examination:  
 
 
     
Constitutional:  ill appearing, short of breath,    
ENT:  Oral mucosa dry. Resp: Rapid shallow breathing- some wheezing R > L  
CV:  tachycardia GI:  distended, minimal tenderness, tympanic Bowel sounds Musculoskeletal:  No edema, warm, 2+ pulses throughout Neurologic:  Moves all extremities. AAOx3, CN II-XII reviewed Psych:   anxious Data Review:  
 Review and/or order of clinical lab test 
Review and/or order of tests in the radiology section of CPT Review and/or order of tests in the medicine section of CPT Labs:  
 
Recent Labs  
  03/31/20 
5637 03/30/20 
1731 03/30/20 
2138 WBC 5.4  --  4.7 HGB 7.8* 8.1* 8.9* HCT 23.4* 24.9* 26.8*  
PLT 96*  --  107* Recent Labs  
  03/31/20 
0782 03/30/20 
1731 03/30/20 
8001  142 138  
K 4.3 4.2 3.8 * 113* 109* CO2 15* 19* 18* BUN 39* 31* 37* CREA 1.57* 1.32* 1.49* * 274* 111* CA 7.2* 6.6* 7.8*  
MG  --  2.1  --   
PHOS  --  2.6  --   
 
Recent Labs  
  03/30/20 
9573 SGOT 114* ALT 48 * TBILI 1.8*  
 TP 5.7* ALB 1.7*  
GLOB 4.0 Recent Labs  
  03/30/20 
2381 INR 1.4* PTP 14.0* APTT 32.5* No results for input(s): FE, TIBC, PSAT, FERR in the last 72 hours. No results found for: FOL, RBCF No results for input(s): PH, PCO2, PO2 in the last 72 hours. Recent Labs  
  03/30/20 
6213 TROIQ <0.05 Lab Results Component Value Date/Time Cholesterol, total 162 11/14/2019 09:52 AM  
 HDL Cholesterol 50 11/14/2019 09:52 AM  
 LDL, calculated 67 11/14/2019 09:52 AM  
 Triglyceride 223 (H) 11/14/2019 09:52 AM  
 
Lab Results Component Value Date/Time Glucose (POC) 147 (H) 03/31/2020 04:19 PM  
 Glucose (POC) 160 (H) 03/31/2020 11:30 AM  
 Glucose (POC) 126 (H) 03/31/2020 06:57 AM  
 Glucose (POC) 155 (H) 03/30/2020 11:16 PM  
 
No results found for: COLOR, APPRN, SPGRU, REFSG, TYRON, PROTU, GLUCU, KETU, BILU, UROU, RAMY, LEUKU, GLUKE, EPSU, BACTU, WBCU, RBCU, CASTS, UCRY Medications Reviewed:  
 
Current Facility-Administered Medications Medication Dose Route Frequency  sodium bicarbonate tablet 650 mg  650 mg Oral TID  piperacillin-tazobactam (ZOSYN) 3.375 g in 0.9% sodium chloride (MBP/ADV) 100 mL  3.375 g IntraVENous Q8H  
 simethicone (MYLICON) tablet 80 mg  80 mg Oral QID PRN  
 albuterol (PROVENTIL VENTOLIN) nebulizer solution 2.5 mg  2.5 mg Nebulization TID RT  
 morphine injection 1 mg  1 mg IntraVENous ONCE  
 [START ON 4/1/2020] methylPREDNISolone (PF) (SOLU-MEDROL) injection 60 mg  60 mg IntraVENous Q8H  
 sodium chloride (NS) flush 5-40 mL  5-40 mL IntraVENous Q8H  
 sodium chloride (NS) flush 5-40 mL  5-40 mL IntraVENous PRN  
 acetaminophen (TYLENOL) tablet 650 mg  650 mg Oral Q4H PRN  
 oxyCODONE-acetaminophen (PERCOCET) 5-325 mg per tablet 1 Tab  1 Tab Oral Q4H PRN  
 diphenhydrAMINE (BENADRYL) injection 12.5 mg  12.5 mg IntraVENous Q4H PRN  
 ondansetron (ZOFRAN) injection 4 mg  4 mg IntraVENous Q4H PRN  
  cholestyramine-aspartame (QUESTRAN LIGHT) packet 4 g  1 Packet Oral TID WITH MEALS  
 levothyroxine (SYNTHROID) tablet 75 mcg  75 mcg Oral 6am  
 primidone (MYSOLINE) tablet 50 mg  50 mg Oral QHS  atorvastatin (LIPITOR) tablet 20 mg  20 mg Oral QHS  tamsulosin (FLOMAX) capsule 0.4 mg  0.4 mg Oral DAILY  temazepam (RESTORIL) capsule 30 mg  30 mg Oral QHS PRN  
 dextrose 5% and 0.9% NaCl infusion  125 mL/hr IntraVENous CONTINUOUS  
 metoprolol succinate (TOPROL-XL) XL tablet 50 mg  50 mg Oral DAILY  glucose chewable tablet 16 g  4 Tab Oral PRN  
 glucagon (GLUCAGEN) injection 1 mg  1 mg IntraMUSCular PRN  
 insulin lispro (HUMALOG) injection   SubCUTAneous AC&HS  morphine injection 2 mg  2 mg IntraVENous Q3H PRN  
 
______________________________________________________________________ EXPECTED LENGTH OF STAY: - - - 
ACTUAL LENGTH OF STAY:          1 Gwen Yadav MD

## 2020-03-31 NOTE — PROGRESS NOTES
Bedside shift change report given to Carmen Husain (oncoming nurse) by Mendoza Jones RN (offgoing nurse). Report included the following information SBAR, Kardex and MAR.

## 2020-03-31 NOTE — PROGRESS NOTES
NUTRITION COMPLETE ASSESSMENT 
 
RECOMMENDATIONS:  
1. Diet advancement per GI 
 - once on full liquids change supplements to Ensure Enlive BID 2. Follow stool consistency with adjustment to bowel regimen PRN 3. Monitor BG with adjustment to insulin PRN Please document  Moderate Acute Protein Calorie Malnutrition in patient diagnoses. Patient meets criteria for as evidenced by:  
ASPEN Malnutrition Criteria Acute Illness, Chronic Illness, or Social/Enviornmental: Acute illness Energy Intake: <75% est energy req for > 7 days Weight Loss: Greater than 5% x 1 mo ASPEN Malnutrition Score - Acute Illness: 7 Acute Illness - Malnutrition Diagnosis: Moderate malnutrition. Interventions/Plan:  
Food/Nutrient Delivery:    Commercial supplement(Ensure Clear TID) Nutrition Education:    
Assessment:  
Reason for Assessment: MST Diet: Clear liquids Supplements: None Nutritionally Significant Medications: [x] Reviewed & Includes: questran TID, SSI, synthroid, vanco, D5 NaCl @ 75mlhr; PRN: zofran Meal Intake: No data found. Pre-Hospitalization: 
Usual Appetite: Poor Diet at Home: regular Current Hospitalization:  
Appetite: Poor PO Ability: Independent Average po intake: Average supplements intake:    
  
Subjective: Assessment completed by chart review, Pt visit/interview limited by isolation precautions Objective: 
Pt admitted for SOB and GI bleed. PMHx: afib, bladder CA, HTN, melanoma with mets to lungs/bone on chemo. Diarrhea and GIB with colitis per CT. C.diff pending with questran rx, ?related to chemo. GI consult pending. Seen by oncology with poor prognosis noted with significant decline. Possible need for palliative/hospice referral noted. On steroids PTA to help with diarrhea. BG slightly elevated with SSI rx, A1c 6.5% but no hx of DM documented. Poor PO intake prior to admit noted with wt loss over past month.  Likely also partially related to fluid losses with diarrhea, as well as increased energy expenditure. Will add Ensure Clear TID while on clear liquids (720kcal, 24g protein). Once diet advanced recommend switch to Ensure Enlive BID (700kcal, 40g protein). Wt stable at 185# prior to this past month. Severe wt loss of 4% x <1 month. Wt Readings:  
03/30/20 81.1 kg (178 lb 12.7 oz) 03/16/20 82.1 kg (181 lb)  
03/13/20 83.9 kg (184 lb 14.4 oz) 03/03/20 84.4 kg (186 lb 1.6 oz) Will follow for goals of care, supplement acceptance, po intake and wt trends. Estimated Nutrition Needs:  
Kcals/day: 2012 Kcals/day(2012-2166kcal) Protein: 97 g(97-113g (1.2-1.4g/kg)) Fluid: 2100 ml(1ml/kcal) Based On: [place] [address] Jeor(x 1.3-1.4) Weight Used: Actual wt(81.1kg) Pt expected to meet estimated nutrient needs:  []   Yes     []  No [x] Unable to predict at this time Nutrition Diagnosis: 1. Malnutrition related to inadequate protein/energy intake; increased energy expenditure as evidenced by severe wt loss of 4% x <1 month; poor PO PTA; melanoma with mets 2. Altered GI function related to ?chemo, colitis as evidenced by diarrhea Goals:   
 Diet advancement to at least full liquids with 75% meals consumed in 2-3 days; wt maintenance Monitoring & Evaluation: - Total energy intake, Liquid meal replacement, Protein intake - Weight/weight change, GI 
 
Previous Nutrition Goals Met:   N/A Previous Recommendations:    N/A Education & Discharge Needs: 
 [x] None Identified   [] Identified and addressed  
 [] Participated in care plan, discharge planning, and/or interdisciplinary rounds Nutrition Discharge Plan:  
[x] Too soon to determine 
[] Other:  
    
Cultural, Yarsani and ethnic food preferences identified: None Skin Integrity: [x]Intact  []Other Edema: [x]None []Other Last BM: 3/30 Food Allergies: [x]None []Other Diet Restrictions: Cultural/Taoism Preference(s): None Anthropometrics:   
Weight Loss Metrics 3/30/2020 3/17/2020 3/16/2020 3/13/2020 3/3/2020 2/28/2020 2/25/2020 Today's Wt 178 lb 12.7 oz - 181 lb 184 lb 14.4 oz 186 lb 1.6 oz 187 lb 186 lb BMI 26.4 kg/m2 26.73 kg/m2 26.73 kg/m2 27.3 kg/m2 27.48 kg/m2 27.62 kg/m2 27.47 kg/m2 Weight Source: Bed Height: 5' 9\" (175.3 cm), Body mass index is 26.4 kg/m². IBW : 72.6 kg (160 lb), % IBW (Calculated): 111.75 % Usual Body Weight: 83.9 kg (185 lb),   
 
Labs:   
Lab Results Component Value Date/Time Sodium 142 03/30/2020 05:31 PM  
 Potassium 4.2 03/30/2020 05:31 PM  
 Chloride 113 (H) 03/30/2020 05:31 PM  
 CO2 19 (L) 03/30/2020 05:31 PM  
 Glucose 274 (H) 03/30/2020 05:31 PM  
 BUN 31 (H) 03/30/2020 05:31 PM  
 Creatinine 1.32 (H) 03/30/2020 05:31 PM  
 Calcium 6.6 (L) 03/30/2020 05:31 PM  
 Magnesium 2.1 03/30/2020 05:31 PM  
 Phosphorus 2.6 03/30/2020 05:31 PM  
 Albumin 1.7 (L) 03/30/2020 07:52 AM  
 
Lab Results Component Value Date/Time Hemoglobin A1c 6.5 (H) 03/30/2020 05:31 PM  
 
Sandy Petty RD [facility], Pager #[phone] or via [employer]

## 2020-03-31 NOTE — PROGRESS NOTES
[facility] [place] at [facility] 
[address], [place] [address] Dena Mcdonald W: [phone]  F: [phone] Reason for Visit:  
Figueroa Prater is a 68 y.o. male who is seen in hospital consultation at the request of Dr. Renetta Chávez for hx of melanoma. Treatment History: · Bladder cancer dx in early 1990s · BCC, left neck, s/p Electrodesiccation and Curettage, 06/07/12 · SCCi, right arm, s/p Electrodesiccation and Curettage, 12/2014 · Recurrent BCC, left lateral neck, Mohs, 02/24/15 · M Melanoma, mid abdomen, Breslow depth 0.65 mm, wide excision by Dr. April Morrison, negative right axillary and right inguinal SLN biopsies, 12/20/17 · Recurrent melanoma 12/2019, 
· CAT CAP and MRI head negative 12/19 · STRATA · No BRAF mutation · CDKN2A deep deletion · NRAS p.Q61R 56% · Opdivo 480 q 28 x 12; cycle 1 1/7/20, cycle 2 2/4/20 · Recurrence at surgical site 2/28/20 · Plan to add Ipilimumab -3/6/20 Ipi 3 mg/kg and opdivo 1 mg/kg q 3 weeks x 4 then opdivo History of Present Illness: This is a 68 y.o. male with a history of BCC, SCC, and malignant melanoma who is now admitted for diarrhea/ GI bleed. Patient reports having 5-6 bowel movents per day and recently in the last two days having 5-6 blood stools that are dark red. During the workup, CT C/A/P performed which shows possible metastatic disease to lung, liver, mesenteric node, and bone. He has received two cycles of nivolumab and one cycle of ipilumomab/nivolumab. Discussed CT results with patient. Oncologic Hx: 
Patient has a history of malignant melanoma of his abdomen in 2017. He sees surgical dermatology regularly due to his history. He noticed a lump on his right abdomen which grew quickly. Patient denies discoloration to the skin around the lump. He saw his PCP who ordered an [address] whichw as worrisome for malignancy.  He was seen by Dr. Jada More who performed an excisional biopsy of the RUQ abdominal mass. Pathology shows melanoma. Patient was referred to us for evaluation and treatment. Patient has recently lost 10 lbs. He relates this to having food poisoning over Thanksgiving. We went over the results of the CT scan and the MRI of his head which were all negative. 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 All known melanoma has been resected at this point. No family history of melanoma. His only family history of cancer is a brain tumor in his grandmother when she was in her 80s. Patient did have bladder cancer in the early 1990s. He does continue to get cystoscopies. He is a former smoker. His colonoscopy is up to date. The patient has had some diarrhea after having his first course of Yervoy. For that reason on steroids to see if we could go ahead and blunt any diarrhea the diarrhea had come on very quickly after [facility] suggesting that it probably was not Yervoy related but nonetheless we treated him with steroids. Despite steroids and hydration on Friday he is continued to do poorly over the weekend and presented to the emergency room and shown a marked decline. The patient did have a CT of his chest and abdomen in the emergency room which now shows pulmonary metastases as well as intra-abdominal metastasis and bony destruction. I spoke with the patient about the results of the CT and what it means and that his time is now getting very short. Having more difficulty with his breathing. He is very weak. He is gone from a performance status of 1 down to a performance status of 4 and just 3 days.   The patient will need a palliative referral and probably a hospice referral.  With the diarrhea it would be very reasonable to consider a colonoscopy or flexible sigmoidoscopy with biopsies and possible infliximab if the gastroenterologist felt that it was [facility] induced 3/31/2020 The patient looks a little bit better today he is more responsive and looks better hydrated. He is still weak and frail he remains afebrile. We will continue with supportive measures. If his diarrhea continues he will need a colonoscopy to be certain that were not dealing with immune colitis. Past Medical History:  
Diagnosis Date  Abdominal wall mass of right upper quadrant 2019  Atrial fibrillation ([address]  BPH (benign prostatic hyperplasia)  Cancer ([address] early 36s BLADDER  
 Hypercholesterolemia  Hypertension  Joint replaced   
 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  
Procedure Laterality Date  HX AFIB ABLATION  2018  HX APPEN[address]CTOMY [address]  HX KNEE REPLACEMENT    
 right knee  HX KNEE REPLACEMENT  2019  
 left knee  HX MALIGNANT SKIN LESION EXCISION    
 HX OTHER SURGICAL  2019 Excisional biopsy of right upper quadrant abdominal wall mass.  HX TONSILLECTOMY  HX UROLOGICAL CYSTOSCOPY  SKIN TISSUE PROCEDURE UNLISTED   Melanoma across abdomen, Right armpit and groin lymphnode involvement Social History Tobacco Use  Smoking status: Former Smoker Packs/day: 2.00 Years: 20.00 Pack years: 40.00 Last attempt to quit: 1988 Years since quittin.2  Smokeless tobacco: Never Used Substance Use Topics  Alcohol use: Yes Alcohol/week: 2.0 standard drinks Types: 2 Glasses of wine per week Comment: 1 glass with dinner 2 nights per week Family History Problem Relation Age of Onset  Dementia Mother  Hypertension Father  Hypertension Sister Current Facility-Administered Medications Medication Dose Route Frequency  sodium bicarbonate tablet 650 mg  650 mg Oral TID  sodium chloride (NS) flush 5-40 mL  5-40 mL IntraVENous Q8H  
 sodium chloride (NS) flush 5-40 mL  5-40 mL IntraVENous PRN  
 acetaminophen (TYLENOL) tablet 650 mg  650 mg Oral Q4H PRN  
 oxyCODONE-acetaminophen (PERCOCET) 5-325 mg per tablet 1 Tab  1 Tab Oral Q4H PRN  
 diphenhydrAMINE (BENADRYL) injection 12.5 mg  12.5 mg IntraVENous Q4H PRN  
 ondansetron (ZOFRAN) injection 4 mg  4 mg IntraVENous Q4H PRN  cholestyramine-aspartame (QUESTRAN LIGHT) packet 4 g  1 Packet Oral TID WITH MEALS  
 levothyroxine (SYNTHROID) tablet 75 mcg  75 mcg Oral 6am  
 primidone (MYSOLINE) tablet 50 mg  50 mg Oral QHS  atorvastatin (LIPITOR) tablet 20 mg  20 mg Oral QHS  tamsulosin (FLOMAX) capsule 0.4 mg  0.4 mg Oral DAILY  temazepam (RESTORIL) capsule 30 mg  30 mg Oral QHS PRN  
 vancomycin (FIRVANQ) 50 mg/mL oral solution 125 mg  125 mg Oral Q6H  
 dextrose 5% and 0.9% NaCl infusion  125 mL/hr IntraVENous CONTINUOUS  
 metoprolol succinate (TOPROL-XL) XL tablet 50 mg  50 mg Oral DAILY  glucose chewable tablet 16 g  4 Tab Oral PRN  
 glucagon (GLUCAGEN) injection 1 mg  1 mg IntraMUSCular PRN  
 insulin lispro (HUMALOG) injection   SubCUTAneous AC&HS  morphine injection 2 mg  2 mg IntraVENous Q3H PRN Allergies Allergen Reactions  Demerol [Meperidine] Other (comments) \"passed out\" Review of Systems: A complete review of systems was obtained, negative except as described above. Physical Exam:  
 
Visit Vitals /75 (BP 1 Location: Left arm, BP Patient Position: At rest) Pulse (!) 103 Temp 98.7 °F (37.1 °C) Resp 18 Ht 5' 9\" (1.753 m) Wt 176 lb 5.9 oz (80 kg) SpO2 96% BMI 26.05 kg/m² ECOG PS: 3 General: acutely ill appering, head normocephalic and atraumatic Eyes: PERRL, anicteric sclerae HENT: Moist mucous membranes Muscles and skeletal: Moves all extremities well. Psych: Alert, oriented, appropriate affect, normal judgment/insight Results:  
 
Lab Results Component Value Date/Time WBC 5.4 03/31/2020 08:19 AM  
 HGB 7.8 (L) 03/31/2020 08:19 AM  
 HCT 23.4 (L) 03/31/2020 08:19 AM  
 PLATELET 96 (L) 11/47/6096 08:19 AM  
 MCV 92.5 03/31/2020 08:19 AM  
 ABS. NEUTROPHILS 3.1 03/31/2020 08:19 AM  
 
Lab Results Component Value Date/Time Sodium 138 03/31/2020 08:19 AM  
 Potassium 4.3 03/31/2020 08:19 AM  
 Chloride 113 (H) 03/31/2020 08:19 AM  
 CO2 15 (LL) 03/31/2020 08:19 AM  
 Glucose 184 (H) 03/31/2020 08:19 AM  
 BUN 39 (H) 03/31/2020 08:19 AM  
 Creatinine 1.57 (H) 03/31/2020 08:19 AM  
 GFR est AA 53 (L) 03/31/2020 08:19 AM  
 GFR est non-AA 44 (L) 03/31/2020 08:19 AM  
 Calcium 7.2 (L) 03/31/2020 08:19 AM  
 Glucose (POC) 126 (H) 03/31/2020 06:57 AM  
 
Lab Results Component Value Date/Time Bilirubin, total 1.8 (H) 03/30/2020 07:52 AM  
 ALT (SGPT) 48 03/30/2020 07:52 AM  
 AST (SGOT) 114 (H) 03/30/2020 07:52 AM  
 Alk. phosphatase 120 (H) 03/30/2020 07:52 AM  
 Protein, total 5.7 (L) 03/30/2020 07:52 AM  
 Albumin 1.7 (L) 03/30/2020 07:52 AM  
 Globulin 4.0 03/30/2020 07:52 AM  
 
12/6/19 Abdominal wall mass, excisional biopsy: Melanoma, MRI HEAD 12/16/19 IMPRESSION: 
1. No evidence of intracranial metastatic disease. 2. Mild chronic white matter disease with no acute process. CAT CAP 12/16/19 IMPRESSION: 
1. No evidence of metastatic disease within the chest, abdomen, or pelvis. There 
are presumed postsurgical changes in the subcutaneous fat anterior and inferior 
to the right rib cage, in the region of the previously demonstrated mass by 
ultrasound. 2. Incidentally noted are 2 sub-5 mm right lung pulmonary nodules. 3. Evidence of old healed granulomatous disease, with calcified granulomas in 
the lung, liver, and spleen. 4. Diffuse fatty infiltration of the liver. 5. Small left renal cysts. 6. Diverticulosis of the colon. 7. Left posterior bladder diverticulum. 8. Enlarged prostate. 9. Atherosclerosis of the coronary arteries and aorta. CT C/A/P 3/30/2020 IMPRESSION: 
  
1. CT of the chest demonstrates multiple bilateral pulmonary nodules which are 
new suspicious for metastatic disease. 
  
There is hepatic steatosis. There are 2 small low densities in the liver could 
represent small metastatic lesions. 
  
There is a 2.4 cm mesenteric nodule could represent metastatic disease. 
  
There is suspicion of extensive bony metastatic disease as described above. There is mild compression of superior endplate of L2. 
 
2. There is mild dilatation of the right, transverse, left and proximal sigmoid 
colon with mild wall thickening of the proximal sigmoid and distal left colon. There is slight pericolonic haziness distal left colon and proximal sigmoid 
colon consistent with nonspecific colitis. No free air or drainable fluid 
collection is identified. Results called to the ER. Records reviewed and summarized above. Pathology report(s) reviewed above. Radiology report(s) reviewed above. Assessment:  
1) Melanoma of the right upper abdomen First diagnosed with melanoma of his mid abdomen 12/2017. Breslow depth 0.65 mm. S/p wide excision by Dr. Rainer Wild, negative right axillary and right inguinal SLN biopsies BRAF testing negative 
  
Now with melanoma of his right upper abdomen s/p excision with clear margins by Dr. Genny Caldwell 12/6/19 CT scan and the MRI of head all negative. 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 Treatment with adjuvant Opdivo 480 mg IV q. 28 days started 1/7/2020 Patient presented 2/24/20 with new lesion on RUQ of surgical site. Evaluated by Dr. Genny Caldwell and underwent excision of lesion 2/28/20. Pathology was positive for melanoma. Margins were unable to be examined.  I have asked pathology to see if there were lymphocytes infiltrating his tumor. CT C/A/P today shows possible mets to lung, liver, mesenteric node, and bone. Discussed these results with patient. Discussed hospice/palliative care given rapid progression of disease. His wife is well aware of what is going on and his prognosis 2) Diarrhea Patient has been taking prednisone 60mg daily with no improvement. C-diff, stool WBC pending. On Lizbeth Walworth. GI consulted if evidence of colitis by endoscopy then consider Infliximab for Yervoy induced colitis. 3)GI Bleed Occult stool positive. GI consulted. 4) History of BCC Left neck, s/p Electrodesiccation and Curettage, 06/07/12. Recurrent BCC, left lateral neck, Mohs, 02/24/15 Right upper chest, 11/26/19, cleared with shave biopsy Right jawline, 11/26/19, cleared with shave biopsy but narrowly - patient to use 5-Fu BID x3 weeks per dermatology Patient is followed closely by [facility] for skin checks 5) History of SCC Right arm, s/p Electrodesiccation and Curettage, 12/2014 Patient is followed closely by [facility] for skin checks 4) History of bladder cancer Diagnosed in the early 1990s He continues to follow up with urology and has regular cystoscopies His most recent cystoscopy showed an area of concern in his bladder. Plan is for a repeat cystoscopy in 6 weeks per patient 5) History of A.fib 
S/p ablation Management per primary team 
 
6) Hypotension Most likely 2/2 hypovolemia Management per primary team. 
 
7) chronic renal failure 8) elevated liver enzymes 
  
Plan: 1. Colonoscopy if diarrhea persists to rule out immune-based colitis 2. Continue with hydration I appreciate the opportunity to participate in [facility].

## 2020-03-31 NOTE — PROGRESS NOTES
Call from pt's estranged dtr Shivani Cardenas from [facility]. She gave her number ([phone]) and would like pt to contact her. Pt given phone and able to speak with her.

## 2020-03-31 NOTE — CONSULTS
[address] 
 [address] [place] [address] GASTROENTEROLOGY CONSULTATION NOTE Will Yojana Corbett, [facility] office [phone] NP/PA in-hospital cell phone M-F until 4:30PM 
After 5PM or on weekends, please call  for physician on call NAME:  Angela Morales :   1950 MRN:   [id number] Referring Physician: Dr. Lianne Oro Consult Date: 3/31/2020 11:43 AM 
 
Chief Complaint: diarrhea and shortness of breath History of Present Illness:  Patient is a 68 y.o. who is seen in consultation at the request of Dr. Robson Arias for diarrhea, colitis on CT, lower GI bleeding. Patient has a past medical history as below to include melanoma, history of basal cell carcinoma, history of squamous cell carcinoma, history of bladder cancer, and atrial fibrillation. Patient presented to the ED with complaints of shortness of breath and blood in the stool. He was admitted to the hospital on 3/30/20. Patient complains of diarrhea for the last approximately 1 week. He reports approximately 4-5 watery bowel movements daily with dark red blood. No melena. He reports generalized abdominal pain only associated with bending over. No nausea or vomiting. Patient was started on prednisone 60 mg daily by oncology for suspected adverse effect to [facility]. He was also reportedly on Levaquin and cholestyramine. Patient is on Xarelto for atrial fibrillation. History of colonoscopy reportedly 7 years ago (no records available for review). No history of colon polyps. I have reviewed the emergency room note, hospital admission note, notes by all other clinicians who have seen the patient during this hospitalization to date. I have reviewed the problem list and the reason for this hospitalization. I have reviewed the allergies and the medications the patient was taking at home prior to this hospitalization. PMH: 
Past Medical History:  
Diagnosis Date  Abdominal wall mass of right upper quadrant 12/2/2019  Atrial fibrillation ([address]  BPH (benign prostatic hyperplasia)  Cancer ([facility] early 36s BLADDER  
 Hypercholesterolemia  Hypertension  Joint replaced 2011  
 right knee  Skin cancer  Skin disorder 2018 Skin Cancer - melanoma across stomach, lymphnode involvement in Right armpit and Right groin  Sun-damaged skin PSH: 
Past Surgical History:  
Procedure Laterality Date  HX AFIB ABLATION  2018  HX [address]OMY [address]  HX KNEE REPLACEMENT  2010  
 right knee  HX KNEE REPLACEMENT  04/02/2019  
 left knee  HX MALIGNANT SKIN LESION EXCISION    
 HX OTHER SURGICAL  12/06/2019 Excisional biopsy of right upper quadrant abdominal wall mass.  HX TONSILLECTOMY  HX UROLOGICAL CYSTOSCOPY  SKIN TISSUE PROCEDURE UNLISTED  2018 Melanoma across abdomen, Right armpit and groin lymphnode involvement Allergies: Allergies Allergen Reactions  Demerol [Meperidine] Other (comments) \"passed out\" Home Medications: 
Prior to Admission Medications Prescriptions Last Dose Informant Patient Reported? Taking? amLODIPine (NORVASC) 10 mg tablet   Yes Yes Sig: Take 10 mg by mouth daily. cholestyramine (Questran) 4 gram packet   Yes Yes Sig: Take 1 Packet by mouth three (3) times daily (with meals). diclofenac EC (VOLTAREN) 75 mg EC tablet   Yes Yes Sig: Take 75 mg by mouth two (2) times daily as needed. fenofibrate (LOFIBRA) 160 mg tablet   No Yes Sig: TAKE 1 TABLET DAILY  
finasteride (PROSCAR) 5 mg tablet   Yes Yes Sig: Take 5 mg by mouth daily. levothyroxine (SYNTHROID) 75 mcg tablet   No Yes Sig: TAKE 1 TABLET BY MOUTH DAILY BEFORE BREAKFAST  
lisinopril (PRINIVIL, ZESTRIL) 20 mg tablet   Yes Yes Sig: Take 20 mg by mouth daily. metoprolol succinate (TOPROL-XL) 50 mg XL tablet   Yes Yes Sig: Take 50 mg by mouth daily. predniSONE (DELTASONE) 20 mg tablet   No Yes Sig: Take 60 mg by mouth daily for 10 days. primidone (MYSOLINE) 50 mg tablet   Yes Yes Sig: Take 50 mg by mouth nightly. rivaroxaban (XARELTO) 20 mg tab tablet   Yes Yes Sig: Take 20 mg by mouth daily (with dinner). simvastatin (ZOCOR) 40 mg tablet   No Yes Sig: TAKE 1 TABLET NIGHTLY  
tadalafil (CIALIS) 5 mg tablet   Yes Yes Sig: Take 5 mg by mouth as needed. tamsulosin (FLOMAX) 0.4 mg capsule   Yes Yes Sig: Take 0.4 mg by mouth daily. temazepam (RESTORIL) 30 mg capsule   No Yes Sig: Take 1 Cap by mouth nightly as needed for Sleep for up to 30 days. Max Daily Amount: 30 mg.  
triamcinolone acetonide (KENALOG) 0.1 % ointment   Yes Yes Sig: Apply  to affected area two (2) times daily as needed (rash). use thin layer Facility-Administered Medications: None Hospital Medications: 
Current Facility-Administered Medications Medication Dose Route Frequency  sodium bicarbonate tablet 650 mg  650 mg Oral TID  piperacillin-tazobactam (ZOSYN) 3.375 g in 0.9% sodium chloride (MBP/ADV) 100 mL  3.375 g IntraVENous Q8H  
 sodium chloride (NS) flush 5-40 mL  5-40 mL IntraVENous Q8H  
 sodium chloride (NS) flush 5-40 mL  5-40 mL IntraVENous PRN  
 acetaminophen (TYLENOL) tablet 650 mg  650 mg Oral Q4H PRN  
 oxyCODONE-acetaminophen (PERCOCET) 5-325 mg per tablet 1 Tab  1 Tab Oral Q4H PRN  
 diphenhydrAMINE (BENADRYL) injection 12.5 mg  12.5 mg IntraVENous Q4H PRN  
 ondansetron (ZOFRAN) injection 4 mg  4 mg IntraVENous Q4H PRN  cholestyramine-aspartame (QUESTRAN LIGHT) packet 4 g  1 Packet Oral TID WITH MEALS  
 levothyroxine (SYNTHROID) tablet 75 mcg  75 mcg Oral 6am  
 primidone (MYSOLINE) tablet 50 mg  50 mg Oral QHS  atorvastatin (LIPITOR) tablet 20 mg  20 mg Oral QHS  tamsulosin (FLOMAX) capsule 0.4 mg  0.4 mg Oral DAILY  temazepam (RESTORIL) capsule 30 mg  30 mg Oral QHS PRN  
  vancomycin (FIRVANQ) 50 mg/mL oral solution 125 mg  125 mg Oral Q6H  
 dextrose 5% and 0.9% NaCl infusion  125 mL/hr IntraVENous CONTINUOUS  
 metoprolol succinate (TOPROL-XL) XL tablet 50 mg  50 mg Oral DAILY  glucose chewable tablet 16 g  4 Tab Oral PRN  
 glucagon (GLUCAGEN) injection 1 mg  1 mg IntraMUSCular PRN  
 insulin lispro (HUMALOG) injection   SubCUTAneous AC&HS  morphine injection 2 mg  2 mg IntraVENous Q3H PRN Social History: 
Social History Tobacco Use  Smoking status: Former Smoker Packs/day: 2.00 Years: 20.00 Pack years: 40.00 Last attempt to quit: 1988 Years since quittin.2  Smokeless tobacco: Never Used Substance Use Topics  Alcohol use: Yes Alcohol/week: 2.0 standard drinks Types: 2 Glasses of wine per week Comment: 1 glass with dinner 2 nights per week Family History: 
Family History Problem Relation Age of Onset  Dementia Mother  Hypertension Father  Hypertension Sister Review of Systems: 
Constitutional: negative fever, negative chills, negative weight loss Eyes:   negative visual changes ENT:   negative sore throat, tongue or lip swelling Respiratory:  +negative cough, + dyspnea Cards:  + for chest pain, negative palpitations, negative lower extremity edema GI:   See HPI 
:  negative for frequency, dysuria Integument:  negative for rash and pruritus Heme:  negative for easy bruising and gum/nose bleeding Musculoskeletal:negative for myalgias, back pain and muscle weakness Neuro:    negative for headaches, dizziness Psych: negative for feelings of anxiety, depression Objective:  
 
Patient Vitals for the past 8 hrs: 
 BP Temp Pulse Resp SpO2 Weight 20 0846 119/75 98.7 °F (37.1 °C) (!) 103 18 96 %   
20 0500 98/56 98.8 °F (37.1 °C) (!) 104 18 93 %   
20 0417      80 kg (176 lb 5.9 oz) No intake/output data recorded.  1901 -  0700 In: 2000 [I.V.:2000] Out: 425 [Urine:425] EXAM:   
 CONST:  Pleasant male lying in bed, no acute distress NEURO:  Alert and oriented HEENT: EOMI, no scleral icterus LUNGS: Labored, on nasal cannula CARD:  S1 S2 
 ABD:  Taught, distended, mild generalized tenderness, high pitched bowel sounds. No guarding or rebound. EXT:  Warm PSYCH: Not anxious or agitated Data Review Recent Labs  
  03/31/20 
6157 03/30/20 
1731 03/30/20 
6345 WBC 5.4  --  4.7 HGB 7.8* 8.1* 8.9* HCT 23.4* 24.9* 26.8*  
PLT 96*  --  107* Recent Labs  
  03/31/20 
0819 03/30/20 
1731  142  
K 4.3 4.2 * 113* CO2 15* 19* BUN 39* 31* CREA 1.57* 1.32* * 274* PHOS  --  2.6 CA 7.2* 6.6* Recent Labs  
  03/30/20 
5897 SGOT 114* * TP 5.7* ALB 1.7*  
GLOB 4.0 Recent Labs  
  03/30/20 
7802 INR 1.4* PTP 14.0* APTT 32.5* Assessment:  
· Diarrhea with hematochezia: WBC 5.4, Hgb 7.8, platelets 96, INR 1.4. CT abdomen/pelvis without contrast (3/30/20): mild dilatation of the right, transverse, left, and proximal sigmoid colon with mild wall thickening of the proximal sigmoid and distal left colon; slightly pericolonic haziness distal left colon and proximal sigmoid colon consistent with nonspecific colitis; no free air or drainable fluid collection identified. Hemoccult stool positive. Stool WBC > 20, C. difficile negative, Enteric bacteria panel pending. Differential includes infectious versus ischemic colitis. · Shortness of breath: CT chest (3/30/20): multiple bilateral pulmonary nodules which are new suspicious for metastatic disease; findings above. · Melanoma: followed by oncology. · History of hypertension and atrial fibrillation: on Xarelto prior to admission. Patient Active Problem List  
Diagnosis Code  Essential hypertension I10  
 Hyperlipidemia E78.5  Atrial fibrillation (HCC) I48.91  
 History of skin cancer S84.136  
  BMI 27.0-27.9,adult Z68.27  
 History of bladder cancer Z85.51  Abdominal wall mass of right upper quadrant R19.01  
 Malignant melanoma of torso excluding breast (HCC) C43.59  
 Primary osteoarthritis of left knee M17.12  
 Hypothyroidism due to medication E03.2  Hypercalcemia E83.52  
 Dehydration E86.0  Shortness of breath R06.02  
 Diarrhea R19.7  Melanoma ([address] C43.9  Anemia D64.9 Plan:  
 
· NPO 
· On Zosyn · Follow enteric bacteria panel · Continue supportive measures · Patient was discussed with and will be seen by Dr. Jordan Bowie · Thank you for allowing me to participate in care of Kate Estrella Signed By: Elba Lesches, [place]   
 3/31/2020  11:43 AM 
  
 
Gastroenterology Attending Physician attestation statement and comments. This patient was seen and examined by me in a face-to-face visit today. I reviewed the medical record including lab work, imaging and other provider notes. I confirmed the history as described above. I spoke to the patient, reviewed the medical record including lab work, imaging and other provider notes. I discussed this case in detail with Savi LOPEZ. I formulated an  assessment of this patient and developed a treatment plan. I agree with the above consultation note. I agree with the history, exam and assessment and plan as outlined in the note. I would like to add the following:  
 
Abd: hypoactive high pitched BS, distended, nt, no rebound/guarding. Colitis: infectious versus ischemic versus chemo related. C diff neg. Await enteric panel. Agree with Zosyn. AXR for concerns for colonic ileus. Minimize opioid medications. Dr. Jordan Bowie

## 2020-03-31 NOTE — PROGRESS NOTES
Problem: Risk for Spread of Infection Goal: Prevent transmission of infectious organism to others Description: Prevent the transmission of infectious organisms to other patients, staff members, and visitors. Outcome: Progressing Towards Goal 
  
Problem: Pressure Injury - Risk of 
Goal: *Prevention of pressure injury Description: Document Carlos Scale and appropriate interventions in the flowsheet. Outcome: Progressing Towards Goal 
Note: Pressure Injury Interventions: 
Sensory Interventions: Assess changes in LOC, Float heels, Keep linens dry and wrinkle-free, Maintain/enhance activity level, Minimize linen layers Moisture Interventions: Absorbent underpads, Apply protective barrier, creams and emollients, Offer toileting Q_hr Activity Interventions: Increase time out of bed, Pressure redistribution bed/mattress(bed type), PT/OT evaluation Mobility Interventions: Float heels, HOB 30 degrees or less, Pressure redistribution bed/mattress (bed type), PT/OT evaluation Nutrition Interventions: Document food/fluid/supplement intake Friction and Shear Interventions: Apply protective barrier, creams and emollients, Minimize layers Problem: Falls - Risk of 
Goal: *Absence of Falls Description: Document Jenn Baxter Fall Risk and appropriate interventions in the flowsheet. Outcome: Progressing Towards Goal 
Note: Fall Risk Interventions: 
Mobility Interventions: Bed/chair exit alarm, Patient to call before getting OOB, Strengthening exercises (ROM-active/passive), Utilize walker, cane, or other assistive device Medication Interventions: Bed/chair exit alarm, Patient to call before getting OOB, Teach patient to arise slowly Elimination Interventions: Bed/chair exit alarm, Call light in reach, Patient to call for help with toileting needs, Toileting schedule/hourly rounds History of Falls Interventions: Bed/chair exit alarm, Door open when patient unattended, Investigate reason for fall

## 2020-03-31 NOTE — PROGRESS NOTES
1715: TRANSFER - IN REPORT: 
 
Verbal report received from [place], RN(name) on Madlyn Dial  being received from 2N(unit) for change in patient condition(respiratory distress) Report consisted of patients Situation, Background, Assessment and  
Recommendations(SBAR). Information from the following report(s) SBAR, Kardex, ED Summary, Intake/Output, MAR, Recent Results and Cardiac Rhythm ST was reviewed with the receiving nurse. Opportunity for questions and clarification was provided. Assessment completed upon patients arrival to unit and care assumed. Primary Nurse Ashli William and Koffi Foster RN performed a dual skin assessment on this patient No impairment noted [facility] Carlos [facility] is [facility] 
1745: RN at the bedside to draw labs. 1930: Bedside shift change report given to Meena Chase RN (oncoming nurse) by Devendra Gibson RN (offgoing nurse). Report included the following information SBAR, Kardex, ED Summary, Procedure Summary, Intake/Output, MAR, Recent Results and Cardiac Rhythm ST.

## 2020-03-31 NOTE — CONSULTS
Pulmonology Intensive Care Unit Initial Assessment Subjective: 
 
 
 
Subjective:  
 
Critical Care Initial Evaluation Note: 3/31/2020 4:47 PM 
 
Mr. Kumar Barillas is 67 yo CM with h/o metastatic melanoma, bladder CA in 1900 now in remission, basal cell CA, cutaneous SCC, HTN, A fib. He was admitted to stepdown floor yesterday with c/o shortness of breath and ongoing diarrhea for about 1 wk. He was found to have metabolic acidosis which has not improved with IVF. CT chest showed adiel metastatic nodular lesions but no acute infiltrates. Today patient has worsening shortness of breath and started on BIPAP. Patient is transferred to ICU for due to lake of beds on stepdown floor. CT abd showed diffuse colitis. Patient is being treated with zosyn and also with solumedrol for possible chemo Loel Curls) induced colitis. Patient also just developed A fib with RVR. He has blood in stools on admission and his xarelto is on hold. Past Medical History:  
Diagnosis Date  Abdominal wall mass of right upper quadrant 12/2/2019  Atrial fibrillation ([address]  BPH (benign prostatic hyperplasia)  Cancer ([address] early 36s BLADDER  
 Hypercholesterolemia  Hypertension  Joint replaced 2011  
 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  
Procedure Laterality Date  HX AFIB ABLATION  2018  HX [address]Y [address]  HX KNEE REPLACEMENT  2010  
 right knee  HX KNEE REPLACEMENT  04/02/2019  
 left knee  HX MALIGNANT SKIN LESION EXCISION    
 HX OTHER SURGICAL  12/06/2019 Excisional biopsy of right upper quadrant abdominal wall mass.  HX TONSILLECTOMY  HX UROLOGICAL CYSTOSCOPY  SKIN TISSUE PROCEDURE UNLISTED  2018 Melanoma across abdomen, Right armpit and groin lymphnode involvement Prior to Admission medications Medication Sig Start Date End Date Taking? Authorizing Provider  
cholestyramine (Questran) 4 gram packet Take 1 Packet by mouth three (3) times daily (with meals). Yes Provider, Historical  
metoprolol succinate (TOPROL-XL) 50 mg XL tablet Take 50 mg by mouth daily. Yes Provider, Historical  
primidone (MYSOLINE) 50 mg tablet Take 50 mg by mouth nightly. Yes Provider, Historical  
triamcinolone acetonide (KENALOG) 0.1 % ointment Apply  to affected area two (2) times daily as needed (rash). use thin layer   Yes Provider, Historical  
predniSONE (DELTASONE) 20 mg tablet Take 60 mg by mouth daily for 10 days. 3/27/20 4/6/20 Yes Adelaide Lam MD  
temazepam (RESTORIL) 30 mg capsule Take 1 Cap by mouth nightly as needed for Sleep for up to 30 days. Max Daily Amount: 30 mg. 3/27/20 4/26/20 Yes Adelaide Lam MD  
levothyroxine (SYNTHROID) 75 mcg tablet TAKE 1 TABLET BY MOUTH DAILY BEFORE BREAKFAST 3/4/20  Yes Fatemeh Rabago, NP  
fenofibrate (LOFIBRA) 160 mg tablet TAKE 1 TABLET DAILY 26/74/81  Yes Bibi Guo NP  
simvastatin (ZOCOR) 40 mg tablet TAKE 1 TABLET NIGHTLY 86/17/71  Yes Dalia Guo NP  
tadalafil (CIALIS) 5 mg tablet Take 5 mg by mouth as needed. 10/19/19  Yes Provider, Historical  
tamsulosin (FLOMAX) 0.4 mg capsule Take 0.4 mg by mouth daily. 10/19/19  Yes Provider, Historical  
diclofenac EC (VOLTAREN) 75 mg EC tablet Take 75 mg by mouth two (2) times daily as needed. 2/22/19  Yes Provider, Historical  
amLODIPine (NORVASC) 10 mg tablet Take 10 mg by mouth daily. Yes Provider, Historical  
rivaroxaban (XARELTO) 20 mg tab tablet Take 20 mg by mouth daily (with dinner). Yes Provider, Historical  
finasteride (PROSCAR) 5 mg tablet Take 5 mg by mouth daily. 12/11/18  Yes Provider, Historical  
lisinopril (PRINIVIL, ZESTRIL) 20 mg tablet Take 20 mg by mouth daily. 9/15/18  Yes Provider, Historical  
 
Allergies Allergen Reactions  Demerol [Meperidine] Other (comments) \"passed out\" Social History Tobacco Use  Smoking status: Former Smoker Packs/day: 2.00 Years: 20.00 Pack years: 40.00 Last attempt to quit: 1988 Years since quittin.2  Smokeless tobacco: Never Used Substance Use Topics  Alcohol use: Yes Alcohol/week: 2.0 standard drinks Types: 2 Glasses of wine per week Comment: 1 glass with dinner 2 nights per week Family History Problem Relation Age of Onset  Dementia Mother  Hypertension Father  Hypertension Sister Review of Systems All other systems reviewed and are negative. Objective:  
 
Vital signs reviewed. Patient Vitals for the past 12 hrs: 
 Temp Pulse Resp BP SpO2  
20 1631  (!) 152   99 % 20 1625     100 % 20 1616 98.7 °F (37.1 °C) (!) 130 (!) 40 (!) 202/96   
20 1413 97.8 °F (36.6 °C) (!) 110 20 153/72 94 % 20 0846 98.7 °F (37.1 °C) (!) 103 18 119/75 96 % 20 0500 98.8 °F (37.1 °C) (!) 104 18 98/56 93 % No intake/output data recorded.  1901 -  0700 In:  [I.V.:] Out: 425 [Urine:425] Physical Exam 
Constitutional:   
   Appearance: He is ill-appearing. HENT:  
   Head: Normocephalic and atraumatic. Mouth/Throat:  
   Mouth: Mucous membranes are moist.  
Eyes:  
   General:     
   Right eye: No discharge. Left eye: No discharge. Extraocular Movements: Extraocular movements intact. Cardiovascular:  
   Rate and Rhythm: Tachycardia present. Rhythm irregular. Heart sounds: No murmur. No friction rub. Pulmonary:  
   Effort: Respiratory distress present. Breath sounds: No wheezing, rhonchi or rales. Abdominal:  
   General: There is distension. Tenderness: There is no abdominal tenderness. There is no guarding. Musculoskeletal: Normal range of motion. Skin: 
   General: Skin is warm and dry. Neurological: Mental Status: He is alert. Data Review:  
 
Recent Results (from the past 24 hour(s)) HGB & HCT Collection Time: 03/30/20  5:31 PM  
Result Value Ref Range HGB 8.1 (L) 12.1 - 17.0 g/dL HCT 24.9 (L) 36.6 - 50.3 % METABOLIC PANEL, BASIC Collection Time: 03/30/20  5:31 PM  
Result Value Ref Range Sodium 142 136 - 145 mmol/L Potassium 4.2 3.5 - 5.1 mmol/L Chloride 113 (H) 97 - 108 mmol/L  
 CO2 19 (L) 21 - 32 mmol/L Anion gap 10 5 - 15 mmol/L Glucose 274 (H) 65 - 100 mg/dL BUN 31 (H) 6 - 20 MG/DL Creatinine 1.32 (H) 0.70 - 1.30 MG/DL  
 BUN/Creatinine ratio 23 (H) 12 - 20 GFR est AA >60 >60 ml/min/1.73m2 GFR est non-AA 53 (L) >60 ml/min/1.73m2 Calcium 6.6 (L) 8.5 - 10.1 MG/DL MAGNESIUM Collection Time: 03/30/20  5:31 PM  
Result Value Ref Range Magnesium 2.1 1.6 - 2.4 mg/dL PHOSPHORUS Collection Time: 03/30/20  5:31 PM  
Result Value Ref Range Phosphorus 2.6 2.6 - 4.7 MG/DL  
HEMOGLOBIN A1C WITH EAG Collection Time: 03/30/20  5:31 PM  
Result Value Ref Range Hemoglobin A1c 6.5 (H) 4.0 - 5.6 % Est. average glucose 140 mg/dL C. DIFFICILE AG & TOXIN A/B Collection Time: 03/30/20  7:32 PM  
Result Value Ref Range GDH ANTIGEN NEGATIVE  NEG    
 C. difficile toxin NEGATIVE  NEG INTERPRETATION NEGATIVE FOR TOXIGENIC C. DIFFICILE NTXCD    
WBC, STOOL Collection Time: 03/30/20  7:32 PM  
Result Value Ref Range White blood cells, stool >20 0 - 4 /HPF  
GLUCOSE, POC Collection Time: 03/30/20 11:16 PM  
Result Value Ref Range Glucose (POC) 155 (H) 65 - 100 mg/dL Performed by Tawnya Nick GLUCOSE, POC Collection Time: 03/31/20  6:57 AM  
Result Value Ref Range Glucose (POC) 126 (H) 65 - 100 mg/dL Performed by Gilford Croft   
CBC WITH AUTOMATED DIFF Collection Time: 03/31/20  8:19 AM  
Result Value Ref Range WBC 5.4 4.1 - 11.1 K/uL  
 RBC 2.53 (L) 4.10 - 5.70 M/uL HGB 7.8 (L) 12.1 - 17.0 g/dL HCT 23.4 (L) 36.6 - 50.3 % MCV 92.5 80.0 - 99.0 FL  
 MCH 30.8 26.0 - 34.0 PG  
 MCHC 33.3 30.0 - 36.5 g/dL  
 RDW 14.1 11.5 - 14.5 % PLATELET 96 (L) 194 - 400 K/uL MPV 10.9 8.9 - 12.9 FL  
 NRBC 1.3 (H) 0  WBC ABSOLUTE NRBC 0.07 (H) 0.00 - 0.01 K/uL NEUTROPHILS 52 32 - 75 % BAND NEUTROPHILS 5 0 - 6 % LYMPHOCYTES 22 12 - 49 % MONOCYTES 8 5 - 13 % EOSINOPHILS 3 0 - 7 % BASOPHILS 1 0 - 1 % METAMYELOCYTES 7 (H) 0 % MYELOCYTES 2 (H) 0 % IMMATURE GRANULOCYTES 0 %  
 ABS. NEUTROPHILS 3.1 1.8 - 8.0 K/UL  
 ABS. LYMPHOCYTES 1.2 0.8 - 3.5 K/UL  
 ABS. MONOCYTES 0.4 0.0 - 1.0 K/UL  
 ABS. EOSINOPHILS 0.2 0.0 - 0.4 K/UL  
 ABS. BASOPHILS 0.1 0.0 - 0.1 K/UL  
 ABS. IMM. GRANS. 0.0 K/UL  
 DF MANUAL    
 RBC COMMENTS NORMOCYTIC, NORMOCHROMIC    
 WBC COMMENTS TOXIC GRANULATION    
METABOLIC PANEL, BASIC Collection Time: 03/31/20  8:19 AM  
Result Value Ref Range Sodium 138 136 - 145 mmol/L Potassium 4.3 3.5 - 5.1 mmol/L Chloride 113 (H) 97 - 108 mmol/L  
 CO2 15 (LL) 21 - 32 mmol/L Anion gap 10 5 - 15 mmol/L Glucose 184 (H) 65 - 100 mg/dL BUN 39 (H) 6 - 20 MG/DL Creatinine 1.57 (H) 0.70 - 1.30 MG/DL  
 BUN/Creatinine ratio 25 (H) 12 - 20 GFR est AA 53 (L) >60 ml/min/1.73m2 GFR est non-AA 44 (L) >60 ml/min/1.73m2 Calcium 7.2 (L) 8.5 - 10.1 MG/DL  
GLUCOSE, POC Collection Time: 03/31/20 11:30 AM  
Result Value Ref Range Glucose (POC) 160 (H) 65 - 100 mg/dL Performed by Mihaela RODRIGUEZ POC EG7 Collection Time: 03/31/20  2:55 PM  
Result Value Ref Range Calcium, ionized (POC) 1.04 (L) 1.12 - 1.32 mmol/L  
 pH (POC) 7.378 7.35 - 7.45    
 pCO2 (POC) 24.8 (L) 35.0 - 45.0 MMHG  
 pO2 (POC) 73 (L) 80 - 100 MMHG  
 HCO3 (POC) 14.6 (L) 22 - 26 MMOL/L Base deficit (POC) 11 mmol/L  
 sO2 (POC) 95 92 - 97 % Site RIGHT BRACHIAL Device: NASAL CANNULA Flow rate (POC) 4 L/M  Allens test (POC) N/A    
 Specimen type (POC) ARTERIAL    
GLUCOSE, POC Collection Time: 03/31/20  4:19 PM  
Result Value Ref Range Glucose (POC) 147 (H) 65 - 100 mg/dL Performed by Jj Smith Assessment:  
 
-Sepsis. Sec to colitis. -Hypovolemia from diarrhea. 
-Respiratory distress. Likely from abd distension. 
-Diffuse colitis with colonic distension. Infectious/chemo induced. 
-Non anion gap met acidosis. From vol depletion insetting of diarrhea and hyperchloremia. -A fib with RVR. -CKD 3. 
-HTN. -Metastatic melanoma. 
-Hypothyroidism Plan:  
 
-Start on cardizem gtt for RVR. -Continue toprol. 
-start LR at 100ml/hr. 
-Closely monitor hemodynamics and markers of end organ perfusion including lactate. -F/u stool pathogen panel. 
-BiPAP PRN. -Continue zoysn for now. -F/u cultures. 
-Continue current dose of solumedrol for now. 
-Continue holding xarelto for now. Prophylaxis: DVT Protocol Active: SCD, holding xarelto due to GI bleed. 40min of CC time spent.

## 2020-03-31 NOTE — ROUTINE PROCESS
Bedside shift change report given to kade garza rn (oncoming nurse) by General Headings (offgoing nurse). Report included the following information SBAR and Kardex.

## 2020-03-31 NOTE — PHYSICIAN ADVISORY
This patient is at high risk of adverse events and deterioration based on documented clinical data, comorbid conditions and current acute care course. Mr. Lu Rodriguez is expected to meet Inpatient Admission status criteria in accordance with CMS regulation Section 43 .3. Specifically, due to medical necessity the patient's stay is expected to exceed Two Midnights. It is our recommendation that this patient's hospitalization status should be \NPATIENT status. The final decision of the patient's hospitalization status depends on the attending physician's judgment. ear pain

## 2020-04-01 NOTE — CDMP QUERY
Pt admitted with GIB . Noted documentation of \"sepsis secondary to colitis\" on 3/31 by [facility]30 [facility]. If possible, please document in progress notes and discharge summary: 
 
? Sepsis POA (Dx) confirmed ? Sepsis (Dx) ruled out The medical record reflects the following: 
 
  Risk Factors: 68 M with GIB and colitis, CA, Clinical Indicators: 3/30 10 bands 3/31 1729 lactate 3,6, 2259 4.1; 4/1 0414 lactate 3.5 0850 3.5 HR >90 documented throughout admission, RR 18-40 documented throughout admission, 
  Treatment: 3/30 NaCl 0.9% IV bolus x2, Lactated Ringers  ml/hr continuous  , Zosyn 3.375    g  IV q 8 hr, serial lactates, BIPAP prn Thank you, Elkin De Guzman BSN, RN  
[phone]

## 2020-04-01 NOTE — PROGRESS NOTES
SPEECH THERAPY SCREENING: 
SERVICES ARE NOT INDICATED AT THIS TIME [facility] therapy based on results obtained during the nursing admission assessment. The patients chart was reviewed and the patient is not appropriate for a skilled therapy evaluation at this time as patient NPO per GI. Please consult speech therapy if any therapy needs arise. Thank you. Donna Murphy, SLP

## 2020-04-01 NOTE — CDMP QUERY
Pt admitted with GIB  shortness of breath. Pt noted to have new O2 requirement with placement on BiPAP less than 24 hrs with orders for prn BIPAP [place] [facility] If possible, please document in the progress notes and d/c summary if you are evaluating and / or treating any of the following: ? Acute respiratory failure 
o With hypoxia 
? Other, please specify ? Clinically unable to determine The medical record reflects the following: 
   Risk Factors: 68 M, cancer with pulmonary nodules present on this admission Clinical Indicators: tachypnea RR 24-40 documented, Respiratory distress present documented 3/31 Ousmane Mccollum progress note Treatment: NC 3LPM started in ER  Increased to 4LPM, Patient placed on BIPAP 3/31    [id number] and placed back onto O2 via NC 4 LPM, patient transferred to higher level of care    (CCU), steroids Thank you, Vincent Harrison BSN, RN  
[phone]

## 2020-04-01 NOTE — PROGRESS NOTES
[address] [facility] Adult  Hospitalist Group Hospitalist Progress Note Bridgett Little MD 
Answering service: [phone] from in house phone Date of Service:  2020 NAME:  Eyad Patel :  1950 MRN:  [id number] Admission Summary:  
Mr. [place]skar [place] is a 77-year-old male who presents to the ER with complaints of shortness of breath and blood in his stool. Jennifer Schaffer states that he has had a cough and shortness of breath for 1 week. Jennifer Schaffer was recently diagnosed with pneumonia and started on antibiotics. Jennifer Schaffer frequently has 5-6 bowel movements a day.  His bowel movements have returned bloody in the last 2 to 3 days. Jennifer Schaffer reports having 5-6 bloody bowel movements a day for the last 2 days. Jennifer Schaffer states that they are \"dark. \" Jennifer Schaffer describes them as a dark red. Jennifer Schaffer says that he has some abdominal pain when he bends over and chest pain when he coughs.  States that he has been coughing.  His cough is been mildly productive.  He says that he has a hard time getting up anything with his cough.  He denies any other complaints. Pt was seen in oncology office on 3/27  for cycle 2 Opdivo/Yervoy. Assessment was completed. Pt weak, SOB, having diarrhea w/blood in stool occasionally. Upon assessment, pt BP very low. 20 0956 97.4 °F (36.3 °C) (!) 110 22 (!) 75/52 95 % Labs were drawn on yesterday and reviewed but praful addt'l labs due to pt symptoms - Pt given 2L of IVFluids and chemotherapy held Interval history / Subjective:  
Pt less short of breath today- no wheezing, 
abd distension and tachycardia persist-  
Acidosis improved- starting reglan, decreasing IV steroids Assessment & Plan:  
 
 
1) diarrhea with acute GI bleeding- hx of diverticulosis- CT of abd pelvis on admission showing \" COLON: There is dilatation of the colon measuring up to 7 cm. There is slight wall thickening in the left colon. The mid to distal sigmoid colon and rectum  
are not distended. There is slight pericolonic haziness around the distal left  
colon and proximal sigmoid colon consistent with colitis. diarrhea - resolving? . Diffuse colonic distension consistent with colonic ileus- pt reports passing some gas- trial of reglan Stool was neg  for C. Difficile- genesis Patton, GI consulted,  
Cont IV zosyn for bacterial colitis 
xarelto was not restarted due to bleeding   
2) oncology- Melanoma- with diffuse mets including to bone per admit CT On tx per Dr Roblero Cassette- currently on hold due to hypotension and diarrhea Hx of skin cancer and bladder cancer- recent cystoscopy showed an area of concern in his bladder. Plan is for a repeat cystoscopy in 6 weeks per patient   
3) CV- HTN and Hx of A.fib 
S/p ablation- resumed home meds metoprolol Tachycardia due to acidosis- improved today- he did not need IV diltiazem overnight  
  
4) Shortness of breath/dyspnea-meeting the criteria for acute hypoxemic resp failure- but of unclear etiology- 
negative viral resp panel and neg for flu CT chest on admission showing no evidence of pneumonia \" There has been interval development of multiple bilateral pulmonary nodules  
   
 patient less dyspnec today- suspect this is resp compensation for metabolic acidosis 
cont nebs, IV steroids- -decreased dose today- cont oxygen via NC He was only on Bipap for a few hours yesterday 5) anemia- due to chronic dz and acute GI blood loss Hb low- some slow continued lower GI bleeding- monitor 6) metabolic acidosis- likely due to colitis/infection/cancer-  
Bicarb given IV yesterday, he continues on PO bicarb today LR IVFluids 7) hyperglycemia-suspected DM based on HbA1c 
- accuchecks and ss insulin 8) coagulopathy- elevated INR due to xarelto use 9) This patient meets the diagnostic criteria for having Acute Moderate Protein-Calorie Malnutrition- and I am documenting this at the request of CDMP query but I did not assess, address, or treat this medical issue during my care of this patient 10) this patient met the criteria for sepsis during his admission but on admission his lactate was normal and his tachycardia was likely due to acidosis- I do not think the sepsis was present on admission but more likely I believe that he developed sepsis after admission from an infection that was present on admission- colitis- this clarification is done at the request of a [place] query received 4/1/20 
  
I called the patient's wife David Navarrete and updated her #elay [phone] Code status: DNR 
DVT prophylaxis: SCDs Care Plan discussed with: Patient/Family Anticipated Disposition: Home w/Family Anticipated Discharge: Greater than 48 hours Hospital Problems  Date Reviewed: 3/19/2020 Codes Class Noted POA Shortness of breath ICD-10-CM: R06.02 
ICD-9-CM: 786.05  3/30/2020 Unknown Diarrhea ICD-10-CM: R19.7 ICD-9-CM: 787.91  3/30/2020 Unknown Melanoma ([facility] [address] ICD-10-CM: C43.9 ICD-9-CM: 172.9  3/30/2020 Unknown Anemia ICD-10-CM: D64.9 ICD-9-CM: 285.9  3/30/2020 Unknown Review of Systems: A comprehensive review of systems was negative except for that written in the HPI. Vital Signs:  
 Last 24hrs VS reviewed since prior progress note. Most recent are: 
Visit Vitals /75 (BP 1 Location: Left arm) Pulse (!) 112 Temp 98.7 °F (37.1 °C) Resp 27 Ht 5' 9\" (1.753 m) Wt 80.5 kg (177 lb 7.5 oz) SpO2 96% BMI 26.21 kg/m² Intake/Output Summary (Last 24 hours) at 4/1/2020 0845 Last data filed at 4/1/2020 0700 Gross per 24 hour Intake 1668.33 ml Output 500 ml Net 1168.33 ml Physical Examination:  
 
 
     
Constitutional: tired appearing, awake alert following commands ENT:  Oral mucosa dry. Resp: Dyspnea-decreased breath sounds bilat-no wheezing CV:  tachycardia- no murmur GI:  very distended, mild tenderness, tympanic Bowel sounds Musculoskeletal:  No edema, warm, 2+ pulses throughout Neurologic:  Moves all extremities. AAOx3, CN II-XII reviewed Psych:  less anxious today Data Review:  
 Review and/or order of clinical lab test 
Review and/or order of tests in the radiology section of [facility] Review and/or order of tests in the medicine section of [facility] Labs:  
 
Recent Labs 04/01/20 
0351 03/31/20 
1730 WBC 6.2 3.0* HGB 7.9* 7.5* HCT 24.2* 22.9*  
PLT 85* 82* Recent Labs 04/01/20 
6343 03/31/20 
1729 03/31/20 
0819 03/30/20 
1731  140 138 142  
K 4.0 4.2 4.3 4.2 * 112* 113* 113* CO2 20* 19* 15* 19* BUN 40* 41* 39* 31* CREA 1.42* 1.54* 1.57* 1.32* * 156* 184* 274* CA 7.3* 6.8* 7.2* 6.6*  
MG  --   --   --  2.1 PHOS  --   --   --  2.6 Recent Labs  
  03/31/20 
1729 03/30/20 
8733 SGOT 79* 114* ALT 37 48 AP 98 120* TBILI 3.1* 1.8* TP 5.0* 5.7* ALB 1.4* 1.7*  
GLOB 3.6 4.0 Recent Labs  
  03/30/20 
1490 INR 1.4* PTP 14.0* APTT 32.5* No results for input(s): FE, TIBC, PSAT, FERR in the last 72 hours. No results found for: FOL, RBCF No results for input(s): PH, PCO2, PO2 in the last 72 hours. Recent Labs  
  03/31/20 
1729 03/30/20 2007 TROIQ <0.05 <0.05 Lab Results Component Value Date/Time Cholesterol, total 162 11/14/2019 09:52 AM  
 HDL Cholesterol 50 11/14/2019 09:52 AM  
 LDL, calculated 67 11/14/2019 09:52 AM  
 Triglyceride 223 (H) 11/14/2019 09:52 AM  
 
Lab Results Component Value Date/Time  Glucose (POC) 80 04/01/2020 08:37 AM  
 Glucose (POC) 147 (H) 03/31/2020 09:04 PM  
 Glucose (POC) 169 (H) 03/31/2020 05:48 PM  
 Glucose (POC) 147 (H) 03/31/2020 04:19 PM  
 Glucose (POC) 160 (H) 03/31/2020 11:30 AM  
 
No results found for: COLOR, APPRN, SPGRU, REFSG, TYRON, PROTU, GLUCU, KETU, [place], RAMY, LEUKU, GLUKE, EPSU, BACTU, WBCU, RBCU, CASTS, UCRY Medications Reviewed:  
 
Current Facility-Administered Medications Medication Dose Route Frequency  albuterol-ipratropium (DUO-NEB) 2.5 MG-0.5 MG/3 ML  3 mL Nebulization Q6H PRN  
 metoclopramide HCl (REGLAN) injection 5 mg  5 mg IntraVENous Q6H  
 methylPREDNISolone (PF) (Solu-MEDROL) injection 40 mg  40 mg IntraVENous Q8H  
 sodium bicarbonate tablet 650 mg  650 mg Oral TID  piperacillin-tazobactam (ZOSYN) 3.375 g in 0.9% sodium chloride (MBP/ADV) 100 mL  3.375 g IntraVENous Q8H  
 simethicone (MYLICON) tablet 80 mg  80 mg Oral QID PRN  
 albuterol (PROVENTIL VENTOLIN) nebulizer solution 2.5 mg  2.5 mg Nebulization TID RT  
 lactated Ringers infusion  100 mL/hr IntraVENous CONTINUOUS  
 dilTIAZem (CARDIZEM) 125 mg in dextrose 5% 125 mL infusion  0-15 mg/hr IntraVENous TITRATE  sodium chloride (NS) flush 5-40 mL  5-40 mL IntraVENous Q8H  
 sodium chloride (NS) flush 5-40 mL  5-40 mL IntraVENous PRN  
 acetaminophen (TYLENOL) tablet 650 mg  650 mg Oral Q4H PRN  
 oxyCODONE-acetaminophen (PERCOCET) 5-325 mg per tablet 1 Tab  1 Tab Oral Q4H PRN  
 diphenhydrAMINE (BENADRYL) injection 12.5 mg  12.5 mg IntraVENous Q4H PRN  
 ondansetron (ZOFRAN) injection 4 mg  4 mg IntraVENous Q4H PRN  
 levothyroxine (SYNTHROID) tablet 75 mcg  75 mcg Oral 6am  
 primidone (MYSOLINE) tablet 50 mg  50 mg Oral QHS  atorvastatin (LIPITOR) tablet 20 mg  20 mg Oral QHS  tamsulosin (FLOMAX) capsule 0.4 mg  0.4 mg Oral DAILY  temazepam (RESTORIL) capsule 30 mg  30 mg Oral QHS PRN  
 metoprolol succinate (TOPROL-XL) XL tablet 50 mg  50 mg Oral DAILY  glucose chewable tablet 16 g  4 Tab Oral PRN  
 glucagon (GLUCAGEN) injection 1 mg  1 mg IntraMUSCular PRN  
 insulin lispro (HUMALOG) injection   SubCUTAneous AC&HS  morphine injection 2 mg  2 mg IntraVENous Q3H PRN  
 
 ______________________________________________________________________ EXPECTED LENGTH OF STAY: - - - 
ACTUAL LENGTH OF STAY:          2 Ciro Paige MD

## 2020-04-01 NOTE — PROGRESS NOTES
0730: Bedside shift change report given to Amy Mueller, ELROY and Derek Sanchez RN (oncoming nurse) by Margie Tillman RN (offgoing nurse). Report included the following information SBAR, Kardex, ED Summary, Intake/Output, MAR and Recent Results. 1330: See [facility] [place] note for [phone] 
 
1534: Pt called out. Appears more SOB. Sats low 90s. Performed EKG and noticed ST changes. Pt sats increasing with oxygen on 3L NC. HR 120s sinus. Paged Dr. Dariela Cleary. [phone]: Spoke with Dr. Dariela Cleary and orders for cardiac enzymes to be drawn at this time. 1700: Spoke with Dr. Dariela Cleary. Enzymes negative at this time. Continue to monitor patient. 1800: Placed decompression tube per order. Bedside shift change report given to Fredy Aviles RN (oncoming nurse) by Amy Mueller RN (offgoing nurse). Report included the following information SBAR, Kardex, ED Summary, Intake/Output, MAR and Recent Results.

## 2020-04-01 NOTE — PROGRESS NOTES
Transitions of Care Plan: 
   Patient admitted to ICU from stepdown unit Sepsis; colitis; melanoma Disposition - anticipate home with family assistance CM will continue to follow. Therapy should be ordered if patient falls below his baseline of independent ambulation/mobility. Patient resides at home with his wife. Independent at time of admission.  
 
Francie Buchanan, MPH

## 2020-04-01 NOTE — CDMP QUERY
Patient admitted with GI bleed and recurrent melanoma with new mets , noted to have RD consult 3/31. If possible, please document in progress notes and d/c summary if you are evaluating and/or treating any of the following: 
 
=> Acute Moderate Protein-Calorie Malnutrition 
=> Other Explanation of clinical findings 
=> Clinically Undetermined (no explanation for clinical findings) The medical record reflects the following per the registered dietician Risk Factors: 68 M, Cancer with mets, documented poor PO intake, diarrhea Clinical Indicators: new edema,  Wt stable at 185# prior to this past month. Severe wt loss of 4% x <1 month. Wt Readings:  
03/30/20 81.1 kg (178 lb 12.7 oz) 03/16/20 82.1 kg (181 lb)  
03/13/20 83.9 kg (184 lb 14.4 oz) 03/03/20 84.4 kg (186 lb 1.6 oz) Treatment: RD consult with recommendations \"add Ensure Clear TID while on clear liquids (720kcal, 24g protein). Once diet advanced recommend switch to Ensure Enlive BID (700kcal, 40g protein\" Please clarify and document your clinical opinion in the progress notes and discharge summary including the definitive and/or presumptive diagnosis, (suspected or probable), related to the above clinical findings. Please include clinical findings supporting your diagnosis. Thank you, Flora VANEGAS, RN  
[phone]

## 2020-04-01 NOTE — PROGRESS NOTES
Occupational Therapy Screening: 
Services maybe indicated at this time. [facility] based on results obtained during the nursing admission assessment. The patients chart was reviewed . Please order a consult for occupational therapy if patient has had a decline in function from baseline and you would like an evaluation to be completed. Thank you.

## 2020-04-01 NOTE — PROGRESS NOTES
0768  Bedside shift change report given to Marcelina (oncoming nurse) by Rekha Omalley (offgoing nurse). Report included the following information SBAR, Kardex, Intake/Output, MAR and Cardiac Rhythm ST, BBB. 2619  Oral medications:  swallowed one pill with a sip of water. Pt placed next capsule into mouth and had difficulty swallowing it with water. Pt choked frothy clear secretions into kleenex. Pt desaturated briefly to 86  and contined to cough and clear his throat. Pt responded within 45 seconds and  Pulse ox was in the l and asked for more water. Pt failed STAND. 
 
1000 Pt spoke to his wife on the telephone.

## 2020-04-01 NOTE — PROGRESS NOTES
[employer] 
[address] [facility], [address] GI PROGRESS NOTE Francis Cooper, [address] office [phone] NP in-hospital cell phone M-F until 4:30 After 5pm or on weekends, please call  for physician on call NAME: Kate Estrella :  1950 MRN:  [id number] Subjective:  
Patient is seen this morning, sitting up in bed and talking on the phone. He states that he feels slightly worse today considering his swelling in his abdomen. His biggest complaint is mouth dryness and not being able to eat and drink. Objective: VITALS:  
Last 24hrs VS reviewed since prior progress note. Most recent are: 
Visit Vitals /75 (BP 1 Location: Left arm) Pulse (!) 112 Temp 98.7 °F (37.1 °C) Resp 27 Ht 5' 9\" (1.753 m) Wt 80.5 kg (177 lb 7.5 oz) SpO2 96% BMI 26.21 kg/m² PHYSICAL EXAM: 
General: Cooperative, no acute distress   
Neurologic:  Alert and oriented X 3. HEENT: EOMI, no scleral icterus Lungs:  CTA bilaterally, dim in bases. No wheezing Heart:  S1 S2 Abdomen: Firm, rounded, distended, mild tenderness to palpation. +Bowel sounds in all 4 quadrants Extremities: No edema Psych:   Good insight. Not anxious or agitated. Lab Data Reviewed:  
 
Recent Results (from the past 24 hour(s)) GLUCOSE, POC Collection Time: 20 11:30 AM  
Result Value Ref Range Glucose (POC) 160 (H) 65 - 100 mg/dL Performed by Roxi RODRIGUEZ POC EG7 Collection Time: 20  2:55 PM  
Result Value Ref Range Calcium, ionized (POC) 1.04 (L) 1.12 - 1.32 mmol/L  
 pH (POC) 7.378 7.35 - 7.45    
 pCO2 (POC) 24.8 (L) 35.0 - 45.0 MMHG  
 pO2 (POC) 73 (L) 80 - 100 MMHG  
 HCO3 (POC) 14.6 (L) 22 - 26 MMOL/L Base deficit (POC) 11 mmol/L  
 sO2 (POC) 95 92 - 97 % Site RIGHT BRACHIAL Device: NASAL CANNULA Flow rate (POC) 4 L/M Allens test (POC) N/A  Specimen type (POC) ARTERIAL    
GLUCOSE, POC  
 Collection Time: 03/31/20  4:19 PM  
Result Value Ref Range Glucose (POC) 147 (H) 65 - 100 mg/dL Performed by Bon [facility] METABOLIC PANEL, COMPREHENSIVE Collection Time: 03/31/20  5:29 PM  
Result Value Ref Range Sodium 140 136 - 145 mmol/L Potassium 4.2 3.5 - 5.1 mmol/L Chloride 112 (H) 97 - 108 mmol/L  
 CO2 19 (L) 21 - 32 mmol/L Anion gap 9 5 - 15 mmol/L Glucose 156 (H) 65 - 100 mg/dL BUN 41 (H) 6 - 20 MG/DL Creatinine 1.54 (H) 0.70 - 1.30 MG/DL  
 BUN/Creatinine ratio 27 (H) 12 - 20 GFR est AA 54 (L) >60 ml/min/1.73m2 GFR est non-AA 45 (L) >60 ml/min/1.73m2 Calcium 6.8 (L) 8.5 - 10.1 MG/DL Bilirubin, total 3.1 (H) 0.2 - 1.0 MG/DL  
 ALT (SGPT) 37 12 - 78 U/L  
 AST (SGOT) 79 (H) 15 - 37 U/L Alk. phosphatase 98 45 - 117 U/L Protein, total 5.0 (L) 6.4 - 8.2 g/dL Albumin 1.4 (L) 3.5 - 5.0 g/dL Globulin 3.6 2.0 - 4.0 g/dL A-G Ratio 0.4 (L) 1.1 - 2.2 LACTIC ACID Collection Time: 03/31/20  5:29 PM  
Result Value Ref Range Lactic acid 3.6 (HH) 0.4 - 2.0 MMOL/L  
TROPONIN I Collection Time: 03/31/20  5:29 PM  
Result Value Ref Range Troponin-I, Qt. <0.05 <0.05 ng/mL CBC W/O DIFF Collection Time: 03/31/20  5:30 PM  
Result Value Ref Range WBC 3.0 (L) 4.1 - 11.1 K/uL  
 RBC 2.47 (L) 4.10 - 5.70 M/uL HGB 7.5 (L) 12.1 - 17.0 g/dL HCT 22.9 (L) 36.6 - 50.3 % MCV 92.7 80.0 - 99.0 FL  
 MCH 30.4 26.0 - 34.0 PG  
 MCHC 32.8 30.0 - 36.5 g/dL  
 RDW 14.2 11.5 - 14.5 % PLATELET 82 (L) 382 - 400 K/uL MPV 11.2 8.9 - 12.9 FL  
 NRBC 4.3 (H) 0  WBC ABSOLUTE NRBC 0.13 (H) 0.00 - 0.01 K/uL GLUCOSE, POC Collection Time: 03/31/20  5:48 PM  
Result Value Ref Range Glucose (POC) 169 (H) 65 - 100 mg/dL Performed by Kathy HARMON(RN)   
GLUCOSE, POC Collection Time: 03/31/20  9:04 PM  
Result Value Ref Range Glucose (POC) 147 (H) 65 - 100 mg/dL Performed by Pietro Brito LACTIC ACID  
 Collection Time: 03/31/20 10:59 PM  
Result Value Ref Range Lactic acid 4.1 (HH) 0.4 - 2.0 MMOL/L  
CBC WITH AUTOMATED DIFF Collection Time: 04/01/20  3:51 AM  
Result Value Ref Range WBC 6.2 4.1 - 11.1 K/uL  
 RBC 2.58 (L) 4.10 - 5.70 M/uL HGB 7.9 (L) 12.1 - 17.0 g/dL HCT 24.2 (L) 36.6 - 50.3 % MCV 93.8 80.0 - 99.0 FL  
 MCH 30.6 26.0 - 34.0 PG  
 MCHC 32.6 30.0 - 36.5 g/dL  
 RDW 14.4 11.5 - 14.5 % PLATELET 85 (L) 825 - 400 K/uL MPV 11.2 8.9 - 12.9 FL  
 NRBC 1.3 (H) 0  WBC ABSOLUTE NRBC 0.08 (H) 0.00 - 0.01 K/uL NEUTROPHILS 65 32 - 75 % BAND NEUTROPHILS 6 0 - 6 % LYMPHOCYTES 17 12 - 49 % MONOCYTES 7 5 - 13 % EOSINOPHILS 1 0 - 7 % BASOPHILS 1 0 - 1 % MYELOCYTES 3 (H) 0 % IMMATURE GRANULOCYTES 0 %  
 ABS. NEUTROPHILS 4.4 1.8 - 8.0 K/UL  
 ABS. LYMPHOCYTES 1.1 0.8 - 3.5 K/UL  
 ABS. MONOCYTES 0.4 0.0 - 1.0 K/UL  
 ABS. EOSINOPHILS 0.1 0.0 - 0.4 K/UL  
 ABS. BASOPHILS 0.1 0.0 - 0.1 K/UL  
 ABS. IMM. GRANS. 0.0 K/UL  
 DF MANUAL PLATELET COMMENTS Large Platelets RBC COMMENTS MICROCYTOSIS 1+ 
    
 RBC COMMENTS SCHISTOCYTES 1+ METABOLIC PANEL, BASIC Collection Time: 04/01/20  3:51 AM  
Result Value Ref Range Sodium 140 136 - 145 mmol/L Potassium 4.0 3.5 - 5.1 mmol/L Chloride 112 (H) 97 - 108 mmol/L  
 CO2 20 (L) 21 - 32 mmol/L Anion gap 8 5 - 15 mmol/L Glucose 152 (H) 65 - 100 mg/dL BUN 40 (H) 6 - 20 MG/DL Creatinine 1.42 (H) 0.70 - 1.30 MG/DL  
 BUN/Creatinine ratio 28 (H) 12 - 20 GFR est AA 60 (L) >60 ml/min/1.73m2 GFR est non-AA 49 (L) >60 ml/min/1.73m2 Calcium 7.3 (L) 8.5 - 10.1 MG/DL  
LACTIC ACID Collection Time: 04/01/20  4:14 AM  
Result Value Ref Range Lactic acid 3.5 (HH) 0.4 - 2.0 MMOL/L  
GLUCOSE, POC Collection Time: 04/01/20  8:37 AM  
Result Value Ref Range Glucose (POC) 80 65 - 100 mg/dL Performed by Marianne Carmichael   
LACTIC ACID  Collection Time: 04/01/20  8:50 AM  
 Result Value Ref Range Lactic acid 3.5 (HH) 0.4 - 2.0 MMOL/L Imaging/Procedures KUB 3/31/20 IMPRESSION IMPRESSION: Mild diffuse gaseous colonic distention. Assessment:  
· Colitis: diarrhea with hematochezia: KUB 3/31/20 showed mild diffuse gaseous colonic distention. Enteric bacteria panel still pending. infectious vs ischemic vs chemo related · Shortness of breath: CT chest (3/30/20): multiple bilateral pulmonary nodules which are new suspicious for metastatic disease · Melanoma: followed by oncology. · History of hypertension and atrial fibrillation: on Xarelto prior to admission. Patient Active Problem List  
Diagnosis Code  Essential hypertension I10  
 Hyperlipidemia E78.5  Atrial fibrillation (HCC) I48.91  
 History of skin cancer A55.291  BMI 27.0-27.9,adult Z68.27  
 History of bladder cancer Z85.51  Abdominal wall mass of right upper quadrant R19.01  
 Malignant melanoma of torso excluding breast (HCC) C43.59  
 Primary osteoarthritis of left knee M17.12  
 Hypothyroidism due to medication E03.2  Hypercalcemia E83.52  
 Dehydration E86.0  Shortness of breath R06.02  
 Diarrhea R19.7  Melanoma ([address] [address] [address] C43.9  Anemia D64.9 Plan:  
· NPO · Trial Reglan · On antibiotics · Follow stool studies · If worsening, consider rectal decompression given diffuse colonic gaseous distention on KUB · Supportive management Signed By: Sandra Wheatley 4/1/2020  10:28 AM  
 
I reviewed records, evaluated patient, and developed plan with MARLIN Troncoso. CARRIE Casarez This patient was seen and examined by me in a face-to-face visit today. I reviewed the medical record including lab work, imaging and other provider notes. I confirmed the interval history as described above. I spoke to the patient, discussing our findings and plans. I discussed this case in detail with Elizabeth RUSSO.  I formulated an updated  assessment of this patient and guided our treatment plan. I agree with the above progress note. I agree with the history, exam and assessment and plan as outlined in the note. I would like to add the following:  
 
Abd: hypoactive BS, distended, mild R sided tenderness, no rebound/guarding-no improvement since yesterday. C diff, enteric panel negative. Normal WBC count. Will proceed with rectal decompression protocol for colonic ileus. He understands and agrees with the plan. Dr. Reyes Merino

## 2020-04-01 NOTE — PROGRESS NOTES
1930: Bedside and Verbal shift change report given to Michael Ramirez RN (oncoming nurse) by Gian Armstrong RN (offgoing nurse). Report included the following information SBAR, Kardex, ED Summary, Procedure Summary, Intake/Output, MAR, Recent Results, Med Rec Status and Cardiac Rhythm ST.  
 
2000: Assumed pt care, VSS 
 
2030: Pt complaining on abdominal pain 7/10, PRN morphine given, rectal decompression tube inserted. 2100: Rectal tube removed, ~150mL brown stool out, pt pain decreased. Daily CHG bath completed, pt tolerated well. Pt agreed to condom catheter use for incontinence. 0100: Pt incontinent of stool and urine, condom cath replaced 0430: Critical lactic 2.1 
 
0730: Bedside and Verbal shift change report given to Gian Armstrong RN (oncoming nurse) by Michael Ramirez RN (offgoing nurse). Report included the following information SBAR, Kardex, Procedure Summary, Intake/Output, MAR, Recent Results, Med Rec Status and Cardiac Rhythm NSR/ST/BBB.

## 2020-04-02 NOTE — PROGRESS NOTES
SLP Contact Note SLP evaluation complete. Pt cleared for diet per GI. Full note to follow. Thank you, Isidoro Ocasio M.Ed, CCC-SLP Speech-Language Pathologist

## 2020-04-02 NOTE — PROGRESS NOTES
Spiritual Care Assessment/Progress Note [address] 
 
 
NAME: Sidra Smallwood      MRN: [id number] AGE: 68 y.o. SEX: male Mormon Affiliation: No Pentecostal Language: [place] 4/2/2020 Spiritual Assessment begun in [facility] 4 CORONARY CARE through conversation with: 
  
    [x]Patient        [] Family    [] Friend(s) Reason for Consult: Interdisciplinary rounds, critical care Spiritual beliefs: (Please include comment if needed) [x] Identifies with a ethel tradition:[employer] [x] Supported by a ethel community:        
   [] Claims no spiritual orientation:       
   [] Seeking spiritual identity:            
   [] Adheres to an individual form of spirituality:       
   [] Not able to assess:                   
 
    
Identified resources for coping:  
   [x] Prayer                           
   [] Music                  [] Guided Imagery [x] Family/friends                 [] Pet visits [] Devotional reading                         [] Unknown 
   [] Other:                                          
 
 
Interventions offered during this visit: (See comments for more details) Patient Interventions: Affirmation of emotions/emotional suffering, Affirmation of ethel, Catharsis/review of pertinent events in supportive environment, Iconic (affirming the presence of God/Higher Power), Initial/Spiritual assessment, Critical care, Prayer (actual), Prayer (assurance of), Coordination with community clergy Plan of Care: 
 
 [x] Support spiritual and/or cultural needs  
 [] Support AMD and/or advance care planning process    
 [] Support grieving process 
 [] Coordinate Rites and/or Rituals [x] Coordination with community clergy [] No spiritual needs identified at this time 
 [] Detailed Plan of Care below (See Comments)  [] Make referral to Music Therapy 
[] Make referral to Pet Therapy    
[] Make referral to Addiction services [] Make referral to [facility] 
[] Make referral to Spiritual Care Partner 
[] No future visits requested       
[x] Follow up visits as needed Comments: Visited Mr Vania Rodriguez in [place] for initial spiritual assessment. Mr Vania Rodriguez was sitting on the side of the bed and appeared somewhat pensive. Provided active listening as Mr Vania Rodriguez shared how important prayer was to him and how much ethel he had in the power of prayer. He shared that he was a member of [employer] of [address] [place], [place] [address] . He requested that  have prayer of healing for him; had prayer as requested. Mr Vania Rodriguez asked  to call Chau Lozada at [address] [place] [address] and give him a message;  did as patient requested and informed him of his 's reply. Assured patient of ongoing  availability for support. : Rev. Fawad Scott. Adrian Singh; [facility], to contact [phone] Sami Martinez call: [phone]

## 2020-04-02 NOTE — PROGRESS NOTES
[facility] 
[address] [facility], [address] GI PROGRESS NOTE 
 
 
 
NAME: Sidar Smallwood :  1950 MRN:  [id number] Subjective:  
Multiple bowel movements. Pt states abdominal distention better with rectal decompression. Objective: VITALS:  
Last 24hrs VS reviewed since prior progress note. Most recent are: 
Visit Vitals /81 Pulse 89 Temp 98.8 °F (37.1 °C) Resp 19 Ht 5' 9\" (1.753 m) Wt 82.3 kg (181 lb 7 oz) SpO2 (!) 88% BMI 26.79 kg/m² PHYSICAL EXAM: 
General: Cooperative, no acute distress   
HEENT: EOMI, no scleral icterus Lungs:  CTA bilaterally, dim in bases. No wheezing Heart:  S1 S2 Abdomen: Less distended, nt, hypoactive BS. No rebound or guarding. Extremities: No edema Psych:   Good insight. Not anxious or agitated. Lab Data Reviewed:  
 
Recent Results (from the past 24 hour(s)) GLUCOSE, POC Collection Time: 20 12:37 PM  
Result Value Ref Range Glucose (POC) 123 (H) 65 - 100 mg/dL Performed by Bisi Lora LACTIC ACID Collection Time: 20  1:01 PM  
Result Value Ref Range Lactic acid 3.2 (HH) 0.4 - 2.0 MMOL/L  
EKG, 12 LEAD, INITIAL Collection Time: 20  3:34 PM  
Result Value Ref Range Ventricular Rate 118 BPM  
 Atrial Rate 118 BPM  
 P-R Interval 140 ms QRS Duration 118 ms Q-T Interval 364 ms QTC Calculation (Bezet) 510 ms Calculated P Axis 27 degrees Calculated R Axis -36 degrees Calculated T Axis 11 degrees Diagnosis Sinus tachycardia Left axis deviation Right bundle branch block When compared with ECG of 31-MAR-2020 17:23, No significant change was found Confirmed by Van Emanuel M.D., Ynes Escalona ([id number]) on 2020 3:52:38 AM 
  
METABOLIC PANEL, BASIC Collection Time: 20  4:02 PM  
Result Value Ref Range Sodium 143 136 - 145 mmol/L Potassium 4.0 3.5 - 5.1 mmol/L  Chloride 113 (H) 97 - 108 mmol/L  
 CO2 20 (L) 21 - 32 mmol/L Anion gap 10 5 - 15 mmol/L Glucose 126 (H) 65 - 100 mg/dL BUN 35 (H) 6 - 20 MG/DL Creatinine 1.35 (H) 0.70 - 1.30 MG/DL  
 BUN/Creatinine ratio 26 (H) 12 - 20 GFR est AA >60 >60 ml/min/1.73m2 GFR est non-AA 52 (L) >60 ml/min/1.73m2 Calcium 7.5 (L) 8.5 - 10.1 MG/DL MAGNESIUM Collection Time: 04/01/20  4:02 PM  
Result Value Ref Range Magnesium 2.2 1.6 - 2.4 mg/dL PHOSPHORUS Collection Time: 04/01/20  4:02 PM  
Result Value Ref Range Phosphorus 3.2 2.6 - 4.7 MG/DL  
CK W/ CKMB & INDEX Collection Time: 04/01/20  4:02 PM  
Result Value Ref Range CK 50 39 - 308 U/L  
 CK - MB 1.2 <3.6 NG/ML  
 CK-MB Index 2.4 0.0 - 2.5    
TROPONIN I Collection Time: 04/01/20  4:02 PM  
Result Value Ref Range Troponin-I, Qt. <0.05 <0.05 ng/mL GLUCOSE, POC Collection Time: 04/01/20  5:36 PM  
Result Value Ref Range Glucose (POC) 146 (H) 65 - 100 mg/dL Performed by Sarika Bell   
GLUCOSE, POC Collection Time: 04/01/20  9:31 PM  
Result Value Ref Range Glucose (POC) 122 (H) 65 - 100 mg/dL Performed by Sergei Shell CBC WITH AUTOMATED DIFF Collection Time: 04/02/20  3:34 AM  
Result Value Ref Range WBC 5.6 4.1 - 11.1 K/uL  
 RBC 2.32 (L) 4.10 - 5.70 M/uL HGB 7.1 (L) 12.1 - 17.0 g/dL HCT 21.6 (L) 36.6 - 50.3 % MCV 93.1 80.0 - 99.0 FL  
 MCH 30.6 26.0 - 34.0 PG  
 MCHC 32.9 30.0 - 36.5 g/dL  
 RDW 14.1 11.5 - 14.5 % PLATELET 76 (L) 628 - 400 K/uL MPV 11.5 8.9 - 12.9 FL  
 NRBC 3.4 (H) 0  WBC ABSOLUTE NRBC 0.19 (H) 0.00 - 0.01 K/uL NEUTROPHILS 53 32 - 75 % BAND NEUTROPHILS 13 (H) 0 - 6 % LYMPHOCYTES 21 12 - 49 % MONOCYTES 5 5 - 13 % EOSINOPHILS 0 0 - 7 % BASOPHILS 0 0 - 1 % METAMYELOCYTES 4 (H) 0 % MYELOCYTES 4 (H) 0 % IMMATURE GRANULOCYTES 0 %  
 ABS. NEUTROPHILS 3.7 1.8 - 8.0 K/UL  
 ABS. LYMPHOCYTES 1.2 0.8 - 3.5 K/UL  
 ABS. MONOCYTES 0.3 0.0 - 1.0 K/UL ABS. EOSINOPHILS 0.0 0.0 - 0.4 K/UL  
 ABS. BASOPHILS 0.0 0.0 - 0.1 K/UL  
 ABS. IMM. GRANS. 0.0 K/UL  
 DF MANUAL    
 RBC COMMENTS POLYCHROMASIA 1+ 
    
 RBC COMMENTS OVALOCYTES PRESENT 
    
METABOLIC PANEL, BASIC Collection Time: 04/02/20  3:34 AM  
Result Value Ref Range Sodium 146 (H) 136 - 145 mmol/L Potassium 3.9 3.5 - 5.1 mmol/L Chloride 114 (H) 97 - 108 mmol/L  
 CO2 21 21 - 32 mmol/L Anion gap 11 5 - 15 mmol/L Glucose 124 (H) 65 - 100 mg/dL BUN 32 (H) 6 - 20 MG/DL Creatinine 1.20 0.70 - 1.30 MG/DL  
 BUN/Creatinine ratio 27 (H) 12 - 20 GFR est AA >60 >60 ml/min/1.73m2 GFR est non-AA 60 (L) >60 ml/min/1.73m2 Calcium 7.3 (L) 8.5 - 10.1 MG/DL  
LACTIC ACID Collection Time: 04/02/20  3:50 AM  
Result Value Ref Range Lactic acid 2.1 (HH) 0.4 - 2.0 MMOL/L  
GLUCOSE, POC Collection Time: 04/02/20  8:16 AM  
Result Value Ref Range Glucose (POC) 108 (H) 65 - 100 mg/dL Performed by Sheba Side   
LACTIC ACID Collection Time: 04/02/20 10:07 AM  
Result Value Ref Range Lactic acid 1.6 0.4 - 2.0 MMOL/L  
GLUCOSE, POC Collection Time: 04/02/20 11:16 AM  
Result Value Ref Range Glucose (POC) 114 (H) 65 - 100 mg/dL Performed by [facility] Imaging/Procedures KUB 3/31/20 IMPRESSION IMPRESSION: Mild diffuse gaseous colonic distention. Assessment:  
· Colitis: diarrhea with hematochezia: KUB 3/31/20 showed mild diffuse gaseous colonic distention. Enteric bacteria panel neg, C diff neg. infectious vs ischemic vs chemo related · Shortness of breath: CT chest (3/30/20): multiple bilateral pulmonary nodules which are new suspicious for metastatic disease · Melanoma: followed by oncology. · History of hypertension and atrial fibrillation: on Xarelto prior to admission. Patient Active Problem List  
Diagnosis Code  Essential hypertension I10  
 Hyperlipidemia E78.5  Atrial fibrillation ([address] I48.91  
  History of skin cancer K67.119  BMI 27.0-27.9,adult Z68.27  
 History of bladder cancer Z85.51  Abdominal wall mass of right upper quadrant R19.01  
 Malignant melanoma of torso excluding breast (HCC) C43.59  
 Primary osteoarthritis of left knee M17.12  
 Hypothyroidism due to medication E03.2  Hypercalcemia E83.52  
 Dehydration E86.0  Shortness of breath R06.02  
 Diarrhea R19.7  Melanoma ([address] C43.9  Anemia D64.9 Plan:  
· NPO with sips of clears · Continue Reglan · On antibiotics · Continue rectal decompression with improvement in symptoms · Minimize opioids · KUB in AM  
 
Signed By: Brie Lange MD   
 4/2/2020  10:28 AM

## 2020-04-02 NOTE — PROGRESS NOTES
[address] [facility] Adult  Hospitalist Group Hospitalist Progress Note Isha Mcginnis MD 
Answering service: [phone] OR [phone] from in house phone Date of Service:  2020 NAME:  Abhijit Robb :  1950 MRN:  [id number] Admission Summary:  
Mr. Vania Rodriguez is a 49-year-old male who presents to the ER with complaints of shortness of breath and blood in his stool.   
Interval history / Subjective:  
Pt seen for FU of CC: Diarrhea, colitis Patient seen in the presence of family. He is awake, alert, reports that abdominal distention is much better after rectal decompression. Continues to have multiple BMs, feels hungry and wants to eat. Past swallow eval but GI has cleared only for n.p.o. with ice chips. Afebrile, hemodynamically he is stable. Tolerating Reglan and antibiotics. Assessment & Plan:  
 
 
1) colitis, POA Hematochezia with diarrhea line enteric bacterial panel negative, C. difficile negative, infectious versus ischemic versus chemo related. diarrhea - resolving? .  
Diffuse colonic distension consistent with colonic ileus- pt reports passing some gas-belly much improved with rectal decompression, continue Reglan. Stool was neg  for C. Difficile, Questran stopped GI consult noted, minimize opiates, rectal decompression PRN, KUB in the morning, n.p.o. with ice chips Cont IV zosyn for bacterial colitis 
xarelto was not restarted due to bleeding   
2) oncology- Melanoma- with diffuse mets including to bone per admit CT On tx per Dr Ricarda Zurita- currently on hold due to hypotension and diarrhea Hx of skin cancer and bladder cancer- recent cystoscopy showed an area of concern in his bladder. Plan is for a repeat cystoscopy in 6 weeks per patient   
3) CV- HTN and Hx of A.fib 
S/p ablation- resumed home meds metoprolol Tachycardia much improved, tolerating Toprol, not on any Cardizem drip at the moment.   
4) Shortness of breath/dyspnea-meeting the criteria for acute hypoxemic resp failure- but of unclear etiology- 
negative viral resp panel and neg for flu CT chest on admission showing no evidence of pneumonia \" patient does have multiple bilateral pulmonary nodules with needs further work-up, likely outpatient. Patient is less dyspneic today, continue nebs, decreased dose of IV steroids, continue oxygen via nasal cannula. 5) anemia- due to chronic dz and acute GI blood loss Hb low- some slow continued lower GI bleeding- monitor 6) hyperchloremic metabolic acidosis, due to colitis, poor oral intake, normal saline Change fluids to half-normal saline, repeat lab in the morning stop p.o. bicarb 7) hyperglycemia-suspected DM based on HbA1c 
- accuchecks and ss insulin 8) coagulopathy- elevated INR due to xarelto use Xarelto on hold at the moment. 9)  Acute Moderate Protein-Calorie Malnutrition-  
Patient is currently n.p.o., will continue to eval and hopefully will introduce diet and caloric intake will improve once oral intake is employed Code status: DNR 
DVT prophylaxis: SCDs Care Plan discussed with: Patient/Family Anticipated Disposition: Home w/Family Anticipated Discharge: Greater than 48 hours Hospital Problems  Date Reviewed: 3/19/2020 Codes Class Noted POA Shortness of breath ICD-10-CM: R06.02 
ICD-9-CM: 786.05  3/30/2020 Unknown Diarrhea ICD-10-CM: R19.7 ICD-9-CM: 787.91  3/30/2020 Unknown Melanoma ([facility] [address] ICD-10-CM: C43.9 ICD-9-CM: 172.9  3/30/2020 Unknown Anemia ICD-10-CM: D64.9 ICD-9-CM: 285.9  3/30/2020 Unknown Review of Systems: A comprehensive review of systems was negative except for that written in the HPI. Vital Signs:  
 Last 24hrs VS reviewed since prior progress note. Most recent are: 
Visit Vitals /83 (BP 1 Location: Left arm, BP Patient Position: At rest) Pulse 92 Temp 98.3 °F (36.8 °C) Resp 19 Ht 5' 9\" (1.753 m) Wt 82.3 kg (181 lb 7 oz) SpO2 97% BMI 26.79 kg/m² Intake/Output Summary (Last 24 hours) at 4/2/2020 1557 Last data filed at 4/2/2020 1300 Gross per 24 hour Intake 2100 ml Output 650 ml Net 1450 ml Physical Examination:  
 
 
     
Constitutional:  awake, alert, appears to be in mild distress ENT:  Oral mucosa dry. Resp: Dyspnea-decreased breath sounds bilat-no wheezing CV:  tachycardia- no murmur GI:  distended, mild tenderness, tympanic Bowel sounds Musculoskeletal:  No edema, warm, 2+ pulses throughout Neurologic:  Moves all extremities. AAOx3, Psych:  Calm Data Review:  
 Review and/or order of clinical lab test 
Review and/or order of tests in the radiology section of CPT Review and/or order of tests in the medicine section of CPT Labs:  
 
Recent Labs 04/02/20 
8854 04/01/20 
0269 WBC 5.6 6.2 HGB 7.1* 7.9*  
HCT 21.6* 24.2*  
PLT 76* 85* Recent Labs 04/02/20 
0671 04/01/20 
1602 04/01/20 
0351  03/30/20 
1731 * 143 140   < > 142 K 3.9 4.0 4.0   < > 4.2 * 113* 112*   < > 113* CO2 21 20* 20*   < > 19* BUN 32* 35* 40*   < > 31* CREA 1.20 1.35* 1.42*   < > 1.32* * 126* 152*   < > 274* CA 7.3* 7.5* 7.3*   < > 6.6*  
MG  --  2.2  --   --  2.1 PHOS  --  3.2  --   --  2.6  
 < > = values in this interval not displayed. Recent Labs  
  03/31/20 
1729 SGOT 79* ALT 37 AP 98 TBILI 3.1* TP 5.0* ALB 1.4*  
GLOB 3.6 No results for input(s): INR, PTP, APTT, INREXT, INREXT in the last 72 hours. No results for input(s): FE, TIBC, PSAT, FERR in the last 72 hours. No results found for: FOL, RBCF No results for input(s): PH, PCO2, PO2 in the last 72 hours. Recent Labs 04/01/20 
1602 03/31/20 
1729 CPK 50  --   
CKNDX 2.4  --   
TROIQ <0.05 <0.05  
 
 Lab Results Component Value Date/Time Cholesterol, total 162 11/14/2019 09:52 AM  
 HDL Cholesterol 50 11/14/2019 09:52 AM  
 LDL, calculated 67 11/14/2019 09:52 AM  
 Triglyceride 223 (H) 11/14/2019 09:52 AM  
 
Lab Results Component Value Date/Time Glucose (POC) 114 (H) 04/02/2020 11:16 AM  
 Glucose (POC) 108 (H) 04/02/2020 08:16 AM  
 Glucose (POC) 122 (H) 04/01/2020 09:31 PM  
 Glucose (POC) 146 (H) 04/01/2020 05:36 PM  
 Glucose (POC) 123 (H) 04/01/2020 12:37 PM  
 
No results found for: COLOR, APPRN, SPGRU, REFSG, TYRON, PROTU, GLUCU,  [place], BILU, UROU, RAMY, LEUKU, GLUKE, EPSU, BACTU, WBCU, RBCU, CASTS, UCRY Medications Reviewed:  
 
Current Facility-Administered Medications Medication Dose Route Frequency  0.45% sodium chloride infusion  75 mL/hr IntraVENous CONTINUOUS  
 albuterol-ipratropium (DUO-NEB) 2.5 MG-0.5 MG/3 ML  3 mL Nebulization Q6H PRN  
 metoclopramide HCl (REGLAN) injection 5 mg  5 mg IntraVENous Q6H  
 methylPREDNISolone (PF) (Solu-MEDROL) injection 40 mg  40 mg IntraVENous Q8H  
 sodium bicarbonate tablet 650 mg  650 mg Oral TID  piperacillin-tazobactam (ZOSYN) 3.375 g in 0.9% sodium chloride (MBP/ADV) 100 mL  3.375 g IntraVENous Q8H  
 simethicone (MYLICON) tablet 80 mg  80 mg Oral QID PRN  
 albuterol (PROVENTIL VENTOLIN) nebulizer solution 2.5 mg  2.5 mg Nebulization TID RT  
 dilTIAZem (CARDIZEM) 125 mg in dextrose 5% 125 mL infusion  0-15 mg/hr IntraVENous TITRATE  sodium chloride (NS) flush 5-40 mL  5-40 mL IntraVENous Q8H  
 sodium chloride (NS) flush 5-40 mL  5-40 mL IntraVENous PRN  
 acetaminophen (TYLENOL) tablet 650 mg  650 mg Oral Q4H PRN  
 oxyCODONE-acetaminophen (PERCOCET) 5-325 mg per tablet 1 Tab  1 Tab Oral Q4H PRN  
 diphenhydrAMINE (BENADRYL) injection 12.5 mg  12.5 mg IntraVENous Q4H PRN  
 ondansetron (ZOFRAN) injection 4 mg  4 mg IntraVENous Q4H PRN  
 levothyroxine (SYNTHROID) tablet 75 mcg  75 mcg Oral 6am  
  primidone (MYSOLINE) tablet 50 mg  50 mg Oral QHS  atorvastatin (LIPITOR) tablet 20 mg  20 mg Oral QHS  tamsulosin (FLOMAX) capsule 0.4 mg  0.4 mg Oral DAILY  temazepam (RESTORIL) capsule 30 mg  30 mg Oral QHS PRN  
 metoprolol succinate (TOPROL-XL) XL tablet 50 mg  50 mg Oral DAILY  glucose chewable tablet 16 g  4 Tab Oral PRN  
 glucagon (GLUCAGEN) injection 1 mg  1 mg IntraMUSCular PRN  
 insulin lispro (HUMALOG) injection   SubCUTAneous AC&HS  morphine injection 2 mg  2 mg IntraVENous Q3H PRN  
 
______________________________________________________________________ EXPECTED LENGTH OF STAY: 5d 4h 
ACTUAL LENGTH OF STAY:          3 Garth Puentes MD

## 2020-04-02 NOTE — ACP (ADVANCE CARE PLANNING)
Palliative Medicine Social Work Patient has AMD on file signed 12/18/2019 [facility] Primary Decision Maker: Manisha Arias - Spouse - [phone] Living Will Patient does not want life prolonging interventions if he is terminal condition and note expected to recover and/or he is in vegetative state and not expected to recover. Organ Donor Yes Patient is DNR (Needs DDNR) Thank you for the opportunity to be involved in the care of Mr. Lynsey Stevens and his family. Adrian Guevara LMSW, Supervisee in Social Work Palliative Medicine  [phone]

## 2020-04-02 NOTE — PROGRESS NOTES
Palliative Medicine [place]: [phone] ([phone]) [facility]: [phone] ([phone] Consult received. Chart reviewed. Code Status: DNR/ needs DDNR Advance Care Planning: 
Advance Care Planning 4/2/2020 Patient's Healthcare Decision Maker is: Named in scanned ACP document Confirm Advance Directive Yes, on file Does the patient have other document types Organ Directive Primary Decision Maker: Karen Mosher - Spouse - [phone] Mr. Laura Anderson is a 41-year-old male with PMH HTN, Afib sp ablation, bladder cx (1990s), BCC (2012);  SCC (2014), and malignant melanoma abdomen (2017, re-current 12/19 s/p 2 cycles Opdivo 1/7/20,e 2 2/4/20 ). PCP - Salvador Temple, NP He presented to ED 3/30/20 with SOB and blood in stool and was admitted for diarrhea/ GI bleed. Colitis has been improving - currently NPO with ice chips. CT C/A/P showed possible metastatic disease to lung, liver, mesenteric node, and bone. Per oncology note, CT results discussed with patient, performance status went from 1 to 4 in 3 days, time is short. Social: lives with spouse (second marriage for both); 4 bio kids, 10 grandkids, 3 stepkids, 1 step grandkid. 1 child lives in [place], rest of [facility]; retired [employer], worked as  Spiritual: ethel in god, power of prayer; [facility] [employer] of [address] Baseline: independent Palliative medicine consult for care decisions. 
 
  
  
Thank you for the opportunity to be involved in the care of Mr. Laura Anderson and his family. Dillon Wilson, EZE, Supervisee in Social Work Palliative Medicine  [phone]

## 2020-04-02 NOTE — PROGRESS NOTES
SLP Contact Note Consult received. Per GI note yesterday, pt is to remain NPO and therefore cannot participate in SLP evaluation at this time. Will sign-off. Once pt is cleared by GI and if he continues to have concerns for oropharyngeal deficits, please reconsult. Thank you, Alysa Schreiber M.Ed, CCC-SLP Speech-Language Pathologist

## 2020-04-02 NOTE — PROGRESS NOTES
SPEECH PATHOLOGY BEDSIDE SWALLOW EVALUATION/DISCHARGE Patient: Arcadio Jordan (16 y.o. male) Date: 4/2/2020 Primary Diagnosis: Diarrhea [R19.7] Shortness of breath [R06.02] Anemia [D64.9] Melanoma ([address] [C43.9] Precautions: n/a ASSESSMENT : 
Based on the objective data described below, the patient presents with functional oropharyngeal phases of swallow at this time with no overt s/s of aspiration and no overt difficulty appreciated. PO trials limited to liquid trials per GI. Therefore, feel pt safe to initiate diet per GI and suspect that, from an oropharyngeal standpoint, he will tolerate PO advancement as GI sees fit. Skilled acute therapy provided by a speech-language pathologist is not indicated at this time. PLAN : 
Recommendations: 
--Diet per GI 
--general aspiration/esophageal precautions Discharge Recommendations: None SUBJECTIVE:  
Patient stated, \"I'm so thirsty. OBJECTIVE:  
 
Past Medical History:  
Diagnosis Date  Abdominal wall mass of right upper quadrant 12/2/2019  Atrial fibrillation ([address]  BPH (benign prostatic hyperplasia)  Cancer ([address] early 36s BLADDER  
 Hypercholesterolemia  Hypertension  Joint replaced 2011  
 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  
Procedure Laterality Date  HX AFIB ABLATION  2018  HX [address]EN[address]CTOMY [address]  HX KNEE REPLACEMENT  2010  
 right knee  HX KNEE REPLACEMENT  04/02/2019  
 left knee  HX MALIGNANT SKIN LESION EXCISION    
 HX OTHER SURGICAL  12/06/2019 Excisional biopsy of right upper quadrant abdominal wall mass.  HX TONSILLECTOMY  HX UROLOGICAL CYSTOSCOPY  SKIN TISSUE PROCEDURE UNLISTED  2018 Melanoma across abdomen, Right armpit and groin lymphnode involvement Prior Level of Function/Home Situation:  
Home Situation Home Environment: Private residence # Steps to Enter: 5 Rails to Enter: Yes Hand Rails : Left One/Two Story Residence: Two story # of Interior Steps: 6(x2) Interior Rails: Left Living Alone: No 
Support Systems: Spouse/Significant Other/Partner Patient Expects to be Discharged to[de-identified] Private residence Current DME Used/Available at Home: Cane, straight Tub or Shower Type: Tub/Shower combination Diet prior to admission: Regular diet/thin liquids Current Diet:  NPO Cognitive and Communication Status: 
Neurologic State: Alert Orientation Level: Oriented X4 Cognition: Appropriate decision making, Appropriate safety awareness Perception: Appears intact Perseveration: No perseveration noted Safety/Judgement: Awareness of environment Oral Assessment: 
Oral Assessment Labial: No impairment Dentition: Intact; Natural 
Oral Hygiene: oral mucosa moist and clear of secretions Lingual: No impairment Velum: No impairment Mandible: No impairment P.O. Trials: 
Patient Position: upright in bed Vocal quality prior to P.O.: No impairment Consistency Presented: Thin liquid How Presented: Self-fed/presented;Cup/sip;Straw;Successive swallows Bolus Acceptance: No impairment Bolus Formation/Control: No impairment Propulsion: No impairment Oral Residue: None Initiation of Swallow: No impairment Laryngeal Elevation: Functional 
Aspiration Signs/Symptoms: None Pharyngeal Phase Characteristics: No impairment, issues, or problems Oral Phase Severity: No impairment Pharyngeal Phase Severity : No impairment NOMS:  
The NOMS functional outcome measure was used to quantify this patient's level of swallowing impairment. Based on the NOMS, the patient was determined to be at level 7 for swallow function NOMS Swallowing Levels: 
Level 1 (CN): NPO Level 2 (CM): NPO but takes consistency in therapy Level 3 (CL): Takes less than 50% of nutrition p.o. and continues with nonoral feedings; and/or safe with mod cues; and/or max diet restriction Level 4 (CK): Safe swallow but needs mod cues; and/or mod diet restriction; and/or still requires some nonoral feeding/supplements Level 5 (CJ): Safe swallow with min diet restriction; and/or needs min cues Level 6 (CI): Independent with p.o.; rare cues; usually self cues; may need to avoid some foods or needs extra time Level 7 (CH): Independent for all p.o. LATRELL. (2003). National Outcomes Measurement System (NOMS): Adult Speech-Language Pathology User's Guide. Pain: 
Pain Scale 1: Numeric (0 - 10) Pain Intensity 1: 3 After treatment:  
Call bell within reach and Nursing notified COMMUNICATION/EDUCATION:  
 
The patient's plan of care including recommendations, planned interventions, and recommended diet changes were discussed with: Registered nurse, Physician. Thank you for this referral. 
SHANTA Candelaria Time Calculation: 15 mins

## 2020-04-02 NOTE — PROGRESS NOTES
Bedside shift change report given to Winston Peace (oncoming nurse) by Jesusita Macdonald RN (offgoing nurse). Report included the following information SBAR, Kardex, ED Summary, Intake/Output, MAR and Recent Results. SHIFT SUMMARY: 
 
0800 Initial Shift  Assessment performed Mental Status: Oriented x4. Forgetfulness. Respiratory: 4L NC Cardiac: Sinus tach GI/: Condom catheter 
          
0900 Paged Speech. Spoke with Dr. Gila Alvarez yesterday concerning patient failing stand. Asked for speech consult and if passed start on sips of clear liquids. 1100 Speech at bedside. Spoke with GI who wants patient to stay sips of clear for now. 1200 Performed decompression per order with tube. Did not obtain any stool this time. 1400 Called report to ELROY Alvarez on [facility]. 26 Pt condom catheter off again. Brief soaked. Cleaned patient and sheets. Tx to IMCU on 4L NC. On monitor in bed.

## 2020-04-02 NOTE — INTERDISCIPLINARY ROUNDS
IDR/SLIDR Summary Patient: Ellis Patel MRN: [id number]    Age: 68 y.o. YOB: 1950 Room/Bed: [address] [place] [address] Admit Diagnosis: Diarrhea [R19.7] Shortness of breath [R06.02] Anemia [D64.9] Melanoma ([facility] [address] [C43.9]  Principal Diagnosis: <principal problem not specified>  
Goals: TBD Readmission: NO  Quality Measure: PNA 
VTE Prophylaxis: Mechanical 
Influenza Vaccine screening completed? YES Pneumococcal Vaccine screening completed? YES Mobility needs: Yes   Nutrition plan:No 
Consults:P.T, O.T., Speech and Respiratory Financial concerns:No  Escalated to CM? YES 
RRAT Score: 7   Interventions:H2H Testing due for pt today? NO 
LOS: 2 days Expected length of stay TBD days Discharge plan: TBD   PCP: Jd Mclaughlin NP Transportation needs: No   
Days before discharge:two or more days before discharge Discharge disposition: TBD Signed:  
 
Elayne Nguyễn 4/1/2020 
10:15 PM

## 2020-04-02 NOTE — PROGRESS NOTES
Problem: Falls - Risk of 
Goal: *Absence of Falls Description: Document Jazzy Brown Fall Risk and appropriate interventions in the flowsheet. Outcome: Progressing Towards Goal 
Note: Fall Risk Interventions: 
Mobility Interventions: Assess mobility with egress test, Communicate number of staff needed for ambulation/transfer, Mechanical lift, Patient to call before getting OOB, OT consult for ADLs, PT Consult for mobility concerns, PT Consult for assist device competence, Strengthening exercises (ROM-active/passive), Utilize gait belt for transfers/ambulation, Utilize walker, cane, or other assistive device Mentation Interventions: Adequate sleep, hydration, pain control, Door open when patient unattended, Evaluate medications/consider consulting pharmacy, Gait belt with transfers/ambulation, HELP ([address]) if available, Increase mobility, More frequent rounding, Self-releasing belt, Room close to nurse's station, Reorient patient, Update white board, Toileting rounds Medication Interventions: Patient to call before getting OOB, Evaluate medications/consider consulting pharmacy, Teach patient to arise slowly, Utilize gait belt for transfers/ambulation Elimination Interventions: Call light in reach, Elevated toilet seat, Stay With Me (per policy), Toilet paper/wipes in reach, Patient to call for help with toileting needs, Toileting schedule/hourly rounds History of Falls Interventions: Consult care management for discharge planning, Door open when patient unattended, Evaluate medications/consider consulting pharmacy, Investigate reason for fall, Room close to nurse's station, Utilize gait belt for transfer/ambulation, Assess for delayed presentation/identification of injury for 48 hrs (comment for end date), Vital signs minimum Q4HRs X 24 hrs (comment for end date) Problem: Diarrhea (Adult and Pediatrics) Goal: *Absence of diarrhea Outcome: Progressing Towards Goal 
 Note: Q2 rectal decompression, watery stool output Problem: Breathing Pattern - Ineffective Goal: *Use of effective breathing techniques Outcome: Progressing Towards Goal 
Note: Pt on 4 L NC with oxygen saturations greater than 90% Primary Nurse Ravi Tadeo RN and Ricky Steve RN performed a dual skin assessment on this patient Impairment noted- see wound doc flow sheet. R abs scar, bilat knee scar, elbows red/blanchable scab on L Carlos score is 13 BEDSIDE_VERBAL_RECORDED_WRITTEN:12586::\"Bedside\"} shift change report given to María Elena Song (oncoming nurse) by Haritha Gates (offgoing nurse).  Report included the following information SBAR, Kardex, Procedure Summary, Intake/Output, MAR, Recent Results and Cardiac Rhythm SR.

## 2020-04-03 NOTE — PROGRESS NOTES
[address], [facility] [place] 
[phone] Wife updated on phone.  
 
Kathy Kirkland, KARAN

## 2020-04-03 NOTE — PROGRESS NOTES
NUTRITION COMPLETE ASSESSMENT 
 
RECOMMENDATIONS:  
 
Agree with palliative care consult for goals of care If unable to advance diet beyond clear liquids in next 24-48 hours, need to consider nutrition support if continuing aggressive measures (see below if recs needed) Please adjust documentation to  Severe Acute Protein Calorie Malnutrition in patient diagnoses d/t NPO/CL status for 5 days. Patient meets criteria for as evidenced by:  
ASPEN Malnutrition Criteria Acute Illness, Chronic Illness, or Social/Enviornmental: Acute illness Energy Intake: Less than/equal to 50% est energy req for greater than/equal to 5 days Weight Loss: Greater than 5% x 1 mo ASPEN Malnutrition Score - Acute Illness: 12 Acute Illness - Malnutrition Diagnosis: Severe malnutrition. Interventions/Plan:  
Food/Nutrient Delivery:  Diet advancement per GI with supplements at all meals (Ensure Clear vs Ensure Enlive depending on diet order) Assessment:  
Reason for Assessment: MST - follow up Diet: NPO Nutritionally Significant Medications: 1/2 NS @ 75 mL/hr, lipitor, SSI, synthroid, solumedrol, reglan, zosyn, zofran PRN Meal Intake: No data found. Subjective: Assessment completed by chart review, Pt visit/interview limited by isolation precautions Objective: 
Pt admitted for SOB and GI bleed. PMHx: afib, bladder CA, HTN, melanoma with mets to lungs/bone on chemo. Diarrhea and GIB with colitis per CT. Pt seen today for follow up/ diet advancement check. Noted C. Diff negative. Nita Burt stopped. ?related to chemo vs ischemic vs infectious. GI on board. KUB 3/31 showed mild diffuse gaseous colonic distention. S/p rectal decompression. Wants to eat, but GI recs continued NPO status (appears he was advanced to full liquids for ~5 hrs on 3/31 then made NPO again). Plan for flex sig today. On Reglan. Repeat KUB in AM. CT chest, abd, pelvis showed possible mets to lung, liver, mesenteric node, and bone. Oncology following. Poor prognosis. Performance status declining. Palliative medicine consult pending for care decisions. New SOB, on 4 L NC. Also of note, seen by SLP. Ok for diet from oropharyngeal standpoint. BG better controlled. Wt stable since admission (up with hydration). However, given that pt has remained without significant nutrition for at least 5 days, pt now qualifies for acute severe malnutrition. Pending care decisions, if continuing aggressive measures and unable to advance diet - recommend PN of 5%AA, 17% dex @ 85 mL/hr + 500 mL 20% lipids 3x/week to provide 2302-4739 mL, 2016 avg daily kcal, 102 gm pro, 347 gm dex (GIR = 2.9 mg/kg/min). Pt would need PICC unless already has port that could be used. Per RD initial assessment: On steroids PTA to help with diarrhea. BG slightly elevated with SSI rx, A1c 6.5% but no hx of DM documented. Poor PO intake prior to admit noted with wt loss over past month. Likely also partially related to fluid losses with diarrhea, as well as increased energy expenditure. Will add Ensure Clear TID while on clear liquids (720kcal, 24g protein). Once diet advanced recommend switch to Ensure Enlive BID (700kcal, 40g protein). Wt stable at 185# prior to this past month. Severe wt loss of 4% x <1 month. Wt Readings:  
03/30/20 81.1 kg (178 lb 12.7 oz) 03/16/20 82.1 kg (181 lb)  
03/13/20 83.9 kg (184 lb 14.4 oz) 03/03/20 84.4 kg (186 lb 1.6 oz) Will follow for goals of care, supplement acceptance, po intake and wt trends. Estimated Nutrition Needs:  
Kcals/day: 2012 Kcals/day(2012-2166kcal) Protein: 97 g(97-113g (1.2-1.4g/kg)) Fluid: 2100 ml(1ml/kcal) Based On: [place] [address] Jeor(x 1.3-1.4) Weight Used: Actual wt(81.1kg) Pt expected to meet estimated nutrient needs:  []   Yes     [x]  No [] Unable to predict at this time Nutrition Diagnosis: 1.  Malnutrition related to inadequate protein/energy intake; increased energy expenditure as evidenced by severe wt loss of 4% x <1 month; poor PO PTA; melanoma with mets 2. Altered GI function related to ?chemo, colitis as evidenced by diarrhea Goals:   
 diet advancement to at least full liquids or consideration of nutrition support within next 72 hours (if continuing aggressive measures) Monitoring & Evaluation: - Total energy intake, Liquid meal replacement, Protein intake - Weight/weight change, GI 
 
Previous Nutrition Goals Met:   Progressing Previous Recommendations:    Progressing Education & Discharge Needs: 
 [x] None Identified   [] Identified and addressed  
 [] Participated in care plan, discharge planning, and/or interdisciplinary rounds Nutrition Discharge Plan:  
[x] Too soon to determine 
[] Other:  
    
Cultural, Nondenominational and ethnic food preferences identified: None Skin Integrity: [x]Intact  []Other Edema: [x]None []Other Last BM: 4/2 - loose. Distended abdomen Food Allergies: [x]None []Other Diet Restrictions: Cultural/Sabianism Preference(s): None Anthropometrics:   
Weight Loss Metrics 4/3/2020 3/30/2020 3/17/2020 3/16/2020 3/13/2020 3/3/2020 2/28/2020 Today's Wt 183 lb 11.2 oz - - 181 lb 184 lb 14.4 oz 186 lb 1.6 oz 187 lb BMI - 27.13 kg/m2 26.73 kg/m2 26.73 kg/m2 27.3 kg/m2 27.48 kg/m2 27.62 kg/m2 Weight Source: Bed Height: 5' 9\" (175.3 cm), Body mass index is 27.13 kg/m². IBW : 72.6 kg (160 lb), % IBW (Calculated): 111.75 % Usual Body Weight: 83.9 kg (185 lb),   
 
Labs:  
Recent Labs 04/03/20 
0451 04/02/20 
5593 04/01/20 
1602 04/01/20 
9411 * 124* 126* 152* BUN 31* 32* 35* 40* CREA 1.05 1.20 1.35* 1.42*  146* 143 140  
K 4.0 3.9 4.0 4.0  
* 114* 113* 112* CO2 19* 21 20* 20* CA 6.8* 7.3* 7.5* 7.3*  
PHOS  --   --  3.2  --   
MG  --   --  2.2  --   
 
 
Recent Labs 04/03/20 
0451 03/31/20 
1729 SGOT 154* 79* ALT 76 37  98 TBILI 1.6* 3.1*  
 TP 4.8* 5.0* ALB 1.4* 1.4*  
GLOB 3.4 3.6 Recent Labs 04/02/20 
1007 04/02/20 
0350 04/01/20 
1301 LAC 1.6 2.1* 3.2* Recent Labs 04/03/20 
0451 04/02/20 
0895 WBC 6.7 5.6 HGB 6.8* 7.1*  
HCT 20.9* 21.6*  
PLT 75* 76* No results for input(s): PREALB in the last 72 hours. No results for input(s): TRIGL in the last 72 hours. Recent Labs 04/03/20 
0800 04/02/20 
2127 04/02/20 
1646 04/02/20 
1116 04/02/20 
1299 04/01/20 
2131 04/01/20 
1736 04/01/20 
1237 04/01/20 
2863 03/31/20 
2104 03/31/20 
1748 03/31/20 
1619 03/31/20 
1130 GLUCPOC 114* 113* 154* 114* 108* 122* 146* 123* 80 147* 169* 147* 160* Lab Results Component Value Date/Time Hemoglobin A1c 6.5 (H) 03/30/2020 05:31 PM  
 
 
 
 
 Carter Lujan RD  
 via Belly Ballot

## 2020-04-03 NOTE — PROGRESS NOTES
[facility] 
[address], [address] GI PROGRESS NOTE [facility] [phone] NP in-hospital cell phone M-F until 4:30 After 5pm or on weekends, please call  for physician on call NAME: Hong Garduno :  1950 MRN:  [id number] Subjective: He reports only 1 bowel movement yesterday and no rectal decompression. He reports sore abdomen. Discussed with RN - actually having several loose stools and had rectal decompression frequently during the day yesterday and overnight. Objective: VITALS:  
Last 24hrs VS reviewed since prior progress note. Most recent are: 
Visit Vitals /67 Pulse 100 Temp 98.6 °F (37 °C) Resp 23 Ht 5' 9\" (1.753 m) Wt 83.3 kg (183 lb 11.2 oz) SpO2 100% BMI 27.13 kg/m² PHYSICAL EXAM: 
General: Cooperative, no acute distress   
Neurologic:  Alert and oriented X 3. HEENT: EOMI, no scleral icterus Lungs:  Diminished Heart:  S1 S2 Abdomen: Soft, distended, no tenderness. +high pitched, hypoactive Bowel sounds Extremities: warm Psych:   Good insight. Not anxious or agitated. Lab Data Reviewed:  
 
Recent Results (from the past 24 hour(s)) GLUCOSE, POC Collection Time: 20 11:16 AM  
Result Value Ref Range Glucose (POC) 114 (H) 65 - 100 mg/dL Performed by Aide Gaitan   
GLUCOSE, POC Collection Time: 20  4:46 PM  
Result Value Ref Range Glucose (POC) 154 (H) 65 - 100 mg/dL Performed by Jessica Virk GLUCOSE, POC Collection Time: 20  9:27 PM  
Result Value Ref Range Glucose (POC) 113 (H) 65 - 100 mg/dL Performed by Jessica Virk   
CBC WITH AUTOMATED DIFF Collection Time: 20  4:51 AM  
Result Value Ref Range WBC 6.7 4.1 - 11.1 K/uL  
 RBC 2.21 (L) 4.10 - 5.70 M/uL HGB 6.8 (L) 12.1 - 17.0 g/dL HCT 20.9 (L) 36.6 - 50.3 %  MCV 94.6 80.0 - 99.0 FL  
 MCH 30.8 26.0 - 34.0 PG  
 MCHC 32.5 30.0 - 36.5 g/dL  
 RDW 14.0 11.5 - 14.5 % PLATELET 75 (L) 489 - 400 K/uL MPV 11.4 8.9 - 12.9 FL  
 NRBC 4.8 (H) 0  WBC ABSOLUTE NRBC 0.32 (H) 0.00 - 0.01 K/uL NEUTROPHILS 52 32 - 75 % BAND NEUTROPHILS 14 (H) 0 - 6 % LYMPHOCYTES 21 12 - 49 % MONOCYTES 8 5 - 13 % EOSINOPHILS 0 0 - 7 % BASOPHILS 0 0 - 1 % METAMYELOCYTES 3 (H) 0 % MYELOCYTES 2 (H) 0 % IMMATURE GRANULOCYTES 0 %  
 ABS. NEUTROPHILS 4.4 1.8 - 8.0 K/UL  
 ABS. LYMPHOCYTES 1.4 0.8 - 3.5 K/UL  
 ABS. MONOCYTES 0.5 0.0 - 1.0 K/UL  
 ABS. EOSINOPHILS 0.0 0.0 - 0.4 K/UL  
 ABS. BASOPHILS 0.0 0.0 - 0.1 K/UL  
 ABS. IMM. GRANS. 0.0 K/UL  
 DF MANUAL    
 RBC COMMENTS MACROCYTOSIS 1+ 
    
 RBC COMMENTS POLYCHROMASIA 1+ METABOLIC PANEL, COMPREHENSIVE Collection Time: 04/03/20  4:51 AM  
Result Value Ref Range Sodium 144 136 - 145 mmol/L Potassium 4.0 3.5 - 5.1 mmol/L Chloride 117 (H) 97 - 108 mmol/L  
 CO2 19 (L) 21 - 32 mmol/L Anion gap 8 5 - 15 mmol/L Glucose 128 (H) 65 - 100 mg/dL BUN 31 (H) 6 - 20 MG/DL Creatinine 1.05 0.70 - 1.30 MG/DL  
 BUN/Creatinine ratio 30 (H) 12 - 20 GFR est AA >60 >60 ml/min/1.73m2 GFR est non-AA >60 >60 ml/min/1.73m2 Calcium 6.8 (L) 8.5 - 10.1 MG/DL Bilirubin, total 1.6 (H) 0.2 - 1.0 MG/DL  
 ALT (SGPT) 76 12 - 78 U/L  
 AST (SGOT) 154 (H) 15 - 37 U/L Alk. phosphatase 117 45 - 117 U/L Protein, total 4.8 (L) 6.4 - 8.2 g/dL Albumin 1.4 (L) 3.5 - 5.0 g/dL Globulin 3.4 2.0 - 4.0 g/dL A-G Ratio 0.4 (L) 1.1 - 2.2 TYPE & SCREEN Collection Time: 04/03/20  6:34 AM  
Result Value Ref Range Crossmatch Expiration 04/06/2020 ABO/Rh(D) O POSITIVE Antibody screen NEG   
GLUCOSE, POC Collection Time: 04/03/20  8:00 AM  
Result Value Ref Range Glucose (POC) 114 (H) 65 - 100 mg/dL Performed by Corina SANABRIA IMPRESSION: No significant change in colonic ileus pattern Assessment: · Colitis: diarrhea, ileus, c diff and enteric pathogen panel negative; ischemic vs chemo related · Melanoma · Atrial fibrillation: Xarelto on hold Patient Active Problem List  
Diagnosis Code  Essential hypertension I10  
 Hyperlipidemia E78.5  Atrial fibrillation (HCC) I48.91  
 History of skin cancer C85.411  BMI 27.0-27.9,adult Z68.27  
 History of bladder cancer Z85.51  Abdominal wall mass of right upper quadrant R19.01  
 Malignant melanoma of torso excluding breast (HCC) C43.59  
 Primary osteoarthritis of left knee M17.12  
 Hypothyroidism due to medication E03.2  Hypercalcemia E83.52  
 Dehydration E86.0  Shortness of breath R06.02  
 Diarrhea R19.7  Melanoma ([facility] [address] C43.9  Anemia D64.9 Plan: · Transfuse pRBC's · Plan for flex sig, unsedated around 1:30pm 
· Discussed risks with patient and wife, please obtain consent · Reglan · NPO · Antibiotics · Minimize narcotics Signed By: Sofia Patel NP   
 4/3/2020  10:22 AM

## 2020-04-03 NOTE — PROGRESS NOTES
Pt is off the unit at endo. PT will defer, follow and see as appropriate.  Thank you, Rama Hernandez, PT

## 2020-04-03 NOTE — CONSULTS
Palliative Medicine Consult [place]: [phone] ([phone]) Patient Name: Gokul King YOB: 1950 Date of Initial Consult: 4/3/20 Reason for Consult: Care decisions Requesting Provider:  
Primary Care Physician: Parker Razo NP 
 
 SUMMARY:  
Gokul King is a 68 y.o. with a past history of bladder cancer, basal and squamous cell carcinoma and melanoma first dx 2017 s/p excision, recurrent 12/2019 w/ excision by Dr Víctor Grant and recurrence in same area of abdomen s/p repeat excision 2/2020 on [place] and Opdivo w/ Dr Sherin Torres who was admitted on 3/30/2020 from home w/ bloody stool (5-6 a day) and cough. With colitis possibly from onc tx vs ischemic and CT showing new pulm mets. C diff neg. GI involved, rectal decompression for ileus, today s/p flex sig w/ colitis, path sent. Current medical issues leading to Palliative Medicine involvement include: care decisions. Social: to Luca Banerjee, 2nd marriage for both. 4 bio kids, 10 grandkids, 3 stepkids, 1 step grandkid. 1 child lives in [place], rest of [facility]; retired [employer], worked as . Wife has 3 children, none local.  
 
 PALLIATIVE DIAGNOSES:  
1. Abdominal distension, ileus 2. Weakness 3. Lower back pain 4. Goals of care PLAN:  
1. Meet w/ pt who is engaging. Difficult to speak for long, as very weak and w/ dry mouth. Eager to advance diet but understands us being conservative. 2. Recalls conversations w/ Dr Yudith Bain earlier this admission and agrees that there is not further treatment that will benefit him- as he tried and had significant side effects. 3. Open to talking about hospice as a way to treat sx at home- okay if I talk to his wife about this. 4. Cont current management, has prn Percocet but trying to avoid at least for now. 5. Pt needs DDNR before discharge.   
6. Call wife who is nervous about pt's condition- asks me several times if he is stable. Did not like the news from oncology earlier this admission- what she says she heard was that pt would not survive this hospital stay. She is requesting a second opinion from [facility]. 7. Certainly can offer this -as we do in many patients, but ask her if this is her request or the pt's request and she says it is both. She understands that he has metastatic disease and that there very well many not be any other tx, and that is okay- but wants to be sure. Also tell her that given pt's condition there are no options to consider at this time. 8. She says that 'hospice scares me\" but listens to me talk about it and that if there are no more tx or pt does not want more tx- then it would serve him well. 9. Initial consult note routed to primary continuity provider and/or primary health care team members 10. Communicated plan of care with: Palliative IDT, [facility] Team incl Albert Martinez NP and Dr Ale Domínguez GOALS OF CARE / TREATMENT PREFERENCES:  
 
GOALS OF CARE: 
Patient/Health Care Proxy Stated Goals: Other (comment)(Ilieus to resolve) TREATMENT PREFERENCES:  
Code Status: DNR Advance Care Planning: 
[x] [facility] Interdisciplinary Team has updated the ACP Navigator with Maria E and Patient Capacity Primary Decision Maker: Jose Jones - Spouse - [phone] Advance Care Planning 4/2/2020 Patient's Healthcare Decision Maker is: Named in scanned ACP document Confirm Advance Directive Yes, on file Does the patient have other document types Organ Directive Medical Interventions: Limited additional interventions Other: As far as possible, the palliative care team has discussed with patient / health care proxy about goals of care / treatment preferences for patient. HISTORY:  
 
History obtained from: Pt, chart, staff CHIEF COMPLAINT: \"My mouth is so dry\" HPI/SUBJECTIVE: The patient is:  
[x] Verbal and participatory [] Non-participatory due to:  
 
Pt w/ abdomen less distended, had one BM yest. Very thirsty. Chronic lower back pain. Clinical Pain Assessment (nonverbal scale for severity on nonverbal patients):  
Clinical Pain Assessment Severity: 3 Location: lower back and tight abdomen Character: aching Duration: chronic Effect: decr mobility Factors: worse w/ lying in bed this hospital stay Frequency: constant Duration: for how long has pt been experiencing pain (e.g., 2 days, 1 month, years) Frequency: how often pain is an issue (e.g., several times per day, once every few days, constant) FUNCTIONAL ASSESSMENT:  
 
Palliative Performance Scale (PPS): PPS: 40 PSYCHOSOCIAL/SPIRITUAL SCREENING:  
 
Palliative IDT has assessed this patient for cultural preferences / practices and a referral made as appropriate to needs (Cultural Services, Patient Advocacy, Ethics, etc.) Any spiritual / Yarsani concerns: 
[] Yes /  [x] No 
 
Caregiver Burnout: 
[] Yes /  [x] No /  [] No Caregiver Present Anticipatory grief assessment:  
[x] Normal  / [] Maladaptive ESAS Anxiety: Anxiety: 0 
 
ESAS Depression: Depression: 0 REVIEW OF SYSTEMS:  
 
Positive and pertinent negative findings in ROS are noted above in HPI. The following systems were [x] reviewed / [] unable to be reviewed as noted in HPI Other findings are noted below. Systems: constitutional, ears/nose/mouth/throat, respiratory, gastrointestinal, genitourinary, musculoskeletal, integumentary, neurologic, psychiatric, endocrine. Positive findings noted below. Modified ESAS Completed by: provider Fatigue: 5 Drowsiness: 0 Depression: 0 Pain: 3 Anxiety: 0 Nausea: 2 Anorexia: 10 Dyspnea: 0 Constipation: Yes Stool Occurrence(s): 1(rectal decompression ) PHYSICAL EXAM:  
 
From RN flowsheet: 
Wt Readings from Last 3 Encounters:  
04/03/20 183 lb 11.2 oz (83.3 kg) 03/16/20 181 lb (82.1 kg) 03/13/20 184 lb 14.4 oz (83.9 kg) Blood pressure 150/74, pulse 87, temperature 99.2 °F (37.3 °C), resp. rate 22, height 5' 9\" (1.753 m), weight 183 lb 11.2 oz (83.3 kg), SpO2 95 %. Pain Scale 1: Numeric (0 - 10) Pain Intensity 1: 0 Pain Onset 1: acute Pain Location 1: Abdomen Pain Orientation 1: Mid 
Pain Description 1: Aching Pain Intervention(s) 1: Repositioned, Medication (see MAR) Last bowel movement, if known:  
 
Constitutional: awake, alert, oriented Eyes: pupils equal, anicteric ENMT: no nasal discharge, dry mucous membranes Cardiovascular: regular rhythm Respiratory: breathing not labored Gastrointestinal: hypoactive bowel sounds, distended Musculoskeletal: no deformity, no tenderness to palpation Skin: warm, dry Neurologic: following commands, moving all extremities Psychiatric: full affect HISTORY:  
 
Active Problems: 
  Shortness of breath (3/30/2020) Diarrhea (3/30/2020) Melanoma ([address] (3/30/2020) Anemia (3/30/2020) Past Medical History:  
Diagnosis Date  Abdominal wall mass of right upper quadrant 12/2/2019  Atrial fibrillation ([address]  BPH (benign prostatic hyperplasia)  Cancer ([address] early 36s BLADDER  
 Hypercholesterolemia  Hypertension  Joint replaced 2011  
 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  
Procedure Laterality Date  HX AFIB ABLATION  2018  HX APPENDECTOMY [address] [facility]  HX KNEE REPLACEMENT  2010  
 right knee  HX KNEE REPLACEMENT  04/02/2019  
 left knee  HX MALIGNANT SKIN LESION EXCISION    
 HX OTHER SURGICAL  12/06/2019 Excisional biopsy of right upper quadrant abdominal wall mass.  HX TONSILLECTOMY  HX UROLOGICAL CYSTOSCOPY  SKIN TISSUE PROCEDURE UNLISTED  2018 Melanoma across abdomen, Right armpit and groin lymphnode involvement Family History Problem Relation Age of Onset  Dementia Mother  Hypertension Father  Hypertension Sister History reviewed, no pertinent family history. Social History Tobacco Use  Smoking status: Former Smoker Packs/day: 2.00 Years: 20.00 Pack years: 40.00 Last attempt to quit: 1988 Years since quittin.2  Smokeless tobacco: Never Used Substance Use Topics  Alcohol use: Yes Alcohol/week: 2.0 standard drinks Types: 2 Glasses of wine per week Comment: 1 glass with dinner 2 nights per week Allergies Allergen Reactions  Demerol [Meperidine] Other (comments) \"passed out\" Current Facility-Administered Medications Medication Dose Route Frequency  benzocaine-menthoL (CEPACOL) lozenge 1 Lozenge  1 Lozenge Mucous Membrane PRN  
 0.9% sodium chloride infusion 250 mL  250 mL IntraVENous PRN  
 lidocaine 4 % patch 1 Patch  1 Patch TransDERmal Q24H  
 0.45% sodium chloride infusion  75 mL/hr IntraVENous CONTINUOUS  
 methylPREDNISolone (PF) (Solu-MEDROL) injection 40 mg  40 mg IntraVENous Q12H  
 albuterol-ipratropium (DUO-NEB) 2.5 MG-0.5 MG/3 ML  3 mL Nebulization Q6H PRN  
 metoclopramide HCl (REGLAN) injection 5 mg  5 mg IntraVENous Q6H  
 piperacillin-tazobactam (ZOSYN) 3.375 g in 0.9% sodium chloride (MBP/ADV) 100 mL  3.375 g IntraVENous Q8H  
 simethicone (MYLICON) tablet 80 mg  80 mg Oral QID PRN  
 albuterol (PROVENTIL VENTOLIN) nebulizer solution 2.5 mg  2.5 mg Nebulization TID RT  
 dilTIAZem (CARDIZEM) 125 mg in dextrose 5% 125 mL infusion  0-15 mg/hr IntraVENous TITRATE  sodium chloride (NS) flush 5-40 mL  5-40 mL IntraVENous Q8H  
 sodium chloride (NS) flush 5-40 mL  5-40 mL IntraVENous PRN  
 acetaminophen (TYLENOL) tablet 650 mg  650 mg Oral Q4H PRN  
 oxyCODONE-acetaminophen (PERCOCET) 5-325 mg per tablet 1 Tab  1 Tab Oral Q4H PRN  
 diphenhydrAMINE (BENADRYL) injection 12.5 mg  12.5 mg IntraVENous Q4H PRN  
 ondansetron (ZOFRAN) injection 4 mg  4 mg IntraVENous Q4H PRN  
 levothyroxine (SYNTHROID) tablet 75 mcg  75 mcg Oral 6am  
 primidone (MYSOLINE) tablet 50 mg  50 mg Oral QHS  atorvastatin (LIPITOR) tablet 20 mg  20 mg Oral QHS  tamsulosin (FLOMAX) capsule 0.4 mg  0.4 mg Oral DAILY  temazepam (RESTORIL) capsule 30 mg  30 mg Oral QHS PRN  
 metoprolol succinate (TOPROL-XL) XL tablet 50 mg  50 mg Oral DAILY  glucose chewable tablet 16 g  4 Tab Oral PRN  
 glucagon (GLUCAGEN) injection 1 mg  1 mg IntraMUSCular PRN  
 insulin lispro (HUMALOG) injection   SubCUTAneous AC&HS  morphine injection 2 mg  2 mg IntraVENous Q3H PRN  
 
 
 
 LAB AND IMAGING FINDINGS:  
 
Lab Results Component Value Date/Time WBC 6.7 04/03/2020 04:51 AM  
 HGB 6.8 (L) 04/03/2020 04:51 AM  
 PLATELET 75 (L) 64/52/3358 04:51 AM  
 
Lab Results Component Value Date/Time Sodium 144 04/03/2020 04:51 AM  
 Potassium 4.0 04/03/2020 04:51 AM  
 Chloride 117 (H) 04/03/2020 04:51 AM  
 CO2 19 (L) 04/03/2020 04:51 AM  
 BUN 31 (H) 04/03/2020 04:51 AM  
 Creatinine 1.05 04/03/2020 04:51 AM  
 Calcium 6.8 (L) 04/03/2020 04:51 AM  
 Magnesium 2.2 04/01/2020 04:02 PM  
 Phosphorus 3.2 04/01/2020 04:02 PM  
  
Lab Results Component Value Date/Time AST (SGOT) 154 (H) 04/03/2020 04:51 AM  
 Alk. phosphatase 117 04/03/2020 04:51 AM  
 Protein, total 4.8 (L) 04/03/2020 04:51 AM  
 Albumin 1.4 (L) 04/03/2020 04:51 AM  
 Globulin 3.4 04/03/2020 04:51 AM  
 
Lab Results Component Value Date/Time INR 1.4 (H) 03/30/2020 07:52 AM  
 Prothrombin time 14.0 (H) 03/30/2020 07:52 AM  
 aPTT 32.5 (H) 03/30/2020 07:52 AM  
  
No results found for: IRON, FE, TIBC, IBCT, PSAT, FERR No results found for: PH, PCO2, PO2 No components found for: [place] Lab Results Component Value Date/Time  CK 50 04/01/2020 04:02 PM  
 CK - MB 1.2 04/01/2020 04:02 PM  
  
 
 
   
 
Total time: 70 min Counseling / coordination time, spent as noted above: 45 min  
> 50% counseling / coordination?: yes Prolonged service was provided for  []30 min   []75 min in face to face time in the presence of the patient, spent as noted above. Time Start:  
Time End:  
Note: this can only be billed with 95001 (initial) or 43463 (follow up). If multiple start / stop times, list each separately.

## 2020-04-03 NOTE — H&P
[address] Pre-procedure History and Physical    
 
NAME:  Eyad Patel :   1950 MRN:   [id number] CHIEF COMPLAINT/HPI: See procedure note PMH: 
Past Medical History:  
Diagnosis Date  Abdominal wall mass of right upper quadrant 2019  Atrial fibrillation ([address]  BPH (benign prostatic hyperplasia)  Cancer ([address] early 36s BLADDER  
 Hypercholesterolemia  Hypertension  Joint replaced   
 right knee  Skin cancer  Skin disorder 2018 Skin Cancer - melanoma across stomach, lymphnode involvement in Right armpit and Right groin  Sun-damaged skin PSH: 
Past Surgical History:  
Procedure Laterality Date  HX AFIB ABLATION  2018  HX APPENDECTOMY [facility]  HX KNEE REPLACEMENT  2010  
 right knee  HX KNEE REPLACEMENT  2019  
 left knee  HX MALIGNANT SKIN LESION EXCISION    
 HX OTHER SURGICAL  2019 Excisional biopsy of right upper quadrant abdominal wall mass.  HX TONSILLECTOMY  HX UROLOGICAL CYSTOSCOPY  SKIN TISSUE PROCEDURE UNLISTED   Melanoma across abdomen, Right armpit and groin lymphnode involvement Allergies: Allergies Allergen Reactions  Demerol [Meperidine] Other (comments) \"passed out\" Home Medications: 
Prior to Admission Medications Prescriptions Last Dose Informant Patient Reported? Taking? amLODIPine (NORVASC) 10 mg tablet   Yes Yes Sig: Take 10 mg by mouth daily. cholestyramine (Questran) 4 gram packet   Yes Yes Sig: Take 1 Packet by mouth three (3) times daily (with meals). diclofenac EC (VOLTAREN) 75 mg EC tablet   Yes Yes Sig: Take 75 mg by mouth two (2) times daily as needed. fenofibrate (LOFIBRA) 160 mg tablet   No Yes Sig: TAKE 1 TABLET DAILY  
finasteride (PROSCAR) 5 mg tablet   Yes Yes Sig: Take 5 mg by mouth daily. levothyroxine (SYNTHROID) 75 mcg tablet   No Yes Sig: TAKE 1 TABLET BY MOUTH DAILY BEFORE BREAKFAST  
lisinopril (PRINIVIL, ZESTRIL) 20 mg tablet   Yes Yes Sig: Take 20 mg by mouth daily. metoprolol succinate (TOPROL-XL) 50 mg XL tablet   Yes Yes Sig: Take 50 mg by mouth daily. predniSONE (DELTASONE) 20 mg tablet   No Yes Sig: Take 60 mg by mouth daily for 10 days. primidone (MYSOLINE) 50 mg tablet   Yes Yes Sig: Take 50 mg by mouth nightly. rivaroxaban (XARELTO) 20 mg tab tablet   Yes Yes Sig: Take 20 mg by mouth daily (with dinner). simvastatin (ZOCOR) 40 mg tablet   No Yes Sig: TAKE 1 TABLET NIGHTLY  
tadalafil (CIALIS) 5 mg tablet   Yes Yes Sig: Take 5 mg by mouth as needed. tamsulosin (FLOMAX) 0.4 mg capsule   Yes Yes Sig: Take 0.4 mg by mouth daily. temazepam (RESTORIL) 30 mg capsule   No Yes Sig: Take 1 Cap by mouth nightly as needed for Sleep for up to 30 days. Max Daily Amount: 30 mg.  
triamcinolone acetonide (KENALOG) 0.1 % ointment   Yes Yes Sig: Apply  to affected area two (2) times daily as needed (rash). use thin layer Facility-Administered Medications: None Hospital Medications: 
Current Facility-Administered Medications Medication Dose Route Frequency  benzocaine-menthoL (CEPACOL) lozenge 1 Lozenge  1 Lozenge Mucous Membrane PRN  
 0.9% sodium chloride infusion 250 mL  250 mL IntraVENous PRN  
 lidocaine 4 % patch 1 Patch  1 Patch TransDERmal Q24H  
 0.45% sodium chloride infusion  75 mL/hr IntraVENous CONTINUOUS  
 methylPREDNISolone (PF) (Solu-MEDROL) injection 40 mg  40 mg IntraVENous Q12H  
 albuterol-ipratropium (DUO-NEB) 2.5 MG-0.5 MG/3 ML  3 mL Nebulization Q6H PRN  
 metoclopramide HCl (REGLAN) injection 5 mg  5 mg IntraVENous Q6H  
 piperacillin-tazobactam (ZOSYN) 3.375 g in 0.9% sodium chloride (MBP/ADV) 100 mL  3.375 g IntraVENous Q8H  
 simethicone (MYLICON) tablet 80 mg  80 mg Oral QID PRN  
  albuterol (PROVENTIL VENTOLIN) nebulizer solution 2.5 mg  2.5 mg Nebulization TID RT  
 dilTIAZem (CARDIZEM) 125 mg in dextrose 5% 125 mL infusion  0-15 mg/hr IntraVENous TITRATE  sodium chloride (NS) flush 5-40 mL  5-40 mL IntraVENous Q8H  
 sodium chloride (NS) flush 5-40 mL  5-40 mL IntraVENous PRN  
 acetaminophen (TYLENOL) tablet 650 mg  650 mg Oral Q4H PRN  
 oxyCODONE-acetaminophen (PERCOCET) 5-325 mg per tablet 1 Tab  1 Tab Oral Q4H PRN  
 diphenhydrAMINE (BENADRYL) injection 12.5 mg  12.5 mg IntraVENous Q4H PRN  
 ondansetron (ZOFRAN) injection 4 mg  4 mg IntraVENous Q4H PRN  
 levothyroxine (SYNTHROID) tablet 75 mcg  75 mcg Oral 6am  
 primidone (MYSOLINE) tablet 50 mg  50 mg Oral QHS  atorvastatin (LIPITOR) tablet 20 mg  20 mg Oral QHS  tamsulosin (FLOMAX) capsule 0.4 mg  0.4 mg Oral DAILY  temazepam (RESTORIL) capsule 30 mg  30 mg Oral QHS PRN  
 metoprolol succinate (TOPROL-XL) XL tablet 50 mg  50 mg Oral DAILY  glucose chewable tablet 16 g  4 Tab Oral PRN  
 glucagon (GLUCAGEN) injection 1 mg  1 mg IntraMUSCular PRN  
 insulin lispro (HUMALOG) injection   SubCUTAneous AC&HS  morphine injection 2 mg  2 mg IntraVENous Q3H PRN Family History: 
Family History Problem Relation Age of Onset  Dementia Mother  Hypertension Father  Hypertension Sister Social History: 
Social History Tobacco Use  Smoking status: Former Smoker Packs/day: 2.00 Years: 20.00 Pack years: 40.00 Last attempt to quit: 1988 Years since quittin.2  Smokeless tobacco: Never Used Substance Use Topics  Alcohol use: Yes Alcohol/week: 2.0 standard drinks Types: 2 Glasses of wine per week Comment: 1 glass with dinner 2 nights per week PHYSICAL EXAM PRIOR TO SEDATION: 
General: Alert, in no acute distress Lungs:            CTA bilaterally Heart:  Normal S1, S2   
 Abdomen: Soft, distended, Non tender. Hypoactive bowel sounds. Assessment:  
Pt aware of increased risk for perforation and death due to comorbidities. Flex sig to look for pseudomembranes due to no improvement in ileus. No improvement with conservative management. Plan:  
· Endoscopic procedure Signed By: Mita Arnett MD   
 4/3/2020  1:40 PM

## 2020-04-03 NOTE — PROGRESS NOTES
Problem: Falls - Risk of 
Goal: *Absence of Falls Description: Document [facility] Fall Risk and appropriate interventions in the flowsheet. Outcome: Progressing Towards Goal 
Note: Fall Risk Interventions: 
Mobility Interventions: Bed/chair exit alarm, Patient to call before getting OOB, PT Consult for assist device competence, Strengthening exercises (ROM-active/passive), PT Consult for mobility concerns, Utilize walker, cane, or other assistive device, Utilize gait belt for transfers/ambulation Mentation Interventions: Door open when patient unattended, Evaluate medications/consider consulting pharmacy, Eyeglasses and hearing aids, Gait belt with transfers/ambulation, HELP ([address]) if available, Increase mobility, More frequent rounding, Reorient patient, Room close to nurse's station, Self-releasing belt, Toileting rounds Medication Interventions: Evaluate medications/consider consulting pharmacy, Patient to call before getting OOB, Teach patient to arise slowly, Utilize gait belt for transfers/ambulation Elimination Interventions: Patient to call for help with toileting needs, Call light in reach, Elevated toilet seat, Toilet paper/wipes in reach, Stay With Me (per policy), Toileting schedule/hourly rounds, Urinal in reach History of Falls Interventions: Consult care management for discharge planning, Door open when patient unattended, Evaluate medications/consider consulting pharmacy, Investigate reason for fall, Room close to nurse's station, Utilize gait belt for transfer/ambulation, Assess for delayed presentation/identification of injury for 48 hrs (comment for end date), Vital signs minimum Q4HRs X 24 hrs (comment for end date) Problem: Diarrhea (Adult and Pediatrics) Goal: *Absence of diarrhea Outcome: Progressing Towards Goal 
Note: Patient NPO, had endo procedure today, d/c Q2 rectal decompression Problem: Breathing Pattern - Ineffective Goal: *Use of effective breathing techniques Outcome: Progressing Towards Goal 
Note: Pt on 4 L NC with oxygen saturations greater than 90% Bedside shift change report given to Juliette Maddox (oncoming nurse) by Tammy Lopez (offgoing nurse).  Report included the following information SBAR, Kardex, Procedure Summary, Intake/Output, MAR, Recent Results and Cardiac Rhythm SR.

## 2020-04-03 NOTE — PERIOP NOTES
TRANSFER - IN REPORT: 
 
Verbal report received from ELROY Gallegos (name) on [facility]  being received from [phone] (unit) for ordered procedure Information from the following report(s) SBAR, ED Summary, MAR, Recent Results, Cardiac Rhythm , ST, Pre Procedure Checklist, Procedure Verification and Quality Measures was reviewed with the receiving nurse. Opportunity for questions and clarification was provided. Assessment completed upon patients arrival to unit and care assumed.

## 2020-04-03 NOTE — PROGRESS NOTES
Bedside and Verbal shift change report given to [employer] [place] (oncoming nurse) by Lidia Don (offgoing nurse). Report included the following information SBAR, Kardex, ED Summary, Intake/Output, MAR, Accordion, Recent Results and Cardiac Rhythm NSR. Last 3 Recorded Weights in this Encounter 04/01/20 0600 04/02/20 0600 04/03/20 0332 Weight: 80.5 kg (177 lb 7.5 oz) 82.3 kg (181 lb 7 oz) 83.3 kg (183 lb 11.2 oz) Problem: Pressure Injury - Risk of 
Goal: *Prevention of pressure injury Description: Document Carlos Scale and appropriate interventions in the flowsheet. Outcome: Progressing Towards Goal 
Note: Pressure Injury Interventions: 
Sensory Interventions: Assess changes in LOC, Assess need for specialty bed, Float heels, Minimize linen layers, Monitor skin under medical devices, Pressure redistribution bed/mattress (bed type), Turn and reposition approx. every two hours (pillows and wedges if needed) Moisture Interventions: Absorbent underpads, Assess need for specialty bed, Check for incontinence Q2 hours and as needed, Minimize layers Activity Interventions: Assess need for specialty bed, Increase time out of bed, Pressure redistribution bed/mattress(bed type), PT/OT evaluation Mobility Interventions: Assess need for specialty bed, HOB 30 degrees or less, Pressure redistribution bed/mattress (bed type), PT/OT evaluation, Turn and reposition approx. every two hours(pillow and wedges) Nutrition Interventions: Document food/fluid/supplement intake Friction and Shear Interventions: HOB 30 degrees or less, Minimize layers Problem: Falls - Risk of 
Goal: *Absence of Falls Description: Document [facility] Fall Risk and appropriate interventions in the flowsheet.  
Outcome: Progressing Towards Goal 
Note: Fall Risk Interventions: 
Mobility Interventions: Communicate number of staff needed for ambulation/transfer, Patient to call before getting OOB, PT Consult for mobility concerns, Strengthening exercises (ROM-active/passive) Mentation Interventions: Adequate sleep, hydration, pain control, Door open when patient unattended, Evaluate medications/consider consulting pharmacy, More frequent rounding, Toileting rounds, Update white board, Room close to nurse's station Medication Interventions: Evaluate medications/consider consulting pharmacy, Patient to call before getting OOB, Teach patient to arise slowly Elimination Interventions: Call light in reach, Patient to call for help with toileting needs, Stay With Me (per policy), Toileting schedule/hourly rounds History of Falls Interventions: Consult care management for discharge planning, Door open when patient unattended, Evaluate medications/consider consulting pharmacy Problem: Pain Goal: *Control of Pain Outcome: Progressing Towards Goal 
  
Problem: Breathing Pattern - Ineffective Goal: *Use of effective breathing techniques Outcome: Progressing Towards Goal

## 2020-04-03 NOTE — PROGRESS NOTES
[address] [facility] Adult  Hospitalist Group Hospitalist Progress Note Nash Ely MD 
Answering service: [phone] OR [phone] from in house phone Date of Service:  4/3/2020 NAME:  Abhijit Robb :  1950 MRN:  [id number] Admission Summary:  
Mr. Vania Rodriguez is a 68-year-old male who presents to the ER with complaints of shortness of breath and blood in his stool.   
Interval history / Subjective:  
Pt seen for FU of CC: Diarrhea, colitis Patient seen ,  He is awake, alert, reports he wants to eat something hids BS is present Reviewed xrays from AM no change in colonic ileus pattern Continues to have multiple BMs, feels hungry and wants to eat. Past swallow eval but GI has cleared only for n.p.o. with ice chips. Afebrile, hemodynamically he is stable. Tolerating Reglan and antibiotics. Assessment & Plan:  
 
1) Colitis, POA Hematochezia with diarrhea line enteric bacterial panel negative, C. difficile negative, infectious versus ischemic versus chemo related. diarrhea - resolving? .  
Diffuse colonic distension consistent with colonic ileus- pt reports passing some gas- he aslo had a small BM last night belly much improved with rectal decompression, continue Reglan. Stool was neg  for C. Difficile, Questran stopped GI consult noted, minimize opiates, rectal decompression PRN, KUB in the morning, n.p.o. with ice chips Cont IV zosyn for bacterial colitis Xarelto was not restarted due to bleeding   
2) oncology- Melanoma- with diffuse mets including to bone per admit CT On tx per Dr Ricarda Zurita- currently on hold due to hypotension and diarrhea Hx of skin cancer and bladder cancer- recent cystoscopy showed an area of concern in his bladder. Plan is for a repeat cystoscopy in 6 weeks per patient   
3) CV- HTN and Hx of A.fib 
S/p ablation- resumed home meds metoprolol Tachycardia much improved, tolerating Toprol 
  
4) Shortness of breath/dyspnea-meeting the criteria for acute hypoxemic resp failure- but of unclear etiology- 
negative viral resp panel and neg for flu CT chest on admission showing no evidence of pneumonia \" patient does have multiple bilateral pulmonary nodules with needs further work-up, likely outpatient. Patient is less dyspneic today, continue nebs, decreased dose of IV steroids, continue oxygen via nasal cannula. 5) anemia- due to chronic dz and acute GI blood loss Hb low- some slow continued lower GI bleeding- monitor HGB 6.8 6) hyperchloremic metabolic acidosis, due to colitis, poor oral intake, normal saline Change fluids to half-normal saline, repeat lab in the morning stop p.o. bicarb 7) hyperglycemia-suspected DM based on HbA1c 
- accuchecks and ss insulin 8) coagulopathy- elevated INR due to xarelto use Xarelto on hold at the moment. < 7 hgb 9)  Acute Moderate Protein-Calorie Malnutrition-  
Patient is currently n.p.o., will continue to eval and hopefully will introduce diet and caloric intake will improve once oral intake is employed Code status: DNR 
DVT prophylaxis: SCDs Care Plan discussed with: Patient/Family Anticipated Disposition: Home w/Family Anticipated Discharge: Greater than 48 hours Hospital Problems  Date Reviewed: 3/19/2020 Codes Class Noted POA Shortness of breath ICD-10-CM: R06.02 
ICD-9-CM: 786.05  3/30/2020 Unknown Diarrhea ICD-10-CM: R19.7 ICD-9-CM: 787.91  3/30/2020 Unknown Melanoma ([facility] ICD-10-CM: C43.9 ICD-9-CM: 172.9  3/30/2020 Unknown Anemia ICD-10-CM: D64.9 ICD-9-CM: 285.9  3/30/2020 Unknown Review of Systems: A comprehensive review of systems was negative except for that written in the HPI. Xr Abd (kub) Result Date: 4/3/2020 IMPRESSION: No significant change in colonic ileus pattern. Xr Abd (kub) Result Date: 3/31/2020 IMPRESSION: Mild diffuse gaseous colonic distention. Ct Chest Wo Cont Result Date: 3/30/2020 IMPRESSION: 1. CT of the chest demonstrates multiple bilateral pulmonary nodules which are new suspicious for metastatic disease. There is hepatic steatosis. There are 2 small low densities in the liver could represent small metastatic lesions. There is a 2.4 cm mesenteric nodule could represent metastatic disease. There is suspicion of extensive bony metastatic disease as described above. There is mild compression of superior endplate of L2. 2. There is mild dilatation of the right, transverse, left and proximal sigmoid colon with mild wall thickening of the proximal sigmoid and distal left colon. There is slight pericolonic haziness distal left colon and proximal sigmoid colon consistent with nonspecific colitis. No free air or drainable fluid collection is identified. Results called to the ER. Ct Abd Pelv Wo Cont Result Date: 3/30/2020 IMPRESSION: 1. CT of the chest demonstrates multiple bilateral pulmonary nodules which are new suspicious for metastatic disease. There is hepatic steatosis. There are 2 small low densities in the liver could represent small metastatic lesions. There is a 2.4 cm mesenteric nodule could represent metastatic disease. There is suspicion of extensive bony metastatic disease as described above. There is mild compression of superior endplate of L2. 2. There is mild dilatation of the right, transverse, left and proximal sigmoid colon with mild wall thickening of the proximal sigmoid and distal left colon. There is slight pericolonic haziness distal left colon and proximal sigmoid colon consistent with nonspecific colitis. No free air or drainable fluid collection is identified. Results called to the ER. Xr Chest [facility] Result Date: 4/3/2020 IMPRESSION: Bibasilar subsegmental atelectasis Xr Chest [facility] Result Date: 4/2/2020 IMPRESSION: 1. Mild left base probable atelectasis. 2. Known small pulmonary nodules/metastases. 3. No change. Xr Chest [facility] Result Date: 3/31/2020 IMPRESSION: Stable elevation of the left hemidiaphragm with left basilar atelectasis, scar or minimal infiltrate stable. .  . Xr Chest [facility] Result Date: 3/30/2020 IMPRESSION: No significant change in the slight elevation left hemidiaphragm with slight left basilar atelectasis. Vital Signs:  
 Last 24hrs VS reviewed since prior progress note. Most recent are: 
Visit Vitals /67 Pulse 100 Temp 98.6 °F (37 °C) Resp 23 Ht 5' 9\" (1.753 m) Wt 83.3 kg (183 lb 11.2 oz) SpO2 100% BMI 27.13 kg/m² Intake/Output Summary (Last 24 hours) at 4/3/2020 1049 Last data filed at 4/3/2020 0401 Gross per 24 hour Intake 1448.75 ml Output 350 ml Net 1098.75 ml Physical Examination:  
 
 
     
Constitutional:  awake, alert, appears to be in mild distress ENT:  Oral mucosa dry. Resp: Decreased breath sounds bilat-no wheezing CV:  tachycardia- no murmur GI:  distended, mild tenderness, tympanic Bowel sounds Musculoskeletal:  No edema, warm, 2+ pulses throughout Neurologic:  Moves all extremities. AAOx3, Psych:  Calm Data Review:  
 Review and/or order of clinical lab test 
Review and/or order of tests in the radiology section of [facility] Review and/or order of tests in the medicine section of [facility] Labs:  
 
Recent Labs 04/03/20 
0451 04/02/20 
6266 WBC 6.7 5.6 HGB 6.8* 7.1*  
HCT 20.9* 21.6*  
PLT 75* 76* Recent Labs 04/03/20 
0451 04/02/20 
6789 04/01/20 
5201  146* 143  
K 4.0 3.9 4.0  
* 114* 113* CO2 19* 21 20* BUN 31* 32* 35* CREA 1.05 1.20 1.35* * 124* 126* CA 6.8* 7.3* 7.5* MG  --   --  2.2 PHOS  --   --  3.2 Recent Labs 04/03/20 
0451 03/31/20 
1729 SGOT 154* 79* ALT 76 37  98 TBILI 1.6* 3.1* TP 4.8* 5.0* ALB 1.4* 1.4*  
 GLOB 3.4 3.6 No results for input(s): INR, PTP, APTT, INREXT, INREXT in the last 72 hours. No results for input(s): FE, TIBC, PSAT, FERR in the last 72 hours. No results found for: FOL, RBCF No results for input(s): PH, PCO2, PO2 in the last 72 hours. Recent Labs 04/01/20 
1602 03/31/20 
1729 CPK 50  --   
CKNDX 2.4  --   
TROIQ <0.05 <0.05 Lab Results Component Value Date/Time Cholesterol, total 162 11/14/2019 09:52 AM  
 HDL Cholesterol 50 11/14/2019 09:52 AM  
 LDL, calculated 67 11/14/2019 09:52 AM  
 Triglyceride 223 (H) 11/14/2019 09:52 AM  
 
Lab Results Component Value Date/Time Glucose (POC) 114 (H) 04/03/2020 08:00 AM  
 Glucose (POC) 113 (H) 04/02/2020 09:27 PM  
 Glucose (POC) 154 (H) 04/02/2020 04:46 PM  
 Glucose (POC) 114 (H) 04/02/2020 11:16 AM  
 Glucose (POC) 108 (H) 04/02/2020 08:16 AM  
 
No results found for: COLOR, APPRN, SPGRU, REFSG, TYRON, PROTU, GLUCU,  [place], BILU, UROU, RAMY, LEUKU, GLUKE, EPSU, BACTU, WBCU, RBCU, CASTS, UCRY Medications Reviewed:  
 
Current Facility-Administered Medications Medication Dose Route Frequency  benzocaine-menthoL (CEPACOL) lozenge 1 Lozenge  1 Lozenge Mucous Membrane PRN  
 0.9% sodium chloride infusion 250 mL  250 mL IntraVENous PRN  
 0.45% sodium chloride infusion  75 mL/hr IntraVENous CONTINUOUS  
 methylPREDNISolone (PF) (Solu-MEDROL) injection 40 mg  40 mg IntraVENous Q12H  
 albuterol-ipratropium (DUO-NEB) 2.5 MG-0.5 MG/3 ML  3 mL Nebulization Q6H PRN  
 metoclopramide HCl (REGLAN) injection 5 mg  5 mg IntraVENous Q6H  
 piperacillin-tazobactam (ZOSYN) 3.375 g in 0.9% sodium chloride (MBP/ADV) 100 mL  3.375 g IntraVENous Q8H  
 simethicone (MYLICON) tablet 80 mg  80 mg Oral QID PRN  
 albuterol (PROVENTIL VENTOLIN) nebulizer solution 2.5 mg  2.5 mg Nebulization TID RT  
 dilTIAZem (CARDIZEM) 125 mg in dextrose 5% 125 mL infusion  0-15 mg/hr IntraVENous TITRATE  sodium chloride (NS) flush 5-40 mL  5-40 mL IntraVENous Q8H  
 sodium chloride (NS) flush 5-40 mL  5-40 mL IntraVENous PRN  
 acetaminophen (TYLENOL) tablet 650 mg  650 mg Oral Q4H PRN  
 oxyCODONE-acetaminophen (PERCOCET) 5-325 mg per tablet 1 Tab  1 Tab Oral Q4H PRN  
 diphenhydrAMINE (BENADRYL) injection 12.5 mg  12.5 mg IntraVENous Q4H PRN  
 ondansetron (ZOFRAN) injection 4 mg  4 mg IntraVENous Q4H PRN  
 levothyroxine (SYNTHROID) tablet 75 mcg  75 mcg Oral 6am  
 primidone (MYSOLINE) tablet 50 mg  50 mg Oral QHS  atorvastatin (LIPITOR) tablet 20 mg  20 mg Oral QHS  tamsulosin (FLOMAX) capsule 0.4 mg  0.4 mg Oral DAILY  temazepam (RESTORIL) capsule 30 mg  30 mg Oral QHS PRN  
 metoprolol succinate (TOPROL-XL) XL tablet 50 mg  50 mg Oral DAILY  glucose chewable tablet 16 g  4 Tab Oral PRN  
 glucagon (GLUCAGEN) injection 1 mg  1 mg IntraMUSCular PRN  
 insulin lispro (HUMALOG) injection   SubCUTAneous AC&HS  morphine injection 2 mg  2 mg IntraVENous Q3H PRN  
 
______________________________________________________________________ EXPECTED LENGTH OF STAY: 5d 4h 
ACTUAL LENGTH OF STAY:          4 Thuy Barnhart MD

## 2020-04-03 NOTE — PROCEDURES
[address] [place]. Bhavik Gibson M.D. 
[address] [place] [address] 
[phone] Flexible Sigmoidoscopy Procedure Note NAME: Eyad Patel :  1950 MRN:  [id number] Indications: Colitis with colonic distention, verify no pseudomembranous colitis : Julius Davila MD 
 
Referring Provider:  Tracee Simons NP Surgical Assistant: none Prosthetic devices, grafts, tissues, transplant, or devices implanted: none Medicines:  None Procedure Details: After informed consent was obtained with all risks and benefits of the procedure explained and preprocedure exam completed, the patient was placed in the left lateral decubitus position. Universal protocol for patient identification was performed and documented in the nursing notes. Throughout the procedure, the patient's blood pressure was monitored at least every five minutes; pulse, and oxygen saturations were monitored continuously. All vital signs were documented in the nursing notes. A digital rectal exam was performed and was normal.  The Olympus videocolonoscope  was inserted in the rectum and carefully advanced to the sigmoid colon. The colonoscope was slowly withdrawn with careful evaluation between folds. Retroflexion in the rectum was NOT performed; findings and interventions are described below. The quality of preparation was adequate for the indication. Findings:  
Rectum: severe erythema, friability, and ulcerations s/p biopsies Sigmoid: severe erythema, friability, and ulcerations s/p biopsies. Air suctioned from the colon upon withdrawal 
 
 
Interventions:  
Biopsies of the colon Specimens:  
ID Type Source Tests Collected by Time Destination 1 : left colon biopsies, r/o ischemia, pseudomembraneous colitis Preservative   Jose Solomon MD 4/3/2020 6089 Pathology EBL:  None. Complications: No immediate complications Impression: Severe colitis-not obvious pseudomembranous colitis Recommendations: 
-AXR now 
-Continue current management 
-Pathology sent STAT 
-Weekend coverage to follow Signed by:  Bridgett Singletary MD 
        4/3/2020  2:10 PM

## 2020-04-03 NOTE — PERIOP NOTES
TRANSFER - OUT REPORT: 
 
Verbal report given to ELROY Gallegos(name) on Gricelda Francis  being transferred to [facility] (unit) for routine progression of care Information from the following report(s) Procedure Summary and Recent Results was reviewed with the receiving nurse. Lines:  
Peripheral IV 03/30/20 Right Antecubital (Active) Site Assessment Clean, dry, & intact 4/3/2020  4:01 AM  
Phlebitis Assessment 0 4/3/2020  4:01 AM  
Infiltration Assessment 0 4/3/2020  4:01 AM  
Dressing Status Clean, dry, & intact 4/3/2020  4:01 AM  
Dressing Type Tape;Transparent 4/3/2020  4:01 AM  
Hub Color/Line Status Pink; Infusing 4/3/2020  4:30 AM  
Action Taken Open ports on tubing capped 4/3/2020  4:01 AM  
Alcohol Cap Used Yes 4/3/2020  4:01 AM  
   
Peripheral IV 03/31/20 Posterior;Right Hand (Active) Site Assessment Clean, dry, & intact 4/3/2020  4:01 AM  
Phlebitis Assessment 0 4/3/2020  4:01 AM  
Infiltration Assessment 0 4/3/2020  4:01 AM  
Dressing Status Clean, dry, & intact 4/3/2020  4:01 AM  
Dressing Type Tape;Transparent 4/3/2020  4:01 AM  
Hub Color/Line Status Pink; Infusing 4/3/2020  4:01 AM  
Action Taken Open ports on tubing capped 4/3/2020  4:01 AM  
Alcohol Cap Used Yes 4/3/2020  4:01 AM  
  
 
Opportunity for questions and clarification was provided. Patient transported with: 
 O2 @ 4 liters

## 2020-04-03 NOTE — PROGRESS NOTES
Problem: Mobility Impaired (Adult and Pediatric) Goal: *Acute Goals and Plan of Care (Insert Text) Description: FUNCTIONAL STATUS PRIOR TO ADMISSION: Patient was modified independent using a single point cane for functional mobility. HOME SUPPORT PRIOR TO ADMISSION: The patient lived with wife and was requiring assistance with ADLs and mobility several days prior to admission due to weakness. Physical Therapy Goals Initiated 4/2/2020 1. Patient will move from supine to sit and sit to supine , scoot up and down and roll side to side in bed with modified independence within 7 day(s). 2.  Patient will transfer from bed to chair and chair to bed with supervision using the least restrictive device within 7 day(s). 3.  Patient will perform sit to stand with supervision/set-up within 7 day(s). 4.  Patient will ambulate with supervision/set-up for 50 feet with the least restrictive device within 7 day(s). 5.  Patient will ascend/descend 2 stairs with bilateral handrail(s) with supervision/set-up within 7 day(s). 4/3/2020 0845 by Henry Rivera PT Outcome: Progressing Towards Goal 
 PHYSICAL THERAPY EVALUATION 
LATE ENTRY Patient: Edda Cerda (53 y.o. male) Date: 4/3/2020 Primary Diagnosis: Diarrhea [R19.7] Shortness of breath [R06.02] Anemia [D64.9] Melanoma ([facility] [address] [C43.9] Precautions: fall ASSESSMENT Based on the objective data described below, the patient presents with decreased activity tolerance, balance deficits, and generalized weakness. Pt reports recent weakness at home prior to admission. Pt agreeable to sit EOB and presented with fair steadiness and intermittent anterior trunk leaning. Pt performed sit<>stand with min A presenting wide DAYANARA. Pt then reports he needed to urinate, pt returned to EOB and obtained urinal. However, pt then needed to have BM and returned pt to supine.   
 
Current Level of Function Impacting Discharge (mobility/balance): min A for transfers Functional Outcome Measure: The patient scored Total: 15/100 on the Barthel Index which is indicative of 85% impaired ability to care for basic self needs/dependency on others. Other factors to consider for discharge: fall risk, decreased activity tolerance, assistance for transfers Patient will benefit from skilled therapy intervention to address the above noted impairments. PLAN : 
Recommendations and Planned Interventions: bed mobility training, transfer training, gait training, therapeutic exercises, neuromuscular re-education, patient and family training/education, and therapeutic activities Frequency/Duration: Patient will be followed by physical therapy:  5 times a week to address goals. Recommendation for discharge: (in order for the patient to meet his/her long term goals) Therapy up to 5 days/week in SNF setting or intensive home health therapy program 
 
This discharge recommendation: 
Has not yet been discussed the attending provider and/or case management IF patient discharges home will need the following DME: rolling walker and wheelchair SUBJECTIVE:  
Patient stated I have been weak.  OBJECTIVE DATA SUMMARY:  
HISTORY:   
Past Medical History:  
Diagnosis Date Abdominal wall mass of right upper quadrant 12/2/2019 Atrial fibrillation ([address] BPH (benign prostatic hyperplasia) Cancer [facility]) early 36s BLADDER Hypercholesterolemia Hypertension Joint replaced 2011  
 right knee Skin cancer Skin disorder 2018 Skin Cancer - melanoma across stomach, lymphnode involvement in Right armpit and Right groin Sun-damaged skin Past Surgical History:  
Procedure Laterality Date HX AFIB ABLATION  2018 HX APPENDECTOMY [facility] HX KNEE REPLACEMENT  2010  
 right knee HX KNEE REPLACEMENT  04/02/2019  
 left knee HX MALIGNANT SKIN LESION EXCISION    
 HX OTHER SURGICAL  12/06/2019 Excisional biopsy of right upper quadrant abdominal wall mass. HX TONSILLECTOMY HX UROLOGICAL CYSTOSCOPY SKIN TISSUE PROCEDURE UNLISTED  2018 Melanoma across abdomen, Right armpit and groin lymphnode involvement Personal factors and/or comorbidities impacting plan of care: PMH Home Situation Home Environment: Private residence # Steps to Enter: 5 Rails to Enter: Yes Hand Rails : Left One/Two Story Residence: Two story # of Interior Steps: 6(x2) Interior Rails: Left Living Alone: No 
Support Systems: Spouse/Significant Other/Partner Patient Expects to be Discharged to[de-identified] Private residence Current DME Used/Available at Home: Cane, straight Tub or Shower Type: Tub/Shower combination EXAMINATION/PRESENTATION/DECISION MAKING:  
Critical Behavior: 
Neurologic State: Alert Orientation Level: Oriented X4 Cognition: Appropriate decision making, Appropriate for age attention/concentration, Appropriate safety awareness, Follows commands Safety/Judgement: Awareness of environment Hearing: Auditory Auditory Impairment: None Skin:  intact Edema: none noted 20 1400 Gross Assessment Gross Assessment Yes AROM Generally decreased, functional  
PROM Generally decreased, functional  
Strength Generally decreased, functional  
 
  
Functional Mobility: 
Bed Mobility: 
  
 20 1400 Bed Mobility Supine to Sit Minimum assistance Sit to Supine Minimum assistance Scooting Minimum assistance 20 1400 Transfers Sit to Stand Minimum assistance;Assist x1;Additional time Stand to Sit Minimum assistance;Assist x1;Additional time 20 1400 Balance Sitting Impaired; Without support Sitting - Static Good (unsupported) Sitting - Dynamic Fair (occasional) Standing Impaired; Without support Standing - Static Fair Standing - Dynamic  Fair Functional Measure: 
Barthel Index: 
 
Bathin Bladder: 0 Bowels: 0 Groomin Dressin Feeding: 10 Mobility: 0 Stairs: 0 Toilet Use: 0 Transfer (Bed to Chair and Back): 5 Total: 15/100 The Barthel ADL Index: Guidelines 1. The index should be used as a record of what a patient does, not as a record of what a patient could do. 2. The main aim is to establish degree of independence from any help, physical or verbal, however minor and for whatever reason. 3. The need for supervision renders the patient not independent. 4. A patient's performance should be established using the best available evidence. Asking the patient, friends/relatives and nurses are the usual sources, but direct observation and common sense are also important. However direct testing is not needed. 5. Usually the patient's performance over the preceding 24-48 hours is important, but occasionally longer periods will be relevant. 6. Middle categories imply that the patient supplies over 50 per cent of the effort. 7. Use of aids to be independent is allowed. Ellis Olvera., Barthel DDorisW. (1467). Functional evaluation: the Barthel Index. [address] [facility] (14)2. Clementine Tatum nemo CHELSI Jeffrey, Ruddy Muñoz., Lyly Pimentels., [place], [address] [place] [address] (). Measuring the change indisability after inpatient rehabilitation; comparison of the responsiveness of the Barthel Index and Functional [place] Measure. Journal of Neurology, Neurosurgery, and Psychiatry, 66(4), 967-891. Tristen Arredondo, N.J.A, ZULY Raymond, & Malina Cardona M.A. (2004.) Assessment of post-stroke quality of life in cost-effectiveness studies: The usefulness of the Barthel Index and the EuroQoL-5D. [facility], 13, 010-23 Physical Therapy Evaluation Charge Determination History Examination Presentation Decision-Making MEDIUM  Complexity : 1-2 comorbidities / personal factors will impact the outcome/ POC  LOW Complexity : 1-2 Standardized tests and measures addressing body structure, function, activity limitation and / or participation in recreation  LOW Complexity : Stable, uncomplicated  LOW Complexity : FOTO score of  Based on the above components, the patient evaluation is determined to be of the following complexity level: LOW Activity Tolerance:  
Fair and requires rest breaks, SpO2 dropped to 88% on 2 L O2 via NC Please refer to the flowsheet for vital signs taken during this treatment. After treatment patient left in no apparent distress:  
Supine in bed, Call bell within reach, and Side rails x 3 
 
COMMUNICATION/EDUCATION:  
The patients plan of care was discussed with: Registered nurse. Fall prevention education was provided and the patient/caregiver indicated understanding., Patient/family have participated as able in goal setting and plan of care. , and Patient/family agree to work toward stated goals and plan of care.  
 
Thank you for this referral. 
Ольга Bowser, PT

## 2020-04-04 NOTE — PROGRESS NOTES
[address] [facility] Adult  Hospitalist Group Hospitalist Progress Note Dmitry Banerjee MD 
Answering service: [phone] OR [phone] from in house phone Date of Service:  2020 NAME:  Dajuan Flaherty :  1950 MRN:  [id number] Admission Summary:  
Mr. Marya Blue is a 64-year-old male who presents to the ER with complaints of shortness of breath and blood in his stool.   
Interval history / Subjective:  
Pt seen for FU of CC: Diarrhea, colitis Patient seen ,  He is awake, alert, s/p flex sig per GI  
abd remains distended and tympanitic Continues to have multiple small BMs, feels hungry and wants to eat. Past swallow eval but GI has cleared only for n.p.o. with ice chips. Afebrile, hemodynamically he is stable. Tolerating Reglan and antibiotics. Per pall care - Call wife who is nervous about pt's condition- asks me several times if he is stable. Did not like the news from oncology earlier this admission- what she says she heard was that pt would not survive this hospital stay. She is requesting a second opinion from Gabriella Bajwa. Assessment & Plan:  
 
1) Colitis, POA Hematochezia with diarrhea line enteric bacterial panel negative, C. difficile negative, infectious versus ischemic versus chemo related. diarrhea - resolving? . S/p flex sig Diffuse colonic distension consistent with colonic ileus- pt reports passing some gas- he aslo had a small BM but without much improved with rectal decompression, continue Reglan. Stool was neg  for C. Difficile, Questran stopped GI consult noted, minimize opiates, rectal decompression PRN,  n.p.o. with ice chips Cont IV zosyn for bacterial colitis Xarelto was not restarted due to bleeding s/p 1 unit BT HGB 7.7  
  
2) oncology- Melanoma- with diffuse mets including to bone per admit CT  
 On tx per Dr Willim Bloch- currently on hold due to hypotension and diarrhea Hx of skin cancer and bladder cancer- recent cystoscopy showed an area of concern in his bladder. Plan is for a repeat cystoscopy in 6 weeks per patient   
3) CV- HTN and Hx of A.fib 
S/p ablation- resumed home meds metoprolol Tachycardia much improved, tolerating Toprol Xarelto on hold at the moment. < 7 hgb S/P 1 UNIT HGB 7.7  
  
4) Shortness of breath/dyspnea-meeting the criteria for acute hypoxemic resp failure- but of unclear etiology- 
negative viral resp panel and neg for flu CT chest on admission showing no evidence of pneumonia \" patient does have multiple bilateral pulmonary nodules with needs further work-up, likely outpatient. continue nebs, decreased dose of IV steroids start taper , continue oxygen via nasal cannula. 5) anemia- due to chronic dz and acute GI blood loss Hb low- some slow continued lower GI bleeding- monitor HGB s/p BT 1 unit 6) hyperchloremic metabolic acidosis, due to colitis, poor oral intake, normal saline Change fluids to half-normal saline, repeat lab in the morning stop p.o. bicarb 7) hyperglycemia-suspected DM based on HbA1c 
- accuchecks and ss insulin 8) coagulopathy- elevated INR due to xarelto use Xarelto was not restarted due to bleeding s/p 1 unit BT HGB 7.7 9)  Acute Moderate Protein-Calorie Malnutrition-  
Patient is currently n.p.o., will continue to eval and hopefully will introduce diet and caloric intake will improve once oral intake is employed Code status: DNR 
DVT prophylaxis: SCDs Care Plan discussed with: Patient/Family Anticipated Disposition: Home w/Family Anticipated Discharge: Greater than 48 hours Hospital Problems  Date Reviewed: 3/19/2020 Codes Class Noted POA Shortness of breath ICD-10-CM: R06.02 
ICD-9-CM: 786.05  3/30/2020 Unknown Diarrhea ICD-10-CM: R19.7 ICD-9-CM: 787.91  3/30/2020 Unknown Melanoma ([facility] [address] ICD-10-CM: C43.9 ICD-9-CM: 172.9  3/30/2020 Unknown Anemia ICD-10-CM: D64.9 ICD-9-CM: 285.9  3/30/2020 Unknown Review of Systems: A comprehensive review of systems was negative except for that written in the HPI. Xr Abd (kub) Result Date: 4/3/2020 IMPRESSION: No significant change in colonic ileus pattern. Xr Abd (kub) Result Date: 3/31/2020 IMPRESSION: Mild diffuse gaseous colonic distention. Xr Abd (ap And Erect Or Decub) Result Date: 4/3/2020 IMPRESSION: 1. No free air post endoscopy. Persistent colonic distention Ct Chest Wo Cont Result Date: 3/30/2020 IMPRESSION: 1. CT of the chest demonstrates multiple bilateral pulmonary nodules which are new suspicious for metastatic disease. There is hepatic steatosis. There are 2 small low densities in the liver could represent small metastatic lesions. There is a 2.4 cm mesenteric nodule could represent metastatic disease. There is suspicion of extensive bony metastatic disease as described above. There is mild compression of superior endplate of L2. 2. There is mild dilatation of the right, transverse, left and proximal sigmoid colon with mild wall thickening of the proximal sigmoid and distal left colon. There is slight pericolonic haziness distal left colon and proximal sigmoid colon consistent with nonspecific colitis. No free air or drainable fluid collection is identified. Results called to the ER. Ct Abd Pelv Wo Cont Result Date: 3/30/2020 IMPRESSION: 1. CT of the chest demonstrates multiple bilateral pulmonary nodules which are new suspicious for metastatic disease. There is hepatic steatosis. There are 2 small low densities in the liver could represent small metastatic lesions. There is a 2.4 cm mesenteric nodule could represent metastatic disease. There is suspicion of extensive bony metastatic disease as described above.  There is mild compression of superior endplate of L2. 2. There is mild dilatation of the right, transverse, left and proximal sigmoid colon with mild wall thickening of the proximal sigmoid and distal left colon. There is slight pericolonic haziness distal left colon and proximal sigmoid colon consistent with nonspecific colitis. No free air or drainable fluid collection is identified. Results called to the ER. Xr Chest [facility] Result Date: 4/3/2020 IMPRESSION: Bibasilar subsegmental atelectasis Xr Chest [facility] Result Date: 4/2/2020 IMPRESSION: 1. Mild left base probable atelectasis. 2. Known small pulmonary nodules/metastases. 3. No change. Xr Chest [facility] Result Date: 3/31/2020 IMPRESSION: Stable elevation of the left hemidiaphragm with left basilar atelectasis, scar or minimal infiltrate stable. .  . Xr Chest [facility] Result Date: 3/30/2020 IMPRESSION: No significant change in the slight elevation left hemidiaphragm with slight left basilar atelectasis. Vital Signs:  
 Last 24hrs VS reviewed since prior progress note. Most recent are: 
Visit Vitals /63 Pulse 96 Temp 98.2 °F (36.8 °C) Resp 18 Ht 5' 9\" (1.753 m) Wt 82.6 kg (182 lb 1.6 oz) SpO2 96% BMI 26.89 kg/m² Intake/Output Summary (Last 24 hours) at 4/4/2020 1025 Last data filed at 4/3/2020 1735 Gross per 24 hour Intake 751.3 ml Output 400 ml Net 351.3 ml Physical Examination:  
 
 
     
Constitutional:  awake, alert, ENT:  Oral mucosa dry. Resp: Decreased breath sounds bilat-no wheezing CV:  tachycardia- no murmur GI:  distended, mild tenderness, tympanic Bowel sounds Musculoskeletal:  No edema, warm, 2+ pulses throughout Neurologic:  Moves all extremities. AAOx3, Psych:  Calm Data Review:  
 Review and/or order of clinical lab test 
Review and/or order of tests in the radiology section of CPT Review and/or order of tests in the medicine section of CPT Labs:  
 
Recent Labs 04/04/20 
0400 04/03/20 
1944 04/03/20 
0451 WBC 9.7  --  6.7 HGB 7.7* 7.1* 6.8* HCT 24.1* 22.2* 20.9*  
PLT 74*  --  75* Recent Labs 04/04/20 
0400 04/03/20 
0451 04/02/20 
0334 04/01/20 
1602  144 146* 143  
K 4.2 4.0 3.9 4.0  
* 117* 114* 113* CO2 21 19* 21 20* BUN 29* 31* 32* 35* CREA 1.12 1.05 1.20 1.35* * 128* 124* 126* CA 7.2* 6.8* 7.3* 7.5* MG  --   --   --  2.2 PHOS  --   --   --  3.2 Recent Labs 04/03/20 
0451 SGOT 154* ALT 76  TBILI 1.6* TP 4.8* ALB 1.4*  
GLOB 3.4 No results for input(s): INR, PTP, APTT, INREXT, INREXT in the last 72 hours. No results for input(s): FE, TIBC, PSAT, FERR in the last 72 hours. No results found for: FOL, RBCF No results for input(s): PH, PCO2, PO2 in the last 72 hours. Recent Labs 04/01/20 
1602 CPK 50 CKNDX 2.4  
TROIQ <0.05 Lab Results Component Value Date/Time Cholesterol, total 162 11/14/2019 09:52 AM  
 HDL Cholesterol 50 11/14/2019 09:52 AM  
 LDL, calculated 67 11/14/2019 09:52 AM  
 Triglyceride 223 (H) 11/14/2019 09:52 AM  
 
Lab Results Component Value Date/Time Glucose (POC) 86 04/04/2020 08:24 AM  
 Glucose (POC) 107 (H) 04/03/2020 09:20 PM  
 Glucose (POC) 101 (H) 04/03/2020 04:53 PM  
 Glucose (POC) 104 (H) 04/03/2020 11:45 AM  
 Glucose (POC) 114 (H) 04/03/2020 08:00 AM  
 
No results found for: COLOR, APPRN, SPGRU, REFSG, TYRON, PROTU, GLUCU, Kristen Grisel, BILU, UROU, RAMY, LEUKU, GLUKE, EPSU, BACTU, WBCU, RBCU, CASTS, UCRY Medications Reviewed:  
 
Current Facility-Administered Medications Medication Dose Route Frequency  benzocaine-menthoL (CEPACOL) lozenge 1 Lozenge  1 Lozenge Mucous Membrane PRN  
 0.9% sodium chloride infusion 250 mL  250 mL IntraVENous PRN  
 lidocaine 4 % patch 1 Patch  1 Patch TransDERmal Q24H  
 sodium chloride (NS) flush 5-40 mL  5-40 mL IntraVENous Q8H  
 sodium chloride (NS) flush 5-40 mL  5-40 mL IntraVENous PRN  
  0.45% sodium chloride infusion  75 mL/hr IntraVENous CONTINUOUS  
 methylPREDNISolone (PF) (Solu-MEDROL) injection 40 mg  40 mg IntraVENous Q12H  
 albuterol-ipratropium (DUO-NEB) 2.5 MG-0.5 MG/3 ML  3 mL Nebulization Q6H PRN  
 metoclopramide HCl (REGLAN) injection 5 mg  5 mg IntraVENous Q6H  
 piperacillin-tazobactam (ZOSYN) 3.375 g in 0.9% sodium chloride (MBP/ADV) 100 mL  3.375 g IntraVENous Q8H  
 simethicone (MYLICON) tablet 80 mg  80 mg Oral QID PRN  
 albuterol (PROVENTIL VENTOLIN) nebulizer solution 2.5 mg  2.5 mg Nebulization TID RT  
 dilTIAZem (CARDIZEM) 125 mg in dextrose 5% 125 mL infusion  0-15 mg/hr IntraVENous TITRATE  sodium chloride (NS) flush 5-40 mL  5-40 mL IntraVENous Q8H  
 sodium chloride (NS) flush 5-40 mL  5-40 mL IntraVENous PRN  
 acetaminophen (TYLENOL) tablet 650 mg  650 mg Oral Q4H PRN  
 oxyCODONE-acetaminophen (PERCOCET) 5-325 mg per tablet 1 Tab  1 Tab Oral Q4H PRN  
 diphenhydrAMINE (BENADRYL) injection 12.5 mg  12.5 mg IntraVENous Q4H PRN  
 ondansetron (ZOFRAN) injection 4 mg  4 mg IntraVENous Q4H PRN  
 levothyroxine (SYNTHROID) tablet 75 mcg  75 mcg Oral 6am  
 primidone (MYSOLINE) tablet 50 mg  50 mg Oral QHS  atorvastatin (LIPITOR) tablet 20 mg  20 mg Oral QHS  tamsulosin (FLOMAX) capsule 0.4 mg  0.4 mg Oral DAILY  temazepam (RESTORIL) capsule 30 mg  30 mg Oral QHS PRN  
 metoprolol succinate (TOPROL-XL) XL tablet 50 mg  50 mg Oral DAILY  glucose chewable tablet 16 g  4 Tab Oral PRN  
 glucagon (GLUCAGEN) injection 1 mg  1 mg IntraMUSCular PRN  
 insulin lispro (HUMALOG) injection   SubCUTAneous AC&HS  morphine injection 2 mg  2 mg IntraVENous Q3H PRN  
 
______________________________________________________________________ EXPECTED LENGTH OF STAY: 5d 4h 
ACTUAL LENGTH OF STAY:          5 Kinza Mauro MD

## 2020-04-04 NOTE — PROGRESS NOTES
Problem: Falls - Risk of 
Goal: *Absence of Falls Description: Document Jose C Freed Fall Risk and appropriate interventions in the flowsheet. Outcome: Progressing Towards Goal 
Note: Fall Risk Interventions: 
Mobility Interventions: Communicate number of staff needed for ambulation/transfer, OT consult for ADLs, Patient to call before getting OOB, PT Consult for mobility concerns, PT Consult for assist device competence, Strengthening exercises (ROM-active/passive) Mentation Interventions: Adequate sleep, hydration, pain control, Evaluate medications/consider consulting pharmacy, Eyeglasses and hearing aids, Familiar objects from home, More frequent rounding, Room close to nurse's station, Toileting rounds, Update white board Medication Interventions: Evaluate medications/consider consulting pharmacy, Patient to call before getting OOB, Teach patient to arise slowly Elimination Interventions: Call light in reach, Patient to call for help with toileting needs, Stay With Me (per policy), Toilet paper/wipes in reach, Toileting schedule/hourly rounds, Urinal in reach History of Falls Interventions: Room close to nurse's station, Assess for delayed presentation/identification of injury for 48 hrs (comment for end date), Vital signs minimum Q4HRs X 24 hrs (comment for end date) Problem: Patient Education: Go to Patient Education Activity Goal: Patient/Family Education Outcome: Progressing Towards Goal 
  
Problem: Breathing Pattern - Ineffective Goal: *Use of effective breathing techniques Outcome: Progressing Towards Goal 
 Pt currently on 1L/NC. Breath sounds clear bilaterally. Last 3 Recorded Weights in this Encounter 04/03/20 3329 04/03/20 1326 04/04/20 0321 Weight: 83.3 kg (183 lb 11.2 oz) 83 kg (183 lb) 82.6 kg (182 lb 1.6 oz) Bedside shift change report given to Bolivar Dunbar (oncoming nurse) by Maralee Litten (offgoing nurse).  Report included the following information SBAR, Kardex, ED Summary, Intake/Output, MAR, Accordion, Recent Results, Med Rec Status, Cardiac Rhythm NSR, Alarm Parameters  and Quality Measures.

## 2020-04-04 NOTE — PROGRESS NOTES
[address] 
[address] [facility] [address], [address] 
[phone] GI PROGRESS NOTE 
 
 
NAME:   Maura Schmitt :    1950 MRN:    [id number] Assessment/Plan · Colitis: diarrhea with hematochezia: KUB shows persistent gaseous colonic small bowel distention. Correct electrolytes · Shortness of breath: CT chest (3/30/20): multiple bilateral pulmonary nodules which are new suspicious for metastatic disease · Melanoma: followed by oncology. · History of hypertension and atrial fibrillation: on Xarelto prior to admission.   
 
Patient Active Problem List  
Diagnosis Code  Essential hypertension I10  
 Hyperlipidemia E78.5  Atrial fibrillation (HCC) I48.91  
 History of skin cancer Y21.027  BMI 27.0-27.9,adult Z68.27  
 History of bladder cancer Z85.51  Abdominal wall mass of right upper quadrant R19.01  
 Malignant melanoma of torso excluding breast (HCC) C43.59  
 Primary osteoarthritis of left knee M17.12  
 Hypothyroidism due to medication E03.2  Hypercalcemia E83.52  
 Dehydration E86.0  Shortness of breath R06.02  
 Diarrhea R19.7  Melanoma ([facility] [address] C43.9  Anemia D64.9 Subjective: Maura Schmitt is a 68 y.o.  male who still feels weak and has persistent abdominal distention. X-ray KUB shows persistent dilation of the transverse colon and small bowel loops. Had small bowel movement and is passing little flatus. No fever or chills Review of Systems Constitutional: negative fever, negative chills, negative weight loss Eyes:   negative visual changes ENT:   negative sore throat, tongue or lip swelling Respiratory:  negative cough, negative dyspnea Cards:  negative for chest pain, palpitations, lower extremity edema GI:   See HPI 
:  negative for frequency, dysuria Integument:  negative for rash and pruritus Heme:  negative for easy bruising and gum/nose bleeding Musculoskel: negative for myalgias,  back pain and muscle weakness Neuro: negative for headaches, dizziness, vertigo Psych:  negative for feelings of anxiety, depression Objective: VITALS:  
Last 24hrs VS reviewed since prior hospitalist progress note. Most recent are: 
Visit Vitals /83 (BP 1 Location: Left arm, BP Patient Position: At rest) Pulse 87 Temp 97.8 °F (36.6 °C) Resp 21 Ht 5' 9\" (1.753 m) Wt 82.6 kg (182 lb 1.6 oz) SpO2 94% BMI 26.89 kg/m² Intake/Output Summary (Last 24 hours) at 4/4/2020 1328 Last data filed at 4/3/2020 1735 Gross per 24 hour Intake 751.3 ml Output 200 ml Net 551.3 ml PHYSICAL EXAM: 
General   well developed, well nourished, appears stated age, in no acute distress EENT  Normocephalic, Atraumatic, PERRLA, EOMI, sclera clear, nares clear, pharynx normal 
Neck   Supple without nodes or mass. No thyromegaly or bruit Respiratory   Clear To Auscultation bilaterally - no wheezes, rales, rhonchi, or crackles Cardiology  Regular Rate and Rythmn  - no murmurs, rubs or gallops Abdominal  Soft, non-tender, non-distended, positive bowel sounds, no hepatosplenomegaly, no palpable mass Extremities  No clubbing, cyanosis, or edema. Pulses intact. Back  No spinal or muscle pain. No CVAT. Skin  Normal skin turgor. No rashes or skin ulcers noted Neurological  No focal neurological deficits noted Psychological  Oriented x 3. Normal affect. Lab Data Recent Results (from the past 12 hour(s)) CBC WITH AUTOMATED DIFF Collection Time: 04/04/20  4:00 AM  
Result Value Ref Range WBC 9.7 4.1 - 11.1 K/uL  
 RBC 2.57 (L) 4.10 - 5.70 M/uL HGB 7.7 (L) 12.1 - 17.0 g/dL HCT 24.1 (L) 36.6 - 50.3 % MCV 93.8 80.0 - 99.0 FL  
 MCH 30.0 26.0 - 34.0 PG  
 MCHC 32.0 30.0 - 36.5 g/dL  
 RDW 14.6 (H) 11.5 - 14.5 % PLATELET 74 (L) 721 - 400 K/uL MPV 11.6 8.9 - 12.9 FL  
 NRBC 2.5 (H) 0  WBC ABSOLUTE NRBC 0.24 (H) 0.00 - 0.01 K/uL NEUTROPHILS 61 32 - 75 % BAND NEUTROPHILS 9 (H) 0 - 6 % LYMPHOCYTES 19 12 - 49 % MONOCYTES 6 5 - 13 % EOSINOPHILS 1 0 - 7 % BASOPHILS 1 0 - 1 % METAMYELOCYTES 2 (H) 0 % MYELOCYTES 1 (H) 0 % IMMATURE GRANULOCYTES 0 %  
 ABS. NEUTROPHILS 6.8 1.8 - 8.0 K/UL  
 ABS. LYMPHOCYTES 1.8 0.8 - 3.5 K/UL  
 ABS. MONOCYTES 0.6 0.0 - 1.0 K/UL  
 ABS. EOSINOPHILS 0.1 0.0 - 0.4 K/UL  
 ABS. BASOPHILS 0.1 0.0 - 0.1 K/UL  
 ABS. IMM. GRANS. 0.0 K/UL  
 DF MANUAL    
 RBC COMMENTS MACROCYTOSIS 1+ 
    
 RBC COMMENTS POLYCHROMASIA 1+ METABOLIC PANEL, BASIC Collection Time: 04/04/20  4:00 AM  
Result Value Ref Range Sodium 141 136 - 145 mmol/L Potassium 4.2 3.5 - 5.1 mmol/L Chloride 114 (H) 97 - 108 mmol/L  
 CO2 21 21 - 32 mmol/L Anion gap 6 5 - 15 mmol/L Glucose 119 (H) 65 - 100 mg/dL BUN 29 (H) 6 - 20 MG/DL Creatinine 1.12 0.70 - 1.30 MG/DL  
 BUN/Creatinine ratio 26 (H) 12 - 20 GFR est AA >60 >60 ml/min/1.73m2 GFR est non-AA >60 >60 ml/min/1.73m2 Calcium 7.2 (L) 8.5 - 10.1 MG/DL  
GLUCOSE, POC Collection Time: 04/04/20  8:24 AM  
Result Value Ref Range Glucose (POC) 86 65 - 100 mg/dL Performed by Dinora MENDOZA   
GLUCOSE, POC Collection Time: 04/04/20 11:37 AM  
Result Value Ref Range Glucose (POC) 107 (H) 65 - 100 mg/dL Performed by Marlon To Medications: Reviewed PMH/ reviewed - no change compared to H&P Attending Physician: Ap Dodson MD  
Date/Time:  4/4/2020

## 2020-04-04 NOTE — PROGRESS NOTES
Problem: Pressure Injury - Risk of 
Goal: *Prevention of pressure injury Description: Document Carlos Scale and appropriate interventions in the flowsheet. Outcome: Progressing Towards Goal 
Note: Pressure Injury Interventions: 
Sensory Interventions: Assess changes in LOC, Keep linens dry and wrinkle-free, Minimize linen layers Moisture Interventions: Absorbent underpads, Assess need for specialty bed, Internal/External urinary devices, Maintain skin hydration (lotion/cream), Minimize layers Activity Interventions: Assess need for specialty bed, PT/OT evaluation, Pressure redistribution bed/mattress(bed type) Mobility Interventions: HOB 30 degrees or less, Assess need for specialty bed, PT/OT evaluation Nutrition Interventions: Document food/fluid/supplement intake, Offer support with meals,snacks and hydration Friction and Shear Interventions: Minimize layers, HOB 30 degrees or less Problem: Patient Education: Go to Patient Education Activity Goal: Patient/Family Education Outcome: Progressing Towards Goal 
  
Problem: Falls - Risk of 
Goal: *Absence of Falls Description: Document Weinbergtara Barger Fall Risk and appropriate interventions in the flowsheet. Outcome: Progressing Towards Goal 
Note: Fall Risk Interventions: 
Mobility Interventions: Communicate number of staff needed for ambulation/transfer, OT consult for ADLs, Patient to call before getting OOB, PT Consult for mobility concerns, PT Consult for assist device competence, Strengthening exercises (ROM-active/passive) Mentation Interventions: Adequate sleep, hydration, pain control, Evaluate medications/consider consulting pharmacy, Eyeglasses and hearing aids, Familiar objects from home, More frequent rounding, Room close to nurse's station, Toileting rounds, Update white board Medication Interventions: Evaluate medications/consider consulting pharmacy, Patient to call before getting OOB, Teach patient to arise slowly Elimination Interventions: Call light in reach, Patient to call for help with toileting needs, Stay With Me (per policy), Toilet paper/wipes in reach, Toileting schedule/hourly rounds, Urinal in reach History of Falls Interventions: Room close to nurse's station, Assess for delayed presentation/identification of injury for 48 hrs (comment for end date), Vital signs minimum Q4HRs X 24 hrs (comment for end date) Problem: Patient Education: Go to Patient Education Activity Goal: Patient/Family Education Outcome: Progressing Towards Goal 
  
Problem: Pain Goal: *Control of Pain Outcome: Progressing Towards Goal 
  
Problem: Diarrhea (Adult and Pediatrics) Goal: *Absence of diarrhea Outcome: Progressing Towards Goal 
  
Problem: Diabetes Self-Management Goal: *Disease process and treatment process Description: Define diabetes and identify own type of diabetes; list 3 options for treating diabetes. Outcome: Progressing Towards Goal 
Goal: *Incorporating nutritional management into lifestyle Description: Describe effect of type, amount and timing of food on blood glucose; list 3 methods for planning meals. Outcome: Progressing Towards Goal 
Goal: *Incorporating physical activity into lifestyle Description: State effect of exercise on blood glucose levels. Outcome: Progressing Towards Goal 
Goal: *Developing strategies to promote health/change behavior Description: Define the ABC's of diabetes; identify appropriate screenings, schedule and personal plan for screenings. Outcome: Progressing Towards Goal 
Goal: *Using medications safely Description: State effect of diabetes medications on diabetes; name diabetes medication taking, action and side effects. Outcome: Progressing Towards Goal 
Goal: *Monitoring blood glucose, interpreting and using results Description: Identify recommended blood glucose targets  and personal targets.  
Outcome: Progressing Towards Goal 
 Goal: *Prevention, detection, treatment of acute complications Description: List symptoms of hyper- and hypoglycemia; describe how to treat low blood sugar and actions for lowering  high blood glucose level. Outcome: Progressing Towards Goal 
Goal: *Prevention, detection and treatment of chronic complications Description: Define the natural course of diabetes and describe the relationship of blood glucose levels to long term complications of diabetes. Outcome: Progressing Towards Goal 
Goal: *Developing strategies to address psychosocial issues Description: Describe feelings about living with diabetes; identify support needed and support network Outcome: Progressing Towards Goal 
Goal: *Insulin pump training Outcome: Progressing Towards Goal 
Goal: *Sick day guidelines Outcome: Progressing Towards Goal 
Goal: *Patient Specific Goal (EDIT GOAL, INSERT TEXT) Outcome: Progressing Towards Goal

## 2020-04-04 NOTE — PROGRESS NOTES
Bedside and Verbal shift change report given to Maia Barry RN (oncoming nurse) by Jonas Wagner RN (offgoing nurse).  Report included the following information SBAR, Kardex, Intake/Output, MAR, Recent Results, Med Rec Status and Cardiac Rhythm SR.

## 2020-04-05 NOTE — PROGRESS NOTES
Bedside shift change report given to Maureen Gore (oncoming nurse) by Florecita Ortiz (offgoing nurse). Report included the following information SBAR and Kardex.

## 2020-04-05 NOTE — PROGRESS NOTES
Bedside shift change report given to Carmina Mazariegos (oncoming nurse) by [facility] (offgoing nurse). Report included the following information SBAR, MAR, Recent Results, Med Rec Status and Cardiac Rhythm NSR. Problem: Breathing Pattern - Ineffective Goal: *Use of effective breathing techniques Outcome: Progressing Towards Goal 
Note- pt on 1L NC. O2 sats within defined limits Problem: Pain Goal: Control of Pain Outcome: Progressing Towards Goal 
Note- pain controlled w/ PRN morphine, oxycodone

## 2020-04-05 NOTE — PROGRESS NOTES
Oncology second opinion completed MD spoke with Wife and they are interested in home hospice, case management consulted

## 2020-04-05 NOTE — CONSULTS
Hematology/Oncology Consultation Note Date of Service: 4/5/2020 Patient Name: Johanna Bingham 
Admitting Physician: Hospitalist service Reason for Consultation: Metastatic melanoma 2nd Opinion History of Present Illness: 
 
Mr. Alesia Jett is a 68y/o gentleman with history of squamous cell carcinoma of the skin, basal cell carcinoma, bladder cancer, and more recently, recurrence of metastatic melanoma, who presented to [facility] on 3/30/2020 with BRBPR and abdominal pain. -patient most recently treated with nivolumab/ipilumumab under Dr. Marisol Watkins 
-CT scan of chest, abdomen, pelvis on 3/30/2020 revealed bilateral pulmonary nodules, meseteric nodule, new hypodense lesions in the liver, and extensive osseous disease.  
-during admission, patient has rapidly declined with worsening fatigue and SOB. -GI service also on the case. -Patient remains on zosyn and high dose steroids in case patient's condition is secondary to immunotherapy-related colitis. Currently, patient reports that he is not able to transfer from chair without assistance. He is SOB at rest.  He endorses significant waxing/waning abdominal and back pain. Denies any fevers, chills, nausea, vomiting. BRBPR has resolved. Brief Oncologic History (as described in Dr. Liana Kearney 3/30/2020 Note): · Bladder cancer dx in early 1990s · BCC, left neck, s/p Electrodesiccation and Curettage, 06/07/12 · SCCi, right arm, s/p Electrodesiccation and Curettage, 12/2014 · Recurrent BCC, left lateral neck, Mohs, 02/24/15 · M Melanoma, mid abdomen, Breslow depth 0.65 mm, wide excision by Dr. Oc Bateman, negative right axillary and right inguinal SLN biopsies, 12/20/17 · Recurrent melanoma 12/2019, 
· CAT CAP and MRI head negative 12/19 · STRATA ? No BRAF mutation ? CDKN2A deep deletion ? NRAS p.Q61R 56% · Opdivo 480 q 28 x 12; cycle 1 1/7/20, cycle 2 2/4/20 · Recurrence at surgical site 2/28/20 · Plan to add Ipilimumab -3/6/20 Ipi 3 mg/kg and opdivo 1 mg/kg q 3 weeks x 4 then opdivo ROS: 
10 point ROS conducted and otherwise negative. Past Medical History: 
Past Medical History:  
Diagnosis Date  Abdominal wall mass of right upper quadrant 12/2/2019  Atrial fibrillation ([address]  BPH (benign prostatic hyperplasia)  Cancer ([address] early 36s BLADDER  
 Hypercholesterolemia  Hypertension  Joint replaced 2011  
 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 
Past Surgical History:  
Procedure Laterality Date 2021 Danyel Mariano Angelina N/A 4/3/2020 SIGMOIDOSCOPY FLEXIBLE performed by Jose Solomon MD at [facility] ENDOSCOPY  
 HX AFIB ABLATION  2018  HX APPENDECTOMY [address]  HX KNEE REPLACEMENT  2010  
 right knee  HX KNEE REPLACEMENT  04/02/2019  
 left knee  HX MALIGNANT SKIN LESION EXCISION    
 HX OTHER SURGICAL  12/06/2019 Excisional biopsy of right upper quadrant abdominal wall mass.  HX TONSILLECTOMY  HX UROLOGICAL CYSTOSCOPY  SKIN TISSUE PROCEDURE UNLISTED  2018 Melanoma across abdomen, Right armpit and groin lymphnode involvement Allergies: Allergies Allergen Reactions  Demerol [Meperidine] Other (comments) \"passed out\" Medications: 
Current Facility-Administered Medications Medication Dose Route Frequency  methylPREDNISolone (PF) (Solu-MEDROL) injection 20 mg  20 mg IntraVENous Q12H  
 benzocaine-menthoL (CEPACOL) lozenge 1 Lozenge  1 Lozenge Mucous Membrane PRN  
 0.9% sodium chloride infusion 250 mL  250 mL IntraVENous PRN  
 lidocaine 4 % patch 1 Patch  1 Patch TransDERmal Q24H  
 sodium chloride (NS) flush 5-40 mL  5-40 mL IntraVENous Q8H  
 sodium chloride (NS) flush 5-40 mL  5-40 mL IntraVENous PRN  
  0.45% sodium chloride infusion  75 mL/hr IntraVENous CONTINUOUS  
 albuterol-ipratropium (DUO-NEB) 2.5 MG-0.5 MG/3 ML  3 mL Nebulization Q6H PRN  
 metoclopramide HCl (REGLAN) injection 5 mg  5 mg IntraVENous Q6H  
 piperacillin-tazobactam (ZOSYN) 3.375 g in 0.9% sodium chloride (MBP/ADV) 100 mL  3.375 g IntraVENous Q8H  
 simethicone (MYLICON) tablet 80 mg  80 mg Oral QID PRN  
 albuterol (PROVENTIL VENTOLIN) nebulizer solution 2.5 mg  2.5 mg Nebulization TID RT  
 sodium chloride (NS) flush 5-40 mL  5-40 mL IntraVENous Q8H  
 sodium chloride (NS) flush 5-40 mL  5-40 mL IntraVENous PRN  
 acetaminophen (TYLENOL) tablet 650 mg  650 mg Oral Q4H PRN  
 oxyCODONE-acetaminophen (PERCOCET) 5-325 mg per tablet 1 Tab  1 Tab Oral Q4H PRN  
 diphenhydrAMINE (BENADRYL) injection 12.5 mg  12.5 mg IntraVENous Q4H PRN  
 ondansetron (ZOFRAN) injection 4 mg  4 mg IntraVENous Q4H PRN  
 levothyroxine (SYNTHROID) tablet 75 mcg  75 mcg Oral 6am  
 primidone (MYSOLINE) tablet 50 mg  50 mg Oral QHS  atorvastatin (LIPITOR) tablet 20 mg  20 mg Oral QHS  tamsulosin (FLOMAX) capsule 0.4 mg  0.4 mg Oral DAILY  temazepam (RESTORIL) capsule 30 mg  30 mg Oral QHS PRN  
 metoprolol succinate (TOPROL-XL) XL tablet 50 mg  50 mg Oral DAILY  glucose chewable tablet 16 g  4 Tab Oral PRN  
 glucagon (GLUCAGEN) injection 1 mg  1 mg IntraMUSCular PRN  
 insulin lispro (HUMALOG) injection   SubCUTAneous AC&HS  morphine injection 2 mg  2 mg IntraVENous Q3H PRN Family History: 
Family History Problem Relation Age of Onset  Dementia Mother  Hypertension Father  Hypertension Sister Social History: 
Social History Socioeconomic History  Marital status:  Spouse name: Not on file  Number of children: Not on file  Years of education: Not on file  Highest education level: Not on file Occupational History  Not on file Social Needs  Financial resource strain: Not on file  Food insecurity Worry: Not on file Inability: Not on file  Transportation needs Medical: Not on file Non-medical: Not on file Tobacco Use  Smoking status: Former Smoker Packs/day: 2.00 Years: 20.00 Pack years: 40.00 Last attempt to quit: 1988 Years since quittin.2  Smokeless tobacco: Never Used Substance and Sexual Activity  Alcohol use: Yes Alcohol/week: 2.0 standard drinks Types: 2 Glasses of wine per week Comment: 1 glass with dinner 2 nights per week  Drug use: No  
 Sexual activity: Not on file Lifestyle  Physical activity Days per week: Not on file Minutes per session: Not on file  Stress: Not on file Relationships  Social connections Talks on phone: Not on file Gets together: Not on file Attends Anabaptism service: Not on file Active member of club or organization: Not on file Attends meetings of clubs or organizations: Not on file Relationship status: Not on file  Intimate partner violence Fear of current or ex partner: Not on file Emotionally abused: Not on file Physically abused: Not on file Forced sexual activity: Not on file Other Topics Concern  Not on file Social History Narrative  Not on file Objective: 
 
Vital Signs: 
Visit Vitals /71 (BP 1 Location: Left arm, BP Patient Position: At rest) Pulse 77 Temp 98.4 °F (36.9 °C) Resp 16 Ht 5' 9\" (1.753 m) Wt 80 kg (176 lb 5.9 oz) SpO2 94% BMI 26.05 kg/m² I/O: 
 
Intake/Output Summary (Last 24 hours) at 2020 1217 Last data filed at 2020 0330 Gross per 24 hour Intake 4161.25 ml Output  Net 4161.25 ml  
 
 
ECOG PS: 3-4 Physical Examination GEN: elderly  man, slightly dyspneic HEENT: NCAT, EOMI Neck: soft, supple, trachea midline PULM: reduced air entry at bases BL. No wheezes or rales CARDS: RRR, S1S2+, no MRG 
ABD: soft, NT/ND, BS+ EXT: no LE edema NEURO: A/Ox3, CNII-XII grossly intact PSYCH: appropriate mood/affect. Laboratory/Pathology Data: 
Recent Results (from the past 24 hour(s)) GLUCOSE, POC Collection Time: 04/04/20 11:37 AM  
Result Value Ref Range Glucose (POC) 107 (H) 65 - 100 mg/dL Performed by Abdi MENDOZA   
GLUCOSE, POC Collection Time: 04/04/20  4:40 PM  
Result Value Ref Range Glucose (POC) 112 (H) 65 - 100 mg/dL Performed by Therese Llanos GLUCOSE, POC Collection Time: 04/04/20  9:03 PM  
Result Value Ref Range Glucose (POC) 114 (H) 65 - 100 mg/dL Performed by Halie Zabala CBC WITH AUTOMATED DIFF Collection Time: 04/05/20  4:17 AM  
Result Value Ref Range WBC 10.3 4.1 - 11.1 K/uL  
 RBC 2.49 (L) 4.10 - 5.70 M/uL HGB 7.5 (L) 12.1 - 17.0 g/dL HCT 23.5 (L) 36.6 - 50.3 % MCV 94.4 80.0 - 99.0 FL  
 MCH 30.1 26.0 - 34.0 PG  
 MCHC 31.9 30.0 - 36.5 g/dL  
 RDW 14.5 11.5 - 14.5 % PLATELET 87 (L) 100 - 400 K/uL MPV 11.8 8.9 - 12.9 FL  
 NRBC 2.1 (H) 0  WBC ABSOLUTE NRBC 0.22 (H) 0.00 - 0.01 K/uL NEUTROPHILS 58 32 - 75 % BAND NEUTROPHILS 10 (H) 0 - 6 % LYMPHOCYTES 21 12 - 49 % MONOCYTES 3 (L) 5 - 13 % EOSINOPHILS 0 0 - 7 % BASOPHILS 0 0 - 1 % METAMYELOCYTES 4 (H) 0 % MYELOCYTES 4 (H) 0 % IMMATURE GRANULOCYTES 0 %  
 ABS. NEUTROPHILS 7.0 1.8 - 8.0 K/UL  
 ABS. LYMPHOCYTES 2.2 0.8 - 3.5 K/UL  
 ABS. MONOCYTES 0.3 0.0 - 1.0 K/UL  
 ABS. EOSINOPHILS 0.0 0.0 - 0.4 K/UL  
 ABS. BASOPHILS 0.0 0.0 - 0.1 K/UL  
 ABS. IMM. GRANS. 0.0 K/UL  
 DF MANUAL    
 RBC COMMENTS ANISOCYTOSIS 1+ 
    
 RBC COMMENTS MACROCYTOSIS 1+ 
    
 RBC COMMENTS OVALOCYTES PRESENT 
    
 RBC COMMENTS MARIOLA CELLS 
PRESENT 
    
METABOLIC PANEL, BASIC Collection Time: 04/05/20  4:17 AM  
Result Value Ref Range Sodium 138 136 - 145 mmol/L Potassium 4.1 3.5 - 5.1 mmol/L  Chloride 112 (H) 97 - 108 mmol/L  
 CO2 19 (L) 21 - 32 mmol/L Anion gap 7 5 - 15 mmol/L Glucose 101 (H) 65 - 100 mg/dL BUN 24 (H) 6 - 20 MG/DL Creatinine 0.90 0.70 - 1.30 MG/DL  
 BUN/Creatinine ratio 27 (H) 12 - 20 GFR est AA >60 >60 ml/min/1.73m2 GFR est non-AA >60 >60 ml/min/1.73m2 Calcium 6.9 (L) 8.5 - 10.1 MG/DL  
GLUCOSE, POC Collection Time: 04/05/20  7:46 AM  
Result Value Ref Range Glucose (POC) 83 65 - 100 mg/dL Performed by Corina SpaphaFreebeepay P Radiographic Date: Xr Abd Flat/ Erect Result Date: 4/4/2020 IMPRESSION: 1. No definite free air identified. 2. There is persistent moderate distention of the transverse colon and mild distention of small bowel loops similar to the prior study. Assessment/Plan: 
Mr. Arsen Burroughs is a 66y/o gentleman with progressive metastatic melanoma. Metastatic Melanoma: 
-CT imaging on 3/30/2020 consistent with diffuse metastatic disease 
-patient s/p nivolumab/ipilumumab most recently. -melanoma reported to be BRAF negative 
-only options for treatment for this patient are immunotherapy, however there is now clear progression.   
-additionally, patient's PS status is poor. 
-even if PS was excellent, additional therapy likely limited to clinical trial only. -but in the setting of such poor PS, further treatment is not possible 
-I discussed this with patient and a transition to hospice/comfort measures is reasonable.  
-discussed in detail with wife by phone, and both she and patient are amenable to meet with home hospice SW tomorrow. -RN will place consult and coordinate timing.  
-palliative care on board Colitis: 
-could be secondary to immunotherapy 
-typically a c-scope would be considered to prove this, but patient not fit for this 
-agree with continuing high dose steroids for now. We will continue to follow. Please call with any questions. Edis Jaeger MD 
4/5/2020

## 2020-04-05 NOTE — PROGRESS NOTES
[address] [facility]  Hospitalist Group Hospitalist Progress Note Peggy Tejada MD 
Answering service: [phone] OR [phone] from in house phone Date of Service:  2020 NAME:  Kaet Estrella :  1950 MRN:  [id number] Admission Summary:  
Mr. Karrie King is a 77-year-old male who presents to the ER with complaints of shortness of breath and blood in his stool.   
 
Interval history / Subjective:  
Pt seen for FU of CC: Diarrhea, colitis Patient seen ,  He is awake, alert, s/p flex sig per GI  abd remains distended and tympanitic Continues to have multiple small BMs, feels hungry and wants to eat. Past swallow eval but GI has cleared only for n.p.o. with ice chips. Afebrile, hemodynamically he is stable. Tolerating Reglan and antibiotics. Per pall care - Call wife who is nervous about pt's condition- asks me several times if he is stable. Did not like the news from oncology earlier this admission- what she says she heard was that pt would not survive this hospital stay. She is requesting a second opinion from Gabriella Bajwa. Assessment & Plan:  
 
1) Colitis, POA Hematochezia with diarrhea line enteric bacterial panel negative, C. difficile negative, infectious versus ischemic versus chemo related. diarrhea - resolving? . S/p flex sig Diffuse colonic distension consistent with colonic ileus- pt reports passing some gas- he aslo had a small BM but without much improved with rectal decompression, continue Reglan. Stool was neg  for C. Difficile, Questran stopped GI consult noted, minimize opiates, rectal decompression PRN,  n.p.o. with ice chips Cont IV zosyn for bacterial colitis Xarelto was not restarted due to bleeding s/p 1 unit BT HGB 7.7  
  
2) Oncology- Melanoma 
- with diffuse mets including to bone per admit CT  
 On tx per Dr Chelsea Schirmer- currently on hold due to hypotension and diarrhea 
- second opinion ? Hx of skin cancer and bladder cancer 
- recent cystoscopy showed an area of concern in his bladder. Plan is for a repeat cystoscopy in 6 weeks per patient pt said he cant hold urine now a days   
3) CV- HTN and Hx of A.fib 
S/p ablation- resumed home meds metoprolol Tachycardia much improved, tolerating Toprol Xarelto on hold at the moment. < 7 hgb S/P 1 UNIT HGB 7.7  
  
4) Shortness of breath/dyspnea-meeting the criteria for acute hypoxemic resp failure- but of unclear etiology- 
negative viral resp panel and neg for flu CT chest on admission showing no evidence of pneumonia \" patient does have multiple bilateral pulmonary nodules with needs further work-up, likely outpatient. continue nebs, decreased dose of IV steroids start taper , continue oxygen via nasal cannula. 5) Anemia- due to chronic dz and acute GI blood loss Hb low- some slow continued lower GI bleeding- monitor HGB s/p BT 1 unit 6) hyperchloremic metabolic acidosis, due to colitis, poor oral intake, normal saline Change fluids to half-normal saline, repeat lab in the morning stop p.o. bicarb 7) hyperglycemia-suspected DM based on HbA1c 
- accuchecks and ss insulin 8) coagulopathy- elevated INR due to xarelto use Xarelto was not restarted due to bleeding s/p 1 unit BT HGB 7.7 9)  Acute Moderate Protein-Calorie Malnutrition-  
Patient is currently n.p.o., will continue to eval and hopefully will introduce diet and caloric intake will improve once oral intake is employed Code status: DNR 
DVT prophylaxis: SCDs Care Plan discussed with: Patient/Family Anticipated Disposition: Home w/Family Anticipated Discharge: Greater than 48 hours Hospital Problems  Date Reviewed: 3/19/2020 Codes Class Noted POA Shortness of breath ICD-10-CM: R06.02 
ICD-9-CM: 786.05  3/30/2020 Unknown Diarrhea ICD-10-CM: R19.7 ICD-9-CM: 787.91  3/30/2020 Unknown Melanoma ([facility] ICD-10-CM: C43.9 ICD-9-CM: 172.9  3/30/2020 Unknown Anemia ICD-10-CM: D64.9 ICD-9-CM: 285.9  3/30/2020 Unknown Review of Systems: A comprehensive review of systems was negative except for that written in the HPI. Xr Abd (kub) Result Date: 4/3/2020 IMPRESSION: No significant change in colonic ileus pattern. Xr Abd (kub) Result Date: 3/31/2020 IMPRESSION: Mild diffuse gaseous colonic distention. Xr Abd (ap And Erect Or Decub) Result Date: 4/3/2020 IMPRESSION: 1. No free air post endoscopy. Persistent colonic distention Xr Abd Flat/ Erect Result Date: 4/4/2020 IMPRESSION: 1. No definite free air identified. 2. There is persistent moderate distention of the transverse colon and mild distention of small bowel loops similar to the prior study. Ct Chest Wo Cont Result Date: 3/30/2020 IMPRESSION: 1. CT of the chest demonstrates multiple bilateral pulmonary nodules which are new suspicious for metastatic disease. There is hepatic steatosis. There are 2 small low densities in the liver could represent small metastatic lesions. There is a 2.4 cm mesenteric nodule could represent metastatic disease. There is suspicion of extensive bony metastatic disease as described above. There is mild compression of superior endplate of L2. 2. There is mild dilatation of the right, transverse, left and proximal sigmoid colon with mild wall thickening of the proximal sigmoid and distal left colon. There is slight pericolonic haziness distal left colon and proximal sigmoid colon consistent with nonspecific colitis. No free air or drainable fluid collection is identified. Results called to the ER. Ct Abd Pelv Wo Cont Result Date: 3/30/2020 IMPRESSION: 1. CT of the chest demonstrates multiple bilateral pulmonary nodules which are new suspicious for metastatic disease.  There is hepatic steatosis. There are 2 small low densities in the liver could represent small metastatic lesions. There is a 2.4 cm mesenteric nodule could represent metastatic disease. There is suspicion of extensive bony metastatic disease as described above. There is mild compression of superior endplate of L2. 2. There is mild dilatation of the right, transverse, left and proximal sigmoid colon with mild wall thickening of the proximal sigmoid and distal left colon. There is slight pericolonic haziness distal left colon and proximal sigmoid colon consistent with nonspecific colitis. No free air or drainable fluid collection is identified. Results called to the ER. Xr Chest [facility] Result Date: 4/3/2020 IMPRESSION: Bibasilar subsegmental atelectasis Xr [facility] Result Date: 4/2/2020 IMPRESSION: 1. Mild left base probable atelectasis. 2. Known small pulmonary nodules/metastases. 3. No change. Xr [facility] Result Date: 3/31/2020 IMPRESSION: Stable elevation of the left hemidiaphragm with left basilar atelectasis, scar or minimal infiltrate stable. .  . Xr Chest [facility] Result Date: 3/30/2020 IMPRESSION: No significant change in the slight elevation left hemidiaphragm with slight left basilar atelectasis. Vital Signs:  
 Last 24hrs VS reviewed since prior progress note. Most recent are: 
Visit Vitals /71 (BP 1 Location: Left arm, BP Patient Position: At rest) Pulse 77 Temp 98.4 °F (36.9 °C) Resp 16 Ht 5' 9\" (1.753 m) Wt 80 kg (176 lb 5.9 oz) SpO2 94% BMI 26.05 kg/m² Intake/Output Summary (Last 24 hours) at 4/5/2020 2108 Last data filed at 4/5/2020 0330 Gross per 24 hour Intake 4161.25 ml Output  Net 4161.25 ml Physical Examination:  
 
 
     
Constitutional:  awake, alert, ENT:  Oral mucosa dry. Resp: Decreased breath sounds bilat-no wheezing CV:  tachycardia- no murmur GI:  distended, mild tenderness, tympanic Bowel sounds Musculoskeletal:  No edema, warm, 2+ pulses throughout Neurologic:  Moves all extremities. AAOx3, Psych:  Calm Data Review:  
 Review and/or order of clinical lab test 
Review and/or order of tests in the radiology section of CPT Review and/or order of tests in the medicine section of CPT Labs:  
 
Recent Labs 04/05/20 0417 04/04/20 
0400 WBC 10.3 9.7 HGB 7.5* 7.7* HCT 23.5* 24.1*  
PLT 87* 74* Recent Labs 04/05/20 0417 04/04/20 
0400 04/03/20 
0451  141 144  
K 4.1 4.2 4.0  
* 114* 117* CO2 19* 21 19* BUN 24* 29* 31* CREA 0.90 1.12 1.05  
* 119* 128* CA 6.9* 7.2* 6.8* Recent Labs 04/03/20 
0451 SGOT 154* ALT 76  TBILI 1.6* TP 4.8* ALB 1.4*  
GLOB 3.4 No results for input(s): INR, PTP, APTT, INREXT, INREXT in the last 72 hours. No results for input(s): FE, TIBC, PSAT, FERR in the last 72 hours. No results found for: FOL, RBCF No results for input(s): PH, PCO2, PO2 in the last 72 hours. No results for input(s): CPK, CKNDX, TROIQ in the last 72 hours. No lab exists for component: CPKMB Lab Results Component Value Date/Time Cholesterol, total 162 11/14/2019 09:52 AM  
 HDL Cholesterol 50 11/14/2019 09:52 AM  
 LDL, calculated 67 11/14/2019 09:52 AM  
 Triglyceride 223 (H) 11/14/2019 09:52 AM  
 
Lab Results Component Value Date/Time Glucose (POC) 83 04/05/2020 07:46 AM  
 Glucose (POC) 114 (H) 04/04/2020 09:03 PM  
 Glucose (POC) 112 (H) 04/04/2020 04:40 PM  
 Glucose (POC) 107 (H) 04/04/2020 11:37 AM  
 Glucose (POC) 86 04/04/2020 08:24 AM  
 
No results found for: COLOR, APPRN, SPGRU, REFSG, TYRON, PROTU, GLUCU, [place], BILU, UROU, RAMY, LEUKU, GLUKE, EPSU, BACTU, WBCU, RBCU, CASTS, UCRY Medications Reviewed:  
 
Current Facility-Administered Medications Medication Dose Route Frequency  methylPREDNISolone (PF) (Solu-MEDROL) injection 20 mg  20 mg IntraVENous Q12H  benzocaine-menthoL (CEPACOL) lozenge 1 Lozenge  1 Lozenge Mucous Membrane PRN  
 0.9% sodium chloride infusion 250 mL  250 mL IntraVENous PRN  
 lidocaine 4 % patch 1 Patch  1 Patch TransDERmal Q24H  
 sodium chloride (NS) flush 5-40 mL  5-40 mL IntraVENous Q8H  
 sodium chloride (NS) flush 5-40 mL  5-40 mL IntraVENous PRN  
 0.45% sodium chloride infusion  75 mL/hr IntraVENous CONTINUOUS  
 albuterol-ipratropium (DUO-NEB) 2.5 MG-0.5 MG/3 ML  3 mL Nebulization Q6H PRN  
 metoclopramide HCl (REGLAN) injection 5 mg  5 mg IntraVENous Q6H  
 piperacillin-tazobactam (ZOSYN) 3.375 g in 0.9% sodium chloride (MBP/ADV) 100 mL  3.375 g IntraVENous Q8H  
 simethicone (MYLICON) tablet 80 mg  80 mg Oral QID PRN  
 albuterol (PROVENTIL VENTOLIN) nebulizer solution 2.5 mg  2.5 mg Nebulization TID RT  
 sodium chloride (NS) flush 5-40 mL  5-40 mL IntraVENous Q8H  
 sodium chloride (NS) flush 5-40 mL  5-40 mL IntraVENous PRN  
 acetaminophen (TYLENOL) tablet 650 mg  650 mg Oral Q4H PRN  
 oxyCODONE-acetaminophen (PERCOCET) 5-325 mg per tablet 1 Tab  1 Tab Oral Q4H PRN  
 diphenhydrAMINE (BENADRYL) injection 12.5 mg  12.5 mg IntraVENous Q4H PRN  
 ondansetron (ZOFRAN) injection 4 mg  4 mg IntraVENous Q4H PRN  
 levothyroxine (SYNTHROID) tablet 75 mcg  75 mcg Oral 6am  
 primidone (MYSOLINE) tablet 50 mg  50 mg Oral QHS  atorvastatin (LIPITOR) tablet 20 mg  20 mg Oral QHS  tamsulosin (FLOMAX) capsule 0.4 mg  0.4 mg Oral DAILY  temazepam (RESTORIL) capsule 30 mg  30 mg Oral QHS PRN  
 metoprolol succinate (TOPROL-XL) XL tablet 50 mg  50 mg Oral DAILY  glucose chewable tablet 16 g  4 Tab Oral PRN  
 glucagon (GLUCAGEN) injection 1 mg  1 mg IntraMUSCular PRN  
 insulin lispro (HUMALOG) injection   SubCUTAneous AC&HS  morphine injection 2 mg  2 mg IntraVENous Q3H PRN  
 
______________________________________________________________________ EXPECTED LENGTH OF STAY: 5d 4h 
 ACTUAL LENGTH OF STAY:          6 Cheri Freeman MD

## 2020-04-05 NOTE — PROGRESS NOTES
TRANSITION OF CARE Referred to [employer] re: home hospice. CM noted consult for hospice.  CM spoke with Albert Mitchell on call liaison, who acknowledged the referral and will notify [facility] on site liaison to start work on this referral. CM sent referral via [employer] to [employer] to facilitate the administrative processing of this referral.

## 2020-04-06 NOTE — PROGRESS NOTES
Spoke with wife Josette Mcmillan and gave her updates. She is anxious about 's condition. Patient states he wants to go home. Patient has pain in his back, medications changed to oral morphine. He has tolerated it well, states it does help pain. Will continue to monitor. Problem: Falls - Risk of 
Goal: *Absence of Falls Description: Document Clydene Bone Fall Risk and appropriate interventions in the flowsheet. Outcome: Progressing Towards Goal 
Note: Fall Risk Interventions: 
Mobility Interventions: Patient to call before getting OOB Mentation Interventions: Adequate sleep, hydration, pain control, Evaluate medications/consider consulting pharmacy, Reorient patient Medication Interventions: Evaluate medications/consider consulting pharmacy, Patient to call before getting OOB Elimination Interventions: Call light in reach, Patient to call for help with toileting needs, Toileting schedule/hourly rounds History of Falls Interventions: Door open when patient unattended, Evaluate medications/consider consulting pharmacy, Investigate reason for fall, Room close to nurse's station Problem: Pain Goal: *Control of Pain Outcome: Progressing Towards Goal 
Patient is having back pain. He refused lidocaine patch. Given oxycodone, IV morphine. Oral morphine added and given when patient requested.  
   
 
Bedside and Verbal shift change report given to Kaushik (oncoming nurse) by Lalo Salgado (offgoing nurse). Report included the following information SBAR, Kardex, and Quality Measures.

## 2020-04-06 NOTE — PROGRESS NOTES
Hematology/Oncology Consultation Note Date of Service: 4/5/2020 Patient Name: Marina Gitelman 
Admitting Physician: Hospitalist service Reason for Consultation: Metastatic melanoma 2nd Opinion History of Present Illness: 
 
Mr. Pradip Denton is a 66y/o gentleman with history of squamous cell carcinoma of the skin, basal cell carcinoma, bladder cancer, and more recently, recurrence of metastatic melanoma, who presented to [facility] on 3/30/2020 with BRBPR and abdominal pain. -patient most recently treated with nivolumab/ipilumumab under Dr. Holly Toledo 
-CT scan of chest, abdomen, pelvis on 3/30/2020 revealed bilateral pulmonary nodules, meseteric nodule, new hypodense lesions in the liver, and extensive osseous disease.  
-during admission, patient has rapidly declined with worsening fatigue and SOB. -GI service also on the case. -Patient remains on zosyn and high dose steroids in case patient's condition is secondary to immunotherapy-related colitis. Currently, patient reports that he is miserable, having significant pain,, wants to go home Brief Oncologic History (as described in Dr. Monica Blake 3/30/2020 Note): · Bladder cancer dx in early 1990s · BCC, left neck, s/p Electrodesiccation and Curettage, 06/07/12 · SCCi, right arm, s/p Electrodesiccation and Curettage, 12/2014 · Recurrent BCC, left lateral neck, Mohs, 02/24/15 · M Melanoma, mid abdomen, Breslow depth 0.65 mm, wide excision by Dr. Karla Streeter, negative right axillary and right inguinal SLN biopsies, 12/20/17 · Recurrent melanoma 12/2019, 
· CAT CAP and MRI head negative 12/19 · STRATA ? No BRAF mutation ? CDKN2A deep deletion ? NRAS p.Q61R 56% · Opdivo 480 q 28 x 12; cycle 1 1/7/20, cycle 2 2/4/20 · Recurrence at surgical site 2/28/20 · Plan to add Ipilimumab -3/6/20 Ipi 3 mg/kg and opdivo 1 mg/kg q 3 weeks x 4 then opdivo ROS: 
10 point ROS conducted and otherwise negative. Past Medical History: 
Past Medical History: Diagnosis Date  Abdominal wall mass of right upper quadrant 12/2/2019  Atrial fibrillation ([address]  BPH (benign prostatic hyperplasia)  Cancer ([address] early 36s BLADDER  
 Hypercholesterolemia  Hypertension  Joint replaced 2011  
 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 
Past Surgical History:  
Procedure Laterality Date 2021 Danyel Alfaro N/A 4/3/2020 SIGMOIDOSCOPY FLEXIBLE performed by Rach Mc MD at [facility] ENDOSCOPY  
 HX AFIB ABLATION  2018  HX [address]EN[address]CTOMY [address]  HX KNEE REPLACEMENT  2010  
 right knee  HX KNEE REPLACEMENT  04/02/2019  
 left knee  HX MALIGNANT SKIN LESION EXCISION    
 HX OTHER SURGICAL  12/06/2019 Excisional biopsy of right upper quadrant abdominal wall mass.  HX TONSILLECTOMY  HX UROLOGICAL CYSTOSCOPY  SKIN TISSUE PROCEDURE UNLISTED  2018 Melanoma across abdomen, Right armpit and groin lymphnode involvement Allergies: Allergies Allergen Reactions  Demerol [Meperidine] Other (comments) \"passed out\" Medications: 
Current Facility-Administered Medications Medication Dose Route Frequency  morphine (ROXANOL) 100 mg/5 mL (20 mg/mL) concentrated solution 10 mg  10 mg Oral Q2H PRN  
 methylPREDNISolone (PF) (Solu-MEDROL) injection 20 mg  20 mg IntraVENous Q12H  
 benzocaine-menthoL (CEPACOL) lozenge 1 Lozenge  1 Lozenge Mucous Membrane PRN  
 lidocaine 4 % patch 1 Patch  1 Patch TransDERmal Q24H  
 sodium chloride (NS) flush 5-40 mL  5-40 mL IntraVENous Q8H  
 sodium chloride (NS) flush 5-40 mL  5-40 mL IntraVENous PRN  
 0.45% sodium chloride infusion  75 mL/hr IntraVENous CONTINUOUS  
 albuterol-ipratropium (DUO-NEB) 2.5 MG-0.5 MG/3 ML  3 mL Nebulization Q6H PRN  
  metoclopramide HCl (REGLAN) injection 5 mg  5 mg IntraVENous Q6H  
 piperacillin-tazobactam (ZOSYN) 3.375 g in 0.9% sodium chloride (MBP/ADV) 100 mL  3.375 g IntraVENous Q8H  
 simethicone (MYLICON) tablet 80 mg  80 mg Oral QID PRN  
 albuterol (PROVENTIL VENTOLIN) nebulizer solution 2.5 mg  2.5 mg Nebulization TID RT  
 sodium chloride (NS) flush 5-40 mL  5-40 mL IntraVENous Q8H  
 sodium chloride (NS) flush 5-40 mL  5-40 mL IntraVENous PRN  
 acetaminophen (TYLENOL) tablet 650 mg  650 mg Oral Q4H PRN  
 diphenhydrAMINE (BENADRYL) injection 12.5 mg  12.5 mg IntraVENous Q4H PRN  
 ondansetron (ZOFRAN) injection 4 mg  4 mg IntraVENous Q4H PRN  
 levothyroxine (SYNTHROID) tablet 75 mcg  75 mcg Oral 6am  
 primidone (MYSOLINE) tablet 50 mg  50 mg Oral QHS  atorvastatin (LIPITOR) tablet 20 mg  20 mg Oral QHS  tamsulosin (FLOMAX) capsule 0.4 mg  0.4 mg Oral DAILY  temazepam (RESTORIL) capsule 30 mg  30 mg Oral QHS PRN  
 metoprolol succinate (TOPROL-XL) XL tablet 50 mg  50 mg Oral DAILY  glucose chewable tablet 16 g  4 Tab Oral PRN  
 glucagon (GLUCAGEN) injection 1 mg  1 mg IntraMUSCular PRN  
 insulin lispro (HUMALOG) injection   SubCUTAneous AC&HS  morphine injection 2 mg  2 mg IntraVENous Q3H PRN Family History: 
Family History Problem Relation Age of Onset  Dementia Mother  Hypertension Father  Hypertension Sister Social History: 
Social History Socioeconomic History  Marital status:  Spouse name: Not on file  Number of children: Not on file  Years of education: Not on file  Highest education level: Not on file Occupational History  Not on file Social Needs  Financial resource strain: Not on file  Food insecurity Worry: Not on file Inability: Not on file  Transportation needs Medical: Not on file Non-medical: Not on file Tobacco Use  Smoking status: Former Smoker   Packs/day: 2.00  
 Years: 20.00 Pack years: 40.00 Last attempt to quit: 1988 Years since quittin.2  Smokeless tobacco: Never Used Substance and Sexual Activity  Alcohol use: Yes Alcohol/week: 2.0 standard drinks Types: 2 Glasses of wine per week Comment: 1 glass with dinner 2 nights per week  Drug use: No  
 Sexual activity: Not on file Lifestyle  Physical activity Days per week: Not on file Minutes per session: Not on file  Stress: Not on file Relationships  Social connections Talks on phone: Not on file Gets together: Not on file Attends Baptism service: Not on file Active member of club or organization: Not on file Attends meetings of clubs or organizations: Not on file Relationship status: Not on file  Intimate partner violence Fear of current or ex partner: Not on file Emotionally abused: Not on file Physically abused: Not on file Forced sexual activity: Not on file Other Topics Concern  Not on file Social History Narrative  Not on file Objective: 
 
Vital Signs: 
Visit Vitals /66 (BP Patient Position: At rest) Pulse (!) 103 Temp 99.5 °F (37.5 °C) Resp 26 Ht 5' 9\" (1.753 m) Wt 82.7 kg (182 lb 4.8 oz) SpO2 91% BMI 26.92 kg/m² I/O: 
 
Intake/Output Summary (Last 24 hours) at 2020 1433 Last data filed at 2020 1228 Gross per 24 hour Intake 2319 ml Output  Net 2319 ml  
 
 
ECOG PS: 3-4 Physical Examination GEN: elderly  man, slightly dyspneic HEENT: NCAT, EOMI Neck: soft, supple, trachea midline ABD: distended EXT: no LE edema NEURO: A/Ox3, CNII-XII grossly intact PSYCH: appropriate mood/affect. Laboratory/Pathology Data: 
Recent Results (from the past 24 hour(s)) GLUCOSE, POC Collection Time: 20  4:47 PM  
Result Value Ref Range Glucose (POC) 99 65 - 100 mg/dL Performed by Charan LOONEY) GLUCOSE, POC  
 Collection Time: 04/05/20 10:39 PM  
Result Value Ref Range Glucose (POC) 91 65 - 100 mg/dL Performed by Sarahi Turner GLUCOSE, POC Collection Time: 04/06/20  7:53 AM  
Result Value Ref Range Glucose (POC) 80 65 - 100 mg/dL Performed by Jackie Vasquez. GLUCOSE, POC Collection Time: 04/06/20 11:14 AM  
Result Value Ref Range Glucose (POC) 92 65 - 100 mg/dL Performed by Jackie Vasquez.   
 
 
Radiographic Date: No results found. Assessment/Plan: 
Mr. Alisha Rudolph is a 66y/o gentleman with progressive metastatic melanoma. Metastatic Melanoma: 
-CT imaging on 3/30/2020 consistent with diffuse metastatic disease 
-patient s/p nivolumab/ipilumumab most recently. -melanoma reported to be BRAF negative 
-only options for treatment for this patient are immunotherapy, however there is now clear progression.   
-additionally, patient's PS status is poor. 
-even if PS was excellent, additional therapy likely limited to clinical trial only. -but in the setting of such poor PS, further treatment is not possible 
-I discussed this with patient and a transition to hospice/comfort measures is reasonable.  
-discussed in detail with wife by phone, and both she and patient are amenable to meet with home hospice SW tomorrow. -RN will place consult and coordinate timing.  
-palliative care on board Colitis: 
-could be secondary to immunotherapy 
-typically a c-scope would be considered to prove this, but patient not fit for this Disposition. .. pt has met with hospice, he wants to go home, says he understands we'll have to stop abx. ... d/w nursing PAIN MGMT>>>>. currently on roxanol q 2hours prn,,, has pt had MS contin in past?  Or fentanyl patch? He could benefit from extended release narcotic We will continue to follow. Please call with any questions. Sacha Devi MD 
4/6/2020

## 2020-04-06 NOTE — PROGRESS NOTES
Bedside shift change report given to Ellis Samuels (oncoming nurse) by Korey Argueta (offgoing nurse). Report included the following information SBAR, Intake/Output, MAR, Recent Results, Med Rec Status and Cardiac Rhythm NSR. Problem: Breathing Pattern - Ineffective Goal: Use of effective breathing techniques Outcome: Progressing Towards Goal  
Note- O2 sats within defined limits on room air. Dyspneic w exertion 1- pt refuses any medication/maintenance IV. Requests to remove IV, electrodes and pulse oximeter. Nurse ensures pt that it is best to keep these on for the time being, but he does not have to receive any medications he does not want

## 2020-04-06 NOTE — PROGRESS NOTES
Palliative Medicine Consult [place]: [phone] ([phone]) Patient Name: Kate Estrella YOB: 1950 Date of Initial Consult: 4/3/20 Reason for Consult: Care decisions Requesting Provider:  
Primary Care Physician: Candance Michaelis, NP 
 
 SUMMARY:  
Kate Estrella is a 68 y.o. with a past history of bladder cancer, basal and squamous cell carcinoma and melanoma first dx 2017 s/p excision, recurrent 12/2019 w/ excision by Dr Steph Castillo and recurrence in same area of abdomen s/p repeat excision 2/2020 on [place] and Opdivo w/ Dr Christina Platt who was admitted on 3/30/2020 from home w/ bloody stool (5-6 a day) and cough. With colitis possibly from onc tx vs ischemic and CT showing new pulm mets. C diff neg. GI involved, rectal decompression for ileus, today s/p flex sig w/ colitis, path sent. 4/6/20- Appreciate [facility] giving second opinion. Recommendation is for hospice. Current medical issues leading to Palliative Medicine involvement include: care decisions. Social: to [place], 2nd marriage for both. 4 bio kids, 10 grandkids, 3 stepkids, 1 step grandkid. 1 child lives in [place], rest of [facility]; retired [employer], worked as . Wife has 3 children, none local.  
 
 PALLIATIVE DIAGNOSES:  
1. Abdominal distension, ileus 2. Weakness 3. Lower back pain 4. Goals of care PLAN:  
1. Pt has declined over weekend and is distressed he is not home. 2. Appreciate second opinion from [facility]- recommendation is also for hospice. 3. Pt understands and unfortunately interventions/events that have occurred this hospital stay have not made him better. At this time wants to focus on sx management even w/ side effects (eg have been avoiding opioids due to ileus, but pain in back and abdomen so significant will allow them more often.) 4. Hard to swallow pills - changing Percocet to morphine concentrate 10mg SL every 2h prn. 5. Spoke w/ hospice ELROY Segura- try to get home pt as soon as we can. Will need DDNR. 6. Communicated plan of care with: Palliative O[facility] Team incl ELROY Li GOALS OF CARE / TREATMENT PREFERENCES:  
 
GOALS OF CARE: 
Patient/Health Care Proxy Stated Goals: Other (comment)(Ilieus to resolve) TREATMENT PREFERENCES:  
Code Status: DNR Advance Care Planning: 
[x] The [facility] Interdisciplinary Team has updated the ACP Navigator with [place] and Patient Capacity Primary Decision Maker: Cecilia Alfaro - Spouse - [phone] Advance Care Planning 4/2/2020 Patient's Healthcare Decision Maker is: Named in scanned ACP document Confirm Advance Directive Yes, on file Does the patient have other document types Organ Directive Medical Interventions: Limited additional interventions Other: As far as possible, the palliative care team has discussed with patient / health care proxy about goals of care / treatment preferences for patient. HISTORY:  
 
 
 
CHIEF COMPLAINT: \"I feel terrible and wants to go home\" HPI/SUBJECTIVE: The patient is:  
[x] Verbal and participatory [] Non-participatory due to:  
 
Pt w/ abdominal and back pain. Cannot swallow pills easily. Feels terrible and wants to go home w/ hospice. Clinical Pain Assessment (nonverbal scale for severity on nonverbal patients):  
Clinical Pain Assessment Severity: 8 Location: lower back and tight abdomen Character: aching Duration: chronic Effect: decr mobility Factors: worse w/ lying in bed this hospital stay Frequency: constant Activity (Movement): Lying quietly, normal position Duration: for how long has pt been experiencing pain (e.g., 2 days, 1 month, years) Frequency: how often pain is an issue (e.g., several times per day, once every few days, constant) FUNCTIONAL ASSESSMENT:  
 
Palliative Performance Scale (PPS): PPS: 30 
 
 
 PSYCHOSOCIAL/SPIRITUAL SCREENING:  
 
Palliative IDT has assessed this patient for cultural preferences / practices and a referral made as appropriate to needs (Cultural Services, Patient Advocacy, Ethics, etc.) Any spiritual / Samaritan concerns: 
[] Yes /  [x] No 
 
Caregiver Burnout: 
[] Yes /  [x] No /  [] No Caregiver Present Anticipatory grief assessment:  
[x] Normal  / [] Maladaptive ESAS Anxiety: Anxiety: 8 ESAS Depression: Depression: 0 REVIEW OF SYSTEMS:  
 
Positive and pertinent negative findings in ROS are noted above in HPI. The following systems were [x] reviewed / [] unable to be reviewed as noted in HPI Other findings are noted below. Systems: constitutional, ears/nose/mouth/throat, respiratory, gastrointestinal, genitourinary, musculoskeletal, integumentary, neurologic, psychiatric, endocrine. Positive findings noted below. Modified ESAS Completed by: provider Fatigue: 5 Drowsiness: 0 Depression: 0 Pain: 8 Anxiety: 8 Nausea: 2 Anorexia: 10 Dyspnea: 5 Constipation: Yes Stool Occurrence(s): 1 PHYSICAL EXAM:  
 
From RN flowsheet: 
Wt Readings from Last 3 Encounters:  
04/06/20 182 lb 4.8 oz (82.7 kg) 03/16/20 181 lb (82.1 kg) 03/13/20 184 lb 14.4 oz (83.9 kg) Blood pressure 142/57, pulse 93, temperature 98.2 °F (36.8 °C), resp. rate 18, height 5' 9\" (1.753 m), weight 182 lb 4.8 oz (82.7 kg), SpO2 98 %. Pain Scale 1: Numeric (0 - 10) Pain Intensity 1: 9 Pain Onset 1: acute Pain Location 1: Back, Neck Pain Orientation 1: Lower Pain Description 1: Aching Pain Intervention(s) 1: Medication (see MAR) Last bowel movement, if known:  
 
Constitutional: awake, alert, oriented, mild distress Eyes: pupils equal, anicteric ENMT: no nasal discharge, dry mucous membranes Cardiovascular: regular rhythm Respiratory: breathing min labored Gastrointestinal: hypoactive bowel sounds, distended, painful Musculoskeletal: no deformity, no tenderness to palpation Skin: warm, dry Neurologic: following commands, moving all extremities Psychiatric: anxious HISTORY:  
 
Active Problems: 
  Shortness of breath (3/30/2020) Diarrhea (3/30/2020) Melanoma ([address] (3/30/2020) Anemia (3/30/2020) Past Medical History:  
Diagnosis Date  Abdominal wall mass of right upper quadrant 2019  Atrial fibrillation ([address]  BPH (benign prostatic hyperplasia)  Cancer ([address] early 36s BLADDER  
 Hypercholesterolemia  Hypertension  Joint replaced   
 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  
Procedure Laterality Date  Danyel Alfaro N/A 4/3/2020 SIGMOIDOSCOPY FLEXIBLE performed by Renetta Thompson MD at [facility] ENDOSCOPY  
 HX AFIB ABLATION  2018  HX [address]CTOMY [address]  HX KNEE REPLACEMENT    
 right knee  HX KNEE REPLACEMENT  2019  
 left knee  HX MALIGNANT SKIN LESION EXCISION    
 HX OTHER SURGICAL  2019 Excisional biopsy of right upper quadrant abdominal wall mass.  HX TONSILLECTOMY  HX UROLOGICAL CYSTOSCOPY  SKIN TISSUE PROCEDURE UNLISTED   Melanoma across abdomen, Right armpit and groin lymphnode involvement Family History Problem Relation Age of Onset  Dementia Mother  Hypertension Father  Hypertension Sister History reviewed, no pertinent family history. Social History Tobacco Use  Smoking status: Former Smoker Packs/day: 2.00 Years: 20.00 Pack years: 40.00 Last attempt to quit: 1988 Years since quittin.2  Smokeless tobacco: Never Used Substance Use Topics  Alcohol use: Yes Alcohol/week: 2.0 standard drinks Types: 2 Glasses of wine per week Comment: 1 glass with dinner 2 nights per week Allergies Allergen Reactions  Demerol [Meperidine] Other (comments) \"passed out\" Current Facility-Administered Medications Medication Dose Route Frequency  morphine (ROXANOL) 100 mg/5 mL (20 mg/mL) concentrated solution 10 mg  10 mg Oral Q2H PRN  
 methylPREDNISolone (PF) (Solu-MEDROL) injection 20 mg  20 mg IntraVENous Q12H  
 benzocaine-menthoL (CEPACOL) lozenge 1 Lozenge  1 Lozenge Mucous Membrane PRN  
 lidocaine 4 % patch 1 Patch  1 Patch TransDERmal Q24H  
 sodium chloride (NS) flush 5-40 mL  5-40 mL IntraVENous Q8H  
 sodium chloride (NS) flush 5-40 mL  5-40 mL IntraVENous PRN  
 0.45% sodium chloride infusion  75 mL/hr IntraVENous CONTINUOUS  
 albuterol-ipratropium (DUO-NEB) 2.5 MG-0.5 MG/3 ML  3 mL Nebulization Q6H PRN  
 metoclopramide HCl (REGLAN) injection 5 mg  5 mg IntraVENous Q6H  
 piperacillin-tazobactam (ZOSYN) 3.375 g in 0.9% sodium chloride (MBP/ADV) 100 mL  3.375 g IntraVENous Q8H  
 simethicone (MYLICON) tablet 80 mg  80 mg Oral QID PRN  
 albuterol (PROVENTIL VENTOLIN) nebulizer solution 2.5 mg  2.5 mg Nebulization TID RT  
 sodium chloride (NS) flush 5-40 mL  5-40 mL IntraVENous Q8H  
 sodium chloride (NS) flush 5-40 mL  5-40 mL IntraVENous PRN  
 acetaminophen (TYLENOL) tablet 650 mg  650 mg Oral Q4H PRN  
 diphenhydrAMINE (BENADRYL) injection 12.5 mg  12.5 mg IntraVENous Q4H PRN  
 ondansetron (ZOFRAN) injection 4 mg  4 mg IntraVENous Q4H PRN  
 levothyroxine (SYNTHROID) tablet 75 mcg  75 mcg Oral 6am  
 primidone (MYSOLINE) tablet 50 mg  50 mg Oral QHS  atorvastatin (LIPITOR) tablet 20 mg  20 mg Oral QHS  tamsulosin (FLOMAX) capsule 0.4 mg  0.4 mg Oral DAILY  temazepam (RESTORIL) capsule 30 mg  30 mg Oral QHS PRN  
 metoprolol succinate (TOPROL-XL) XL tablet 50 mg  50 mg Oral DAILY  glucose chewable tablet 16 g  4 Tab Oral PRN  
  glucagon (GLUCAGEN) injection 1 mg  1 mg IntraMUSCular PRN  
 insulin lispro (HUMALOG) injection   SubCUTAneous AC&HS  morphine injection 2 mg  2 mg IntraVENous Q3H PRN  
 
 
 
 LAB AND IMAGING FINDINGS:  
 
Lab Results Component Value Date/Time WBC 10.3 04/05/2020 04:17 AM  
 HGB 7.5 (L) 04/05/2020 04:17 AM  
 PLATELET 87 (L) 75/43/2497 04:17 AM  
 
Lab Results Component Value Date/Time Sodium 138 04/05/2020 04:17 AM  
 Potassium 4.1 04/05/2020 04:17 AM  
 Chloride 112 (H) 04/05/2020 04:17 AM  
 CO2 19 (L) 04/05/2020 04:17 AM  
 BUN 24 (H) 04/05/2020 04:17 AM  
 Creatinine 0.90 04/05/2020 04:17 AM  
 Calcium 6.9 (L) 04/05/2020 04:17 AM  
 Magnesium 2.2 04/01/2020 04:02 PM  
 Phosphorus 3.2 04/01/2020 04:02 PM  
  
Lab Results Component Value Date/Time AST (SGOT) 154 (H) 04/03/2020 04:51 AM  
 Alk. phosphatase 117 04/03/2020 04:51 AM  
 Protein, total 4.8 (L) 04/03/2020 04:51 AM  
 Albumin 1.4 (L) 04/03/2020 04:51 AM  
 Globulin 3.4 04/03/2020 04:51 AM  
 
Lab Results Component Value Date/Time INR 1.4 (H) 03/30/2020 07:52 AM  
 Prothrombin time 14.0 (H) 03/30/2020 07:52 AM  
 aPTT 32.5 (H) 03/30/2020 07:52 AM  
  
No results found for: IRON, FE, TIBC, IBCT, PSAT, FERR No results found for: PH, PCO2, PO2 No components found for: [place] Lab Results Component Value Date/Time CK 50 04/01/2020 04:02 PM  
 CK - MB 1.2 04/01/2020 04:02 PM  
  
 
 
   
 
Total time:  
Counseling / coordination time, spent as noted above:  
> 50% counseling / coordination?: 
 
Prolonged service was provided for  []30 min   []75 min in face to face time in the presence of the patient, spent as noted above. Time Start:  
Time End:  
Note: this can only be billed with 56089 (initial) or 83799 (follow up). If multiple start / stop times, list each separately.

## 2020-04-06 NOTE — PROGRESS NOTES
[facility] Hospitalist Progress Note Thuy Barnhart MD 
Answering service: [phone] OR [phone] from in house phone Date of Service:  2020 NAME:  Ellis Patel :  1950 MRN:  [id number] Admission Summary:  
Mr. Hamlet Almanzar is a 77-year-old male who presents to the ER with complaints of shortness of breath and blood in his stool.   
 
Interval history / Subjective:  
Pt seen for FU of CC: Diarrhea, colitis Patient seen ,  He is awake, alert, s/p flex sig per GI  abd remains distended and tympanitic Multiple sub specialities had talked with pt and wife and recommened hospice care Home with hospice tomorrow Stop abx Assessment & Plan:  
 
1) Colitis, POA Hematochezia with diarrhea line enteric bacterial panel negative, C. difficile negative, infectious versus ischemic versus chemo related. diarrhea - resolving? . S/p flex sig Diffuse colonic distension consistent with colonic ileus- pt reports passing some gas- he aslo had a small BM but without much improved with rectal decompression, continue Reglan. Stool was neg  for C. Difficile, Questran stopped GI consult noted, minimize opiates, rectal decompression PRN,  n.p.o. with ice chips Cont IV zosyn for bacterial colitis Xarelto was not restarted due to bleeding s/p 1 unit BT HGB 7.7  
  
2) Oncology- Melanoma 
- with diffuse mets including to bone per admit CT On tx per Dr Darby Wheeler- currently on hold due to hypotension and diarrhea 
- second opinion ? Hx of skin cancer and bladder cancer 
- recent cystoscopy showed an area of concern in his bladder. Plan is for a repeat cystoscopy in 6 weeks per patient pt said he cant hold urine now a days   
3) CV- HTN and Hx of A.fib 
S/p ablation- resumed home meds metoprolol Tachycardia much improved, tolerating Toprol Xarelto on hold at the moment. < 7 hgb S/P 1 UNIT HGB 7.7  
  
4) Shortness of breath/dyspnea-meeting the criteria for acute hypoxemic resp failure- but of unclear etiology- 
negative viral resp panel and neg for flu CT chest on admission showing no evidence of pneumonia \" patient does have multiple bilateral pulmonary nodules with needs further work-up, likely outpatient. continue nebs, decreased dose of IV steroids start taper , continue oxygen via nasal cannula. 5) Anemia- due to chronic dz and acute GI blood loss Hb low- some slow continued lower GI bleeding- monitor HGB s/p BT 1 unit 6) hyperchloremic metabolic acidosis, due to colitis, poor oral intake, normal saline Change fluids to half-normal saline, repeat lab in the morning stop p.o. bicarb 7) hyperglycemia-suspected DM based on HbA1c 
- accuchecks and ss insulin 8) coagulopathy- elevated INR due to xarelto use Xarelto was not restarted due to bleeding s/p 1 unit BT HGB 7.7 9)  Acute Moderate Protein-Calorie Malnutrition-  
Patient is currently n.p.o., will continue to eval and hopefully will introduce diet and caloric intake will improve once oral intake is employed Code status: DNR 
DVT prophylaxis: SCDs Care Plan discussed with: Patient/Family Anticipated Disposition: Home w/Family Anticipated Discharge: Greater than 48 hours Hospital Problems  Date Reviewed: 3/19/2020 Codes Class Noted POA Shortness of breath ICD-10-CM: R06.02 
ICD-9-CM: 786.05  3/30/2020 Unknown Diarrhea ICD-10-CM: R19.7 ICD-9-CM: 787.91  3/30/2020 Unknown Melanoma ([facility] [address] ICD-10-CM: C43.9 ICD-9-CM: 172.9  3/30/2020 Unknown Anemia ICD-10-CM: D64.9 ICD-9-CM: 285.9  3/30/2020 Unknown Review of Systems: A comprehensive review of systems was negative except for that written in the HPI. Xr Abd (kub) Result Date: 4/3/2020 IMPRESSION: No significant change in colonic ileus pattern. Xr Abd (kub) Result Date: 3/31/2020 IMPRESSION: Mild diffuse gaseous colonic distention. Xr Abd (ap And Erect Or Decub) Result Date: 4/3/2020 IMPRESSION: 1. No free air post endoscopy. Persistent colonic distention Xr Abd Flat/ Erect Result Date: 4/4/2020 IMPRESSION: 1. No definite free air identified. 2. There is persistent moderate distention of the transverse colon and mild distention of small bowel loops similar to the prior study. Ct Chest Wo Cont Result Date: 3/30/2020 IMPRESSION: 1. CT of the chest demonstrates multiple bilateral pulmonary nodules which are new suspicious for metastatic disease. There is hepatic steatosis. There are 2 small low densities in the liver could represent small metastatic lesions. There is a 2.4 cm mesenteric nodule could represent metastatic disease. There is suspicion of extensive bony metastatic disease as described above. There is mild compression of superior endplate of L2. 2. There is mild dilatation of the right, transverse, left and proximal sigmoid colon with mild wall thickening of the proximal sigmoid and distal left colon. There is slight pericolonic haziness distal left colon and proximal sigmoid colon consistent with nonspecific colitis. No free air or drainable fluid collection is identified. Results called to the ER. Ct Abd Pelv Wo Cont Result Date: 3/30/2020 IMPRESSION: 1. CT of the chest demonstrates multiple bilateral pulmonary nodules which are new suspicious for metastatic disease. There is hepatic steatosis. There are 2 small low densities in the liver could represent small metastatic lesions. There is a 2.4 cm mesenteric nodule could represent metastatic disease. There is suspicion of extensive bony metastatic disease as described above.  There is mild compression of superior endplate of L2. 2. There is mild dilatation of the right, transverse, left and proximal sigmoid colon with mild wall thickening of the proximal sigmoid and distal left colon. There is slight pericolonic haziness distal left colon and proximal sigmoid colon consistent with nonspecific colitis. No free air or drainable fluid collection is identified. Results called to the ER. Xr Chest [facility] Result Date: 4/3/2020 IMPRESSION: Bibasilar subsegmental atelectasis Xr Chest [facility] Result Date: 4/2/2020 IMPRESSION: 1. Mild left base probable atelectasis. 2. Known small pulmonary nodules/metastases. 3. No change. Xr Chest [facility] Result Date: 3/31/2020 IMPRESSION: Stable elevation of the left hemidiaphragm with left basilar atelectasis, scar or minimal infiltrate stable. .  . Xr Chest [facility] Result Date: 3/30/2020 IMPRESSION: No significant change in the slight elevation left hemidiaphragm with slight left basilar atelectasis. Vital Signs:  
 Last 24hrs VS reviewed since prior progress note. Most recent are: 
Visit Vitals /66 (BP Patient Position: At rest) Pulse (!) 103 Temp 99.5 °F (37.5 °C) Resp 26 Ht 5' 9\" (1.753 m) Wt 82.7 kg (182 lb 4.8 oz) SpO2 91% BMI 26.92 kg/m² Intake/Output Summary (Last 24 hours) at 4/6/2020 1440 Last data filed at 4/6/2020 1228 Gross per 24 hour Intake 2319 ml Output  Net 2319 ml Physical Examination:  
 
 
     
Constitutional:  awake, alert, ENT:  Oral mucosa dry. Resp: Decreased breath sounds bilat-no wheezing CV:  tachycardia- no murmur GI:  distended, mild tenderness, tympanic Bowel sounds Musculoskeletal:  No edema, warm, 2+ pulses throughout Neurologic:  Moves all extremities. AAOx3, Psych:  Calm Data Review:  
 Review and/or order of clinical lab test 
Review and/or order of tests in the radiology section of CPT Review and/or order of tests in the medicine section of CPT Labs:  
 
Recent Labs 04/05/20 
0417 04/04/20 
0400 WBC 10.3 9.7 HGB 7.5* 7.7* HCT 23.5* 24.1*  
PLT 87* 74* Recent Labs 04/05/20 
0417 04/04/20 
0400  141  
K 4.1 4.2 * 114* CO2 19* 21 BUN 24* 29* CREA 0.90 1.12  
* 119* CA 6.9* 7.2* No results for input(s): SGOT, GPT, ALT, AP, TBIL, TBILI, TP, ALB, GLOB, GGT, AML, LPSE in the last 72 hours. No lab exists for component: AMYP, HLPSE No results for input(s): INR, PTP, APTT, INREXT, INREXT in the last 72 hours. No results for input(s): FE, TIBC, PSAT, FERR in the last 72 hours. No results found for: FOL, RBCF No results for input(s): PH, PCO2, PO2 in the last 72 hours. No results for input(s): CPK, CKNDX, TROIQ in the last 72 hours. No lab exists for component: CPKMB Lab Results Component Value Date/Time Cholesterol, total 162 11/14/2019 09:52 AM  
 HDL Cholesterol 50 11/14/2019 09:52 AM  
 LDL, calculated 67 11/14/2019 09:52 AM  
 Triglyceride 223 (H) 11/14/2019 09:52 AM  
 
Lab Results Component Value Date/Time Glucose (POC) 92 04/06/2020 11:14 AM  
 Glucose (POC) 80 04/06/2020 07:53 AM  
 Glucose (POC) 91 04/05/2020 10:39 PM  
 Glucose (POC) 99 04/05/2020 04:47 PM  
 Glucose (POC) 90 04/05/2020 11:48 AM  
 
No results found for: COLOR, APPRN, SPGRU, REFSG, TYRON, PROTU, GLUCU, Magdalena Siegel, BILU, UROU, RAMY, LEUKU, GLUKE, EPSU, BACTU, WBCU, RBCU, CASTS, UCRY Medications Reviewed:  
 
Current Facility-Administered Medications Medication Dose Route Frequency  morphine (ROXANOL) 100 mg/5 mL (20 mg/mL) concentrated solution 10 mg  10 mg Oral Q2H PRN  
 methylPREDNISolone (PF) (Solu-MEDROL) injection 20 mg  20 mg IntraVENous Q12H  
 benzocaine-menthoL (CEPACOL) lozenge 1 Lozenge  1 Lozenge Mucous Membrane PRN  
 lidocaine 4 % patch 1 Patch  1 Patch TransDERmal Q24H  
 sodium chloride (NS) flush 5-40 mL  5-40 mL IntraVENous Q8H  
 sodium chloride (NS) flush 5-40 mL  5-40 mL IntraVENous PRN  
 0.45% sodium chloride infusion  75 mL/hr IntraVENous CONTINUOUS  
  albuterol-ipratropium (DUO-NEB) 2.5 MG-0.5 MG/3 ML  3 mL Nebulization Q6H PRN  
 metoclopramide HCl (REGLAN) injection 5 mg  5 mg IntraVENous Q6H  
 piperacillin-tazobactam (ZOSYN) 3.375 g in 0.9% sodium chloride (MBP/ADV) 100 mL  3.375 g IntraVENous Q8H  
 simethicone (MYLICON) tablet 80 mg  80 mg Oral QID PRN  
 albuterol (PROVENTIL VENTOLIN) nebulizer solution 2.5 mg  2.5 mg Nebulization TID RT  
 sodium chloride (NS) flush 5-40 mL  5-40 mL IntraVENous Q8H  
 sodium chloride (NS) flush 5-40 mL  5-40 mL IntraVENous PRN  
 acetaminophen (TYLENOL) tablet 650 mg  650 mg Oral Q4H PRN  
 diphenhydrAMINE (BENADRYL) injection 12.5 mg  12.5 mg IntraVENous Q4H PRN  
 ondansetron (ZOFRAN) injection 4 mg  4 mg IntraVENous Q4H PRN  
 levothyroxine (SYNTHROID) tablet 75 mcg  75 mcg Oral 6am  
 primidone (MYSOLINE) tablet 50 mg  50 mg Oral QHS  atorvastatin (LIPITOR) tablet 20 mg  20 mg Oral QHS  tamsulosin (FLOMAX) capsule 0.4 mg  0.4 mg Oral DAILY  temazepam (RESTORIL) capsule 30 mg  30 mg Oral QHS PRN  
 metoprolol succinate (TOPROL-XL) XL tablet 50 mg  50 mg Oral DAILY  glucose chewable tablet 16 g  4 Tab Oral PRN  
 glucagon (GLUCAGEN) injection 1 mg  1 mg IntraMUSCular PRN  
 insulin lispro (HUMALOG) injection   SubCUTAneous AC&HS  morphine injection 2 mg  2 mg IntraVENous Q3H PRN  
 
______________________________________________________________________ EXPECTED LENGTH OF STAY: 5d 4h 
ACTUAL LENGTH OF STAY:          7 Kinza Mauro MD

## 2020-04-06 NOTE — PROGRESS NOTES
NUTRITION Brief check in with POC/goals of care. Pt remains NPO and has now been NPO/clear liquid status x 1 week. Palliative care involved. Hematology note (2nd opinion) from this weekend indicates that family is now agreeable to meeting with hospice today and transition towards comfort measures only. Given current plan, would not recommend aggressive nutrition intervention. Will continue to follow along peripherally until final decisions/plan put in place.  
 
 
 
Colby Jacobson RD

## 2020-04-06 NOTE — HOSPICE
[facility] Help to Those in Need 
[phone] Patient Name: Angela Morales YOB: 1950 Age: 68 y.o. [facility] RN Note:  Hospice consult noted. Chart reviewed. Plan of care discussed with Palliative MD  
Telephone call to wife, Laura Ojeda who expresses anxiety and fear related to bringing patient home. She notes that she has no support in the community and does not know what she would do to address his toileting and eating. I reiterated that patient would likely not be eating much and we could send him home with a massey catheter. She acknowledged that the patient's wish is to go home for EOL and she stated that she is likely to go that way but needs more time to think about it. I reviewed hospice services and philosophy and encouraged her to start investigating home care services to support her in her caregiving. We agreed to talk again tomorrow. ,  
1:30 pm 
Call from daughter, Camden Rutledge who lives in [place]. Discussed hospice services. She feels that a hospice facility may be a good option for her father if her stepmother cannot care for him at home. Explained temporary closure of [facility]. She will discuss options with her step mother and they will let us know of their plan. Thank you for the opportunity to be of service to this patient.

## 2020-04-06 NOTE — PROGRESS NOTES
Problem: Mobility Impaired (Adult and Pediatric) Goal: *Acute Goals and Plan of Care (Insert Text) Description: FUNCTIONAL STATUS PRIOR TO ADMISSION: Patient was modified independent using a single point cane for functional mobility. HOME SUPPORT PRIOR TO ADMISSION: The patient lived with wife and was requiring assistance with ADLs and mobility several days prior to admission due to weakness. Physical Therapy Goals Initiated 4/2/2020 1. Patient will move from supine to sit and sit to supine , scoot up and down and roll side to side in bed with modified independence within 7 day(s). 2.  Patient will transfer from bed to chair and chair to bed with supervision using the least restrictive device within 7 day(s). 3.  Patient will perform sit to stand with supervision/set-up within 7 day(s). 4.  Patient will ambulate with supervision/set-up for 50 feet with the least restrictive device within 7 day(s). 5.  Patient will ascend/descend 2 stairs with bilateral handrail(s) with supervision/set-up within 7 day(s). Outcome: Progressing Towards Goal 
 PHYSICAL THERAPY TREATMENT Patient: Lizzy Eastman (41 y.o. male) Date: 4/6/2020 Diagnosis: Diarrhea [R19.7] Shortness of breath [R06.02] Anemia [D64.9] Melanoma ([facility] [address] [C43.9] <principal problem not specified> Procedure(s) (LRB): 
SIGMOIDOSCOPY FLEXIBLE (N/A) COLON BIOPSY (N/A) 3 Days Post-Op Precautions:   
Chart, physical therapy assessment, plan of care and goals were reviewed. ASSESSMENT Patient continues with skilled PT services and is slowly progressing towards goals. Pt presents with severe abdominal and back pain. Pt received oral morphine prior to therapy and stated he was feeling \"much better\" and agreeable to participate. Pt ambulated a short distance within his room with a rolling walker and minimal assistance. Pt fatigues quickly and HR noted to be as high as 120, SPO2 stable in 90s.   Pt with +ROCHA and coughing immediately following ambulation. Cough was productive of moderate phlegm and pt used the yankuer to clear his secretions. Pt agreeable to remain in recliner chair following activity and RN aware. Current Level of Function Impacting Discharge (mobility/balance): bed mobility with supervision, transfers with CGA, ambulation with rolling walker x 20 feet with minimal assist 
 
Other factors to consider for discharge: hospice consult, requires assistance for safe mobility, fall risk, per chart review pt's wife is anxious regarding caring for her  at home with limited assistance PLAN : 
Patient continues to benefit from skilled intervention to address the above impairments. Continue treatment per established plan of care. to address goals. Recommendation for discharge: (in order for the patient to meet his/her long term goals) Physical therapy at least 2 days/week in the home AND ensure assist and/or supervision for safety with mobility, hospice consult This discharge recommendation: 
Has not yet been discussed the attending provider and/or case management IF patient discharges home will need the following DME: rolling walker SUBJECTIVE:  
Patient stated I have to get up.  OBJECTIVE DATA SUMMARY:  
Critical Behavior: 
Neurologic State: Alert Orientation Level: Oriented X4 Cognition: Follows commands Safety/Judgement: Awareness of environment Functional Mobility Training: 
Bed Mobility: 
  
Supine to Sit: Supervision Scooting: Supervision Transfers: 
Sit to Stand: Assist x1;Contact guard assistance Stand to Sit: Assist x1;Contact guard assistance Balance: 
Sitting: Intact Standing: Impaired Standing - Static: Good Standing - Dynamic : Fair(with walker support) Ambulation/Gait Training: 
Distance (ft): 20 Feet (ft)(ambulated in room) Assistive Device: Gait belt;Walker, rolling Ambulation - Level of Assistance: Assist x1;Minimal assistance Gait Abnormalities: Decreased step clearance Speed/Ila: Slow Step Length: Right shortened;Left shortened Pain Rating: 
Complained of abdominal pain, 8/10 Activity Tolerance:  
Fair, HR as high as 120, +ROCHA, productive coughing following activity, SPO2 stable, severe abdominal pain, required oral morphine prior to therapy After treatment patient left in no apparent distress:  
Sitting in chair and Call bell within reach COMMUNICATION/COLLABORATION:  
The patients plan of care was discussed with: Registered nurseDoris Aleman Time Calculation: 12 mins

## 2020-04-06 NOTE — PROGRESS NOTES
Transition of Care Plan: · RUR 22%    GLOS  5.2     LOS:  7 
· Dx:  Recurrence of metastatic melanoma, Ileus Colonic pattern · Second Opinion, [facility] - recommending Hospice · GI following, Heme/Onc Following, Palliative Care · [facility] [address] has spoken with wife, spoke with liaison, spouse (primary caretaker) will likely need to hire paid caregivers. BS will follow up with her on 4/7, she wasn't ready to make decision today. Mr. Merlin Tamez wishes to go home with hospice · Care Management will assist with discharge planning/transition of care · [facility] transport set up for WILL CALL for possible discharge Care Management will follow up with Hospice liaison on Tuesday. Arianna Nieves RN, BSN, ACM Care Management [phone]

## 2020-04-06 NOTE — PROGRESS NOTES
[facility] 
[address], [address] GI PROGRESS NOTE Will Danielle Castañeda, [address] [facility] office [phone] NP/PA in-hospital cell phone M-F until 4:30PM 
After 5PM or on weekends, please call  for physician on call NAME: Edda Cerda :  1950 MRN:  [id number] Subjective:  
Discussed with RN. RN reports patient has some abdominal distension and discomfort. No nausea or vomiting. Watery bowel movement was reported overnight. No hematochezia. No nausea or vomiting. Objective: VITALS:  
Last 24hrs VS reviewed since prior progress note. Most recent are: 
Visit Vitals /57 (BP 1 Location: Left arm, BP Patient Position: At rest) Pulse 93 Temp 98.2 °F (36.8 °C) Resp 18 Ht 5' 9\" (1.753 m) Wt 82.7 kg (182 lb 4.8 oz) SpO2 98% BMI 26.92 kg/m² PHYSICAL EXAM: 
Deferred physical exam 
 
Lab Data Reviewed:  
 
Recent Results (from the past 24 hour(s)) GLUCOSE, POC Collection Time: 20 11:48 AM  
Result Value Ref Range Glucose (POC) 90 65 - 100 mg/dL Performed by Corina SANABRIA   
GLUCOSE, POC Collection Time: 20  4:47 PM  
Result Value Ref Range Glucose (POC) 99 65 - 100 mg/dL Performed by Skip Hodgson (ANASTASIIA) GLUCOSE, POC Collection Time: 20 10:39 PM  
Result Value Ref Range Glucose (POC) 91 65 - 100 mg/dL Performed by Javier Shirley GLUCOSE, POC Collection Time: 20  7:53 AM  
Result Value Ref Range Glucose (POC) 80 65 - 100 mg/dL Performed by Emilee Samuel. Assessment:  
· Colitis, unclear etiology: stool WBC >20, C. difficile negative, enteric bacteria panel negative. Flexible sigmoidoscopy (4/3/20): severe colitis - not obvious pseudomembranous colitis. Abdominal xray (20): no definite free air identified; persistent moderate distension of the transverse colon and mild distension of small bowel loops similar to prior study. · Shortness of breath: CT chest (3/30/20): multiple bilateral pulmonary nodules which are new suspicious for metastatic disease. · Melanoma · History of hypertension and atrial fibrillation Patient Active Problem List  
Diagnosis Code  Essential hypertension I10  
 Hyperlipidemia E78.5  Atrial fibrillation (HCC) I48.91  
 History of skin cancer X23.428  BMI 27.0-27.9,adult Z68.27  
 History of bladder cancer Z85.51  Abdominal wall mass of right upper quadrant R19.01  
 Malignant melanoma of torso excluding breast (HCC) C43.59  
 Primary osteoarthritis of left knee M17.12  
 Hypothyroidism due to medication E03.2  Hypercalcemia E83.52  
 Dehydration E86.0  Shortness of breath R06.02  
 Diarrhea R19.7  Melanoma ([address] C43.9  Anemia D64.9 Plan:  
· On antibiotics · Trend CBC and transfuse as necessary · Limit use of opioids · Follow pathology · Oncology and palliative care following Signed By: Mello Rico, [address]   
 4/6/2020  9:33 AM 
  
 
GI Attending: Agree with above plan. Will await pathology results. Continue antibiotics. Please call with any questions. Alfredito Churchill MD

## 2020-04-07 NOTE — PROGRESS NOTES
Palliative Medicine Consult [place]: [phone] ([phone]) Patient Name: Angela Morales YOB: 1950 Date of Initial Consult: 4/3/20 Reason for Consult: Care decisions Requesting Provider:  
Primary Care Physician: Teri Patrick NP 
 
 SUMMARY:  
Angela Morales is a 68 y.o. with a past history of bladder cancer, basal and squamous cell carcinoma and melanoma first dx 2017 s/p excision, recurrent 12/2019 w/ excision by Dr Jack Billingsley and recurrence in same area of abdomen s/p repeat excision 2/2020 on [facility]ervoy and Opdivo w/ Dr Qasim Cruz who was admitted on 3/30/2020 from home w/ bloody stool (5-6 a day) and cough. With colitis possibly from onc tx vs ischemic and CT showing new pulm mets. C diff neg. GI involved, rectal decompression for ileus, today s/p flex sig w/ colitis, path sent. 4/6/20- Appreciate [facility] giving second opinion. Recommendation is for hospice. 4/7/20- Pain better w/ morphine concentrate, was able to work w/ therapies. Current medical issues leading to Palliative Medicine involvement include: care decisions. Social: to Laura Media, 2nd marriage for both. 4 bio kids, 10 grandkids, 3 stepkids, 1 step grandkid. 1 child lives in [place], rest of [facility]; retired [employer], worked as . Wife has 3 children, none local.  
 
 PALLIATIVE DIAGNOSES:  
1. Abdominal distension, ileus 2. Weakness 3. Lower back pain 4. Goals of care PLAN:  
1. Pt w/ improved pain- balance of watching ileus/distension and cancer pain control. 2. Continue morphine concentrate 10mg sublingual every 2 hours prn pain. 3. Wife with significant distress about situation, hospice ELROY Harden f/u yesterday after I talked to patient about hospice. Note that now wife also contemplating SNF beforehand.   
4. Following w/ you.  
5. Communicated plan of care with: Palliative IDTOma 192 Team 
 GOALS OF CARE / TREATMENT PREFERENCES:  
 
GOALS OF CARE: 
 Patient/Health Care Proxy Stated Goals: Other (comment)(Ilieus to resolve) TREATMENT PREFERENCES:  
Code Status: DNR Advance Care Planning: 
[x] [facility] Interdisciplinary Team has updated the ACP Navigator with Maria E and Patient Capacity Primary Decision Maker: Melanie Henderson - Spouse - [phone] Advance Care Planning 4/2/2020 Patient's Healthcare Decision Maker is: Named in scanned ACP document Confirm Advance Directive Yes, on file Does the patient have other document types Organ Directive Medical Interventions: Limited additional interventions Other: As far as possible, the palliative care team has discussed with patient / health care proxy about goals of care / treatment preferences for patient. HISTORY:  
 
 
 
CHIEF COMPLAINT: Abdominal and back pain HPI/SUBJECTIVE: The patient is:  
[x] Verbal and participatory [] Non-participatory due to:  
 
Pt w/ abdominal and back pain better controlled. Clinical Pain Assessment (nonverbal scale for severity on nonverbal patients):  
Clinical Pain Assessment Severity: 5 Location: lower back and tight abdomen Character: aching Duration: chronic Effect: decr mobility Factors: worse w/ lying in bed this hospital stay Frequency: constant Activity (Movement): Lying quietly, normal position Duration: for how long has pt been experiencing pain (e.g., 2 days, 1 month, years) Frequency: how often pain is an issue (e.g., several times per day, once every few days, constant) FUNCTIONAL ASSESSMENT:  
 
Palliative Performance Scale (PPS): PPS: 30 
 
 
 PSYCHOSOCIAL/SPIRITUAL SCREENING:  
 
Palliative IDT has assessed this patient for cultural preferences / practices and a referral made as appropriate to needs (Cultural Services, Patient Advocacy, Ethics, etc.) Any spiritual / Sabianist concerns: 
[] Yes /  [x] No 
 
Caregiver Burnout: 
[] Yes /  [x] No /  [] No Caregiver Present Anticipatory grief assessment:  
[x] Normal  / [] Maladaptive ESAS Anxiety: Anxiety: 3 ESAS Depression: Depression: 0 REVIEW OF SYSTEMS:  
 
Positive and pertinent negative findings in ROS are noted above in HPI. The following systems were [x] reviewed / [] unable to be reviewed as noted in HPI Other findings are noted below. Systems: constitutional, ears/nose/mouth/throat, respiratory, gastrointestinal, genitourinary, musculoskeletal, integumentary, neurologic, psychiatric, endocrine. Positive findings noted below. Modified ESAS Completed by: provider Fatigue: 5 Drowsiness: 0 Depression: 0 Pain: 5 Anxiety: 3 Nausea: 2 Anorexia: 10 Dyspnea: 5 Constipation: Yes Stool Occurrence(s): 1 PHYSICAL EXAM:  
 
From RN flowsheet: 
Wt Readings from Last 3 Encounters:  
04/07/20 175 lb 8 oz (79.6 kg) 03/16/20 181 lb (82.1 kg) 03/13/20 184 lb 14.4 oz (83.9 kg) Blood pressure 151/76, pulse 92, temperature 98.1 °F (36.7 °C), resp. rate 21, height 5' 9\" (1.753 m), weight 175 lb 8 oz (79.6 kg), SpO2 95 %. Pain Scale 1: Numeric (0 - 10) Pain Intensity 1: 0 Pain Onset 1: acute Pain Location 1: Abdomen Pain Orientation 1: Mid 
Pain Description 1: Constant Pain Intervention(s) 1: Medication (see MAR) Last bowel movement, if known: 2 today Constitutional: awake, alert, oriented Eyes: pupils equal, anicteric ENMT: no nasal discharge, dry mucous membranes Respiratory: breathing min labored Gastrointestinal: distended Musculoskeletal: no deformity, no tenderness to palpation Skin: warm, dry Neurologic: following commands, moving all extremities Psychiatric: anxious HISTORY:  
 
Active Problems: 
  Shortness of breath (3/30/2020) Diarrhea (3/30/2020) Melanoma ([facility] (3/30/2020) Anemia (3/30/2020) Past Medical History:  
Diagnosis Date  Abdominal wall mass of right upper quadrant 12/2/2019  Atrial fibrillation ([address]  BPH (benign prostatic hyperplasia)  Cancer ([facility] early 36s BLADDER  
 Hypercholesterolemia  Hypertension  Joint replaced   
 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  
Procedure Laterality Date  Danyel Alfaro N/A 4/3/2020 SIGMOIDOSCOPY FLEXIBLE performed by Kimberly Hassan MD at [facility] ENDOSCOPY  
 HX AFIB ABLATION  2018  HX APPENDECTOMY [address]  HX KNEE REPLACEMENT  2010  
 right knee  HX KNEE REPLACEMENT  2019  
 left knee  HX MALIGNANT SKIN LESION EXCISION    
 HX OTHER SURGICAL  2019 Excisional biopsy of right upper quadrant abdominal wall mass.  HX TONSILLECTOMY  HX UROLOGICAL CYSTOSCOPY  SKIN TISSUE PROCEDURE UNLISTED   Melanoma across abdomen, Right armpit and groin lymphnode involvement Family History Problem Relation Age of Onset  Dementia Mother  Hypertension Father  Hypertension Sister History reviewed, no pertinent family history. Social History Tobacco Use  Smoking status: Former Smoker Packs/day: 2.00 Years: 20.00 Pack years: 40.00 Last attempt to quit: 1988 Years since quittin.2  Smokeless tobacco: Never Used Substance Use Topics  Alcohol use: Yes Alcohol/week: 2.0 standard drinks Types: 2 Glasses of wine per week Comment: 1 glass with dinner 2 nights per week Allergies Allergen Reactions  Demerol [Meperidine] Other (comments) \"passed out\" Current Facility-Administered Medications Medication Dose Route Frequency  morphine (ROXANOL) 100 mg/5 mL (20 mg/mL) concentrated solution 10 mg  10 mg Oral Q2H PRN  
 methylPREDNISolone (PF) (Solu-MEDROL) injection 20 mg  20 mg IntraVENous Q12H  benzocaine-menthoL (CEPACOL) lozenge 1 Lozenge  1 Lozenge Mucous Membrane PRN  
 lidocaine 4 % patch 1 Patch  1 Patch TransDERmal Q24H  
 sodium chloride (NS) flush 5-40 mL  5-40 mL IntraVENous Q8H  
 sodium chloride (NS) flush 5-40 mL  5-40 mL IntraVENous PRN  
 0.45% sodium chloride infusion  75 mL/hr IntraVENous CONTINUOUS  
 albuterol-ipratropium (DUO-NEB) 2.5 MG-0.5 MG/3 ML  3 mL Nebulization Q6H PRN  
 metoclopramide HCl (REGLAN) injection 5 mg  5 mg IntraVENous Q6H  
 simethicone (MYLICON) tablet 80 mg  80 mg Oral QID PRN  
 albuterol (PROVENTIL VENTOLIN) nebulizer solution 2.5 mg  2.5 mg Nebulization TID RT  
 sodium chloride (NS) flush 5-40 mL  5-40 mL IntraVENous Q8H  
 sodium chloride (NS) flush 5-40 mL  5-40 mL IntraVENous PRN  
 acetaminophen (TYLENOL) tablet 650 mg  650 mg Oral Q4H PRN  
 diphenhydrAMINE (BENADRYL) injection 12.5 mg  12.5 mg IntraVENous Q4H PRN  
 ondansetron (ZOFRAN) injection 4 mg  4 mg IntraVENous Q4H PRN  
 levothyroxine (SYNTHROID) tablet 75 mcg  75 mcg Oral 6am  
 primidone (MYSOLINE) tablet 50 mg  50 mg Oral QHS  atorvastatin (LIPITOR) tablet 20 mg  20 mg Oral QHS  tamsulosin (FLOMAX) capsule 0.4 mg  0.4 mg Oral DAILY  temazepam (RESTORIL) capsule 30 mg  30 mg Oral QHS PRN  
 metoprolol succinate (TOPROL-XL) XL tablet 50 mg  50 mg Oral DAILY  glucose chewable tablet 16 g  4 Tab Oral PRN  
 glucagon (GLUCAGEN) injection 1 mg  1 mg IntraMUSCular PRN  
 insulin lispro (HUMALOG) injection   SubCUTAneous AC&HS  morphine injection 2 mg  2 mg IntraVENous Q3H PRN  
 
 
 
 LAB AND IMAGING FINDINGS:  
 
Lab Results Component Value Date/Time WBC 10.3 04/05/2020 04:17 AM  
 HGB 7.5 (L) 04/05/2020 04:17 AM  
 PLATELET 87 (L) 77/73/2195 04:17 AM  
 
Lab Results Component Value Date/Time  Sodium 138 04/05/2020 04:17 AM  
 Potassium 4.1 04/05/2020 04:17 AM  
 Chloride 112 (H) 04/05/2020 04:17 AM  
 CO2 19 (L) 04/05/2020 04:17 AM  
 BUN 24 (H) 04/05/2020 04:17 AM  
 Creatinine 0.90 04/05/2020 04:17 AM  
 Calcium 6.9 (L) 04/05/2020 04:17 AM  
 Magnesium 2.2 04/01/2020 04:02 PM  
 Phosphorus 3.2 04/01/2020 04:02 PM  
  
Lab Results Component Value Date/Time AST (SGOT) 154 (H) 04/03/2020 04:51 AM  
 Alk. phosphatase 117 04/03/2020 04:51 AM  
 Protein, total 4.8 (L) 04/03/2020 04:51 AM  
 Albumin 1.4 (L) 04/03/2020 04:51 AM  
 Globulin 3.4 04/03/2020 04:51 AM  
 
Lab Results Component Value Date/Time INR 1.4 (H) 03/30/2020 07:52 AM  
 Prothrombin time 14.0 (H) 03/30/2020 07:52 AM  
 aPTT 32.5 (H) 03/30/2020 07:52 AM  
  
No results found for: IRON, FE, TIBC, IBCT, PSAT, FERR No results found for: PH, PCO2, PO2 No components found for: [place] Lab Results Component Value Date/Time CK 50 04/01/2020 04:02 PM  
 CK - MB 1.2 04/01/2020 04:02 PM  
  
 
 
   
 
Total time: 25 min Counseling / coordination time, spent as noted above: 15 min  
> 50% counseling / coordination?:yes Prolonged service was provided for  []30 min   []75 min in face to face time in the presence of the patient, spent as noted above. Time Start:  
Time End:  
Note: this can only be billed with 91734 (initial) or 39255 (follow up). If multiple start / stop times, list each separately.

## 2020-04-07 NOTE — PROGRESS NOTES
Problem: Mobility Impaired (Adult and Pediatric) Goal: *Acute Goals and Plan of Care (Insert Text) Description: FUNCTIONAL STATUS PRIOR TO ADMISSION: Patient was modified independent using a single point cane for functional mobility. HOME SUPPORT PRIOR TO ADMISSION: The patient lived with wife and was requiring assistance with ADLs and mobility several days prior to admission due to weakness. Physical Therapy Goals Initiated 4/2/2020 1. Patient will move from supine to sit and sit to supine , scoot up and down and roll side to side in bed with modified independence within 7 day(s). 2.  Patient will transfer from bed to chair and chair to bed with supervision using the least restrictive device within 7 day(s). 3.  Patient will perform sit to stand with supervision/set-up within 7 day(s). 4.  Patient will ambulate with supervision/set-up for 50 feet with the least restrictive device within 7 day(s). 5.  Patient will ascend/descend 2 stairs with bilateral handrail(s) with supervision/set-up within 7 day(s). Outcome: Progressing Towards Goal 
 PHYSICAL THERAPY TREATMENT Patient: Devan Gutiérrez (22 y.o. male) Date: 4/7/2020 Diagnosis: Diarrhea [R19.7] Shortness of breath [R06.02] Anemia [D64.9] Melan[facility] ([facility] [C43.9] <principal problem not specified> Procedure(s) (LRB): 
SIGMOIDOSCOPY FLEXIBLE (N/A) COLON BIOPSY (N/A) 4 Days Post-Op Precautions:  fall Chart, physical therapy assessment, plan of care and goals were reviewed. ASSESSMENT Patient continues with skilled PT services and is progressing towards goals. Today he was able to increased his distance of amb (25 feet X 3) and progress from use of a walker to hand held assist to just contact guard within our session (he reports that at baseline he typically does not use an assistive device and only uses a cane prn). He was ROCHA but his 02 sats were stable on room air.  Post session he remained up to a chair.Disposition depending on plan regarding hospice or not (per chart check, looks like pt is leaning towards not hospice at this time). While he is making gains, he would likely require some assist at time of discharge and it looks like his wife is unable to do so. Per chart check, he does not have an OT order. I highly recommend an OT consult. I highly recommend that pt be up to the chair with assist of nursing and is up to the chair 3 X a day as if for meals (currently taking in only ice chips) and amb to the bathroom with nursing assist. 
 
Current Level of Function Impacting Discharge (mobility/balance): impaired standing balance. Provided up to contact guard. Other factors to consider for discharge: cancer, ileus PLAN : 
Patient continues to benefit from skilled intervention to address the above impairments. Continue treatment per established plan of care. to address goals. Recommendation for discharge: (in order for the patient to meet his/her long term goals) Physical therapy at least 2 days/week in the home AND ensure assist and/or supervision for safety with mobility and perhaps self care (I recommend an OT consult) as needed, but if this is not an option  then I recommend short term rehab at a SNF. This discharge recommendation: A follow-up discussion with the attending provider and/or case management is planned IF patient discharges home will need the following DME: to be determined (TBD) SUBJECTIVE:  
Patient stated Kb coats for pushing me.  OBJECTIVE DATA SUMMARY:  
Chart checked, pt cleared by nursing Critical Behavior: 
Neurologic State: Alert, Eyes open spontaneously Orientation Level: Oriented X4 Cognition: Follows commands Safety/Judgement: Awareness of environment Functional Mobility Training: 
Bed Mobility: 
  
Supine to Sit: Stand-by assistance Transfers: 
Sit to Stand: Contact guard assistance Stand to Sit: Contact guard assistance Balance: 
Sitting: Intact; Without support Standing: Impaired Standing - Static: Good Standing - Dynamic : Good Ambulation/Gait Training: 
Distance (ft): 75 Feet (ft)(25 feet X 3) Assistive Device: Gait belt(rolling walker, then hand held assist, then just using gait belt) Ambulation - Level of Assistance: Contact guard assistance Gait Abnormalities: Decreased step clearance Base of Support: Widened Speed/Ila: Slow Step Length: Left shortened;Right shortened Stairs: Therapeutic Exercises:  
Pursed lip breathing Pain Ratin/10 back pain, alerted his nurse Activity Tolerance:  
Good, requires rest breaks, observed SOB with activity, and see assessment above Please refer to the flowsheet for vital signs taken during this treatment. After treatment patient left in no apparent distress:  
Sitting in chair and Call bell within reach COMMUNICATION/COLLABORATION:  
The patients plan of care was discussed with: Registered nurse. Kim Chung Time Calculation: 29 mins

## 2020-04-07 NOTE — PROGRESS NOTES
Bedside shift change report given to Joao RN (oncoming nurse) by Corina Bernard RN (offgoing nurse). Report included the following information SBAR, Kardex, MAR, Recent Results and Cardiac Rhythm NSR.  
 
 
 
0458: Pt had 9 runs of Vtach. NP notified, no orders received. Will continue to monitor Last 3 Recorded Weights in this Encounter 04/05/20 0330 04/06/20 0600 04/07/20 0314 Weight: 80 kg (176 lb 5.9 oz) 82.7 kg (182 lb 4.8 oz) 79.6 kg (175 lb 8 oz) Pt weighed on bed with 1 sheet and 2 pillow Problem: Pressure Injury - Risk of 
Goal: *Prevention of pressure injury Description: Document Carlos Scale and appropriate interventions in the flowsheet. Outcome: Progressing Towards Goal 
Note: Pressure Injury Interventions: 
Sensory Interventions: Check visual cues for pain, Float heels, Keep linens dry and wrinkle-free, Minimize linen layers Moisture Interventions: Absorbent underpads, Apply protective barrier, creams and emollients, Check for incontinence Q2 hours and as needed Activity Interventions: Increase time out of bed Mobility Interventions: Float heels, HOB 30 degrees or less Nutrition Interventions: Document food/fluid/supplement intake Friction and Shear Interventions: Apply protective barrier, creams and emollients, HOB 30 degrees or less Problem: Pain Goal: *Control of Pain Outcome: Progressing Towards Goal 
Note: Pt pain remains controlled. Pt given IV and PO pain medication throughout shift

## 2020-04-07 NOTE — PROGRESS NOTES
NUTRITION brief Pt remains NPO. Has been NPO/clear liquid status >1 week. Noted plans to d/c home with hospice today. Antibiotics stopped. Given current plan, would not recommend aggressive nutrition intervention. Recommend allowing PO diet as tolerated. Defer to GI. Will sign off, but available to assist as needed. Please reconsult if POC changes and services needed.  
 
Maia Colindres RD

## 2020-04-07 NOTE — PROGRESS NOTES
[facility] 
[address] [facility], [address] GI PROGRESS NOTE Will Davenport Denise, [address] [facility] office [phone] NP/PA in-hospital cell phone M-F until 4:30PM 
After 5PM or on weekends, please call  for physician on call NAME: Dirk Rodriguez :  1950 MRN:  [id number] Subjective:  
Patient was not physically seen due to pandemic. Discussed with RN. RN reports overall improvement in diarrhea with 1-2 bowel movements over the last 12 hours. No nausea or vomiting. He has complaints of abdominal pain. Objective: VITALS:  
Last 24hrs VS reviewed since prior progress note. Most recent are: 
Visit Vitals /80 Pulse (!) 112 Temp 97.6 °F (36.4 °C) Resp 20 Ht 5' 9\" (1.753 m) Wt 79.6 kg (175 lb 8 oz) SpO2 95% BMI 25.92 kg/m² PHYSICAL EXAM: 
Deferred physical exam 
 
Lab Data Reviewed:  
 
Recent Results (from the past 24 hour(s)) GLUCOSE, POC Collection Time: 20 11:14 AM  
Result Value Ref Range Glucose (POC) 92 65 - 100 mg/dL Performed by Irish Lay. GLUCOSE, POC Collection Time: 20  4:47 PM  
Result Value Ref Range Glucose (POC) 92 65 - 100 mg/dL Performed by Judson Molina GLUCOSE, POC Collection Time: 20 10:50 PM  
Result Value Ref Range Glucose (POC) 76 65 - 100 mg/dL Performed by Huseyin Short GLUCOSE, POC Collection Time: 20  8:07 AM  
Result Value Ref Range Glucose (POC) 107 (H) 65 - 100 mg/dL Performed by Corina SANABRIA Assessment:  
· Colitis, unclear etiology: stool WBC >20, C. difficile negative, enteric bacteria panel negative. Flexible sigmoidoscopy (4/3/20): severe colitis - not obvious pseudomembranous colitis. Pathology of left colon biopsies show focal active colitis with surface erosion and granulation tissue.  Abdominal xray (20): no definite free air identified; persistent moderate distension of the transverse colon and mild distension of small bowel loops similar to prior study. · Shortness of breath: CT chest (3/30/20): multiple bilateral pulmonary nodules which are new suspicious for metastatic disease. · Melanoma · History of hypertension and atrial fibrillation Patient Active Problem List  
Diagnosis Code  Essential hypertension I10  
 Hyperlipidemia E78.5  Atrial fibrillation (HCC) I48.91  
 History of skin cancer W17.576  BMI 27.0-27.9,adult Z68.27  
 History of bladder cancer Z85.51  Abdominal wall mass of right upper quadrant R19.01  
 Malignant melanoma of torso excluding breast (HCC) C43.59  
 Primary osteoarthritis of left knee M17.12  
 Hypothyroidism due to medication E03.2  Hypercalcemia E83.52  
 Dehydration E86.0  Shortness of breath R06.02  
 Diarrhea R19.7  Melanoma ([facility] C43.9  Anemia D64.9 Plan:  
· Continue antibiotics · Supportive measures · Monitor CBC · Oncology and palliative care following Signed By: Dimple Burris   
 4/7/2020  11:06 AM 
  
 
GI Attending: Agree with above plan. Pathology from colon biopsies was not conclusive as to the exact underlying etiology of colitis. Would continue antibiotics and supportive measures given his relative improvement in symptoms. We will sign off for now. Please call with any questions. Alfredito Bland MD

## 2020-04-07 NOTE — PROGRESS NOTES
Problem: Patient Education:  Go to Education Activity Goal: Patient/Family Education Outcome: Progressing Towards Goal 
  
Problem: Pressure Injury - Risk of 
Goal: *Prevention of pressure injury Description: Document Carlos Scale and appropriate interventions in the flowsheet. Outcome: Progressing Towards Goal 
Note: Pressure Injury Interventions: 
Sensory Interventions: Assess need for specialty bed, Check visual cues for pain, Discuss PT/OT consult with provider, Float heels, Keep linens dry and wrinkle-free, Minimize linen layers, Monitor skin under medical devices Moisture Interventions: Apply protective barrier, creams and emollients, Check for incontinence Q2 hours and as needed, Contain wound drainage, Maintain skin hydration (lotion/cream), Minimize layers Activity Interventions: Assess need for specialty bed, Increase time out of bed, PT/OT evaluation Mobility Interventions: Assess need for specialty bed, Float heels, PT/OT evaluation, Turn and reposition approx. every two hours(pillow and wedges) Nutrition Interventions: Document food/fluid/supplement intake, Discuss nutritional consult with provider Friction and Shear Interventions: Apply protective barrier, creams and emollients, Foam dressings/transparent film/skin sealants, Lift sheet, Lift team/patient mobility team, Minimize layers Problem: Falls - Risk of 
Goal: *Absence of Falls Description: Document Thora Bare Fall Risk and appropriate interventions in the flowsheet. Outcome: Progressing Towards Goal 
Note: Fall Risk Interventions: 
Mobility Interventions: Communicate number of staff needed for ambulation/transfer, OT consult for ADLs, Patient to call before getting OOB, Strengthening exercises (ROM-active/passive), PT Consult for assist device competence Mentation Interventions: Adequate sleep, hydration, pain control, Door open when patient unattended, Evaluate medications/consider consulting pharmacy, Increase mobility, More frequent rounding, Room close to nurse's station, Reorient patient Medication Interventions: Assess postural VS orthostatic hypotension, Evaluate medications/consider consulting pharmacy, Teach patient to arise slowly Elimination Interventions: Call light in reach, Patient to call for help with toileting needs, Stay With Me (per policy) History of Falls Interventions: Consult care management for discharge planning, Evaluate medications/consider consulting pharmacy, Room close to nurse's station, Utilize gait belt for transfer/ambulation Problem: Patient Education: Go to Patient Education Activity Goal: Patient/Family Education Outcome: Progressing Towards Goal 
  
Problem: Diarrhea (Adult and Pediatrics) Goal: *Absence of diarrhea Outcome: Progressing Towards Goal 
 
Bedside and Verbal shift change report given to Ravin (oncoming nurse) by Ananya Sims RN  (offgoing nurse). Report included the following information SBAR, Kardex, MAR, Recent Results and Cardiac Rhythm NSR. Patient Vitals for the past 12 hrs: 
 Temp Pulse Resp BP SpO2  
04/07/20 1600     99 % 04/07/20 1536 97.4 °F (36.3 °C) 92 16 128/67 97 % 04/07/20 1200     95 % 04/07/20 1152     94 % 04/07/20 1117 98.1 °F (36.7 °C) 92 21 151/76 91 % 04/07/20 0853  (!) 112  153/80   
04/07/20 0800     95 % 04/07/20 0742     94 % 04/07/20 0716 97.6 °F (36.4 °C) 99 20 161/71 92 %

## 2020-04-07 NOTE — PROGRESS NOTES
[address] [facility] Adult  Hospitalist Group Hospitalist Progress Note Radha Reyes MD 
Answering service: [phone] OR [phone] from in house phone Date of Service:  2020 NAME:  Angela Morales :  1950 MRN:  [id number] Admission Summary:  
Mr. Alisha Rudolph is a 57-year-old male who presents to the ER with complaints of shortness of breath and blood in his stool.   
 
Interval history / Subjective:  
Pt seen for FU of CC: Diarrhea, colitis Patient seen chart reviewed home hospice planned for today CM working on it Multiple sub specialities had talked with pt and wife and recommened hospice care Home with hospice today Stop abx Assessment & Plan:  
 
1) Colitis, POA Hematochezia with diarrhea line enteric bacterial panel negative, C. difficile negative, infectious versus ischemic versus chemo related. diarrhea - resolving? . S/p flex sig Diffuse colonic distension consistent with colonic ileus- pt reports passing some gas- he aslo had a small BM but without much improved with rectal decompression, continue Reglan. Stool was neg  for C. Difficile, Questran stopped GI consult noted, minimize opiates, rectal decompression PRN,  n.p.o. with ice chips Cont IV zosyn for bacterial colitis Xarelto was not restarted due to bleeding s/p 1 unit BT HGB 7.7  
  
2) Oncology- Melanoma 
- with diffuse mets including to bone per admit CT On tx per Dr Woodward Cea- currently on hold due to hypotension and diarrhea 
- second opinion ? Hx of skin cancer and bladder cancer 
- recent cystoscopy showed an area of concern in his bladder. Plan is for a repeat cystoscopy in 6 weeks per patient pt said he cant hold urine now a days   
3) CV- HTN and Hx of A.fib 
S/p ablation- resumed home meds metoprolol Tachycardia much improved, tolerating Toprol Xarelto on hold at the moment. < 7 hgb S/P 1 UNIT HGB 7.7  
  
4) Shortness of breath/dyspnea-meeting the criteria for acute hypoxemic resp failure- but of unclear etiology- 
negative viral resp panel and neg for flu CT chest on admission showing no evidence of pneumonia \" patient does have multiple bilateral pulmonary nodules with needs further work-up, likely outpatient. continue nebs, decreased dose of IV steroids start taper , continue oxygen via nasal cannula. 5) Anemia- due to chronic dz and acute GI blood loss Hb low- some slow continued lower GI bleeding- monitor HGB s/p BT 1 unit 6) hyperchloremic metabolic acidosis, due to colitis, poor oral intake, normal saline Change fluids to half-normal saline, repeat lab in the morning stop p.o. bicarb 7) hyperglycemia-suspected DM based on HbA1c 
- accuchecks and ss insulin 8) coagulopathy- elevated INR due to xarelto use Xarelto was not restarted due to bleeding s/p 1 unit BT HGB 7.7 9)  Acute Moderate Protein-Calorie Malnutrition-  
Patient is currently n.p.o., will continue to eval and hopefully will introduce diet and caloric intake will improve once oral intake is employed Code status: DNR 
DVT prophylaxis: SCDs Care Plan discussed with: Patient/Family Anticipated Disposition: Home w/Family Anticipated Discharge: Greater than 48 hours Hospital Problems  Date Reviewed: 3/19/2020 Codes Class Noted POA Shortness of breath ICD-10-CM: R06.02 
ICD-9-CM: 786.05  3/30/2020 Unknown Diarrhea ICD-10-CM: R19.7 ICD-9-CM: 787.91  3/30/2020 Unknown Melanoma ([facility] [address] ICD-10-CM: C43.9 ICD-9-CM: 172.9  3/30/2020 Unknown Anemia ICD-10-CM: D64.9 ICD-9-CM: 285.9  3/30/2020 Unknown Review of Systems: A comprehensive review of systems was negative except for that written in the HPI. Xr Abd (kub) Result Date: 4/3/2020 IMPRESSION: No significant change in colonic ileus pattern. Xr Abd (kub) Result Date: 3/31/2020 IMPRESSION: Mild diffuse gaseous colonic distention. Xr Abd (ap And Erect Or Decub) Result Date: 4/3/2020 IMPRESSION: 1. No free air post endoscopy. Persistent colonic distention Xr Abd Flat/ Erect Result Date: 4/4/2020 IMPRESSION: 1. No definite free air identified. 2. There is persistent moderate distention of the transverse colon and mild distention of small bowel loops similar to the prior study. Ct Chest Wo Cont Result Date: 3/30/2020 IMPRESSION: 1. CT of the chest demonstrates multiple bilateral pulmonary nodules which are new suspicious for metastatic disease. There is hepatic steatosis. There are 2 small low densities in the liver could represent small metastatic lesions. There is a 2.4 cm mesenteric nodule could represent metastatic disease. There is suspicion of extensive bony metastatic disease as described above. There is mild compression of superior endplate of L2. 2. There is mild dilatation of the right, transverse, left and proximal sigmoid colon with mild wall thickening of the proximal sigmoid and distal left colon. There is slight pericolonic haziness distal left colon and proximal sigmoid colon consistent with nonspecific colitis. No free air or drainable fluid collection is identified. Results called to the ER. Ct Abd Pelv Wo Cont Result Date: 3/30/2020 IMPRESSION: 1. CT of the chest demonstrates multiple bilateral pulmonary nodules which are new suspicious for metastatic disease. There is hepatic steatosis. There are 2 small low densities in the liver could represent small metastatic lesions. There is a 2.4 cm mesenteric nodule could represent metastatic disease. There is suspicion of extensive bony metastatic disease as described above.  There is mild compression of superior endplate of L2. 2. There is mild dilatation of the right, transverse, left and proximal sigmoid colon with mild wall thickening of the proximal sigmoid and distal left colon. There is slight pericolonic haziness distal left colon and proximal sigmoid colon consistent with nonspecific colitis. No free air or drainable fluid collection is identified. Results called to the ER. Xr Chest [facility] Result Date: 4/3/2020 IMPRESSION: Bibasilar subsegmental atelectasis Xr Chest [facility] Result Date: 4/2/2020 IMPRESSION: 1. Mild left base probable atelectasis. 2. Known small pulmonary nodules/metastases. 3. No change. Xr Chest [facility] Result Date: 3/31/2020 IMPRESSION: Stable elevation of the left hemidiaphragm with left basilar atelectasis, scar or minimal infiltrate stable. .  . Xr Chest [facility] Result Date: 3/30/2020 IMPRESSION: No significant change in the slight elevation left hemidiaphragm with slight left basilar atelectasis. Vital Signs:  
 Last 24hrs VS reviewed since prior progress note. Most recent are: 
Visit Vitals /80 Pulse (!) 112 Temp 97.6 °F (36.4 °C) Resp 20 Ht 5' 9\" (1.753 m) Wt 79.6 kg (175 lb 8 oz) SpO2 94% BMI 25.92 kg/m² Intake/Output Summary (Last 24 hours) at 4/7/2020 9055 Last data filed at 4/7/2020 6678 Gross per 24 hour Intake 100 ml Output 375 ml Net -275 ml Physical Examination:  
 
 
     
Constitutional:  awake, alert, ENT:  Oral mucosa dry. Resp: Decreased breath sounds bilat-no wheezing CV:  tachycardia- no murmur GI:  distended, mild tenderness, tympanic Bowel sounds Musculoskeletal:  No edema, warm, 2+ pulses throughout Neurologic:  Moves all extremities. AAOx3, Psych:  Calm Data Review:  
 Review and/or order of clinical lab test 
Review and/or order of tests in the radiology section of CPT Review and/or order of tests in the medicine section of CPT Labs:  
 
Recent Labs 04/05/20 
8048 WBC 10.3 HGB 7.5* HCT 23.5*  
PLT 87* Recent Labs 04/05/20 
8914   
K 4.1 * CO2 19*  
BUN 24* CREA 0.90 * CA 6.9* No results for input(s): SGOT, GPT, ALT, AP, TBIL, TBILI, TP, ALB, GLOB, GGT, AML, LPSE in the last 72 hours. No lab exists for component: AMYP, HLPSE No results for input(s): INR, PTP, APTT, INREXT, INREXT in the last 72 hours. No results for input(s): FE, TIBC, PSAT, FERR in the last 72 hours. No results found for: FOL, RBCF No results for input(s): PH, PCO2, PO2 in the last 72 hours. No results for input(s): CPK, CKNDX, TROIQ in the last 72 hours. No lab exists for component: CPKMB Lab Results Component Value Date/Time Cholesterol, total 162 11/14/2019 09:52 AM  
 HDL Cholesterol 50 11/14/2019 09:52 AM  
 LDL, calculated 67 11/14/2019 09:52 AM  
 Triglyceride 223 (H) 11/14/2019 09:52 AM  
 
Lab Results Component Value Date/Time Glucose (POC) 107 (H) 04/07/2020 08:07 AM  
 Glucose (POC) 76 04/06/2020 10:50 PM  
 Glucose (POC) 92 04/06/2020 04:47 PM  
 Glucose (POC) 92 04/06/2020 11:14 AM  
 Glucose (POC) 80 04/06/2020 07:53 AM  
 
No results found for: COLOR, APPRN, SPGRU, REFSG, TYRON, PROTU, GLUCU, Vela Bones, BILU, UROU, RAMY, LEUKU, GLUKE, EPSU, BACTU, WBCU, RBCU, CASTS, UCRY Medications Reviewed:  
 
Current Facility-Administered Medications Medication Dose Route Frequency  morphine (ROXANOL) 100 mg/5 mL (20 mg/mL) concentrated solution 10 mg  10 mg Oral Q2H PRN  
 methylPREDNISolone (PF) (Solu-MEDROL) injection 20 mg  20 mg IntraVENous Q12H  
 benzocaine-menthoL (CEPACOL) lozenge 1 Lozenge  1 Lozenge Mucous Membrane PRN  
 lidocaine 4 % patch 1 Patch  1 Patch TransDERmal Q24H  
 sodium chloride (NS) flush 5-40 mL  5-40 mL IntraVENous Q8H  
 sodium chloride (NS) flush 5-40 mL  5-40 mL IntraVENous PRN  
 0.45% sodium chloride infusion  75 mL/hr IntraVENous CONTINUOUS  
 albuterol-ipratropium (DUO-NEB) 2.5 MG-0.5 MG/3 ML  3 mL Nebulization Q6H PRN  
 metoclopramide HCl (REGLAN) injection 5 mg  5 mg IntraVENous Q6H  
  simethicone (MYLICON) tablet 80 mg  80 mg Oral QID PRN  
 albuterol (PROVENTIL VENTOLIN) nebulizer solution 2.5 mg  2.5 mg Nebulization TID RT  
 sodium chloride (NS) flush 5-40 mL  5-40 mL IntraVENous Q8H  
 sodium chloride (NS) flush 5-40 mL  5-40 mL IntraVENous PRN  
 acetaminophen (TYLENOL) tablet 650 mg  650 mg Oral Q4H PRN  
 diphenhydrAMINE (BENADRYL) injection 12.5 mg  12.5 mg IntraVENous Q4H PRN  
 ondansetron (ZOFRAN) injection 4 mg  4 mg IntraVENous Q4H PRN  
 levothyroxine (SYNTHROID) tablet 75 mcg  75 mcg Oral 6am  
 primidone (MYSOLINE) tablet 50 mg  50 mg Oral QHS  atorvastatin (LIPITOR) tablet 20 mg  20 mg Oral QHS  tamsulosin (FLOMAX) capsule 0.4 mg  0.4 mg Oral DAILY  temazepam (RESTORIL) capsule 30 mg  30 mg Oral QHS PRN  
 metoprolol succinate (TOPROL-XL) XL tablet 50 mg  50 mg Oral DAILY  glucose chewable tablet 16 g  4 Tab Oral PRN  
 glucagon (GLUCAGEN) injection 1 mg  1 mg IntraMUSCular PRN  
 insulin lispro (HUMALOG) injection   SubCUTAneous AC&HS  morphine injection 2 mg  2 mg IntraVENous Q3H PRN  
 
______________________________________________________________________ EXPECTED LENGTH OF STAY: 5d 4h 
ACTUAL LENGTH OF STAY:          8 Ginette Loomis MD

## 2020-04-07 NOTE — PROGRESS NOTES
2020; 09:30 - 
CM received call from [facility] (Romero Khanchild: [phone]) indicating that patient is currently not GIP appropriate. The patient's spouse and Romero Grandchild have discussed out of pocket expenses, including that inpatient unit cost would be approximately $300/day and facility cost would be private pay as well. Per Romero Grandchild, the spouse expressed that out of pocket expenses is not feasible for patient and family. Romero Khanchild encouraged spouse to discuss potential at home hospice with patient's children. CM will touch base with patient's spouse around lunch time. CM to follow with patient's family and [facility]. 
CRM: Earnestine Mejia, MPH, [facility]; Z: [phone] 
   
10:00 -  
CM returned a call to patient's spouse Demi [phone]). The patient's spouse stated that she is not able to bring him safely home and does not have family support in the immediate area. The spouse expressed confusion of what hospice would provide at a facility vs rehab. CM discussed that rehab would be treatment vs hospice is making the patient comfortable. Spouse expressed desire to treat the patient to make him as strong as possible. CM inquired if spouse has discussed plan and patient's wishes with the patient; per spouse, the patient has expressed a desire to go to a skilled facility. Spouse identified: [facility], [facility], and [facility] as facilities of choice. Per spouse's request, CM to submit referrals via [facility] and [facility]. CM to notify attending and request conversation of clarification with patient and spouse. CRM: Earnestine Mejia, MPH, [address] [place] [address]; Z: [phone]

## 2020-04-08 NOTE — PROGRESS NOTES
Problem: Falls - Risk of 
Goal: *Absence of Falls Description: Document [place] Led Fall Risk and appropriate interventions in the flowsheet. Outcome: Progressing Towards Goal 
Note: Fall Risk Interventions: 
Mobility Interventions: Communicate number of staff needed for ambulation/transfer, Patient to call before getting OOB Mentation Interventions: Adequate sleep, hydration, pain control, Door open when patient unattended, More frequent rounding, Room close to nurse's station, [facility] and hearing aids Medication Interventions: Patient to call before getting OOB, Teach patient to arise slowly Elimination Interventions: Call light in reach, Patient to call for help with toileting needs, Toileting schedule/hourly rounds History of Falls Interventions: Door open when patient unattended, Room close to nurse's station Problem: Pain Goal: *Control of Pain Outcome: Progressing Towards Goal 
  
Problem: Diabetes Self-Management Goal: *Monitoring blood glucose, interpreting and using results Description: Identify recommended blood glucose targets  and personal targets. Outcome: Progressing Towards Goal 
 
Bedside shift change report given to Herminia Amaya RN (oncoming nurse) by Meryle Leeks, RN (offgoing nurse). Report included the following information SBAR, Kardex, ED Summary, Intake/Output, MAR, Accordion and Recent Results.

## 2020-04-08 NOTE — WOUND CARE
WOCN Note:  
 
New consult placed for assessment of perineum. Chart reviewed. Admitted DX:  Diarrhea; Shortness of breath; Anemia; Melanoma Assessment:  
Patient is A&O x 4, communicative and mobile. Bed: total care Patient wearing briefs for incontinence. Patient reports no pain. Heels offloaded with pillows offloading boots. Heels intact without erythema. 1.  Groin, perineum and gluteal cleft:  Red moist rash consistent with Candidiasis. Wound, Pressure Prevention & Skin Care Recommendations: 1. Minimize layers of linen/pads under patient to optimize support surface. 2.  Turn/reposition approximately every 2 hours and offload heels;  
3. Manage incontinence / promote continence/minimize use of briefs when able. 4.  Groin, perineum and gluteal cleft:  Miconazole ointment BID. Discussed above plan with patient and Adama Ordoñez RN. Transition of Care: Plan to follow as needed while admitted to hospital. 
 
NATALIYA BonnerN RN [facility] Inpatient Wound Care Available on [employer] Pager [phone] Office [phone]

## 2020-04-08 NOTE — PROGRESS NOTES
CHICHI: 
 
Patient discharging to [facility] unit with [facility] CM confirmed with Mary Henderson [phone]. RN Notified. RN call report to [phone] Referral sent to [facility] requesting 11:30 pickup. Marlene Monte RN/CRM

## 2020-04-08 NOTE — DISCHARGE INSTRUCTIONS
Discharge Instructions       PATIENT ID: Roger Jeffery  MRN: [id number]   YOB: 1950    DATE OF ADMISSION: 3/30/2020  7:26 AM    DATE OF DISCHARGE: 4/8/2020    PRIMARY CARE PROVIDER: Safia oHbbs NP     ATTENDING PHYSICIAN: Vincent Jones MD  DISCHARGING PROVIDER: Indio Prado MD    To contact this individual call [phone] and ask the  to page. If unavailable ask to be transferred the Adult Hospitalist Department. DISCHARGE DIAGNOSES metastatic melanoma     CONSULTATIONS: IP CONSULT TO HOSPITALIST  IP CONSULT TO GASTROENTEROLOGY  IP CONSULT TO ONCOLOGY  IP CONSULT TO INTENSIVIST  IP CONSULT TO PALLIATIVE CARE - PROVIDER  IP CONSULT TO HEMATOLOGY    PROCEDURES/SURGERIES: Procedure(s):  SIGMOIDOSCOPY FLEXIBLE  COLON BIOPSY    PENDING TEST RESULTS:   At the time of discharge the following test results are still pending:     FOLLOW UP APPOINTMENTS:   Follow-up Information     Follow up With Specialties Details Why Contact Info    Safia Hobbs NP Nurse Practitioner In 1 month  [address] [address]  [phone]             ADDITIONAL CARE RECOMMENDATIONS:     DIET: Regular Diet as tolerated    ACTIVITY: Activity as tolerated    WOUND CARE:     EQUIPMENT needed:       DISCHARGE MEDICATIONS:   See Medication Reconciliation Form    · It is important that you take the medication exactly as they are prescribed. · Keep your medication in the bottles provided by the pharmacist and keep a list of the medication names, dosages, and times to be taken in your wallet. · Do not take other medications without consulting your doctor. NOTIFY YOUR PHYSICIAN FOR ANY OF THE FOLLOWING:   Fever over 101 degrees for 24 hours. Chest pain, shortness of breath, fever, chills, nausea, vomiting, diarrhea, change in mentation, falling, weakness, bleeding. Severe pain or pain not relieved by medications.   Or, any other signs or symptoms that you may have questions about.       DISPOSITION:    Home With:   OT  PT  HH  RN       SNF/Inpatient Rehab/LTAC    Independent/assisted living   x Hospice    Other:     CDMP Checked:   Yes x     PROBLEM LIST Updated:  Yes x       Signed:   iMchael English MD  4/8/2020  9:35 AM

## 2020-04-08 NOTE — DISCHARGE SUMMARY
Discharge Summary PATIENT ID: Sena Ugarte MRN: [id number] YOB: 1950 DATE OF ADMISSION: 3/30/2020  7:26 AM   
DATE OF DISCHARGE: 4/8/20 PRIMARY CARE PROVIDER: Yesika Conner NP  
 
ATTENDING PHYSICIAN: Kasey Darling DISCHARGING PROVIDER: Matilda Mcmillan MD   
To contact this individual call [phone] and ask the  to page. If unavailable ask to be transferred the Adult Hospitalist Department. CONSULTATIONS: IP CONSULT TO HOSPITALIST 
IP CONSULT TO GASTROENTEROLOGY 
IP CONSULT TO ONCOLOGY 
IP CONSULT TO INTENSIVIST 
IP CONSULT TO PALLIATIVE CARE - PROVIDER 
IP CONSULT TO HEMATOLOGY PROCEDURES/SURGERIES: Procedure(s): SIGMOIDOSCOPY FLEXIBLE 
COLON BIOPSY 
 
ADMITTING [facility] COURSE:  
Mr. Eduar Lorenzo is a 49-year-old male who presents to the ER with complaints of shortness of breath and blood in his stool.   
 
[facility]   
  
Assessment & Plan:  
  
1) Colitis, POA Hematochezia with diarrhea line enteric bacterial panel negative, C. difficile negative, infectious versus ischemic versus chemo related. diarrhea - resolving? . S/p flex sig Diffuse colonic distension consistent with colonic ileus- pt reports passing some gas- he aslo had a small BM but without much improved with rectal decompression, continue Reglan. Stool was neg  for C. Difficile, Questran stopped GI consult noted, minimize opiates, rectal decompression PRN,  n.p.o. with ice chips Cont IV zosyn for bacterial colitis Xarelto was not restarted due to bleeding s/p 1 unit BT HGB 7.7  
  
2) Oncology- Melanoma 
- with diffuse mets including to bone per admit CT On tx per Dr Chelsea Schirmer- currently on hold due to hypotension and diarrhea 
- second opinion ?  
  
Hx of skin cancer and bladder cancer 
- recent cystoscopy showed an area of concern in his bladder.  Plan is for a repeat cystoscopy in 6 weeks per patient pt said he cant hold urine now a days  
  
 3) CV- HTN and Hx of A.fib 
S/p ablation- resumed home meds metoprolol Tachycardia much improved, tolerating Toprol Xarelto on hold at the moment. < 7 hgb S/P 1 UNIT HGB 7.7  
  
4) Shortness of breath/dyspnea-meeting the criteria for acute hypoxemic resp failure- but of unclear etiology- 
negative viral resp panel and neg for flu CT chest on admission showing no evidence of pneumonia \" patient does have multiple bilateral pulmonary nodules with needs further work-up, likely outpatient. continue nebs, decreased dose of IV steroids start taper , continue oxygen via nasal cannula. 
  
5) Anemia- due to chronic dz and acute GI blood loss Hb low- some slow continued lower GI bleeding- monitor HGB s/p BT 1 unit  
  
6) hyperchloremic metabolic acidosis, due to colitis, poor oral intake, normal saline Change fluids to half-normal saline, repeat lab in the morning stop p.o. bicarb 
  
7) hyperglycemia-suspected DM based on HbA1c 
- accuchecks and ss insulin 
  
8) coagulopathy- elevated INR due to xarelto use Xarelto was not restarted due to bleeding s/p 1 unit BT HGB 7.7  
  
9)  Acute Moderate Protein-Calorie Malnutrition-  
Patient is currently n.p.o., will continue to eval and hopefully will introduce diet and caloric intake will improve once oral intake is employed  
  
Code status: DNR  
 
 
 
 
 
DISCHARGE DIAGNOSES / PLAN:   
 
1. D/c to hospice ADDITIONAL CARE RECOMMENDATIONS:  
 
 
PENDING TEST RESULTS:  
At the time of discharge the following test results are still pending: FOLLOW UP APPOINTMENTS:   
Follow-up Information Follow up With Specialties Details Why Contact Haydee Conner NP Nurse Practitioner In 1 month  [address] [place] [address] 
[phone] DIET:  As tolerates ACTIVITY: Activity as tolerated WOUND CARE:  
 
EQUIPMENT needed:  
 
 
DISCHARGE MEDICATIONS: 
Current Discharge Medication List  
  
 START taking these medications Details  
morphine CR (MS CONTIN) 30 mg CR tablet Take 1 Tab by mouth every twelve (12) hours every twelve (12) hours for 7 days. Max Daily Amount: 60 mg. 
Qty: 14 Tab, Refills: 0 Associated Diagnoses: Cancer associated pain CONTINUE these medications which have NOT CHANGED Details  
metoprolol succinate (TOPROL-XL) 50 mg XL tablet Take 50 mg by mouth daily. primidone (MYSOLINE) 50 mg tablet Take 50 mg by mouth nightly. triamcinolone acetonide (KENALOG) 0.1 % ointment Apply  to affected area two (2) times daily as needed (rash). use thin layer  
  
temazepam (RESTORIL) 30 mg capsule Take 1 Cap by mouth nightly as needed for Sleep for up to 30 days. Max Daily Amount: 30 mg. 
Qty: 30 Cap, Refills: 0 Associated Diagnoses: Malignant melanoma of torso excluding breast ([address] levothyroxine (SYNTHROID) 75 mcg tablet TAKE 1 TABLET BY MOUTH DAILY BEFORE BREAKFAST Qty: 90 Tab, Refills: 0 Comments: **Patient requests 90 days supply** Associated Diagnoses: Hypothyroidism due to medication  
  
simvastatin (ZOCOR) 40 mg tablet TAKE 1 TABLET NIGHTLY Qty: 90 Tab, Refills: 1 Associated Diagnoses: Hyperlipidemia, unspecified hyperlipidemia type  
  
tamsulosin (FLOMAX) 0.4 mg capsule Take 0.4 mg by mouth daily. amLODIPine (NORVASC) 10 mg tablet Take 10 mg by mouth daily. finasteride (PROSCAR) 5 mg tablet Take 5 mg by mouth daily. STOP taking these medications  
  
 cholestyramine (Questran) 4 gram packet Comments:  
Reason for Stopping:   
   
 predniSONE (DELTASONE) 20 mg tablet Comments:  
Reason for Stopping:   
   
 fenofibrate (LOFIBRA) 160 mg tablet Comments:  
Reason for Stopping:   
   
 tadalafil (CIALIS) 5 mg tablet Comments:  
Reason for Stopping:   
   
 diclofenac EC (VOLTAREN) 75 mg EC tablet Comments:  
Reason for Stopping:   
   
 rivaroxaban (XARELTO) 20 mg tab tablet Comments:  
Reason for Stopping: lisinopril (PRINIVIL, ZESTRIL) 20 mg tablet Comments:  
Reason for Stopping:   
   
  
 
 
 
NOTIFY YOUR PHYSICIAN FOR ANY OF THE FOLLOWING:  
Fever over 101 degrees for 24 hours. Chest pain, shortness of breath, fever, chills, nausea, vomiting, diarrhea, change in mentation, falling, weakness, bleeding. Severe pain or pain not relieved by medications. Or, any other signs or symptoms that you may have questions about. DISPOSITION: 
  Home With: 
 OT  PT  HH  RN  
  
 Long term SNF/Inpatient Rehab Independent/assisted living  
x Hospice Other:  
 
 
PATIENT CONDITION AT DISCHARGE:  
 
Functional status  
x Poor Deconditioned Independent Cognition Lucid   
x Forgetful Dementia Catheters/lines (plus indication) Aviles PICC   
 PEG   
x None Code status Full code   
x DNR PHYSICAL EXAMINATION AT DISCHARGE: 
General:          Alert, cooperative, no distress, appears stated age. HEENT:           Atraumatic, anicteric sclerae, pink conjunctivae No oral ulcers, mucosa moist, throat clear, dentition fair Neck:               Supple, symmetrical 
Lungs:             Clear to auscultation bilaterally. No Wheezing or Rhonchi. No rales. Chest wall:      No tenderness  No Accessory muscle use. Heart:              Regular  rhythm,  No  murmur   No edema Abdomen:        Soft, non-tender. Not distended. Bowel sounds normal 
Extremities:     No cyanosis. No clubbing,   
                        Skin turgor normal, Capillary refill normal 
Skin:                Not pale. Not Jaundiced  No rashes Psych:             Not anxious or agitated. Neurologic:      Alert, moves all extremities, answers questions appropriately and responds to commands CHRONIC MEDICAL DIAGNOSES: 
Problem List as of 4/8/2020 Date Reviewed: 4/8/2020 Codes Class Noted - Resolved Dehydration ICD-10-CM: E86.0 ICD-9-CM: 276.51  3/30/2020 - Present Shortness of breath ICD-10-CM: R06.02 
ICD-9-CM: 786.05  3/30/2020 - Present Diarrhea ICD-10-CM: R19.7 ICD-9-CM: 787.91  3/30/2020 - Present Melanoma ([facility] ICD-10-CM: C43.9 ICD-9-CM: 172.9  3/30/2020 - Present Anemia ICD-10-CM: D64.9 ICD-9-CM: 285.9  3/30/2020 - Present Hypercalcemia ICD-10-CM: A10.17 
ICD-9-CM: 275.42  3/18/2020 - Present Hypothyroidism due to medication ICD-10-CM: E03.2 ICD-9-CM: 244.8, E980.5  3/17/2020 - Present Malignant melanoma of torso excluding breast ([facility] ICD-10-CM: C43.59 
ICD-9-CM: 172.5  12/18/2019 - Present Abdominal wall mass of right upper quadrant ICD-10-CM: R19.01 
ICD-9-CM: 789.31  12/2/2019 - Present Essential hypertension ICD-10-CM: I10 
ICD-9-CM: 401.9  2/1/2019 - Present Hyperlipidemia ICD-10-CM: E78.5 ICD-9-CM: 272.4  2/1/2019 - Present Atrial fibrillation [facility]) ICD-10-CM: I48.91 
ICD-9-CM: 427.31  2/1/2019 - Present History of skin cancer ICD-10-CM: P35.004 ICD-9-CM: V10.83  2/1/2019 - Present BMI 27.0-27.9,adult ICD-10-CM: P61.25 ICD-9-CM: V85.23  2/1/2019 - Present History of bladder cancer ICD-10-CM: Z85.51 
ICD-9-CM: V10.51  2/1/2019 - Present Primary osteoarthritis of left knee ICD-10-CM: M17.12 
ICD-9-CM: 715.16  8/3/2017 - Present Greater than 20  minutes were spent with the patient on counseling and coordination of care Signed:  
Edgar Ramsey MD 
4/8/2020 
9:42 AM

## 2020-04-08 NOTE — PROGRESS NOTES
ADULT PROTOCOL: JET AEROSOL ASSESSMENT Patient  Olman Patel     68 y.o.   male     4/8/2020  9:12 AM 
 
Breath Sounds Pre Procedure: Right Breath Sounds: Diminished Left Breath Sounds: Diminished Breath Sounds Post Procedure: Right Breath Sounds: Diminished Left Breath Sounds: Diminished Breathing pattern: Pre procedure Breathing Pattern: Regular Post procedure Breathing Pattern: Regular Heart Rate: Pre procedure Pulse: 90 
         Post procedure Pulse: 90 Resp Rate: Pre procedure Respirations: 18 Post procedure Respirations: 18 Peak Flow: Pre bronchodilator Post bronchodilator FVC/FEV1:   
 
Incentive Spirometry:    
     
 
Cough: Pre procedure Cough: Non-productive Post procedure Cough: Non-productive Suctioned: NO Sputum: Pre procedure Post procedure Oxygen: O2 Device: Room air   FiO2 (%) 21 Changed: NO SpO2: Pre procedure SpO2: 97 %   without oxygen Post procedure SpO2: 97 %  without oxygen Nebulizer Therapy: Current medications Aerosolized Medications: DuoNeb, Pulmicort Changed: YES Smoking History:  
 
Problem List:  
Patient Active Problem List  
Diagnosis Code  Essential hypertension I10  
 Hyperlipidemia E78.5  Atrial fibrillation (HCC) I48.91  
 History of skin cancer V87.232  BMI 27.0-27.9,adult Z68.27  
 History of bladder cancer Z85.51  Abdominal wall mass of right upper quadrant R19.01  
 Malignant melanoma of torso excluding breast (HCC) C43.59  
 Primary osteoarthritis of left knee M17.12  
 Hypothyroidism due to medication E03.2  Hypercalcemia E83.52  
 Dehydration E86.0  Shortness of breath R06.02  
 Diarrhea R19.7  Melanoma ([address] C43.9  Anemia D64.9 Respiratory Therapist: Cecilia Infante, RT

## 2020-04-09 NOTE — PROGRESS NOTES
Medicaid LTSS screening successfully processed and faxed to [place] [address] at [phone]. CM spoke with Kary Alejo with [place] [address] to notify this CM if the do not receive the UAI.  TRINY Reyez

## 2020-04-17 ENCOUNTER — HOSPITAL ENCOUNTER (OUTPATIENT)
Dept: INFUSION THERAPY | Age: 70
End: 2020-04-17

## 2020-04-28 ENCOUNTER — APPOINTMENT (OUTPATIENT)
Dept: INFUSION THERAPY | Age: 70
End: 2020-04-28

## 2020-05-08 ENCOUNTER — APPOINTMENT (OUTPATIENT)
Dept: INFUSION THERAPY | Age: 70
End: 2020-05-08

## 2020-05-26 ENCOUNTER — APPOINTMENT (OUTPATIENT)
Dept: INFUSION THERAPY | Age: 70
End: 2020-05-26

## 2020-05-29 ENCOUNTER — APPOINTMENT (OUTPATIENT)
Dept: INFUSION THERAPY | Age: 70
End: 2020-05-29

## 2020-06-19 ENCOUNTER — APPOINTMENT (OUTPATIENT)
Dept: INFUSION THERAPY | Age: 70
End: 2020-06-19

## 2020-06-23 ENCOUNTER — APPOINTMENT (OUTPATIENT)
Dept: INFUSION THERAPY | Age: 70
End: 2020-06-23

## 2022-09-26 NOTE — H&P
Date of Surgery Update:  Griselda Enriquez was seen and examined. History and physical has been reviewed. The patient has been examined. There have been no significant clinical changes since the completion of the originally dated History and Physical.  Patient identified by surgeon; surgical site was confirmed by patient and surgeon. Signed By: Daniel Ramon MD     February 28, 2020 1:20 PM         Please note from the office and include the additional information below:    Past Medical History  Past Medical History:   Diagnosis Date    Abdominal wall mass of right upper quadrant 12/2/2019    Atrial fibrillation (HCC)     BPH (benign prostatic hyperplasia)     Cancer ([facility] early 1990s    BLADDER    Hypercholesterolemia     Hypertension     Joint replaced 2011    right knee    Skin cancer     Skin disorder 2018    Skin Cancer - melanoma across stomach, lymphnode involvement in Right armpit and Right groin    Sun-damaged skin         Past Surgical History  Past Surgical History:   Procedure Laterality Date    HX AFIB ABLATION  2018    HX APPENDECTOMY      HX HERNIA REPAIR  1990s    HX KNEE REPLACEMENT  2010    right knee    HX KNEE REPLACEMENT  04/02/2019    left knee    HX MALIGNANT SKIN LESION EXCISION      HX OTHER SURGICAL  12/06/2019    Excisional biopsy of right upper quadrant abdominal wall mass.  HX TONSILLECTOMY      HX UROLOGICAL      CYSTOSCOPY    SKIN TISSUE PROCEDURE UNLISTED  2018    Melanoma across abdomen, Right armpit and groin lymphnode involvement        Social History  The patient Griselda Enriquez  reports that he quit smoking about 32 years ago. He has a 40.00 pack-year smoking history. He has never used smokeless tobacco. He reports current alcohol use of about 2.0 standard drinks of alcohol per week. He reports that he does not use drugs.      Family History  Family History   Problem Relation Age of Onset    Dementia Mother     Hypertension Father     Hypertension Sister Implemented All Universal Safety Interventions:  [place] to call system. Call bell, personal items and telephone within reach. Instruct patient to call for assistance. Room bathroom lighting operational. Non-slip footwear when patient is off stretcher. Physically safe environment: no spills, clutter or unnecessary equipment. Stretcher in lowest position, wheels locked, appropriate side rails in place.

## 2022-12-26 NOTE — PROGRESS NOTES
Received call from spouse requesting referral be sent to [facility] [phone]. She asked that fax be sent to [phone]. Jeromy Werner, RN, BSN, [place] 
ED Care Management [phone]

## 2023-03-20 NOTE — PROGRESS NOTES
Problem: Pressure Injury - Risk of 
Goal: *Prevention of pressure injury Description: Document Carlos Scale and appropriate interventions in the flowsheet. Outcome: Progressing Towards Goal 
Note: Pressure Injury Interventions: 
Sensory Interventions: Assess changes in LOC, Discuss PT/OT consult with provider, Minimize linen layers, Turn and reposition approx. every two hours (pillows and wedges if needed) Moisture Interventions: Apply protective barrier, creams and emollients, Internal/External urinary devices, Maintain skin hydration (lotion/cream), Minimize layers Activity Interventions: Increase time out of bed, Pressure redistribution bed/mattress(bed type) Mobility Interventions: Pressure redistribution bed/mattress (bed type), HOB 30 degrees or less, Turn and reposition approx. every two hours(pillow and wedges) Nutrition Interventions: Document food/fluid/supplement intake Friction and Shear Interventions: HOB 30 degrees or less, Apply protective barrier, creams and emollients, Minimize layers Problem: Pain Goal: *Control of Pain Outcome: Progressing Towards Goal 
 
Pain controlled by percocet Problem: Breathing Pattern - Ineffective Goal: *Use of effective breathing techniques Outcome: Progressing Towards Goal 
 
 
Maintained 02 sats WDL on RA. Dyspnea on exertion Nasalis-Muscle-Based Myocutaneous Island Pedicle Flap Text: Using a #15 blade, an incision was made around the donor flap to the level of the nasalis muscle. Wide lateral undermining was then performed in both the subcutaneous plane above the nasalis muscle, and in a submuscular plane just above periosteum. This allowed the formation of a free nasalis muscle axial pedicle (based on the angular artery) which was still attached to the actual cutaneous flap, increasing its mobility and vascular viability. Hemostasis was obtained with pinpoint electrocoagulation. The flap was mobilized into position and the pivotal anchor points positioned and stabilized with buried interrupted sutures. Subcutaneous and dermal tissues were closed in a multilayered fashion with sutures. Tissue redundancies were excised, and the epidermal edges were apposed without significant tension and sutured with sutures.

## 2025-04-15 NOTE — PERIOP NOTES
Patient interviewed in holding area, identity and procedure verified.
Spoke with family waiting, wife Jj Cedeno, at start of case
Detail Level: Generalized
Instructions: This plan will send the code FBSE to the PM system.  DO NOT or CHANGE the price.
Price (Do Not Change): 0.00

## (undated) DEVICE — REM POLYHESIVE ADULT PATIENT RETURN ELECTRODE: Brand: VALLEYLAB

## (undated) DEVICE — STERILE POLYISOPRENE POWDER-FREE SURGICAL GLOVES WITH EMOLLIENT COATING: Brand: PROTEXIS

## (undated) DEVICE — TAPE DRSG RETEN HYPFX 2INX10YD --

## (undated) DEVICE — ROCKER SWITCH PENCIL BLADE ELECTRODE, HOLSTER: Brand: EDGE

## (undated) DEVICE — SUTURE MCRYL SZ 4-0 L27IN ABSRB UD L19MM PS-2 1/2 CIR PRIM Y426H

## (undated) DEVICE — SUTURE ETHLN SZ 4-0 L18IN NONABSORBABLE BLK L19MM PS-2 3/8 1667H

## (undated) DEVICE — DRAPE,LAPAROTOMY,T,PEDI,STERILE: Brand: MEDLINE

## (undated) DEVICE — SPONGE GZ W4XL4IN COT 12 PLY TYP VII WVN C FLD DSGN

## (undated) DEVICE — SURGICAL PROCEDURE PACK BASIN MAJ SET CUST NO CAUT

## (undated) DEVICE — SUTURE VCRL SZ 2-0 L27IN ABSRB UD L26MM SH 1/2 CIR J417H

## (undated) DEVICE — (D)PREP SKN CHLRAPRP APPL 26ML -- CONVERT TO ITEM 371833

## (undated) DEVICE — PACK,BASIC,SIRUS,V: Brand: MEDLINE

## (undated) DEVICE — SUTURE MCRYL SZ 3-0 L27IN ABSRB UD L24MM PS-1 3/8 CIR PRIM Y936H

## (undated) DEVICE — PAD,NON-ADHERENT,W/AD,3X4,ST,LF,1/PK: Brand: MEDLINE

## (undated) DEVICE — TOWEL SURG W17XL27IN STD BLU COT NONFENESTRATED PREWASHED

## (undated) DEVICE — FCPS RAD JAW 4LC 240CM W/NDL -- BX/20 RADIAL JAW 4

## (undated) DEVICE — SYR 10ML LUER LOK 1/5ML GRAD --

## (undated) DEVICE — STRAP,POSITIONING,KNEE/BODY,FOAM,4X60": Brand: MEDLINE

## (undated) DEVICE — SOLUTION IV 1000ML 0.9% SOD CHL

## (undated) DEVICE — INFECTION CONTROL KIT SYS